# Patient Record
Sex: FEMALE | Race: WHITE | NOT HISPANIC OR LATINO
[De-identification: names, ages, dates, MRNs, and addresses within clinical notes are randomized per-mention and may not be internally consistent; named-entity substitution may affect disease eponyms.]

---

## 2017-01-31 ENCOUNTER — TRANSCRIPTION ENCOUNTER (OUTPATIENT)
Age: 82
End: 2017-01-31

## 2017-08-21 ENCOUNTER — TRANSCRIPTION ENCOUNTER (OUTPATIENT)
Age: 82
End: 2017-08-21

## 2017-08-30 ENCOUNTER — TRANSCRIPTION ENCOUNTER (OUTPATIENT)
Age: 82
End: 2017-08-30

## 2017-11-08 ENCOUNTER — TRANSCRIPTION ENCOUNTER (OUTPATIENT)
Age: 82
End: 2017-11-08

## 2018-03-21 ENCOUNTER — APPOINTMENT (OUTPATIENT)
Dept: OPHTHALMOLOGY | Facility: CLINIC | Age: 83
End: 2018-03-21

## 2018-03-29 ENCOUNTER — APPOINTMENT (OUTPATIENT)
Dept: OPHTHALMOLOGY | Facility: CLINIC | Age: 83
End: 2018-03-29

## 2018-06-01 ENCOUNTER — RESULT REVIEW (OUTPATIENT)
Age: 83
End: 2018-06-01

## 2018-09-05 ENCOUNTER — APPOINTMENT (OUTPATIENT)
Dept: ORTHOPEDIC SURGERY | Facility: CLINIC | Age: 83
End: 2018-09-05
Payer: MEDICARE

## 2018-09-05 VITALS
DIASTOLIC BLOOD PRESSURE: 76 MMHG | SYSTOLIC BLOOD PRESSURE: 136 MMHG | HEIGHT: 60 IN | WEIGHT: 155 LBS | BODY MASS INDEX: 30.43 KG/M2 | HEART RATE: 67 BPM

## 2018-09-05 PROCEDURE — 73502 X-RAY EXAM HIP UNI 2-3 VIEWS: CPT | Mod: LT

## 2018-09-05 PROCEDURE — 99203 OFFICE O/P NEW LOW 30 MIN: CPT

## 2018-09-11 ENCOUNTER — RESULT CHARGE (OUTPATIENT)
Age: 83
End: 2018-09-11

## 2018-09-12 ENCOUNTER — OTHER (OUTPATIENT)
Age: 83
End: 2018-09-12

## 2018-09-26 ENCOUNTER — APPOINTMENT (OUTPATIENT)
Dept: ORTHOPEDIC SURGERY | Facility: CLINIC | Age: 83
End: 2018-09-26

## 2018-09-27 ENCOUNTER — OUTPATIENT (OUTPATIENT)
Dept: OUTPATIENT SERVICES | Facility: HOSPITAL | Age: 83
LOS: 1 days | End: 2018-09-27
Payer: MEDICARE

## 2018-09-27 ENCOUNTER — APPOINTMENT (OUTPATIENT)
Dept: RADIOLOGY | Facility: CLINIC | Age: 83
End: 2018-09-27
Payer: MEDICARE

## 2018-09-27 DIAGNOSIS — Z00.00 ENCOUNTER FOR GENERAL ADULT MEDICAL EXAMINATION WITHOUT ABNORMAL FINDINGS: ICD-10-CM

## 2018-09-27 DIAGNOSIS — Z96.649 PRESENCE OF UNSPECIFIED ARTIFICIAL HIP JOINT: Chronic | ICD-10-CM

## 2018-09-27 DIAGNOSIS — Z98.89 OTHER SPECIFIED POSTPROCEDURAL STATES: Chronic | ICD-10-CM

## 2018-09-27 PROCEDURE — 20610 DRAIN/INJ JOINT/BURSA W/O US: CPT | Mod: LT

## 2018-09-27 PROCEDURE — 20610 DRAIN/INJ JOINT/BURSA W/O US: CPT

## 2018-09-27 PROCEDURE — 77002 NEEDLE LOCALIZATION BY XRAY: CPT | Mod: 26

## 2018-09-27 PROCEDURE — 77002 NEEDLE LOCALIZATION BY XRAY: CPT

## 2018-11-21 ENCOUNTER — APPOINTMENT (OUTPATIENT)
Dept: ORTHOPEDIC SURGERY | Facility: CLINIC | Age: 83
End: 2018-11-21

## 2018-12-05 ENCOUNTER — APPOINTMENT (OUTPATIENT)
Dept: ORTHOPEDIC SURGERY | Facility: CLINIC | Age: 83
End: 2018-12-05
Payer: MEDICARE

## 2018-12-05 PROCEDURE — 99214 OFFICE O/P EST MOD 30 MIN: CPT

## 2019-02-12 ENCOUNTER — TRANSCRIPTION ENCOUNTER (OUTPATIENT)
Age: 84
End: 2019-02-12

## 2019-07-25 ENCOUNTER — RX RENEWAL (OUTPATIENT)
Age: 84
End: 2019-07-25

## 2019-10-25 ENCOUNTER — APPOINTMENT (OUTPATIENT)
Dept: INTERNAL MEDICINE | Facility: CLINIC | Age: 84
End: 2019-10-25
Payer: MEDICARE

## 2019-10-25 ENCOUNTER — NON-APPOINTMENT (OUTPATIENT)
Age: 84
End: 2019-10-25

## 2019-10-25 VITALS
OXYGEN SATURATION: 97 % | HEART RATE: 95 BPM | TEMPERATURE: 97.8 F | WEIGHT: 157 LBS | BODY MASS INDEX: 30.82 KG/M2 | HEIGHT: 60 IN

## 2019-10-25 DIAGNOSIS — Z80.3 FAMILY HISTORY OF MALIGNANT NEOPLASM OF BREAST: ICD-10-CM

## 2019-10-25 DIAGNOSIS — Z87.891 PERSONAL HISTORY OF NICOTINE DEPENDENCE: ICD-10-CM

## 2019-10-25 DIAGNOSIS — S62.101A FRACTURE OF UNSPECIFIED CARPAL BONE, RIGHT WRIST, INITIAL ENCOUNTER FOR CLOSED FRACTURE: ICD-10-CM

## 2019-10-25 PROCEDURE — G0439: CPT

## 2019-10-25 PROCEDURE — 82270 OCCULT BLOOD FECES: CPT

## 2019-10-25 PROCEDURE — 90686 IIV4 VACC NO PRSV 0.5 ML IM: CPT

## 2019-10-25 PROCEDURE — 36415 COLL VENOUS BLD VENIPUNCTURE: CPT

## 2019-10-25 PROCEDURE — G0008: CPT

## 2019-10-25 PROCEDURE — 93000 ELECTROCARDIOGRAM COMPLETE: CPT

## 2019-10-25 NOTE — HISTORY OF PRESENT ILLNESS
[de-identified] : the patient is an 85-year-old female comes in for complete physical examination. Status post breast cancer in 1994 on the eft but does not get followup mammograms She has had a left hip replacement and has known lipidemia. She is treated for long-standing depression withWellbutrin.At the present time she occasionally gets sad and from only this but is not crying and is leaving the house is sleeping well.Over the last year she has gotten one minute of chest pain at rest which is dull and aching but not associated with any other cardiopulmonary symptoms and resolves without sequelae. This occurs once a day She has no chest pain or dyspnea on exertion. She has had a previous stress test that was normal She has refluxand was started on omeprazole by ENT and will take it for 2 weeks. She has no other change in her bowel or bladder and denies melena or blood in her stool. She has pain in the left hip but no weakness numbness loss of balance and coordination. She is up-to-date on Pneumovax as well asshingles vaccineand she will get a flu shot. She is overall feeling well

## 2019-10-25 NOTE — REVIEW OF SYSTEMS
[Chest Pain] : chest pain [Joint Pain] : joint pain [Depression] : depression [FreeTextEntry5] : C. atypical chest pain [Negative] : Heme/Lymph [FreeTextEntry8] : incontinence overflow [FreeTextEntry7] : treated for GERD [FreeTextEntry9] : chronic left hip pain on and off/ight wrist pain secondary to fracture [de-identified] : adiculopathy of the right arm chronicintermittent without weakness [de-identified] : improved on medication

## 2019-10-25 NOTE — ASSESSMENT
[FreeTextEntry1] : the patient's EKG shows no significant changes. We are checking her lipids She refuses to follow up on her breast with mammograms She is up-to-date on all vaccinations She has nonspecific chest pain which we will followand may be GI in origin Overall her health is good and all screening tests are being done

## 2019-10-25 NOTE — HEALTH RISK ASSESSMENT
[1] : 2) Feeling down, depressed, or hopeless for several days (1) [0] : 1) Little interest or pleasure doing things: Not at all (0) [With Patient/Caregiver] : With Patient/Caregiver [Relationship: ___] : Relationship: [unfilled] [I will adhere to the patient's wishes as expressed in the advance directive except as noted below.] : I will adhere to the patient's wishes as expressed in the advance directive except as noted below [AdvancecareDate] : 10/25/19

## 2019-10-25 NOTE — PHYSICAL EXAM
[Normal] : normal gait, coordination grossly intact, no focal deficits [de-identified] : status post cataract [de-identified] : no chest discomfort on palpation [de-identified] : that is post lumpectomy left upper outer quadrant since no masses on the right [FreeTextEntry1] : guaiac negative no masses [de-identified] : no adenopathy [de-identified] : deformity of the right wrist secondary to fracture/status post left hip surgery with occasional limp and pain [de-identified] : no significant lesion

## 2019-10-26 LAB
25(OH)D3 SERPL-MCNC: 43.7 NG/ML
ALBUMIN SERPL ELPH-MCNC: 4.4 G/DL
ALP BLD-CCNC: 55 U/L
ALT SERPL-CCNC: 22 U/L
ANION GAP SERPL CALC-SCNC: 11 MMOL/L
APPEARANCE: CLEAR
AST SERPL-CCNC: 23 U/L
BACTERIA: ABNORMAL
BASOPHILS # BLD AUTO: 0.04 K/UL
BASOPHILS NFR BLD AUTO: 0.6 %
BILIRUB SERPL-MCNC: 0.3 MG/DL
BILIRUBIN URINE: NEGATIVE
BLOOD URINE: NEGATIVE
BUN SERPL-MCNC: 15 MG/DL
CALCIUM SERPL-MCNC: 9.9 MG/DL
CHLORIDE SERPL-SCNC: 104 MMOL/L
CHOLEST SERPL-MCNC: 184 MG/DL
CHOLEST/HDLC SERPL: 3 RATIO
CK SERPL-CCNC: 90 U/L
CO2 SERPL-SCNC: 27 MMOL/L
COLOR: NORMAL
CREAT SERPL-MCNC: 0.78 MG/DL
EOSINOPHIL # BLD AUTO: 0.1 K/UL
EOSINOPHIL NFR BLD AUTO: 1.4 %
GLUCOSE QUALITATIVE U: NEGATIVE
GLUCOSE SERPL-MCNC: 100 MG/DL
HCT VFR BLD CALC: 45.9 %
HDLC SERPL-MCNC: 61 MG/DL
HGB BLD-MCNC: 14.3 G/DL
HYALINE CASTS: 2 /LPF
IMM GRANULOCYTES NFR BLD AUTO: 0.3 %
KETONES URINE: NEGATIVE
LDLC SERPL CALC-MCNC: 99 MG/DL
LEUKOCYTE ESTERASE URINE: NEGATIVE
LYMPHOCYTES # BLD AUTO: 1.44 K/UL
LYMPHOCYTES NFR BLD AUTO: 20.8 %
MAN DIFF?: NORMAL
MCHC RBC-ENTMCNC: 28.2 PG
MCHC RBC-ENTMCNC: 31.2 GM/DL
MCV RBC AUTO: 90.5 FL
MICROSCOPIC-UA: NORMAL
MONOCYTES # BLD AUTO: 0.77 K/UL
MONOCYTES NFR BLD AUTO: 11.1 %
NEUTROPHILS # BLD AUTO: 4.54 K/UL
NEUTROPHILS NFR BLD AUTO: 65.8 %
NITRITE URINE: NEGATIVE
PH URINE: 6
PLATELET # BLD AUTO: 231 K/UL
POTASSIUM SERPL-SCNC: 4.8 MMOL/L
PROT SERPL-MCNC: 7.1 G/DL
PROTEIN URINE: NEGATIVE
RBC # BLD: 5.07 M/UL
RBC # FLD: 14 %
RED BLOOD CELLS URINE: 0 /HPF
SODIUM SERPL-SCNC: 142 MMOL/L
SPECIFIC GRAVITY URINE: 1.01
SQUAMOUS EPITHELIAL CELLS: 5 /HPF
TRIGL SERPL-MCNC: 120 MG/DL
UROBILINOGEN URINE: NORMAL
WBC # FLD AUTO: 6.91 K/UL
WHITE BLOOD CELLS URINE: 3 /HPF

## 2019-11-04 ENCOUNTER — LABORATORY RESULT (OUTPATIENT)
Age: 84
End: 2019-11-04

## 2019-11-05 ENCOUNTER — APPOINTMENT (OUTPATIENT)
Dept: DERMATOLOGY | Facility: CLINIC | Age: 84
End: 2019-11-05
Payer: MEDICARE

## 2019-11-05 VITALS — WEIGHT: 157 LBS | BODY MASS INDEX: 30.82 KG/M2 | HEIGHT: 60 IN

## 2019-11-05 DIAGNOSIS — L40.9 PSORIASIS, UNSPECIFIED: ICD-10-CM

## 2019-11-05 DIAGNOSIS — D48.5 NEOPLASM OF UNCERTAIN BEHAVIOR OF SKIN: ICD-10-CM

## 2019-11-05 DIAGNOSIS — Z78.9 OTHER SPECIFIED HEALTH STATUS: ICD-10-CM

## 2019-11-05 DIAGNOSIS — L21.9 SEBORRHEIC DERMATITIS, UNSPECIFIED: ICD-10-CM

## 2019-11-05 PROCEDURE — 11102 TANGNTL BX SKIN SINGLE LES: CPT

## 2019-11-05 PROCEDURE — 99204 OFFICE O/P NEW MOD 45 MIN: CPT | Mod: 25

## 2019-12-06 ENCOUNTER — APPOINTMENT (OUTPATIENT)
Dept: DERMATOLOGY | Facility: CLINIC | Age: 84
End: 2019-12-06
Payer: MEDICARE

## 2019-12-06 ENCOUNTER — TRANSCRIPTION ENCOUNTER (OUTPATIENT)
Age: 84
End: 2019-12-06

## 2019-12-06 PROCEDURE — 99215 OFFICE O/P EST HI 40 MIN: CPT

## 2019-12-06 RX ORDER — ESTRADIOL 0.1 MG/G
0.1 CREAM VAGINAL
Qty: 42 | Refills: 0 | Status: ACTIVE | COMMUNITY
Start: 2019-07-03

## 2020-01-27 ENCOUNTER — APPOINTMENT (OUTPATIENT)
Dept: DERMATOLOGY | Facility: CLINIC | Age: 85
End: 2020-01-27

## 2020-03-10 ENCOUNTER — APPOINTMENT (OUTPATIENT)
Dept: DERMATOLOGY | Facility: CLINIC | Age: 85
End: 2020-03-10
Payer: MEDICARE

## 2020-03-10 PROCEDURE — 13132 CMPLX RPR F/C/C/M/N/AX/G/H/F: CPT

## 2020-03-10 PROCEDURE — 17311 MOHS 1 STAGE H/N/HF/G: CPT

## 2020-03-11 ENCOUNTER — APPOINTMENT (OUTPATIENT)
Dept: DERMATOLOGY | Facility: CLINIC | Age: 85
End: 2020-03-11
Payer: MEDICARE

## 2020-03-11 PROCEDURE — 99024 POSTOP FOLLOW-UP VISIT: CPT

## 2020-03-17 ENCOUNTER — APPOINTMENT (OUTPATIENT)
Dept: DERMATOLOGY | Facility: CLINIC | Age: 85
End: 2020-03-17
Payer: MEDICARE

## 2020-03-17 PROCEDURE — 99024 POSTOP FOLLOW-UP VISIT: CPT

## 2020-06-30 ENCOUNTER — APPOINTMENT (OUTPATIENT)
Dept: DERMATOLOGY | Facility: CLINIC | Age: 85
End: 2020-06-30
Payer: MEDICARE

## 2020-06-30 VITALS — TEMPERATURE: 96.8 F

## 2020-06-30 DIAGNOSIS — C44.319 BASAL CELL CARCINOMA OF SKIN OF OTHER PARTS OF FACE: ICD-10-CM

## 2020-06-30 PROCEDURE — 99213 OFFICE O/P EST LOW 20 MIN: CPT

## 2020-09-04 ENCOUNTER — TRANSCRIPTION ENCOUNTER (OUTPATIENT)
Age: 85
End: 2020-09-04

## 2020-10-28 ENCOUNTER — NON-APPOINTMENT (OUTPATIENT)
Age: 85
End: 2020-10-28

## 2020-10-28 ENCOUNTER — APPOINTMENT (OUTPATIENT)
Dept: INTERNAL MEDICINE | Facility: CLINIC | Age: 85
End: 2020-10-28
Payer: MEDICARE

## 2020-10-28 VITALS
BODY MASS INDEX: 29.64 KG/M2 | SYSTOLIC BLOOD PRESSURE: 130 MMHG | HEIGHT: 60 IN | WEIGHT: 151 LBS | DIASTOLIC BLOOD PRESSURE: 70 MMHG | OXYGEN SATURATION: 96 % | TEMPERATURE: 98.8 F | HEART RATE: 94 BPM

## 2020-10-28 DIAGNOSIS — Z23 ENCOUNTER FOR IMMUNIZATION: ICD-10-CM

## 2020-10-28 PROCEDURE — G0008: CPT

## 2020-10-28 PROCEDURE — 93000 ELECTROCARDIOGRAM COMPLETE: CPT

## 2020-10-28 PROCEDURE — G0439: CPT

## 2020-10-28 PROCEDURE — 36415 COLL VENOUS BLD VENIPUNCTURE: CPT

## 2020-10-28 PROCEDURE — 90662 IIV NO PRSV INCREASED AG IM: CPT

## 2020-10-28 NOTE — PHYSICAL EXAM
[Normal] : normal gait, coordination grossly intact, no focal deficits [de-identified] : S/P L lumpectomu, 12 0clock R breadt large 3 cm mass ecchymoses,nipple mildly deformed

## 2020-10-28 NOTE — HISTORY OF PRESENT ILLNESS
[de-identified] : Pt is an 86 F s/p L breast Ca =-alana, hyperlipidemia who comes in for complete px. She was seen by her gyne and breast surgeon following onset of itching and dull pain at12 o'clock R beast. W/u w mammogram,MRI found breast cancer on R and there was a ? of a bone lesion wh is being followed up W CAT scan. With these exceptions she has been well ROS otherwise entirely neg. At the present time she has no chest pain.

## 2020-10-29 LAB
25(OH)D3 SERPL-MCNC: 57.9 NG/ML
ALBUMIN SERPL ELPH-MCNC: 4.3 G/DL
ALP BLD-CCNC: 56 U/L
ALT SERPL-CCNC: 22 U/L
ANION GAP SERPL CALC-SCNC: 12 MMOL/L
APPEARANCE: ABNORMAL
AST SERPL-CCNC: 20 U/L
BACTERIA: ABNORMAL
BASOPHILS # BLD AUTO: 0.04 K/UL
BASOPHILS NFR BLD AUTO: 0.6 %
BILIRUB SERPL-MCNC: 0.5 MG/DL
BILIRUBIN URINE: NEGATIVE
BLOOD URINE: NEGATIVE
BUN SERPL-MCNC: 13 MG/DL
CALCIUM SERPL-MCNC: 9.4 MG/DL
CHLORIDE SERPL-SCNC: 104 MMOL/L
CHOLEST SERPL-MCNC: 180 MG/DL
CK SERPL-CCNC: 67 U/L
CO2 SERPL-SCNC: 26 MMOL/L
COLOR: YELLOW
CREAT SERPL-MCNC: 0.8 MG/DL
EOSINOPHIL # BLD AUTO: 0.11 K/UL
EOSINOPHIL NFR BLD AUTO: 1.7 %
GLUCOSE QUALITATIVE U: NEGATIVE
GLUCOSE SERPL-MCNC: 109 MG/DL
HCT VFR BLD CALC: 42.8 %
HDLC SERPL-MCNC: 61 MG/DL
HGB BLD-MCNC: 13.4 G/DL
HYALINE CASTS: 0 /LPF
IMM GRANULOCYTES NFR BLD AUTO: 0.3 %
KETONES URINE: NEGATIVE
LDLC SERPL CALC-MCNC: 96 MG/DL
LEUKOCYTE ESTERASE URINE: ABNORMAL
LYMPHOCYTES # BLD AUTO: 1.17 K/UL
LYMPHOCYTES NFR BLD AUTO: 17.6 %
MAN DIFF?: NORMAL
MCHC RBC-ENTMCNC: 27.9 PG
MCHC RBC-ENTMCNC: 31.3 GM/DL
MCV RBC AUTO: 89.2 FL
MICROSCOPIC-UA: NORMAL
MONOCYTES # BLD AUTO: 0.73 K/UL
MONOCYTES NFR BLD AUTO: 11 %
NEUTROPHILS # BLD AUTO: 4.57 K/UL
NEUTROPHILS NFR BLD AUTO: 68.8 %
NITRITE URINE: NEGATIVE
NONHDLC SERPL-MCNC: 119 MG/DL
PH URINE: 6.5
PLATELET # BLD AUTO: 223 K/UL
POTASSIUM SERPL-SCNC: 4.5 MMOL/L
PROT SERPL-MCNC: 6.7 G/DL
PROTEIN URINE: NORMAL
RBC # BLD: 4.8 M/UL
RBC # FLD: 14 %
RED BLOOD CELLS URINE: 2 /HPF
SODIUM SERPL-SCNC: 141 MMOL/L
SPECIFIC GRAVITY URINE: 1.01
SQUAMOUS EPITHELIAL CELLS: >27 /HPF
TRIGL SERPL-MCNC: 115 MG/DL
UROBILINOGEN URINE: NORMAL
WBC # FLD AUTO: 6.64 K/UL
WHITE BLOOD CELLS URINE: 15 /HPF

## 2021-01-02 ENCOUNTER — TRANSCRIPTION ENCOUNTER (OUTPATIENT)
Age: 86
End: 2021-01-02

## 2021-01-07 ENCOUNTER — FORM ENCOUNTER (OUTPATIENT)
Age: 86
End: 2021-01-07

## 2021-01-08 ENCOUNTER — TRANSCRIPTION ENCOUNTER (OUTPATIENT)
Age: 86
End: 2021-01-08

## 2021-01-10 ENCOUNTER — APPOINTMENT (OUTPATIENT)
Dept: DISASTER EMERGENCY | Facility: HOSPITAL | Age: 86
End: 2021-01-10

## 2021-01-11 ENCOUNTER — EMERGENCY (EMERGENCY)
Facility: HOSPITAL | Age: 86
LOS: 1 days | Discharge: HOME | End: 2021-01-11
Attending: EMERGENCY MEDICINE
Payer: MEDICARE

## 2021-01-11 ENCOUNTER — TRANSCRIPTION ENCOUNTER (OUTPATIENT)
Age: 86
End: 2021-01-11

## 2021-01-11 ENCOUNTER — APPOINTMENT (OUTPATIENT)
Dept: DISASTER EMERGENCY | Facility: CLINIC | Age: 86
End: 2021-01-11

## 2021-01-11 VITALS
OXYGEN SATURATION: 95 % | SYSTOLIC BLOOD PRESSURE: 114 MMHG | HEART RATE: 71 BPM | RESPIRATION RATE: 18 BRPM | DIASTOLIC BLOOD PRESSURE: 54 MMHG | TEMPERATURE: 99 F

## 2021-01-11 VITALS
DIASTOLIC BLOOD PRESSURE: 72 MMHG | HEART RATE: 78 BPM | WEIGHT: 145.06 LBS | RESPIRATION RATE: 18 BRPM | TEMPERATURE: 98 F | HEIGHT: 64 IN | OXYGEN SATURATION: 93 % | SYSTOLIC BLOOD PRESSURE: 122 MMHG

## 2021-01-11 DIAGNOSIS — Z98.89 OTHER SPECIFIED POSTPROCEDURAL STATES: Chronic | ICD-10-CM

## 2021-01-11 DIAGNOSIS — Z96.649 PRESENCE OF UNSPECIFIED ARTIFICIAL HIP JOINT: Chronic | ICD-10-CM

## 2021-01-11 LAB
ALBUMIN SERPL ELPH-MCNC: 3.9 G/DL — SIGNIFICANT CHANGE UP (ref 3.3–5)
ALP SERPL-CCNC: 49 U/L — SIGNIFICANT CHANGE UP (ref 40–120)
ALT FLD-CCNC: 18 U/L — SIGNIFICANT CHANGE UP (ref 10–45)
ANION GAP SERPL CALC-SCNC: 12 MMOL/L — SIGNIFICANT CHANGE UP (ref 5–17)
APTT BLD: 31.5 SEC — SIGNIFICANT CHANGE UP (ref 27.5–35.5)
AST SERPL-CCNC: 25 U/L — SIGNIFICANT CHANGE UP (ref 10–40)
BASOPHILS # BLD AUTO: 0.01 K/UL — SIGNIFICANT CHANGE UP (ref 0–0.2)
BASOPHILS NFR BLD AUTO: 0.3 % — SIGNIFICANT CHANGE UP (ref 0–2)
BILIRUB SERPL-MCNC: 0.2 MG/DL — SIGNIFICANT CHANGE UP (ref 0.2–1.2)
BUN SERPL-MCNC: 8 MG/DL — SIGNIFICANT CHANGE UP (ref 7–23)
CALCIUM SERPL-MCNC: 9 MG/DL — SIGNIFICANT CHANGE UP (ref 8.4–10.5)
CHLORIDE SERPL-SCNC: 102 MMOL/L — SIGNIFICANT CHANGE UP (ref 96–108)
CO2 SERPL-SCNC: 24 MMOL/L — SIGNIFICANT CHANGE UP (ref 22–31)
CREAT SERPL-MCNC: 0.74 MG/DL — SIGNIFICANT CHANGE UP (ref 0.5–1.3)
EOSINOPHIL # BLD AUTO: 0 K/UL — SIGNIFICANT CHANGE UP (ref 0–0.5)
EOSINOPHIL NFR BLD AUTO: 0 % — SIGNIFICANT CHANGE UP (ref 0–6)
GLUCOSE SERPL-MCNC: 108 MG/DL — HIGH (ref 70–99)
HCT VFR BLD CALC: 40.5 % — SIGNIFICANT CHANGE UP (ref 34.5–45)
HGB BLD-MCNC: 12.7 G/DL — SIGNIFICANT CHANGE UP (ref 11.5–15.5)
IMM GRANULOCYTES NFR BLD AUTO: 0.3 % — SIGNIFICANT CHANGE UP (ref 0–1.5)
INR BLD: 1.07 RATIO — SIGNIFICANT CHANGE UP (ref 0.88–1.16)
LYMPHOCYTES # BLD AUTO: 0.63 K/UL — LOW (ref 1–3.3)
LYMPHOCYTES # BLD AUTO: 17.3 % — SIGNIFICANT CHANGE UP (ref 13–44)
MCHC RBC-ENTMCNC: 27.3 PG — SIGNIFICANT CHANGE UP (ref 27–34)
MCHC RBC-ENTMCNC: 31.4 GM/DL — LOW (ref 32–36)
MCV RBC AUTO: 87.1 FL — SIGNIFICANT CHANGE UP (ref 80–100)
MONOCYTES # BLD AUTO: 0.56 K/UL — SIGNIFICANT CHANGE UP (ref 0–0.9)
MONOCYTES NFR BLD AUTO: 15.3 % — HIGH (ref 2–14)
NEUTROPHILS # BLD AUTO: 2.44 K/UL — SIGNIFICANT CHANGE UP (ref 1.8–7.4)
NEUTROPHILS NFR BLD AUTO: 66.8 % — SIGNIFICANT CHANGE UP (ref 43–77)
NRBC # BLD: 0 /100 WBCS — SIGNIFICANT CHANGE UP (ref 0–0)
PLATELET # BLD AUTO: 140 K/UL — LOW (ref 150–400)
POTASSIUM SERPL-MCNC: 3.7 MMOL/L — SIGNIFICANT CHANGE UP (ref 3.5–5.3)
POTASSIUM SERPL-SCNC: 3.7 MMOL/L — SIGNIFICANT CHANGE UP (ref 3.5–5.3)
PROT SERPL-MCNC: 6.7 G/DL — SIGNIFICANT CHANGE UP (ref 6–8.3)
PROTHROM AB SERPL-ACNC: 12.8 SEC — SIGNIFICANT CHANGE UP (ref 10.6–13.6)
RBC # BLD: 4.65 M/UL — SIGNIFICANT CHANGE UP (ref 3.8–5.2)
RBC # FLD: 14 % — SIGNIFICANT CHANGE UP (ref 10.3–14.5)
SODIUM SERPL-SCNC: 138 MMOL/L — SIGNIFICANT CHANGE UP (ref 135–145)
WBC # BLD: 3.65 K/UL — LOW (ref 3.8–10.5)
WBC # FLD AUTO: 3.65 K/UL — LOW (ref 3.8–10.5)

## 2021-01-11 PROCEDURE — 99284 EMERGENCY DEPT VISIT MOD MDM: CPT | Mod: CS

## 2021-01-11 PROCEDURE — 93005 ELECTROCARDIOGRAM TRACING: CPT

## 2021-01-11 PROCEDURE — 71045 X-RAY EXAM CHEST 1 VIEW: CPT | Mod: 26

## 2021-01-11 PROCEDURE — 93010 ELECTROCARDIOGRAM REPORT: CPT

## 2021-01-11 PROCEDURE — 71045 X-RAY EXAM CHEST 1 VIEW: CPT

## 2021-01-11 PROCEDURE — 85025 COMPLETE CBC W/AUTO DIFF WBC: CPT

## 2021-01-11 PROCEDURE — 99284 EMERGENCY DEPT VISIT MOD MDM: CPT | Mod: 25

## 2021-01-11 PROCEDURE — 85730 THROMBOPLASTIN TIME PARTIAL: CPT

## 2021-01-11 PROCEDURE — 80053 COMPREHEN METABOLIC PANEL: CPT

## 2021-01-11 PROCEDURE — 85610 PROTHROMBIN TIME: CPT

## 2021-01-11 NOTE — ED ADULT NURSE REASSESSMENT NOTE - NS ED NURSE REASSESS COMMENT FT1
Patient resting comfortably in bed, VSS. Awaiting non-emergent ambulette for transport home. Chandni SNELL has been in contact with patient's daughter Alysa.

## 2021-01-11 NOTE — ED ADULT NURSE REASSESSMENT NOTE - NS ED NURSE REASSESS COMMENT FT1
Patient resting comfortably in bed, O2 97-98% on room air. Patient O2 decreased with ambulation to 89%. Patient ambulated with ALIS CUTLER Attending MD Ian bajwa.

## 2021-01-11 NOTE — ED PROVIDER NOTE - NS ED ROS FT
Constitutional: see hpi  Eyes: No visual changes, eye pain or redness  HEENT: No throat pain, ear pain, nasal pain. No nose bleeding.  CV: No chest pain or lower extremity edema  Resp: No SOB no cough  GI: No abd pain. No nausea or vomiting. No diarrhea. No constipation.   : No dysuria, hematuria.   MSK: No musculoskeletal pain  Skin: No rash  Neuro: No headache. No numbness or tingling. No weakness.

## 2021-01-11 NOTE — ED PROVIDER NOTE - OBJECTIVE STATEMENT
87 yo female with pmh hld, breast CA (on oral chemo) presenting with concern of hypoxia at home in setting of recent COVID-19 swab 4 days ago. Pt states she has been having low-grade fevers and decreased appetite for approx 1 week, was tested positive for covid-19 4 days ago and has been monitoring oher O2 at home. Today she noticed her O2 was 88-89%, she called her PMD who recommended she come to the ER for evaluation. Pt endorses some mild midsternal pleuritic chest pain only with deep inspiration. oherwise no pain, 85 yo female with pmh hld, breast CA (on oral chemo) presenting with concern of hypoxia at home in setting of recent COVID-19 swab 4 days ago. Pt states she has been having low-grade fevers and decreased appetite for approx 1 week, was tested positive for covid-19 4 days ago and has been monitoring oher O2 at home. Today she noticed her O2 was 88-89%, she called her PMD who recommended she come to the ER for evaluation. Pt endorses some mild midsternal pleuritic chest pain only with deep inspiration. otherwise no pain, no cough, congestion, shortness of breath or difficulty breathing, no abdominal pain, nausea, vomiting or inability to tolerate PO. 85 yo female with pmh hld, breast CA (on oral chemo) presenting with concern of hypoxia at home in setting of recent COVID-19 swab 4 days ago. Pt states she has been having low-grade fevers and decreased appetite for approx 1 week, was tested positive for covid-19 4 days ago and has been monitoring oher O2 at home. Today she noticed her O2 was 88-89%, she called her PMD who recommended she come to the ER for evaluation. Pt endorses some mild midsternal pleuritic chest pain only with deep inspiration. otherwise no pain, no cough, congestion, shortness of breath or difficulty breathing, no abdominal pain, nausea, vomiting or inability to tolerate PO.      Attn - pt seen in [facility] - agree with above - COVID + on 1/7 - pt had one low O2Sat reading at home this morning and called PMD and advised to be checked in ER.  pt reports sscp only with deep inspiration.  no sob, gaona, palp. +fatigue and tire with decreased appetite.  no leg pain or swelling.  currently getting oral chemo for breast CA - Dr. Batista

## 2021-01-11 NOTE — ED PROVIDER NOTE - NSPTACCESSSVCSAPPTDETAILS_ED_ALL_ED_FT
URGENT: [facility]-pt with recent COVID diagnosis, also can receive monoclonal antibodies. URGENT: here for COVID, agreeable to monoclonal abx and needs [facility] f/u.

## 2021-01-11 NOTE — ED PROVIDER NOTE - PHYSICAL EXAMINATION
A&Ox3, NAD. NCAT. PERRL, EOMI. Neck supple, no LAD. Lungs CTAB. +S1S2, RRR, No m/r/g. Abd soft, NT/ND, +BS, no rebound or guarding. Extremities: cap refill <2, pulses in distal extremities 4+, no edema. Skin without rash. CN II-XII intact. Strength 5/5 UE/LE. Sensations intact throughout. Gait steady. breathing comfortably, no retractions, speaking in full sentences no difficulty breathing. O2 at 96% on RA at rest. A&Ox3, NAD. NCAT. PERRL, EOMI. Neck supple, no LAD. Lungs CTAB. +S1S2, RRR, No m/r/g. Abd soft, NT/ND, +BS, no rebound or guarding. Extremities: cap refill <2, pulses in distal extremities 4+, no edema. Skin without rash. CN II-XII intact. Strength 5/5 UE/LE. Sensations intact throughout. Gait steady. breathing comfortably, no retractions, speaking in full sentences no difficulty breathing. O2 at 96% on RA at rest.      Attn - O2Sat 98% at rest on r/a, decreased to 89% with ambulation.  alert, NAD, no pallor or jaundice, PERRL 3 mm, moist mm, skin - warm and dry, Lungs - clear, no w/r/r, good BS bilaterally, Cor - rr, no M, no rub, Abdo - ND, soft, NT, no HSM, no CVAT, no guarding or rebound. Extremities - no edma, no calf tenderness, distal pulses intact and symmetrical, Neuro - intact and non-focal A&Ox3, NAD. NCAT. PERRL, EOMI. Neck supple, no LAD. Lungs CTAB. +S1S2, RRR, No m/r/g. Abd soft, NT/ND, +BS, no rebound or guarding. Extremities: cap refill <2, pulses in distal extremities 4+, no edema. Skin without rash. CN II-XII intact. Strength 5/5 UE/LE. Sensations intact throughout. Gait steady. breathing comfortably, no retractions, speaking in full sentences no difficulty breathing. O2 at 98% on RA at rest.      Attn - O2Sat 98% at rest on r/a, decreased to 89% with ambulation.  alert, NAD, no pallor or jaundice, PERRL 3 mm, moist mm, skin - warm and dry, Lungs - clear, no w/r/r, good BS bilaterally, Cor - rr, no M, no rub, Abdo - ND, soft, NT, no HSM, no CVAT, no guarding or rebound. Extremities - no edma, no calf tenderness, distal pulses intact and symmetrical, Neuro - intact and non-focal

## 2021-01-11 NOTE — ED PROVIDER NOTE - PATIENT PORTAL LINK FT
You can access the FollowMyHealth Patient Portal offered by [facility] by registering at the following website: [email or web address]. By joining [facility]’s FollowMyHealth portal, you will also be able to view your health information using other applications (apps) compatible with our system.

## 2021-01-11 NOTE — ED PROVIDER NOTE - NSFOLLOWUPINSTRUCTIONS_ED_ALL_ED_FT
- stay hydrated.   - take tylenol 975mg and ibuprofen 600mg every 6 hours as needed for pain-take with meals.  - follow up with your pcp in 1-2 days.  - return if symptoms worsen, fever, weakness, numbness/tingling, blurred vision, difficulty ambulating and all other concerns. - stay hydrated.   - take tylenol 975mg and every 6 hours as needed for fevers/chills  - follow up with your pcp in 1-2 days.  - return if symptoms worsen, you are unable to eat or drink, have difficulty ambulating, your oxygen goes below 90 and all other concerns. - stay hydrated, take all  jabari emedications as prescribed  - take tylenol 975mg and every 6 hours as needed for body aches  - follow up with your pcp in 1-2 days.  - return if symptoms worsen, you are unable to eat or drink, have difficulty ambulating, your oxygen goes below 90 and all other concerns---make sure your hands are warm and you place the sensor on a finger that does not have nail polish on it.

## 2021-01-11 NOTE — CHART NOTE - NSCHARTNOTEFT_GEN_A_CORE
EMERGENCY : Social work consulted due to patient in need of transportation home and medically cleared for discharge. Patient covid positive, identifying her primary contact as her daughter in law Alysa Bridges (PH: [phone]). As per PA patient is independent in adl’s and ambulation. LMSW contacted patient’s daughter in law Alysa and introduced self and role to which she verbalized understanding. Alysa states that patient has no way to get home and lives alone, family unable to transport patient home. LMSW explained the option of private paying for an ambulette for transportation home. Patient’s daughter in law expresses interest in this option. LMSW got a quote from [employer] for $116.36 for non-emergent ambulette transport from hospital to patient’s home. Alysa provided with this information. Patient’s daughter in law also expressing that patient was registered for antibody infusion therapy and inquiring if and when patient got an appointment. GILL spoke with ED referrals coordinator Katelyn who was able to confirm that patient has a 10:20 am appointment in the infusion tent on hospital campus for tomorrow, 1/12/2020. LMSW made patient’s daughter in law aware of appointment arrangements and provided her with hotline number for appointments, PH:[phone]. GILL was then notified by Jeremy that he received a call from infusion RN looking to help patient/ family set up transportation for appointment tomorrow. GILL provided this RN with Alysa’s information for further assistance scheduling transportation for tomorrow’s appointment. GILL also provided patient’s daughter-in-law with ambulette companies transporting covid positive patient’s should she need an alternative mode of transportation. Including [facility] [employer] who can accommodate transportation arrangements for patient’s appointment, Alysa aware of availability and states she will call to private pay for trip if infusion RN cannot arrange transportation. Alysa confirms that she is in touch with infusion RN Indy in regards to transportation arrangements.  Alysa then requested LMSW book patient’s trip home through senior care. Alysa confirms all demographic information reflective in chart. Trip arranged for 7:30PM trip #[id number]. GILL then provided information to Alysa for senior care ambulette to private pay for patient’s transportation home. Patient and providers aware of above information and in agreement with dispo plans. Patient and family with no further needs for LMSW at this time. Contact information provided and ongoing social work availability ensured.

## 2021-01-11 NOTE — ED ADULT NURSE NOTE - ED STAT RN HANDOFF DETAILS
Bedside report given to on coming nurse Katerina. Understands pmh, medications given and plan of care for patient. Patient in stable condition, vital signs updated, has no complaints at this time and has been updated on care plan. Explained to patient that it is change of shift and new nurse is taking over, pt verbalized understanding.

## 2021-01-11 NOTE — ED ADULT NURSE REASSESSMENT NOTE - NS ED NURSE REASSESS COMMENT FT1
Patient resting comfortably in bed, VSS. ALIS Tariq arranged for consult with social work for transport home from hospital.

## 2021-01-11 NOTE — ED PROVIDER NOTE - CLINICAL SUMMARY MEDICAL DECISION MAKING FREE TEXT BOX
Attn - pt COVID+ with one episode of decreased pulse ox at home this morning.  CXR to r/o PN.  no suspicion for PE or ACS at this time.  EKG, labs. reassess.  if all reasonable, d/c and enroll pt into [facility] program. pt has own pulse oximeter at home.

## 2021-01-11 NOTE — ED PROVIDER NOTE - PROGRESS NOTE DETAILS
Spoke with Dr. Moise, pts pcp, discussed labs and cxr wnl, pt not hypoxic here in ED, would like to d/c with [facility] follow up which he is agreeable with. -TAVON DyerC spoke with jonathon from social work, will come help with pts d/c, pt state she does not have ride home. Pt agreeable with monoclonal abx treatment, is independent with ambualtion but may need transportation assistance. -TAVON DyerC spoke with Jeremy, clinical coordinator who will speak with pts daughter Alysa [phone] in regards to setting up monoclonal abx. Alysa called earlier with concern as they have been ahving difficulty with setting this up. -Chandni Christopher PA-C spoke with jonathon from social work, will come help with pts d/c, pt state she does not have ride home. Pt agreeable with monoclonal abx treatment, is independent with ambulation but may need transportation assistance. -TAVON DyerC

## 2021-01-11 NOTE — ED ADULT NURSE NOTE - NSIMPLEMENTINTERV_GEN_ALL_ED
Implemented All Fall with Harm Risk Interventions:  [place] to call system. Call bell, personal items and telephone within reach. Instruct patient to call for assistance. Room bathroom lighting operational. Non-slip footwear when patient is off stretcher. Physically safe environment: no spills, clutter or unnecessary equipment. Stretcher in lowest position, wheels locked, appropriate side rails in place. Provide visual cue, wrist band, yellow gown, etc. Monitor gait and stability. Monitor for mental status changes and reorient to person, place, and time. Review medications for side effects contributing to fall risk. Reinforce activity limits and safety measures with patient and family. Provide visual clues: red socks.

## 2021-01-11 NOTE — ED ADULT NURSE NOTE - OBJECTIVE STATEMENT
87 y/o female patient presents ambulatory to the ED with c/o "low oxygen" at home today. Patient states her oxygen was 88-89% on room air today, spoke with PMD advised to come to ED for evaluation. Patient recently diagnosed with COVID 4 days ago (1/7/21). Patient states she has been having low grade fever, decreased PO intake x 1 week. Patient has midsternal chest pain, worsens with deep inspiration. Patient denies cough, nasal congestion, N/V/D; afebrile in ED. PMHX breast cancer on oral chemo, right limb restriction

## 2021-01-12 ENCOUNTER — OUTPATIENT (OUTPATIENT)
Dept: OUTPATIENT SERVICES | Facility: HOSPITAL | Age: 86
LOS: 1 days | End: 2021-01-12
Payer: MEDICARE

## 2021-01-12 ENCOUNTER — APPOINTMENT (OUTPATIENT)
Dept: DISASTER EMERGENCY | Facility: HOSPITAL | Age: 86
End: 2021-01-12

## 2021-01-12 VITALS
HEART RATE: 64 BPM | RESPIRATION RATE: 20 BRPM | OXYGEN SATURATION: 94 % | DIASTOLIC BLOOD PRESSURE: 53 MMHG | TEMPERATURE: 98 F | SYSTOLIC BLOOD PRESSURE: 120 MMHG

## 2021-01-12 VITALS
TEMPERATURE: 98 F | RESPIRATION RATE: 18 BRPM | HEART RATE: 66 BPM | DIASTOLIC BLOOD PRESSURE: 82 MMHG | OXYGEN SATURATION: 94 % | SYSTOLIC BLOOD PRESSURE: 143 MMHG

## 2021-01-12 DIAGNOSIS — U07.1 COVID-19: ICD-10-CM

## 2021-01-12 DIAGNOSIS — Z96.649 PRESENCE OF UNSPECIFIED ARTIFICIAL HIP JOINT: Chronic | ICD-10-CM

## 2021-01-12 DIAGNOSIS — Z98.89 OTHER SPECIFIED POSTPROCEDURAL STATES: Chronic | ICD-10-CM

## 2021-01-12 PROCEDURE — M0243: CPT

## 2021-01-12 NOTE — MONOCLONAL ANTIBODY INFUSION - ASSESSMENT AND PLAN
Patient is a 86y old  Female who presents with a chief complaint of fever, nausea found to have Covid 19 positive. Here for monoclonal infusion.    I have reviewed the Casirivimab/Imdevimab Emergency Use Authorization (EUA) and I have provided the patient or patient's caregiver with the following information:      1. FDA has authorized emergency use Casirivimab/Imdevimab, which is not an FDA-approved biological product.  2. The patient or patient's caregiver has the option to accept or refuse administration of Casirivimab/Imdevimab.   3. The significant known and potential risks and benefits of Casirivimab/Imdevimab and the extent to which such risks and benefits are unknown.  4. Information on available alternative treatments and risks and benefits of those alternatives.  Patient is a 86y old  Female who presents with a chief complaint of fever, nausea found to have Covid 19 positive. Here for monoclonal infusion.    I have reviewed the Casirivimab/Imdevimab Emergency Use Authorization (EUA) and I have provided the patient or patient's caregiver with the following information:      1. FDA has authorized emergency use Casirivimab/Imdevimab, which is not an FDA-approved biological product.  2. The patient or patient's caregiver has the option to accept or refuse administration of Casirivimab/Imdevimab.   3. The significant known and potential risks and benefits of Casirivimab/Imdevimab and the extent to which such risks and benefits are unknown.  4. Information on available alternative treatments and risks and benefits of those alternatives.     - Pt is stable for discharge  - Discharge instructions reviewed with pt, all questions answered.

## 2021-01-12 NOTE — MONOCLONAL ANTIBODY INFUSION - EXAM
Patient is a 86y old  Female who presents with a chief complaint of fever, nausea found to have Covid 19 positive. Here for monoclonal infusion.    MEDICATIONS  (STANDING):  casirivimab/imdevimab in sodium chloride 0.9% (EUA) IVPB 250 milliLiter(s) IV Intermittent once    PAST MEDICAL/SURGICAL/FAMILY/SOCIAL HISTORY:  Past Medical History:  Breast cancer.    Tobacco Usage:  - Tobacco Usage Former smoker    ALLERGIES AND HOME MEDICATIONS:  Allergies:   Allergies:  	penicillin: Drug, Unknown      PHYSICAL EXAM:  Vital Signs Last 24 Hrs  T(C): 36.9 (12 Jan 2021 10:50), Max: 37.6 (11 Jan 2021 17:53)  T(F): 98.4 (12 Jan 2021 10:50), Max: 99.6 (11 Jan 2021 17:53)  HR: 59 (12 Jan 2021 10:50) (59 - 74)  BP: 112/70 (12 Jan 2021 10:50) (104/52 - 143/82)  BP(mean): --  RR: 20 (12 Jan 2021 10:50) (18 - 20)  SpO2: 94% (12 Jan 2021 10:50) (94% - 100%)  CONSTITUTIONAL: NAD, well-developed, well-groomed  ENMT: Moist oral mucosa, no pharyngeal injection or exudates; normal dentition  RESPIRATORY: Normal respiratory effort; lungs are clear to auscultation bilaterally  CARDIOVASCULAR: Regular rate and rhythm, normal S1 and S2, no murmur/rub/gallop; No lower extremity edema; Peripheral pulses are 2+ bilaterally  ABDOMEN: Nontender to palpation, normoactive bowel sounds, no rebound/guarding; No hepatosplenomegaly  PSYCH: A+O to person, place, and time; affect appropriate  NEUROLOGY: CN 2-12 are intact and symmetric; no gross sensory deficits   SKIN: No rashes; no palpable lesions    LABS: Covid detected on 1/7/21                        12.7   3.65  )-----------( 140      ( 11 Jan 2021 11:38 )             40.5     01-11    138  |  102  |  8   ----------------------------<  108<H>  3.7   |  24  |  0.74    Ca    9.0      11 Jan 2021 11:38    TPro  6.7  /  Alb  3.9  /  TBili  0.2  /  DBili  x   /  AST  25  /  ALT  18  /  AlkPhos  49  01-11    PT/INR - ( 11 Jan 2021 11:38 )   PT: 12.8 sec;   INR: 1.07 ratio         PTT - ( 11 Jan 2021 11:38 )  PTT:31.5 sec

## 2021-01-13 ENCOUNTER — TRANSCRIPTION ENCOUNTER (OUTPATIENT)
Age: 86
End: 2021-01-13

## 2021-01-14 ENCOUNTER — APPOINTMENT (OUTPATIENT)
Dept: PULMONOLOGY | Facility: CLINIC | Age: 86
End: 2021-01-14
Payer: MEDICARE

## 2021-01-14 ENCOUNTER — TRANSCRIPTION ENCOUNTER (OUTPATIENT)
Age: 86
End: 2021-01-14

## 2021-01-14 DIAGNOSIS — U07.1 COVID-19: ICD-10-CM

## 2021-01-14 DIAGNOSIS — J12.82 COVID-19: ICD-10-CM

## 2021-01-14 PROCEDURE — 99442: CPT | Mod: CS,95

## 2021-01-14 NOTE — PLAN
[FreeTextEntry1] : 86 year old female with breast cancer, HLD and recent COVID-19  virus. Will have home blood draw, as well as home O2 sent to her house.  Will also order IV remdesivir and have [facility] nursing services as well.  She will start decadron which has been called into her pharmacy. Will follow up tomorrow. \par \par I interviewed the patient and developed a plan of care  Odell Fraser MD\par

## 2021-01-14 NOTE — HISTORY OF PRESENT ILLNESS
[Home] : at home, [unfilled] , at the time of the visit. [Medical Office: ([facility])___] : at the medical office located in  [Verbal consent obtained from patient] : the patient, [unfilled] [FreeTextEntry1] : 86 year old female with breast cancer, HLD and recent COVID-19  virus.  She was tested positive on 1/7 and came back positive on 1/9.  She went to the hospital on Monday due to dyspnea and oxygen saturations were 93% and went as low as 88% on room air.  She was given MAB but not started on remdesivir or decadron.  She is monitoring her oxygen saturations which are 89-91% on room air.  She does not have home oxygen and does report feeling short of breath.

## 2021-01-15 ENCOUNTER — TRANSCRIPTION ENCOUNTER (OUTPATIENT)
Age: 86
End: 2021-01-15

## 2021-01-15 ENCOUNTER — APPOINTMENT (OUTPATIENT)
Dept: PULMONOLOGY | Facility: CLINIC | Age: 86
End: 2021-01-15
Payer: MEDICARE

## 2021-01-15 PROCEDURE — 99214 OFFICE O/P EST MOD 30 MIN: CPT | Mod: CS,95

## 2021-01-15 NOTE — HISTORY OF PRESENT ILLNESS
[Home] : at home, [unfilled] , at the time of the visit. [Medical Office: ([facility])___] : at the medical office located in  [Family Member] : family member [Verbal consent obtained from patient] : the patient, [unfilled] [FreeTextEntry1] : 86 year old female with COVID-19 virus.  Her current oxygen saturations are around 90-91% on room air.  She should be receiving oxygen a little later today.  She does not have much of an appetite and is trying to stay hydrated with water and apple juice.  She is starting to experience a little diarrhea today.  \par She took decadron last night and reports feeling very dizzy after taking the medication.  \par \par I spoke to son,datr-in-law prior to visit and included them in the visit

## 2021-01-15 NOTE — PLAN
[FreeTextEntry1] : 86 year old female with COVID-19 virus.  Pt will be receiving home O2 today, she will continue to take half a tab of Decadron today because of the dizziness (this is most likely due to dehydration than a side effect of the medication), IV hydration and IV remdesivir to be started today at home as well.  Will follow up later this afternoon to make sure that various services have been received and that pt is still stable.  \par Also would like to give full dose of decadron if possible

## 2021-01-17 ENCOUNTER — NON-APPOINTMENT (OUTPATIENT)
Age: 86
End: 2021-01-17

## 2021-01-18 ENCOUNTER — NON-APPOINTMENT (OUTPATIENT)
Age: 86
End: 2021-01-18

## 2021-01-20 ENCOUNTER — APPOINTMENT (OUTPATIENT)
Dept: PULMONOLOGY | Facility: CLINIC | Age: 86
End: 2021-01-20
Payer: MEDICARE

## 2021-01-20 PROBLEM — E78.5 HYPERLIPIDEMIA, UNSPECIFIED: Chronic | Status: ACTIVE | Noted: 2021-01-11

## 2021-01-20 PROBLEM — C50.919 MALIGNANT NEOPLASM OF UNSPECIFIED SITE OF UNSPECIFIED FEMALE BREAST: Chronic | Status: ACTIVE | Noted: 2021-01-11

## 2021-01-20 PROCEDURE — 99212 OFFICE O/P EST SF 10 MIN: CPT | Mod: CS,95

## 2021-01-20 NOTE — PLAN
[FreeTextEntry1] : 86 year old female with COVID-19 virus presents for a follow up.  She is s/p IV Remdesivir and IV hydration and doing much better.  She will continue to keep the oxygen, although, she has not been using it.  She will continue to monitor her oxygen with exertion.  She will also finish  the decadron.  Her d dimer was above 800 and she will start Xarelto 10 mg po daily for 30 days.\par \par \par I interviewed the patient and developed a plan of care  Odell Fraser MD\par

## 2021-01-20 NOTE — HISTORY OF PRESENT ILLNESS
[Home] : at home, [unfilled] , at the time of the visit. [Medical Office: ([facility])___] : at the medical office located in  [Verbal consent obtained from patient] : the patient, [unfilled] [FreeTextEntry1] : 86 year old female with COVID-19 virus presents for a follow up.  She is currently feeling a little better.  She had 5 days of IV remdesivir and some hydration.  Her oxygen saturations are 94-96% on room air at rest and on exertion.  She has not required oxygen over the last few days.  She still has the chills at times but has not checked her temperature.    She is also having difficulty sleeping at night most likely because of the decadron.  She has three more days of the decadron.

## 2021-01-21 ENCOUNTER — NON-APPOINTMENT (OUTPATIENT)
Age: 86
End: 2021-01-21

## 2021-01-25 ENCOUNTER — APPOINTMENT (OUTPATIENT)
Dept: PULMONOLOGY | Facility: CLINIC | Age: 86
End: 2021-01-25
Payer: MEDICARE

## 2021-01-25 PROCEDURE — 99213 OFFICE O/P EST LOW 20 MIN: CPT | Mod: CS,95

## 2021-01-25 NOTE — PLAN
[FreeTextEntry1] : 86 year old female with history of COVID-19 virus. presents for follow up. She will continue to stay hydrated and monitor her oxygen saturation.  We will check back in with her on Thursday of this week to ensure she doesn't require more aggressive IV hydration. \par \par Answered all of her questions\par \par I interviewed the patient and developed a plan of care  Odell Fraser MD\par

## 2021-01-25 NOTE — HISTORY OF PRESENT ILLNESS
[Home] : at home, [unfilled] , at the time of the visit. [Medical Office: ([facility])___] : at the medical office located in  [Verbal consent obtained from patient] : the patient, [unfilled] [FreeTextEntry1] : 86 year old female with history of COVID-19 virus. presents for follow up.  She has been having difficulty eating and drinking adequate amounts but today has poured 4 glasses of water to drink today.  She is also drinking 2 Ensures/day.  She had an egg and toast this AM; she feels like her appetite is slowly coming back.  She continues to feel short of breath but her oxygen saturations is 95% on room air at rest and on exertion. \par She is not currently using her oxygen because her O2 levels have not fallen below 94%.

## 2021-01-28 ENCOUNTER — APPOINTMENT (OUTPATIENT)
Dept: PULMONOLOGY | Facility: CLINIC | Age: 86
End: 2021-01-28
Payer: MEDICARE

## 2021-01-28 PROCEDURE — 99212 OFFICE O/P EST SF 10 MIN: CPT | Mod: CS,95

## 2021-01-28 NOTE — HISTORY OF PRESENT ILLNESS
[Home] : at home, [unfilled] , at the time of the visit. [Medical Office: ([facility])___] : at the medical office located in  [Verbal consent obtained from patient] : the patient, [unfilled] [FreeTextEntry1] : 86 year old female with COVID-19 virus.  She has been feeling much better overall.  Her oxygen saturations have been around 94-97% on room air.  She has never required supplemental oxygen but has it just in case.  Pt has also been making sure to stay hydrated and has been trying to drink 4-5 glasses of water or juice/day.\par She is still struggling with sleep at night.  Just finished the decadron a couple of days ago.  \par \par I was present for the key portions of the history  elicited by the nurse practitioner. I agree with the assessment and plan as written\par

## 2021-01-28 NOTE — PLAN
[FreeTextEntry1] : 86 year old female with COVID-19 virus. Pt has improved significantly over the last couple of days and is feeling a little better. She will continue to check her oxygen saturations and told her to keep the supplemental oxygen should she need it.  We will follow up over the phone next week.

## 2021-02-02 ENCOUNTER — NON-APPOINTMENT (OUTPATIENT)
Age: 86
End: 2021-02-02

## 2021-03-22 ENCOUNTER — EMERGENCY (EMERGENCY)
Facility: HOSPITAL | Age: 86
LOS: 1 days | Discharge: HOME | End: 2021-03-22
Attending: EMERGENCY MEDICINE
Payer: MEDICARE

## 2021-03-22 VITALS
OXYGEN SATURATION: 98 % | WEIGHT: 145.95 LBS | DIASTOLIC BLOOD PRESSURE: 79 MMHG | RESPIRATION RATE: 16 BRPM | TEMPERATURE: 97 F | HEART RATE: 81 BPM | SYSTOLIC BLOOD PRESSURE: 178 MMHG | HEIGHT: 60 IN

## 2021-03-22 VITALS
SYSTOLIC BLOOD PRESSURE: 141 MMHG | RESPIRATION RATE: 17 BRPM | HEART RATE: 85 BPM | DIASTOLIC BLOOD PRESSURE: 67 MMHG | TEMPERATURE: 98 F | OXYGEN SATURATION: 98 %

## 2021-03-22 DIAGNOSIS — Z98.89 OTHER SPECIFIED POSTPROCEDURAL STATES: Chronic | ICD-10-CM

## 2021-03-22 DIAGNOSIS — Z96.649 PRESENCE OF UNSPECIFIED ARTIFICIAL HIP JOINT: Chronic | ICD-10-CM

## 2021-03-22 LAB
ALBUMIN SERPL ELPH-MCNC: 4.1 G/DL — SIGNIFICANT CHANGE UP (ref 3.3–5)
ALP SERPL-CCNC: 51 U/L — SIGNIFICANT CHANGE UP (ref 40–120)
ALT FLD-CCNC: 15 U/L — SIGNIFICANT CHANGE UP (ref 10–45)
ANION GAP SERPL CALC-SCNC: 13 MMOL/L — SIGNIFICANT CHANGE UP (ref 5–17)
APPEARANCE UR: ABNORMAL
APTT BLD: 29.2 SEC — SIGNIFICANT CHANGE UP (ref 27.5–35.5)
AST SERPL-CCNC: 20 U/L — SIGNIFICANT CHANGE UP (ref 10–40)
BACTERIA # UR AUTO: ABNORMAL
BASOPHILS # BLD AUTO: 0 K/UL — SIGNIFICANT CHANGE UP (ref 0–0.2)
BASOPHILS NFR BLD AUTO: 0 % — SIGNIFICANT CHANGE UP (ref 0–2)
BILIRUB SERPL-MCNC: 0.3 MG/DL — SIGNIFICANT CHANGE UP (ref 0.2–1.2)
BILIRUB UR-MCNC: NEGATIVE — SIGNIFICANT CHANGE UP
BUN SERPL-MCNC: 10 MG/DL — SIGNIFICANT CHANGE UP (ref 7–23)
CALCIUM SERPL-MCNC: 9.4 MG/DL — SIGNIFICANT CHANGE UP (ref 8.4–10.5)
CHLORIDE SERPL-SCNC: 103 MMOL/L — SIGNIFICANT CHANGE UP (ref 96–108)
CO2 SERPL-SCNC: 22 MMOL/L — SIGNIFICANT CHANGE UP (ref 22–31)
COLOR SPEC: YELLOW — SIGNIFICANT CHANGE UP
CREAT SERPL-MCNC: 0.6 MG/DL — SIGNIFICANT CHANGE UP (ref 0.5–1.3)
DIFF PNL FLD: NEGATIVE — SIGNIFICANT CHANGE UP
EOSINOPHIL # BLD AUTO: 0 K/UL — SIGNIFICANT CHANGE UP (ref 0–0.5)
EOSINOPHIL NFR BLD AUTO: 0 % — SIGNIFICANT CHANGE UP (ref 0–6)
EPI CELLS # UR: 3 /HPF — SIGNIFICANT CHANGE UP
GAS PNL BLDV: SIGNIFICANT CHANGE UP
GLUCOSE SERPL-MCNC: 119 MG/DL — HIGH (ref 70–99)
GLUCOSE UR QL: NEGATIVE — SIGNIFICANT CHANGE UP
HCT VFR BLD CALC: 36.7 % — SIGNIFICANT CHANGE UP (ref 34.5–45)
HGB BLD-MCNC: 11.9 G/DL — SIGNIFICANT CHANGE UP (ref 11.5–15.5)
HYALINE CASTS # UR AUTO: 4 /LPF — HIGH (ref 0–2)
INR BLD: 0.99 RATIO — SIGNIFICANT CHANGE UP (ref 0.88–1.16)
KETONES UR-MCNC: NEGATIVE — SIGNIFICANT CHANGE UP
LEUKOCYTE ESTERASE UR-ACNC: ABNORMAL
LIDOCAIN IGE QN: 20 U/L — SIGNIFICANT CHANGE UP (ref 7–60)
LYMPHOCYTES # BLD AUTO: 0.68 K/UL — LOW (ref 1–3.3)
LYMPHOCYTES # BLD AUTO: 21 % — SIGNIFICANT CHANGE UP (ref 13–44)
MCHC RBC-ENTMCNC: 29.2 PG — SIGNIFICANT CHANGE UP (ref 27–34)
MCHC RBC-ENTMCNC: 32.4 GM/DL — SIGNIFICANT CHANGE UP (ref 32–36)
MCV RBC AUTO: 90.2 FL — SIGNIFICANT CHANGE UP (ref 80–100)
MONOCYTES # BLD AUTO: 0.17 K/UL — SIGNIFICANT CHANGE UP (ref 0–0.9)
MONOCYTES NFR BLD AUTO: 5.3 % — SIGNIFICANT CHANGE UP (ref 2–14)
NEUTROPHILS # BLD AUTO: 2.35 K/UL — SIGNIFICANT CHANGE UP (ref 1.8–7.4)
NEUTROPHILS NFR BLD AUTO: 71.9 % — SIGNIFICANT CHANGE UP (ref 43–77)
NITRITE UR-MCNC: POSITIVE
PH UR: 7.5 — SIGNIFICANT CHANGE UP (ref 5–8)
PLATELET # BLD AUTO: 140 K/UL — LOW (ref 150–400)
POTASSIUM SERPL-MCNC: 3.9 MMOL/L — SIGNIFICANT CHANGE UP (ref 3.5–5.3)
POTASSIUM SERPL-SCNC: 3.9 MMOL/L — SIGNIFICANT CHANGE UP (ref 3.5–5.3)
PROT SERPL-MCNC: 6.9 G/DL — SIGNIFICANT CHANGE UP (ref 6–8.3)
PROT UR-MCNC: ABNORMAL
PROTHROM AB SERPL-ACNC: 11.9 SEC — SIGNIFICANT CHANGE UP (ref 10.6–13.6)
RBC # BLD: 4.07 M/UL — SIGNIFICANT CHANGE UP (ref 3.8–5.2)
RBC # FLD: 15.7 % — HIGH (ref 10.3–14.5)
RBC CASTS # UR COMP ASSIST: 1 /HPF — SIGNIFICANT CHANGE UP (ref 0–4)
SODIUM SERPL-SCNC: 138 MMOL/L — SIGNIFICANT CHANGE UP (ref 135–145)
SP GR SPEC: 1.02 — SIGNIFICANT CHANGE UP (ref 1.01–1.02)
UROBILINOGEN FLD QL: NEGATIVE — SIGNIFICANT CHANGE UP
WBC # BLD: 3.23 K/UL — LOW (ref 3.8–10.5)
WBC # FLD AUTO: 3.23 K/UL — LOW (ref 3.8–10.5)
WBC UR QL: 14 /HPF — HIGH (ref 0–5)

## 2021-03-22 PROCEDURE — 93010 ELECTROCARDIOGRAM REPORT: CPT

## 2021-03-22 PROCEDURE — 99284 EMERGENCY DEPT VISIT MOD MDM: CPT

## 2021-03-22 PROCEDURE — 74177 CT ABD & PELVIS W/CONTRAST: CPT | Mod: 26,MA

## 2021-03-22 RX ORDER — METOCLOPRAMIDE HCL 10 MG
10 TABLET ORAL ONCE
Refills: 0 | Status: COMPLETED | OUTPATIENT
Start: 2021-03-22 | End: 2021-03-22

## 2021-03-22 RX ORDER — ONDANSETRON 8 MG/1
4 TABLET, FILM COATED ORAL ONCE
Refills: 0 | Status: COMPLETED | OUTPATIENT
Start: 2021-03-22 | End: 2021-03-22

## 2021-03-22 RX ORDER — CEFUROXIME AXETIL 250 MG
1 TABLET ORAL
Qty: 14 | Refills: 0
Start: 2021-03-22 | End: 2021-03-28

## 2021-03-22 RX ORDER — ONDANSETRON 8 MG/1
1 TABLET, FILM COATED ORAL
Qty: 12 | Refills: 0
Start: 2021-03-22 | End: 2021-03-24

## 2021-03-22 RX ORDER — CEFTRIAXONE 500 MG/1
1000 INJECTION, POWDER, FOR SOLUTION INTRAMUSCULAR; INTRAVENOUS ONCE
Refills: 0 | Status: COMPLETED | OUTPATIENT
Start: 2021-03-22 | End: 2021-03-22

## 2021-03-22 RX ORDER — PANTOPRAZOLE SODIUM 20 MG/1
40 TABLET, DELAYED RELEASE ORAL ONCE
Refills: 0 | Status: COMPLETED | OUTPATIENT
Start: 2021-03-22 | End: 2021-03-22

## 2021-03-22 RX ADMIN — CEFTRIAXONE 100 MILLIGRAM(S): 500 INJECTION, POWDER, FOR SOLUTION INTRAMUSCULAR; INTRAVENOUS at 17:30

## 2021-03-22 RX ADMIN — ONDANSETRON 4 MILLIGRAM(S): 8 TABLET, FILM COATED ORAL at 15:48

## 2021-03-22 RX ADMIN — PANTOPRAZOLE SODIUM 40 MILLIGRAM(S): 20 TABLET, DELAYED RELEASE ORAL at 15:47

## 2021-03-22 RX ADMIN — Medication 10 MILLIGRAM(S): at 17:10

## 2021-03-22 RX ADMIN — ONDANSETRON 4 MILLIGRAM(S): 8 TABLET, FILM COATED ORAL at 16:12

## 2021-03-22 NOTE — ED PROVIDER NOTE - ATTENDING CONTRIBUTION TO CARE
RGUJRAL 87yo f hx listed presents with abdominal pain and nausea since this morning. Pain is diffuse and crampy associated with nausea and belching. No chest pain/palp/sob. No f/c.   On exam, Patient is awake,alert,oriented x 3. Patient is well appearing and in no acute distress. Patient's chest is clear to ausculation, +s1s2. Abdomen is soft nd/+suprapubic tenderness +BS. Extremity with no swelling or calf tenderness. No cvat.  Check labs, CT/UA to eval for symptoms. Pain control and re eval.

## 2021-03-22 NOTE — ED CLERICAL - NS ED CLERK NOTE PRE-ARRIVAL INFORMATION; ADDITIONAL PRE-ARRIVAL INFORMATION
CC/Reason For referral:  acute abdominal pain  Preferred Consultant(if applicable):  Who admits for you (if needed):  Do you have documents you would like to fax over?  Would you still like to speak to an ED attending?  please call after patient is seen

## 2021-03-22 NOTE — ED PROVIDER NOTE - PROGRESS NOTE DETAILS
pt with improved nausea and pain, disussed CT scan findings, pt tolerating PO, would like to go home, agreeable with following up closely with pcp and return for worsening/concerning symptoms. Troponin not resulted, called lab, they state result should come back in 30 min. called lab as result has not come up for troponin, they state 30 more minutes and results should be up. Discussed with pt, pt would like to leave, pt denies chest pain, states abd pain and nausea improved, discussed that elevated level would be concerning for ACS/damage to the heart, she understands risks and would like to go home. -Chandni Christopher PA-C

## 2021-03-22 NOTE — ED PROVIDER NOTE - PATIENT PORTAL LINK FT
You can access the FollowMyHealth Patient Portal offered by [facility] by registering at the following website: [email or web address]. By joining [employer]’s FollowMyHealth portal, you will also be able to view your health information using other applications (apps) compatible with our system.

## 2021-03-22 NOTE — ED PROVIDER NOTE - PHYSICAL EXAMINATION
A&Ox3, NAD. NCAT. PERRL, EOMI. Neck supple, no LAD. Lungs CTAB. +S1S2, RRR, No m/r/g. Abd soft, + mild diffuse ttp, nondistended, +BS, no rebound or guarding. Extremities: cap refill <2, pulses in distal extremities 4+, no edema. Skin without rash. CN II-XII intact. Strength 5/5 UE/LE. Sensations intact throughout. Gait steady. A&Ox3, NAD. NCAT. PERRL, EOMI. Neck supple, no LAD. Lungs CTAB. +S1S2, RRR, No m/r/g. Abd soft, + mild diffuse ttp, nondistended, no CVA ttp, +BS, no rebound or guarding. Extremities: cap refill <2, pulses in distal extremities 4+, no edema. Skin without rash. CN II-XII intact. Strength 5/5 UE/LE. Sensations intact throughout. Gait steady.

## 2021-03-22 NOTE — ED ADULT NURSE NOTE - PMH
Anxiety    Breast cancer    Depression    HLD (hyperlipidemia)    Hyperlipidemia    UTI (lower urinary tract infection)

## 2021-03-22 NOTE — ED ADULT NURSE NOTE - DISCHARGE DATE/TIME
normal... Well appearing, well nourished, awake, alert, oriented to person, place, time/situation and in no apparent distress. 22-Mar-2021 20:06

## 2021-03-22 NOTE — ED PROVIDER NOTE - OBJECTIVE STATEMENT
87 yo female with pmh hld, breast CA (on oral chemo) presenting with generalized abd cramping/discomfort, nausea (no emesis) since this morning. Pt was able to tolerate PO this am for breakfast but lost appetite after. Pt denies fevers, chills, diarrhea, constipation, dysuria, hematuria, shortness of breath, difficulty breathing, chest pain.

## 2021-03-22 NOTE — ED CLERICAL - NS ED CLERK NOTE PRE-ARRIVAL INFOMATION; PCP NAME
dr beatty Keystone Flap Text: The defect edges were debeveled with a #15 scalpel blade.  Given the location of the defect, shape of the defect a keystone flap was deemed most appropriate.  Using a sterile surgical marker, an appropriate keystone flap was drawn incorporating the defect, outlining the appropriate donor tissue and placing the expected incisions within the relaxed skin tension lines where possible. The area thus outlined was incised deep to adipose tissue with a #15 scalpel blade.  The skin margins were undermined to an appropriate distance in all directions around the primary defect and laterally outward around the flap utilizing iris scissors.

## 2021-03-22 NOTE — ED PROVIDER NOTE - NSFOLLOWUPINSTRUCTIONS_ED_ALL_ED_FT
- stay hydrated.   -take antibiotics as prescribed  -take zofran as needed every 6 hours for nausea.   - take tylenol 975mg every 6 hours as needed for pain-take with meals.  - follow up with your pcp in 1-2 days.  - return if symptoms worsen, fever, weakness, you are unable to eat/drink and all other concerns.

## 2021-03-22 NOTE — ED ADULT NURSE REASSESSMENT NOTE - NS ED NURSE REASSESS COMMENT FT1
PT AAOx3. Ambulatory. VSS. No c/o cp, sob or palpitations. C/o unchanged abd discomfort. Pt states "I want to go home and I will not wait". Pt verbalizes understanding that troponin result pending. Pt verbalizes understanding on d/c instructions and PA Willsey instructed pt to come back if result abnormal.

## 2021-03-24 ENCOUNTER — NON-APPOINTMENT (OUTPATIENT)
Age: 86
End: 2021-03-24

## 2021-03-24 ENCOUNTER — APPOINTMENT (OUTPATIENT)
Dept: INTERNAL MEDICINE | Facility: CLINIC | Age: 86
End: 2021-03-24
Payer: MEDICARE

## 2021-03-24 ENCOUNTER — INPATIENT (INPATIENT)
Facility: HOSPITAL | Age: 86
LOS: 7 days | Discharge: HOME HEALTH | DRG: 872 | End: 2021-04-01
Attending: INTERNAL MEDICINE | Admitting: HOSPITALIST
Payer: MEDICARE

## 2021-03-24 VITALS
OXYGEN SATURATION: 94 % | BODY MASS INDEX: 28.66 KG/M2 | WEIGHT: 146 LBS | TEMPERATURE: 97.8 F | HEIGHT: 60 IN | HEART RATE: 73 BPM

## 2021-03-24 VITALS
TEMPERATURE: 98 F | OXYGEN SATURATION: 95 % | HEIGHT: 60 IN | WEIGHT: 145.95 LBS | HEART RATE: 101 BPM | SYSTOLIC BLOOD PRESSURE: 107 MMHG | DIASTOLIC BLOOD PRESSURE: 62 MMHG | RESPIRATION RATE: 18 BRPM

## 2021-03-24 DIAGNOSIS — C50.911 MALIGNANT NEOPLASM OF UNSPECIFIED SITE OF RIGHT FEMALE BREAST: ICD-10-CM

## 2021-03-24 DIAGNOSIS — E78.5 HYPERLIPIDEMIA, UNSPECIFIED: ICD-10-CM

## 2021-03-24 DIAGNOSIS — F32.9 MAJOR DEPRESSIVE DISORDER, SINGLE EPISODE, UNSPECIFIED: ICD-10-CM

## 2021-03-24 DIAGNOSIS — K81.9 CHOLECYSTITIS, UNSPECIFIED: ICD-10-CM

## 2021-03-24 DIAGNOSIS — Z29.9 ENCOUNTER FOR PROPHYLACTIC MEASURES, UNSPECIFIED: ICD-10-CM

## 2021-03-24 DIAGNOSIS — Z98.89 OTHER SPECIFIED POSTPROCEDURAL STATES: Chronic | ICD-10-CM

## 2021-03-24 DIAGNOSIS — Z96.649 PRESENCE OF UNSPECIFIED ARTIFICIAL HIP JOINT: Chronic | ICD-10-CM

## 2021-03-24 LAB
-  AMIKACIN: SIGNIFICANT CHANGE UP
-  AMOXICILLIN/CLAVULANIC ACID: SIGNIFICANT CHANGE UP
-  AMPICILLIN/SULBACTAM: SIGNIFICANT CHANGE UP
-  AMPICILLIN: SIGNIFICANT CHANGE UP
-  AZTREONAM: SIGNIFICANT CHANGE UP
-  CEFAZOLIN: SIGNIFICANT CHANGE UP
-  CEFEPIME: SIGNIFICANT CHANGE UP
-  CEFOXITIN: SIGNIFICANT CHANGE UP
-  CEFTRIAXONE: SIGNIFICANT CHANGE UP
-  CIPROFLOXACIN: SIGNIFICANT CHANGE UP
-  ERTAPENEM: SIGNIFICANT CHANGE UP
-  GENTAMICIN: SIGNIFICANT CHANGE UP
-  IMIPENEM: SIGNIFICANT CHANGE UP
-  LEVOFLOXACIN: SIGNIFICANT CHANGE UP
-  MEROPENEM: SIGNIFICANT CHANGE UP
-  NITROFURANTOIN: SIGNIFICANT CHANGE UP
-  PIPERACILLIN/TAZOBACTAM: SIGNIFICANT CHANGE UP
-  TIGECYCLINE: SIGNIFICANT CHANGE UP
-  TOBRAMYCIN: SIGNIFICANT CHANGE UP
-  TRIMETHOPRIM/SULFAMETHOXAZOLE: SIGNIFICANT CHANGE UP
ALBUMIN SERPL ELPH-MCNC: 3.5 G/DL — SIGNIFICANT CHANGE UP (ref 3.3–5)
ALP SERPL-CCNC: 60 U/L — SIGNIFICANT CHANGE UP (ref 40–120)
ALT FLD-CCNC: 22 U/L — SIGNIFICANT CHANGE UP (ref 10–45)
ANION GAP SERPL CALC-SCNC: 15 MMOL/L — SIGNIFICANT CHANGE UP (ref 5–17)
APTT BLD: 31.3 SEC — SIGNIFICANT CHANGE UP (ref 27.5–35.5)
AST SERPL-CCNC: 28 U/L — SIGNIFICANT CHANGE UP (ref 10–40)
BASE EXCESS BLDV CALC-SCNC: 1.8 MMOL/L — SIGNIFICANT CHANGE UP (ref -2–2)
BASOPHILS # BLD AUTO: 0 K/UL — SIGNIFICANT CHANGE UP (ref 0–0.2)
BASOPHILS NFR BLD AUTO: 0 % — SIGNIFICANT CHANGE UP (ref 0–2)
BILIRUB SERPL-MCNC: 0.4 MG/DL — SIGNIFICANT CHANGE UP (ref 0.2–1.2)
BLD GP AB SCN SERPL QL: NEGATIVE — SIGNIFICANT CHANGE UP
BUN SERPL-MCNC: 15 MG/DL — SIGNIFICANT CHANGE UP (ref 7–23)
CA-I SERPL-SCNC: 1.08 MMOL/L — LOW (ref 1.12–1.3)
CALCIUM SERPL-MCNC: 8.8 MG/DL — SIGNIFICANT CHANGE UP (ref 8.4–10.5)
CHLORIDE BLDV-SCNC: 100 MMOL/L — SIGNIFICANT CHANGE UP (ref 96–108)
CHLORIDE SERPL-SCNC: 98 MMOL/L — SIGNIFICANT CHANGE UP (ref 96–108)
CO2 BLDV-SCNC: 28 MMOL/L — SIGNIFICANT CHANGE UP (ref 22–30)
CO2 SERPL-SCNC: 21 MMOL/L — LOW (ref 22–31)
CREAT SERPL-MCNC: 0.89 MG/DL — SIGNIFICANT CHANGE UP (ref 0.5–1.3)
CULTURE RESULTS: SIGNIFICANT CHANGE UP
EOSINOPHIL # BLD AUTO: 0 K/UL — SIGNIFICANT CHANGE UP (ref 0–0.5)
EOSINOPHIL NFR BLD AUTO: 0 % — SIGNIFICANT CHANGE UP (ref 0–6)
GAS PNL BLDV: 131 MMOL/L — LOW (ref 135–145)
GAS PNL BLDV: SIGNIFICANT CHANGE UP
GAS PNL BLDV: SIGNIFICANT CHANGE UP
GLUCOSE BLDV-MCNC: 110 MG/DL — HIGH (ref 70–99)
GLUCOSE SERPL-MCNC: 109 MG/DL — HIGH (ref 70–99)
HCO3 BLDV-SCNC: 27 MMOL/L — SIGNIFICANT CHANGE UP (ref 21–29)
HCT VFR BLD CALC: 36.4 % — SIGNIFICANT CHANGE UP (ref 34.5–45)
HCT VFR BLDA CALC: 38 % — LOW (ref 39–50)
HGB BLD CALC-MCNC: 12.4 G/DL — SIGNIFICANT CHANGE UP (ref 11.5–15.5)
HGB BLD-MCNC: 11.6 G/DL — SIGNIFICANT CHANGE UP (ref 11.5–15.5)
INR BLD: 1.19 RATIO — HIGH (ref 0.88–1.16)
LACTATE BLDV-MCNC: 1.9 MMOL/L — SIGNIFICANT CHANGE UP (ref 0.7–2)
LYMPHOCYTES # BLD AUTO: 0.86 K/UL — LOW (ref 1–3.3)
LYMPHOCYTES # BLD AUTO: 15.5 % — SIGNIFICANT CHANGE UP (ref 13–44)
MAGNESIUM SERPL-MCNC: 1.9 MG/DL — SIGNIFICANT CHANGE UP (ref 1.6–2.6)
MANUAL SMEAR VERIFICATION: SIGNIFICANT CHANGE UP
MCHC RBC-ENTMCNC: 28.7 PG — SIGNIFICANT CHANGE UP (ref 27–34)
MCHC RBC-ENTMCNC: 31.9 GM/DL — LOW (ref 32–36)
MCV RBC AUTO: 90.1 FL — SIGNIFICANT CHANGE UP (ref 80–100)
METHOD TYPE: SIGNIFICANT CHANGE UP
MONOCYTES # BLD AUTO: 0.67 K/UL — SIGNIFICANT CHANGE UP (ref 0–0.9)
MONOCYTES NFR BLD AUTO: 12.1 % — SIGNIFICANT CHANGE UP (ref 2–14)
NEUTROPHILS # BLD AUTO: 4.03 K/UL — SIGNIFICANT CHANGE UP (ref 1.8–7.4)
NEUTROPHILS NFR BLD AUTO: 64.6 % — SIGNIFICANT CHANGE UP (ref 43–77)
NEUTS BAND # BLD: 7.8 % — SIGNIFICANT CHANGE UP (ref 0–8)
NRBC # BLD: 1 /100 — HIGH (ref 0–0)
ORGANISM # SPEC MICROSCOPIC CNT: SIGNIFICANT CHANGE UP
ORGANISM # SPEC MICROSCOPIC CNT: SIGNIFICANT CHANGE UP
PCO2 BLDV: 46 MMHG — HIGH (ref 39–42)
PH BLDV: 7.38 — SIGNIFICANT CHANGE UP (ref 7.35–7.45)
PHOSPHATE SERPL-MCNC: 2.7 MG/DL — SIGNIFICANT CHANGE UP (ref 2.5–4.5)
PLAT MORPH BLD: NORMAL — SIGNIFICANT CHANGE UP
PLATELET # BLD AUTO: 152 K/UL — SIGNIFICANT CHANGE UP (ref 150–400)
PO2 BLDV: 24 MMHG — LOW (ref 25–45)
POTASSIUM BLDV-SCNC: 5.9 MMOL/L — HIGH (ref 3.5–5.3)
POTASSIUM SERPL-MCNC: 3.8 MMOL/L — SIGNIFICANT CHANGE UP (ref 3.5–5.3)
POTASSIUM SERPL-SCNC: 3.8 MMOL/L — SIGNIFICANT CHANGE UP (ref 3.5–5.3)
PROCALCITONIN SERPL-MCNC: 1.53 NG/ML — HIGH (ref 0.02–0.1)
PROT SERPL-MCNC: 6.8 G/DL — SIGNIFICANT CHANGE UP (ref 6–8.3)
PROTHROM AB SERPL-ACNC: 14.2 SEC — HIGH (ref 10.6–13.6)
RBC # BLD: 4.04 M/UL — SIGNIFICANT CHANGE UP (ref 3.8–5.2)
RBC # FLD: 16.4 % — HIGH (ref 10.3–14.5)
RBC BLD AUTO: SIGNIFICANT CHANGE UP
RH IG SCN BLD-IMP: POSITIVE — SIGNIFICANT CHANGE UP
SAO2 % BLDV: 37 % — LOW (ref 67–88)
SARS-COV-2 RNA SPEC QL NAA+PROBE: SIGNIFICANT CHANGE UP
SODIUM SERPL-SCNC: 134 MMOL/L — LOW (ref 135–145)
SPECIMEN SOURCE: SIGNIFICANT CHANGE UP
WBC # BLD: 5.57 K/UL — SIGNIFICANT CHANGE UP (ref 3.8–10.5)
WBC # FLD AUTO: 5.57 K/UL — SIGNIFICANT CHANGE UP (ref 3.8–10.5)

## 2021-03-24 PROCEDURE — 99285 EMERGENCY DEPT VISIT HI MDM: CPT | Mod: CS

## 2021-03-24 PROCEDURE — 93000 ELECTROCARDIOGRAM COMPLETE: CPT | Mod: PD

## 2021-03-24 PROCEDURE — 93010 ELECTROCARDIOGRAM REPORT: CPT

## 2021-03-24 PROCEDURE — 71045 X-RAY EXAM CHEST 1 VIEW: CPT | Mod: 26

## 2021-03-24 PROCEDURE — 99284 EMERGENCY DEPT VISIT MOD MDM: CPT | Mod: GC

## 2021-03-24 PROCEDURE — 99223 1ST HOSP IP/OBS HIGH 75: CPT

## 2021-03-24 PROCEDURE — 76705 ECHO EXAM OF ABDOMEN: CPT | Mod: 26

## 2021-03-24 PROCEDURE — 99214 OFFICE O/P EST MOD 30 MIN: CPT | Mod: 25,PD

## 2021-03-24 RX ORDER — SODIUM CHLORIDE 9 MG/ML
1000 INJECTION INTRAMUSCULAR; INTRAVENOUS; SUBCUTANEOUS ONCE
Refills: 0 | Status: COMPLETED | OUTPATIENT
Start: 2021-03-24 | End: 2021-03-24

## 2021-03-24 RX ORDER — OXYCODONE HYDROCHLORIDE 5 MG/1
5 TABLET ORAL EVERY 6 HOURS
Refills: 0 | Status: DISCONTINUED | OUTPATIENT
Start: 2021-03-24 | End: 2021-03-26

## 2021-03-24 RX ORDER — CEFTRIAXONE 500 MG/1
1000 INJECTION, POWDER, FOR SOLUTION INTRAMUSCULAR; INTRAVENOUS ONCE
Refills: 0 | Status: DISCONTINUED | OUTPATIENT
Start: 2021-03-24 | End: 2021-03-24

## 2021-03-24 RX ORDER — ESTRADIOL 0.5 MG/1
0.5 TABLET ORAL
Refills: 0 | Status: DISCONTINUED | COMMUNITY
End: 2021-03-24

## 2021-03-24 RX ORDER — ONDANSETRON 8 MG/1
4 TABLET, FILM COATED ORAL EVERY 8 HOURS
Refills: 0 | Status: DISCONTINUED | OUTPATIENT
Start: 2021-03-24 | End: 2021-03-24

## 2021-03-24 RX ORDER — METRONIDAZOLE 500 MG
500 TABLET ORAL EVERY 8 HOURS
Refills: 0 | Status: DISCONTINUED | OUTPATIENT
Start: 2021-03-24 | End: 2021-03-29

## 2021-03-24 RX ORDER — MEROPENEM 1 G/30ML
1000 INJECTION INTRAVENOUS ONCE
Refills: 0 | Status: COMPLETED | OUTPATIENT
Start: 2021-03-24 | End: 2021-03-24

## 2021-03-24 RX ORDER — MEDROXYPROGESTERONE ACETATE 10 MG/1
10 TABLET ORAL
Qty: 28 | Refills: 0 | Status: DISCONTINUED | COMMUNITY
Start: 2019-08-07 | End: 2021-03-24

## 2021-03-24 RX ORDER — MORPHINE SULFATE 50 MG/1
2 CAPSULE, EXTENDED RELEASE ORAL EVERY 6 HOURS
Refills: 0 | Status: DISCONTINUED | OUTPATIENT
Start: 2021-03-24 | End: 2021-03-26

## 2021-03-24 RX ORDER — ENOXAPARIN SODIUM 100 MG/ML
40 INJECTION SUBCUTANEOUS DAILY
Refills: 0 | Status: DISCONTINUED | OUTPATIENT
Start: 2021-03-24 | End: 2021-03-25

## 2021-03-24 RX ORDER — CEFEPIME 1 G/1
2000 INJECTION, POWDER, FOR SOLUTION INTRAMUSCULAR; INTRAVENOUS EVERY 8 HOURS
Refills: 0 | Status: DISCONTINUED | OUTPATIENT
Start: 2021-03-24 | End: 2021-03-29

## 2021-03-24 RX ORDER — SODIUM CHLORIDE 9 MG/ML
1000 INJECTION, SOLUTION INTRAVENOUS
Refills: 0 | Status: DISCONTINUED | OUTPATIENT
Start: 2021-03-24 | End: 2021-03-27

## 2021-03-24 RX ORDER — ONDANSETRON 8 MG/1
4 TABLET, FILM COATED ORAL EVERY 8 HOURS
Refills: 0 | Status: DISCONTINUED | OUTPATIENT
Start: 2021-03-24 | End: 2021-04-01

## 2021-03-24 RX ORDER — SODIUM CHLORIDE 9 MG/ML
1000 INJECTION INTRAMUSCULAR; INTRAVENOUS; SUBCUTANEOUS ONCE
Refills: 0 | Status: DISCONTINUED | OUTPATIENT
Start: 2021-03-24 | End: 2021-03-24

## 2021-03-24 RX ORDER — BUPROPION HYDROCHLORIDE 150 MG/1
300 TABLET, EXTENDED RELEASE ORAL DAILY
Refills: 0 | Status: DISCONTINUED | OUTPATIENT
Start: 2021-03-24 | End: 2021-04-01

## 2021-03-24 RX ORDER — ACETAMINOPHEN 500 MG
650 TABLET ORAL EVERY 6 HOURS
Refills: 0 | Status: DISCONTINUED | OUTPATIENT
Start: 2021-03-24 | End: 2021-03-25

## 2021-03-24 RX ORDER — DEXAMETHASONE 6 MG/1
6 TABLET ORAL DAILY
Qty: 10 | Refills: 0 | Status: DISCONTINUED | COMMUNITY
Start: 2021-01-14 | End: 2021-03-24

## 2021-03-24 RX ORDER — RIVAROXABAN 10 MG/1
10 TABLET, FILM COATED ORAL
Qty: 30 | Refills: 1 | Status: DISCONTINUED | COMMUNITY
Start: 2021-01-20 | End: 2021-03-24

## 2021-03-24 RX ORDER — SIMVASTATIN 20 MG/1
20 TABLET, FILM COATED ORAL AT BEDTIME
Refills: 0 | Status: DISCONTINUED | OUTPATIENT
Start: 2021-03-24 | End: 2021-04-01

## 2021-03-24 RX ADMIN — SIMVASTATIN 20 MILLIGRAM(S): 20 TABLET, FILM COATED ORAL at 23:03

## 2021-03-24 RX ADMIN — CEFEPIME 100 MILLIGRAM(S): 1 INJECTION, POWDER, FOR SOLUTION INTRAMUSCULAR; INTRAVENOUS at 19:45

## 2021-03-24 RX ADMIN — Medication 100 MILLIGRAM(S): at 20:20

## 2021-03-24 RX ADMIN — SODIUM CHLORIDE 333.33 MILLILITER(S): 9 INJECTION INTRAMUSCULAR; INTRAVENOUS; SUBCUTANEOUS at 15:25

## 2021-03-24 RX ADMIN — MEROPENEM 100 MILLIGRAM(S): 1 INJECTION INTRAVENOUS at 15:25

## 2021-03-24 NOTE — H&P ADULT - PROBLEM SELECTOR PLAN 2
-on hormonal therapy w/ anastrazole, small molecule inhibitor w/ ibrance  -excellent functional status -on hormonal therapy w/ anastrazole, small molecule inhibitor w/ ibrance  -excellent functional status  She appears to have excellent functional status and notably while Ibrance can be a/w cytopenias, she has no evidence of marrow suppression on cbc and has been on her off week of her cycle

## 2021-03-24 NOTE — PHYSICAL EXAM
[No Acute Distress] : no acute distress [No CVA Tenderness] : no CVA  tenderness [Normal] : no joint swelling and grossly normal strength and tone [de-identified] : Afebrile [de-identified] : Tachycardia without murmur or gallop/pressure sitting is 100/60 [de-identified] : No evidence of phlebitis [de-identified] : The patient has right upper quadrant pain on palpation with positive rebound.  There is no organomegaly bowel sounds are quiet [FreeTextEntry1] : Back is negative [de-identified] : Suprapubic discomfiture neg [de-identified] : In your motion of the hips are negative

## 2021-03-24 NOTE — H&P ADULT - NSHPREVIEWOFSYSTEMS_GEN_ALL_CORE
Review of Systems:   CONSTITUTIONAL: +fever, no weight loss  EYES: No eye pain, visual disturbances, or discharge  ENMT:  No difficulty hearing, tinnitus, vertigo; No sinus or throat pain  RESPIRATORY: No SOB. No cough, wheezing, chills or hemoptysis  CARDIOVASCULAR: No chest pain, palpitations, dizziness, or leg swelling  GASTROINTESTINAL: +RUQ abdominal pain. +nausea, vomiting, No hematemesis; No diarrhea or constipation. No melena or hematochezia.  GENITOURINARY: No dysuria, frequency, hematuria, or incontinence  NEUROLOGICAL: No headaches, memory loss, loss of strength, numbness, or tremors  SKIN: No itching, burning, rashes, or lesions   LYMPH NODES: No enlarged glands  ENDOCRINE: No heat or cold intolerance; No hair loss  MUSCULOSKELETAL: No joint pain or swelling; No muscle, back pain  PSYCHIATRIC: No depression, anxiety, mood swings, or difficulty sleeping  HEME/LYMPH: No easy bruising, or bleeding gums

## 2021-03-24 NOTE — ED ADULT NURSE REASSESSMENT NOTE - NS ED NURSE REASSESS COMMENT FT1
no change in baseline mental status.  no distress noted.  pt resting comfortably in bed.  will continue to monitor.
Pt states she is free from pain or discomfort as long as she is laying in the stretcher. Pt made aware to make RN aware if she has pain, as there are medications ordered for her. Pt is aware of plan of care and in understanding.

## 2021-03-24 NOTE — H&P ADULT - HISTORY OF PRESENT ILLNESS
86F w/ PMHx of HLD, depression, recurrent br ca on inbrance/anastrazole presents w/ RUQ abdominal pain and weakness. Patient was seen in ER on 3/22 for abdominal pain and nausea. She had CT AP which was negative and UA/UCx was positive. She was discharged on cefpodoxime for presumed UTI. Notably never had dysuria. Subsequently developed ongoing nausea, inability to PO and pain localized to RUQ. She was evaluated by PMD this AM and referred to ER due to c.f cholecystitis.     In ER, found to be febrile, tachy and RUQ c/w acute cholecystitis. Received 2L NS. Patient was evaluated by surgery who recommend against cholecystectomy at this time and want to manage conservatively w/ abx, so is now admitted to medicine.

## 2021-03-24 NOTE — ED PROVIDER NOTE - ATTENDING CONTRIBUTION TO CARE
Attending Statement (CHAYA Ball MD):    HPI: 87yo F with h/o breast cancer (on oral chemotherapy), HLD, and recently diagnosed with UTI (on cefuroxime day 2) presenting with RUQ pain for 2 days; states having pain constantly, no appetite, + nausea, no vomiting. Worse after eating yesterday but has not eaten today. Denies fever/chills, diarrhea, back/flank pain, chest pain or shortness of breath.  Saw PCP today, who noted her to be hypotensive and tender in the right upper quadrant, and sent her to ED for evaluation.    Review of Systems:  -General: no fever or chills  -ENT: no congestion, no difficulty swallowing  -Pulmonary: no cough, no shortness of breath  -Cardiac: no chest pain, no palpitations  -Gastrointestinal: +abdominal pain, +nausea, no vomiting, and no diarrhea.  -Genitourinary: no blood or pain with urination  -Musculoskeletal: no back or neck pain  -Skin: no rashes  -Endocrine: No h/o diabetes or thyroid disease  -Neurologic: No focal weakness or numbness    All else negative unless otherwise specified elsewhere in this note.    PSH/PMH as noted above    On Physical Exam:  General: well appearing, in NAD, speaking clearly in full sentences and without difficulty; cooperative with exam  HEENT: PERRL, MMM  Neck: no neck tenderness, no nuchal rigidity  Cardiac: normal s1, s2; RRR; no MGR  Lungs: CTABL  Abdomen: soft, nondistended; + RUQ TTP without rebound; + Mcguire's  : no bladder tenderness or distension  Skin: intact, no rash  Extremities: no peripheral edema, no gross deformities  Neuro: no gross neurologic deficits    POCUS: c/w cholecystitis    MDM: 87yo F with h/o breast cancer (on oral chemotherapy), HLD, and recently diagnosed with UTI (on cefuroxime day 2) presenting with RUQ pain for 2 days; not hypotensive in ED but found to be febrile, and POCUS shows findings c/w acute cholecystitis; starting meropenem (given allergy profile) and sending labs: cbc (to evaluate for leukocytosis or anemia), CMP (to evaluate for electrolyte abnormalities or renal/liver dysfunction), lipase (to evaluate for pancreatitis), pt/inr, type; surgery consulted, to see patient, and patient to require admission.

## 2021-03-24 NOTE — CONSULT NOTE ADULT - SUBJECTIVE AND OBJECTIVE BOX
[facility] General Surgery Consultation     Patient is a 86y old  Female who presents with a chief complaint of abdominal pain    HPI:  Ms. Bridges is an 86 year old woman with PMH of HTN/HLD and breast cancer in 1994 s/p lumpectomy and radiation who was recently diagnosed with recurrence in September and is taking chemotherapy    PAST MEDICAL & SURGICAL HISTORY:  HLD (hyperlipidemia)  Breast cancer  Depression  UTI (lower urinary tract infection)  Hyperlipidemia  Anxiety  History of hip replacement  H/O lumpectomy    FAMILY HISTORY:  None     SOCIAL HISTORY:  Smoked ages ago, quit in 1977  Drinks alcohol ~2x/week    Allergies  penicillin (Unknown)      Vital Signs Last 24 Hrs  T(C): 38.2 (24 Mar 2021 15:05), Max: 38.2 (24 Mar 2021 15:05)  T(F): 100.7 (24 Mar 2021 15:05), Max: 100.7 (24 Mar 2021 15:05)  HR: 96 (24 Mar 2021 16:17) (96 - 101)  BP: 107/61 (24 Mar 2021 16:17) (107/61 - 108/63)  BP(mean): 75 (24 Mar 2021 16:17) (75 - 75)  RR: 17 (24 Mar 2021 16:17) (17 - 18)  SpO2: 95% (24 Mar 2021 16:17) (95% - 95%)  Daily Height in cm: 152.4 (24 Mar 2021 12:40)    Daily     Examination  General: NAD, well-nourished  HEENT: Atraumatic, EOMI  Resp: Breathing comfortably on RA  CV: Normal sinus rhythm  Abd: soft, tender in the RUQ, + Mcguire's sign. no rebound no guarding  Ext: ROMIx4, motor strength intact x 4                          11.6   5.57  )-----------( 152      ( 24 Mar 2021 15:45 )             36.4     03-24    134<L>  |  98  |  15  ----------------------------<  109<H>  3.8   |  21<L>  |  0.89    Ca    8.8      24 Mar 2021 15:45  Phos  2.7     03-24  Mg     1.9     03-24    TPro  6.8  /  Alb  3.5  /  TBili  0.4  /  DBili  x   /  AST  28  /  ALT  22  /  AlkPhos  60  03-24    PT/INR - ( 24 Mar 2021 15:45 )   PT: 14.2 sec;   INR: 1.19 ratio         PTT - ( 24 Mar 2021 15:45 )  PTT:31.3 sec      Radiographic Findings:   < from: US Abdomen Limited (ED) (03.24.21 @ 15:04) >    INTERPRETATION:Grayscale imaging of the right upper quadrant was performed.  The gallbladder was visualized and scanned in longitudinal and transverse planes.  The anterior gallbladder wall measured  up to 0.65.cm.  The common bile duct measured 0.59.cm.  Gallstones are present.  Sludge is present.  Pericholecystic fluid is present.  Gallbladder wall edema is present.  Sonographic Mcguire's sign is present.    IMPRESSION:Findings are consistent with acute cholecystitis.      < end of copied text >      Assessment:                  [facility] General Surgery Consultation     Patient is a 86y old  Female who presents with a chief complaint of abdominal pain    HPI:  Ms. Bridges is an 86 year old woman with PMH of HTN/HLD and breast cancer in 1994 s/p lumpectomy and radiation who was recently diagnosed with recurrence in September and is taking chemotherapy and presents with acute onset abdominal pain. She was in the ED 2 days ago with right lower abdominal pain and back pain and was diagnosed with pyelonephritis and UTI and was prescribed abx. She took the first dose yesterday AM. yesterday PM she noted acute onset abdominal pain that felt distinctly different    PAST MEDICAL & SURGICAL HISTORY:  HLD (hyperlipidemia)  Breast cancer  Depression  UTI (lower urinary tract infection)  Hyperlipidemia  Anxiety  History of hip replacement  H/O lumpectomy    FAMILY HISTORY:  None     SOCIAL HISTORY:  Smoked ages ago, quit in 1977  Drinks alcohol ~2x/week    Allergies  penicillin (Unknown)      Vital Signs Last 24 Hrs  T(C): 38.2 (24 Mar 2021 15:05), Max: 38.2 (24 Mar 2021 15:05)  T(F): 100.7 (24 Mar 2021 15:05), Max: 100.7 (24 Mar 2021 15:05)  HR: 96 (24 Mar 2021 16:17) (96 - 101)  BP: 107/61 (24 Mar 2021 16:17) (107/61 - 108/63)  BP(mean): 75 (24 Mar 2021 16:17) (75 - 75)  RR: 17 (24 Mar 2021 16:17) (17 - 18)  SpO2: 95% (24 Mar 2021 16:17) (95% - 95%)  Daily Height in cm: 152.4 (24 Mar 2021 12:40)    Daily     Examination  General: NAD, well-nourished  HEENT: Atraumatic, EOMI  Resp: Breathing comfortably on RA  CV: Normal sinus rhythm  Abd: soft, tender in the RUQ, + Mcguire's sign. no rebound no guarding  Ext: ROMIx4, motor strength intact x 4                          11.6   5.57  )-----------( 152      ( 24 Mar 2021 15:45 )             36.4     03-24    134<L>  |  98  |  15  ----------------------------<  109<H>  3.8   |  21<L>  |  0.89    Ca    8.8      24 Mar 2021 15:45  Phos  2.7     03-24  Mg     1.9     03-24    TPro  6.8  /  Alb  3.5  /  TBili  0.4  /  DBili  x   /  AST  28  /  ALT  22  /  AlkPhos  60  03-24    PT/INR - ( 24 Mar 2021 15:45 )   PT: 14.2 sec;   INR: 1.19 ratio         PTT - ( 24 Mar 2021 15:45 )  PTT:31.3 sec      Radiographic Findings:   < from: US Abdomen Limited (ED) (03.24.21 @ 15:04) >    INTERPRETATION:Grayscale imaging of the right upper quadrant was performed.  The gallbladder was visualized and scanned in longitudinal and transverse planes.  The anterior gallbladder wall measured  up to 0.65.cm.  The common bile duct measured 0.59.cm.  Gallstones are present.  Sludge is present.  Pericholecystic fluid is present.  Gallbladder wall edema is present.  Sonographic Mcguire's sign is present.    IMPRESSION:Findings are consistent with acute cholecystitis.      < end of copied text >      Assessment:                  [facility] General Surgery Consultation     Patient is a 86y old  Female who presents with a chief complaint of abdominal pain    HPI:  Ms. Bridges is an 86 year old woman with PMH of HTN/HLD and breast cancer in 1994 s/p lumpectomy and radiation who was recently diagnosed with recurrence in September and is taking chemotherapy and presents with acute onset abdominal pain. She was in the ED 2 days ago with right lower abdominal pain and back pain and was diagnosed with pyelonephritis and UTI and was prescribed abx. She took the first dose yesterday AM. yesterday PM she noted acute onset abdominal pain that felt distinctly different from the pyelo in that it was RUQ pain, more severe, and sharper. She denied fever or chills, chest pain, SOB, denied ever having hematuria or dysuria, or diarrhea or constipation. She has been on anastrazole for a "few months" and ibranz for 3w for her breast cancer. She smoked many years ago but quit in 1977, and rinks occasionally 1-2x per month. In the ED the patient was febrile to 100.7 with WBC of 5. US confirmed acute cholecystitis and patient remained uncomfortable but not in excruciating pain.    PAST MEDICAL & SURGICAL HISTORY:  HLD (hyperlipidemia)  Breast cancer  Depression  UTI (lower urinary tract infection)  Hyperlipidemia  Anxiety  History of hip replacement  H/O lumpectomy    FAMILY HISTORY:  None     SOCIAL HISTORY:  Smoked ages ago, quit in 1977  Drinks alcohol ~2x/week    Allergies  penicillin (Unknown)      Vital Signs Last 24 Hrs  T(C): 38.2 (24 Mar 2021 15:05), Max: 38.2 (24 Mar 2021 15:05)  T(F): 100.7 (24 Mar 2021 15:05), Max: 100.7 (24 Mar 2021 15:05)  HR: 96 (24 Mar 2021 16:17) (96 - 101)  BP: 107/61 (24 Mar 2021 16:17) (107/61 - 108/63)  BP(mean): 75 (24 Mar 2021 16:17) (75 - 75)  RR: 17 (24 Mar 2021 16:17) (17 - 18)  SpO2: 95% (24 Mar 2021 16:17) (95% - 95%)  Daily Height in cm: 152.4 (24 Mar 2021 12:40)    Daily     Examination  General: NAD, well-nourished  HEENT: Atraumatic, EOMI  Resp: Breathing comfortably on RA  CV: Normal sinus rhythm  Abd: soft, tender in the RUQ, + Mcguire's sign. no rebound no guarding  Ext: ROMIx4, motor strength intact x 4                          11.6   5.57  )-----------( 152      ( 24 Mar 2021 15:45 )             36.4     03-24    134<L>  |  98  |  15  ----------------------------<  109<H>  3.8   |  21<L>  |  0.89    Ca    8.8      24 Mar 2021 15:45  Phos  2.7     03-24  Mg     1.9     03-24    TPro  6.8  /  Alb  3.5  /  TBili  0.4  /  DBili  x   /  AST  28  /  ALT  22  /  AlkPhos  60  03-24    PT/INR - ( 24 Mar 2021 15:45 )   PT: 14.2 sec;   INR: 1.19 ratio         PTT - ( 24 Mar 2021 15:45 )  PTT:31.3 sec      Radiographic Findings:   < from: US Abdomen Limited (ED) (03.24.21 @ 15:04) >    INTERPRETATION:Grayscale imaging of the right upper quadrant was performed.  The gallbladder was visualized and scanned in longitudinal and transverse planes.  The anterior gallbladder wall measured  up to 0.65.cm.  The common bile duct measured 0.59.cm.  Gallstones are present.  Sludge is present.  Pericholecystic fluid is present.  Gallbladder wall edema is present.  Sonographic Mcguire's sign is present.    IMPRESSION:Findings are consistent with acute cholecystitis.      < end of copied text >      Assessment:   Ms. Bridges is an 86 year old woman with PMH of HTN/HLD and breast cancer in 1994 s/p lumpectomy and radiation who was recently diagnosed with recurrence in September and is taking chemotherapy and presents with acute onset abdominal pain, found to have acute cholecystitis clinically and on US, but not currently a candidate for surgical intervention due to being on chemotherapy.    Plan:  - patient not currently operative candidate  - recommend abx and clinical evaluation  - if patient improves clinically from abx, may follow up outpatient  - if clinical course worsens or persists depsite abx, consider IR consult for percutaneous cholecystostomy tube    Discussed with Dr. Sol  Red Team Surgery  [phone]               [facility] General Surgery Consultation     Patient is a 86y old  Female who presents with a chief complaint of abdominal pain    HPI:  Ms. Bridges is an 86 year old woman with PMH of HTN/HLD and breast cancer in 1994 s/p lumpectomy and radiation who was recently diagnosed with recurrence in September and is taking chemotherapy and presents with acute onset abdominal pain. She was in the ED 2 days ago with right lower abdominal pain and back pain and was diagnosed with pyelonephritis and UTI and was prescribed abx. She took the first dose yesterday AM. yesterday PM she noted acute onset abdominal pain that felt distinctly different from the pyelo in that it was RUQ pain, more severe, and sharper. She denied fever or chills, chest pain, SOB, denied ever having hematuria or dysuria, or diarrhea or constipation. She has been on anastrazole for a "few months" and ibranz for 3w for her breast cancer. She smoked many years ago but quit in 1977, and rinks occasionally 1-2x per month. In the ED the patient was febrile to 100.7 with WBC of 5. US confirmed acute cholecystitis and patient remained uncomfortable but not in excruciating pain.    PAST MEDICAL & SURGICAL HISTORY:  HLD (hyperlipidemia)  Breast cancer  Depression  UTI (lower urinary tract infection)  Hyperlipidemia  Anxiety  History of hip replacement  H/O lumpectomy    FAMILY HISTORY:  None     SOCIAL HISTORY:  Smoked ages ago, quit in 1977  Drinks alcohol ~2x/week    Allergies  penicillin (Unknown)      Vital Signs Last 24 Hrs  T(C): 38.2 (24 Mar 2021 15:05), Max: 38.2 (24 Mar 2021 15:05)  T(F): 100.7 (24 Mar 2021 15:05), Max: 100.7 (24 Mar 2021 15:05)  HR: 96 (24 Mar 2021 16:17) (96 - 101)  BP: 107/61 (24 Mar 2021 16:17) (107/61 - 108/63)  BP(mean): 75 (24 Mar 2021 16:17) (75 - 75)  RR: 17 (24 Mar 2021 16:17) (17 - 18)  SpO2: 95% (24 Mar 2021 16:17) (95% - 95%)  Daily Height in cm: 152.4 (24 Mar 2021 12:40)    Daily     Examination  General: NAD, well-nourished  HEENT: Atraumatic, EOMI  Resp: Breathing comfortably on RA  CV: Normal sinus rhythm  Abd: soft, tender in the RUQ, + Mcguire's sign. no rebound no guarding  Ext: ROMIx4, motor strength intact x 4                          11.6   5.57  )-----------( 152      ( 24 Mar 2021 15:45 )             36.4     03-24    134<L>  |  98  |  15  ----------------------------<  109<H>  3.8   |  21<L>  |  0.89    Ca    8.8      24 Mar 2021 15:45  Phos  2.7     03-24  Mg     1.9     03-24    TPro  6.8  /  Alb  3.5  /  TBili  0.4  /  DBili  x   /  AST  28  /  ALT  22  /  AlkPhos  60  03-24    PT/INR - ( 24 Mar 2021 15:45 )   PT: 14.2 sec;   INR: 1.19 ratio         PTT - ( 24 Mar 2021 15:45 )  PTT:31.3 sec      Radiographic Findings:   < from: US Abdomen Limited (ED) (03.24.21 @ 15:04) >    INTERPRETATION:Grayscale imaging of the right upper quadrant was performed.  The gallbladder was visualized and scanned in longitudinal and transverse planes.  The anterior gallbladder wall measured  up to 0.65.cm.  The common bile duct measured 0.59.cm.  Gallstones are present.  Sludge is present.  Pericholecystic fluid is present.  Gallbladder wall edema is present.  Sonographic Mcguire's sign is present.    IMPRESSION:Findings are consistent with acute cholecystitis.      < end of copied text >      Assessment:   Ms. Bridges is an 86 year old woman with PMH of HTN/HLD and breast cancer in 1994 s/p lumpectomy and radiation who was recently diagnosed with recurrence in September and is taking chemotherapy and presents with acute onset abdominal pain, found to have acute cholecystitis clinically and on US, but not currently a candidate for surgical intervention due to being on chemotherapy.    Plan:  - patient not currently operative candidate  - recommend abx and clinical evaluation  - if patient improves clinically from abx, may follow up outpatient  - if clinical course worsens or persists depsite abx, consider IR consult for percutaneous cholecystostomy tube    Discussed with Dr. Sol  Red Team Surgery  [phone]           [facility] General Surgery Consultation     Patient is a 86y old  Female who presents with a chief complaint of abdominal pain    HPI:  Ms. Bridges is an 86 year old woman with PMH of HTN/HLD and breast cancer in 1994 s/p lumpectomy and radiation who was recently diagnosed with recurrence in September and is taking chemotherapy and presents with acute onset abdominal pain. She was in the ED 2 days ago with right lower abdominal pain and back pain and was diagnosed with pyelonephritis and UTI and was prescribed abx. She took the first dose yesterday AM. yesterday PM she noted acute onset abdominal pain that felt distinctly different from the pyelo in that it was RUQ pain, more severe, and sharper. She denied fever or chills, chest pain, SOB, denied ever having hematuria or dysuria, or diarrhea or constipation. She has been on anastrazole for a "few months" and ibranz for 3w for her breast cancer. She smoked many years ago but quit in 1977, and rinks occasionally 1-2x per month. In the ED the patient was febrile to 100.7 with WBC of 5. US confirmed acute cholecystitis and patient remained uncomfortable but not in excruciating pain.    PAST MEDICAL & SURGICAL HISTORY:  HLD (hyperlipidemia)  Breast cancer  Depression  UTI (lower urinary tract infection)  Hyperlipidemia  Anxiety  History of hip replacement  H/O lumpectomy    Meds: Simvastatin 20mg daily  Wellbutrin 300mg daily  eTrace 2x per month  Anastrazole 1mg daily  Ibrance 100mg    FAMILY HISTORY:  None     SOCIAL HISTORY:  Smoked ages ago, quit in 1977  Drinks alcohol ~2x/week    Allergies  penicillin (Unknown)      Vital Signs Last 24 Hrs  T(C): 38.2 (24 Mar 2021 15:05), Max: 38.2 (24 Mar 2021 15:05)  T(F): 100.7 (24 Mar 2021 15:05), Max: 100.7 (24 Mar 2021 15:05)  HR: 96 (24 Mar 2021 16:17) (96 - 101)  BP: 107/61 (24 Mar 2021 16:17) (107/61 - 108/63)  BP(mean): 75 (24 Mar 2021 16:17) (75 - 75)  RR: 17 (24 Mar 2021 16:17) (17 - 18)  SpO2: 95% (24 Mar 2021 16:17) (95% - 95%)  Daily Height in cm: 152.4 (24 Mar 2021 12:40)    Daily     Examination  General: NAD, well-nourished  HEENT: Atraumatic, EOMI  Resp: Breathing comfortably on RA  CV: Normal sinus rhythm  Abd: soft, tender in the RUQ, + Mcguire's sign. no rebound no guarding  Ext: ROMIx4, motor strength intact x 4                          11.6   5.57  )-----------( 152      ( 24 Mar 2021 15:45 )             36.4     03-24    134<L>  |  98  |  15  ----------------------------<  109<H>  3.8   |  21<L>  |  0.89    Ca    8.8      24 Mar 2021 15:45  Phos  2.7     03-24  Mg     1.9     03-24    TPro  6.8  /  Alb  3.5  /  TBili  0.4  /  DBili  x   /  AST  28  /  ALT  22  /  AlkPhos  60  03-24    PT/INR - ( 24 Mar 2021 15:45 )   PT: 14.2 sec;   INR: 1.19 ratio         PTT - ( 24 Mar 2021 15:45 )  PTT:31.3 sec      Radiographic Findings:   < from: US Abdomen Limited (ED) (03.24.21 @ 15:04) >    INTERPRETATION:Grayscale imaging of the right upper quadrant was performed.  The gallbladder was visualized and scanned in longitudinal and transverse planes.  The anterior gallbladder wall measured  up to 0.65.cm.  The common bile duct measured 0.59.cm.  Gallstones are present.  Sludge is present.  Pericholecystic fluid is present.  Gallbladder wall edema is present.  Sonographic Mcguire's sign is present.    IMPRESSION:Findings are consistent with acute cholecystitis.      < end of copied text >      Assessment:   Ms. Bridges is an 86 year old woman with PMH of HTN/HLD and breast cancer in 1994 s/p lumpectomy and radiation who was recently diagnosed with recurrence in September and is taking chemotherapy and presents with acute onset abdominal pain, found to have acute cholecystitis clinically and on US, but not currently a candidate for surgical intervention due to being on chemotherapy.    Plan:  - patient not currently operative candidate  - recommend abx and clinical evaluation  - if patient improves clinically from abx, may follow up outpatient  - if clinical course worsens or persists depsite abx, consider IR consult for percutaneous cholecystostomy tube    Discussed with Dr. Sol  Red Team Surgery  [phone]           [facility] General Surgery Consultation     Patient is a 86y old  Female who presents with a chief complaint of abdominal pain    HPI:  Ms. Bridges is an 86 year old woman with PMH of HTN/HLD and breast cancer in 1994 s/p lumpectomy and radiation who was recently diagnosed with recurrence in September and is taking chemotherapy and presents with acute onset abdominal pain. She was in the ED 2 days ago with right lower abdominal pain and back pain and was diagnosed with pyelonephritis and UTI and was prescribed abx. She took the first dose yesterday AM. yesterday PM she noted acute onset abdominal pain that felt distinctly different from the pyelo in that it was RUQ pain, more severe, and sharper. She denied fever or chills, chest pain, SOB, denied ever having hematuria or dysuria, or diarrhea or constipation. She has been on anastrazole for a "few months" and ibranz for 3w for her breast cancer. She smoked many years ago but quit in 1977, and rinks occasionally 1-2x per month. In the ED the patient was febrile to 100.7 with WBC of 5. US confirmed acute cholecystitis and patient remained uncomfortable but not in excruciating pain.    PAST MEDICAL & SURGICAL HISTORY:  HLD (hyperlipidemia)  Breast cancer  Depression  UTI (lower urinary tract infection)  Hyperlipidemia  Anxiety  History of hip replacement  H/O lumpectomy    Meds: Simvastatin 20mg daily  Wellbutrin 300mg daily  eTrace 2x per month  Anastrazole 1mg daily  Ibrance 100mg    FAMILY HISTORY:  None     SOCIAL HISTORY:  Smoked ages ago, quit in 1977  Drinks alcohol ~2x/week    Allergies  penicillin (Unknown)      Vital Signs Last 24 Hrs  T(C): 38.2 (24 Mar 2021 15:05), Max: 38.2 (24 Mar 2021 15:05)  T(F): 100.7 (24 Mar 2021 15:05), Max: 100.7 (24 Mar 2021 15:05)  HR: 96 (24 Mar 2021 16:17) (96 - 101)  BP: 107/61 (24 Mar 2021 16:17) (107/61 - 108/63)  BP(mean): 75 (24 Mar 2021 16:17) (75 - 75)  RR: 17 (24 Mar 2021 16:17) (17 - 18)  SpO2: 95% (24 Mar 2021 16:17) (95% - 95%)  Daily Height in cm: 152.4 (24 Mar 2021 12:40)    Daily     Examination  General: NAD, well-nourished  HEENT: Atraumatic, EOMI  Resp: Breathing comfortably on RA  CV: Normal sinus rhythm  Abd: soft, tender in the RUQ, + Mcguire's sign. no rebound no guarding  Ext: ROMIx4, motor strength intact x 4                          11.6   5.57  )-----------( 152      ( 24 Mar 2021 15:45 )             36.4     03-24    134<L>  |  98  |  15  ----------------------------<  109<H>  3.8   |  21<L>  |  0.89    Ca    8.8      24 Mar 2021 15:45  Phos  2.7     03-24  Mg     1.9     03-24    TPro  6.8  /  Alb  3.5  /  TBili  0.4  /  DBili  x   /  AST  28  /  ALT  22  /  AlkPhos  60  03-24    PT/INR - ( 24 Mar 2021 15:45 )   PT: 14.2 sec;   INR: 1.19 ratio         PTT - ( 24 Mar 2021 15:45 )  PTT:31.3 sec      Radiographic Findings:   < from: US Abdomen Limited (ED) (03.24.21 @ 15:04) >    INTERPRETATION:Grayscale imaging of the right upper quadrant was performed.  The gallbladder was visualized and scanned in longitudinal and transverse planes.  The anterior gallbladder wall measured  up to 0.65.cm.  The common bile duct measured 0.59.cm.  Gallstones are present.  Sludge is present.  Pericholecystic fluid is present.  Gallbladder wall edema is present.  Sonographic Mcguire's sign is present.    IMPRESSION:Findings are consistent with acute cholecystitis.      < end of copied text >      Assessment:   Ms. Bridges is an 86 year old woman with PMH of HTN/HLD and breast cancer in 1994 s/p lumpectomy and radiation who was recently diagnosed with recurrence in September and is taking chemotherapy and presents with acute onset abdominal pain, found to have acute cholecystitis clinically and on US, but not currently a candidate for surgical intervention due to being on chemotherapy.    Plan:  - patient not currently operative candidate  - recommend abx and clinical evaluation  - if patient improves clinically from abx, may follow up outpatient for possible elective cholecystecotmy after careful planning with oncologists  - if clinical course worsens or persists depsite abx, consider IR consult for percutaneous cholecystostomy tube    Discussed with Dr. Sol  Red Team Surgery  [phone]

## 2021-03-24 NOTE — ED CLERICAL - NS ED CLERK NOTE PRE-ARRIVAL INFORMATION; ADDITIONAL PRE-ARRIVAL INFORMATION
CC/Reason For referral:  hx breast ca. presented to ed 2 days ago and was found to have uti given meds and sent home.  now has ruq pain, very weak, b/p 100/60 and tachy.  patient was covid + jan 19, 2020  Preferred Consultant(if applicable):  Who admits for you (if needed):  Do you have documents you would like to fax over?  Would you still like to speak to an ED attending?  please call after patient is seen

## 2021-03-24 NOTE — CONSULT NOTE ADULT - ATTENDING COMMENTS
pt chart and imaging reviewed  agree with note above  pt not operative candidate at this time  cont iv abx  serial labs and abd exam  if fails to improve on abx alone consider IR dameon renee   will follow with you

## 2021-03-24 NOTE — REVIEW OF SYSTEMS
[Shortness Of Breath] : shortness of breath [Abdominal Pain] : abdominal pain [Nausea] : nausea [Negative] : Musculoskeletal [[id number]] : Anorexia [de-identified] : Overall weakness

## 2021-03-24 NOTE — ED ADULT NURSE NOTE - OBJECTIVE STATEMENT
87 y/o female with pmhx of ca c/o RUQ pain that radiates to her back. 87 y/o female with pmhx HTN, HLD, breast CA (on IBRANCE and Anastrozole) c/o  RUQ pain, nausea, and lack of appetite x1-2 days. Patient has been hydrating but not really eating. per pt, pain gets worse after eating and is described as crampy. per pt, she  had COVID 1/2021. Patient denied CP, SOB, diarrhea, urinary, dysuria, hematuria, rash. pt is awake, alert and responsive to all stimuli.  no sob or respiratory distress noted.  skin is warm, dry and intact.  no pallor or cyanosis noted at this time.  pt sent by pmd for further assessment of hypotension and tachycardia.  safety precautions in place.  will continue to monitor.

## 2021-03-24 NOTE — H&P ADULT - ASSESSMENT
86F w/ PMHx of HLD, depression, recurrent br ca on inbrance/anastrazole presents w/ RUQ abdominal pain and weakness being managed conservatively.

## 2021-03-24 NOTE — ASSESSMENT
[FreeTextEntry1] : Patient was evaluated on Monday night and was found to have a urinary tract infection.  She initially felt better on cephalexin but developed right upper quadrant pain and on exam there is rebound and tenderness and tachycardia and a borderline low blood pressure and generalized weakness.  EKG is tachycardia of 95 and no significant other changes.  I am concerned about the change in the nature of the pain and it may be modified by being on antibiotic.  Is in the area of the gallbladder and should be reevaluated

## 2021-03-24 NOTE — HISTORY OF PRESENT ILLNESS
[FreeTextEntry8] : The patient comes in following evaluation Monday night at the emergency room [place].  At that time she was having lower abdominal pain and a work-up showed air in the bladder on a CAT scan with no other abnormalities and a urine which showed nitrates positive and white cells and she was placed on cefuroxime.  She felt well on Tuesday then noted the onset of right-sided pain which was constant associated with anorexia and possibly nausea but no fever change in bowel blood in his stool or melena.  She denied urinary symptoms.  She feels generally weak but there are no focalizing neurologic symptoms.  She does feel short of breath on and off.  The patient had Covid pneumonia January 19 and was treated at home with  Xarelto fluprednisolone as well as IV fluids and she improved but noted that she would get short of breath on and off.  Up until the present time she was doing reasonably well until the abdominal pain started

## 2021-03-24 NOTE — H&P ADULT - PROBLEM SELECTOR PLAN 1
-s/p meropenem in ER  -c/w cefepime/flagyl  -NPO  -s/p 2L NS  -start LR@80cc/hr maintenance  -per surgery, defer perc víctor or cholecystectomy for now and monitor on abx  -surgical follow up appreciated  -pain control  -zofran prn -s/p meropenem in ER  -c/w cefepime/flagyl  -NPO  -s/p 2L NS  -start LR@80cc/hr maintenance  -per surgery, defer perc víctor or cholecystectomy for now and monitor on abx  -surgical follow up appreciated  -if decompensating will reach out to surgery for cholecystecomy vs ir perc víctor.   -pain control  -zofran prn

## 2021-03-24 NOTE — H&P ADULT - NSICDXPASTMEDICALHX_GEN_ALL_CORE_FT
PAST MEDICAL HISTORY:  Anxiety     Breast cancer     Depression     HLD (hyperlipidemia)     Hyperlipidemia     UTI (lower urinary tract infection)

## 2021-03-24 NOTE — H&P ADULT - NSHPLABSRESULTS_GEN_ALL_CORE
LABS:                         11.6   5.57  )-----------( 152      ( 24 Mar 2021 15:45 )             36.4     03-24    134<L>  |  98  |  15  ----------------------------<  109<H>  3.8   |  21<L>  |  0.89    Ca    8.8      24 Mar 2021 15:45  Phos  2.7     03-24  Mg     1.9     03-24    TPro  6.8  /  Alb  3.5  /  TBili  0.4  /  DBili  x   /  AST  28  /  ALT  22  /  AlkPhos  60  03-24    PT/INR - ( 24 Mar 2021 15:45 )   PT: 14.2 sec;   INR: 1.19 ratio         PTT - ( 24 Mar 2021 15:45 )  PTT:31.3 sec          Culture - Urine (collected 22 Mar 2021 18:11)  Source: .Urine Clean Catch (Midstream)  Final Report (24 Mar 2021 14:43):    >100,000 CFU/ml Escherichia coli  Organism: Escherichia coli (24 Mar 2021 14:43)  Organism: Escherichia coli (24 Mar 2021 14:43)          Records reviewed from prior hospitalization.  Labs reviewed remarkable for no wbc, LFTs wnl  EKG personally reviewed - sinus tach  CXR personally reviewed -clear lungs    < from: US Abdomen Limited (ED) (03.24.21 @ 15:04) >    INTERPRETATION:Grayscale imaging of the right upper quadrant was performed.  The gallbladder was visualized and scanned in longitudinal and transverse planes.  The anterior gallbladder wall measured  up to 0.65.cm.  The common bile duct measured 0.59.cm.  Gallstones are present.  Sludge is present.  Pericholecystic fluid is present.  Gallbladder wall edema is present.  Sonographic Mcguire's sign is present.    IMPRESSION:Findings are consistent with acute cholecystitis.    < end of copied text >    < from: Xray Chest 1 View AP/PA (03.24.21 @ 14:45) >    Impression:    The heart is slightly enlarged. Mild elevation right hemidiaphragm. Otherwise the lungs appear to be clear. No pleural effusion. No pneumothorax. No acute bony pathology could be identified.      < end of copied text >    < from: CT Abdomen and Pelvis w/ IV Cont (03.22.21 @ 16:58) >    IMPRESSION:    4.6 x 3.7 x 5.6 cm probable uterine fibroid showing signs of degeneration. This is poorly evaluated secondary to streak artifact from a left hip prosthesis. Recommend pelvic ultrasound for further characterization.    Tiny droplet of nondependent air in the bladder. Correlate clinically for recent instrumentation and/or cystitis.    < end of copied text >

## 2021-03-24 NOTE — ED POST DISCHARGE NOTE - DETAILS
Appropriate care for UTI. Patient returned and is currently admitted to hospital. - Yolie Echavarria PA-C

## 2021-03-25 LAB
ANION GAP SERPL CALC-SCNC: 11 MMOL/L — SIGNIFICANT CHANGE UP (ref 5–17)
APPEARANCE UR: CLEAR — SIGNIFICANT CHANGE UP
BACTERIA # UR AUTO: NEGATIVE — SIGNIFICANT CHANGE UP
BILIRUB UR-MCNC: NEGATIVE — SIGNIFICANT CHANGE UP
BUN SERPL-MCNC: 10 MG/DL — SIGNIFICANT CHANGE UP (ref 7–23)
CALCIUM SERPL-MCNC: 8.3 MG/DL — LOW (ref 8.4–10.5)
CHLORIDE SERPL-SCNC: 102 MMOL/L — SIGNIFICANT CHANGE UP (ref 96–108)
CO2 SERPL-SCNC: 23 MMOL/L — SIGNIFICANT CHANGE UP (ref 22–31)
COLOR SPEC: YELLOW — SIGNIFICANT CHANGE UP
COVID-19 SPIKE DOMAIN AB INTERP: POSITIVE
COVID-19 SPIKE DOMAIN ANTIBODY RESULT: >250 U/ML — HIGH
CREAT SERPL-MCNC: 0.76 MG/DL — SIGNIFICANT CHANGE UP (ref 0.5–1.3)
DIFF PNL FLD: NEGATIVE — SIGNIFICANT CHANGE UP
EPI CELLS # UR: 2 /HPF — SIGNIFICANT CHANGE UP
GLUCOSE SERPL-MCNC: 82 MG/DL — SIGNIFICANT CHANGE UP (ref 70–99)
GLUCOSE UR QL: NEGATIVE — SIGNIFICANT CHANGE UP
GRAM STN FLD: SIGNIFICANT CHANGE UP
HCT VFR BLD CALC: 29.7 % — LOW (ref 34.5–45)
HGB BLD-MCNC: 9.5 G/DL — LOW (ref 11.5–15.5)
HYALINE CASTS # UR AUTO: 2 /LPF — SIGNIFICANT CHANGE UP (ref 0–2)
KETONES UR-MCNC: SIGNIFICANT CHANGE UP
LEUKOCYTE ESTERASE UR-ACNC: NEGATIVE — SIGNIFICANT CHANGE UP
MAGNESIUM SERPL-MCNC: 1.9 MG/DL — SIGNIFICANT CHANGE UP (ref 1.6–2.6)
MCHC RBC-ENTMCNC: 28.8 PG — SIGNIFICANT CHANGE UP (ref 27–34)
MCHC RBC-ENTMCNC: 32 GM/DL — SIGNIFICANT CHANGE UP (ref 32–36)
MCV RBC AUTO: 90 FL — SIGNIFICANT CHANGE UP (ref 80–100)
NITRITE UR-MCNC: NEGATIVE — SIGNIFICANT CHANGE UP
NRBC # BLD: 0 /100 WBCS — SIGNIFICANT CHANGE UP (ref 0–0)
PH UR: 6 — SIGNIFICANT CHANGE UP (ref 5–8)
PHOSPHATE SERPL-MCNC: 2.2 MG/DL — LOW (ref 2.5–4.5)
PLATELET # BLD AUTO: 133 K/UL — LOW (ref 150–400)
POTASSIUM SERPL-MCNC: 3.3 MMOL/L — LOW (ref 3.5–5.3)
POTASSIUM SERPL-SCNC: 3.3 MMOL/L — LOW (ref 3.5–5.3)
PROT UR-MCNC: ABNORMAL
RBC # BLD: 3.3 M/UL — LOW (ref 3.8–5.2)
RBC # FLD: 16.4 % — HIGH (ref 10.3–14.5)
RBC CASTS # UR COMP ASSIST: 1 /HPF — SIGNIFICANT CHANGE UP (ref 0–4)
SARS-COV-2 IGG+IGM SERPL QL IA: >250 U/ML — HIGH
SARS-COV-2 IGG+IGM SERPL QL IA: POSITIVE
SODIUM SERPL-SCNC: 136 MMOL/L — SIGNIFICANT CHANGE UP (ref 135–145)
SP GR SPEC: 1.02 — SIGNIFICANT CHANGE UP (ref 1.01–1.02)
SPECIMEN SOURCE: SIGNIFICANT CHANGE UP
UROBILINOGEN FLD QL: NEGATIVE — SIGNIFICANT CHANGE UP
WBC # BLD: 3.4 K/UL — LOW (ref 3.8–10.5)
WBC # FLD AUTO: 3.4 K/UL — LOW (ref 3.8–10.5)
WBC UR QL: 3 /HPF — SIGNIFICANT CHANGE UP (ref 0–5)

## 2021-03-25 PROCEDURE — 71045 X-RAY EXAM CHEST 1 VIEW: CPT | Mod: 26

## 2021-03-25 PROCEDURE — 99233 SBSQ HOSP IP/OBS HIGH 50: CPT | Mod: GC

## 2021-03-25 PROCEDURE — 47490 INCISION OF GALLBLADDER: CPT

## 2021-03-25 PROCEDURE — 99231 SBSQ HOSP IP/OBS SF/LOW 25: CPT | Mod: GC

## 2021-03-25 RX ORDER — ACETAMINOPHEN 500 MG
650 TABLET ORAL EVERY 6 HOURS
Refills: 0 | Status: DISCONTINUED | OUTPATIENT
Start: 2021-03-25 | End: 2021-04-01

## 2021-03-25 RX ORDER — ACETAMINOPHEN 500 MG
1000 TABLET ORAL ONCE
Refills: 0 | Status: DISCONTINUED | OUTPATIENT
Start: 2021-03-25 | End: 2021-03-26

## 2021-03-25 RX ORDER — POTASSIUM CHLORIDE 20 MEQ
40 PACKET (EA) ORAL ONCE
Refills: 0 | Status: DISCONTINUED | OUTPATIENT
Start: 2021-03-25 | End: 2021-03-25

## 2021-03-25 RX ORDER — ONDANSETRON 8 MG/1
4 TABLET, FILM COATED ORAL ONCE
Refills: 0 | Status: DISCONTINUED | OUTPATIENT
Start: 2021-03-25 | End: 2021-04-01

## 2021-03-25 RX ORDER — POTASSIUM CHLORIDE 20 MEQ
20 PACKET (EA) ORAL ONCE
Refills: 0 | Status: COMPLETED | OUTPATIENT
Start: 2021-03-25 | End: 2021-03-25

## 2021-03-25 RX ORDER — POTASSIUM PHOSPHATE, MONOBASIC POTASSIUM PHOSPHATE, DIBASIC 236; 224 MG/ML; MG/ML
15 INJECTION, SOLUTION INTRAVENOUS ONCE
Refills: 0 | Status: COMPLETED | OUTPATIENT
Start: 2021-03-25 | End: 2021-03-25

## 2021-03-25 RX ADMIN — CEFEPIME 100 MILLIGRAM(S): 1 INJECTION, POWDER, FOR SOLUTION INTRAMUSCULAR; INTRAVENOUS at 21:49

## 2021-03-25 RX ADMIN — CEFEPIME 100 MILLIGRAM(S): 1 INJECTION, POWDER, FOR SOLUTION INTRAMUSCULAR; INTRAVENOUS at 14:00

## 2021-03-25 RX ADMIN — OXYCODONE HYDROCHLORIDE 5 MILLIGRAM(S): 5 TABLET ORAL at 12:00

## 2021-03-25 RX ADMIN — Medication 20 MILLIEQUIVALENT(S): at 09:12

## 2021-03-25 RX ADMIN — CEFEPIME 100 MILLIGRAM(S): 1 INJECTION, POWDER, FOR SOLUTION INTRAMUSCULAR; INTRAVENOUS at 06:28

## 2021-03-25 RX ADMIN — POTASSIUM PHOSPHATE, MONOBASIC POTASSIUM PHOSPHATE, DIBASIC 62.5 MILLIMOLE(S): 236; 224 INJECTION, SOLUTION INTRAVENOUS at 09:12

## 2021-03-25 RX ADMIN — Medication 100 MILLIGRAM(S): at 06:28

## 2021-03-25 RX ADMIN — OXYCODONE HYDROCHLORIDE 5 MILLIGRAM(S): 5 TABLET ORAL at 00:07

## 2021-03-25 RX ADMIN — OXYCODONE HYDROCHLORIDE 5 MILLIGRAM(S): 5 TABLET ORAL at 12:30

## 2021-03-25 RX ADMIN — Medication 100 MILLIGRAM(S): at 21:49

## 2021-03-25 RX ADMIN — SODIUM CHLORIDE 80 MILLILITER(S): 9 INJECTION, SOLUTION INTRAVENOUS at 17:00

## 2021-03-25 RX ADMIN — Medication 100 MILLIGRAM(S): at 14:28

## 2021-03-25 RX ADMIN — OXYCODONE HYDROCHLORIDE 5 MILLIGRAM(S): 5 TABLET ORAL at 18:59

## 2021-03-25 RX ADMIN — SODIUM CHLORIDE 80 MILLILITER(S): 9 INJECTION, SOLUTION INTRAVENOUS at 06:29

## 2021-03-25 RX ADMIN — BUPROPION HYDROCHLORIDE 300 MILLIGRAM(S): 150 TABLET, EXTENDED RELEASE ORAL at 16:19

## 2021-03-25 RX ADMIN — SIMVASTATIN 20 MILLIGRAM(S): 20 TABLET, FILM COATED ORAL at 21:49

## 2021-03-25 RX ADMIN — OXYCODONE HYDROCHLORIDE 5 MILLIGRAM(S): 5 TABLET ORAL at 00:37

## 2021-03-25 RX ADMIN — OXYCODONE HYDROCHLORIDE 5 MILLIGRAM(S): 5 TABLET ORAL at 18:23

## 2021-03-25 NOTE — PROCEDURE NOTE - PLAN
-Bedrest x 4 hours  -Monitor vitals as per orders  -Record daily tube output, may flush with 5 cc of sterile saline daily to prevent clogging  -Continue IVAbx  -Pain control as per primary team  -Followup results of bile specimen sent for culture

## 2021-03-25 NOTE — PROGRESS NOTE ADULT - PROBLEM SELECTOR PLAN 5
-lovenox (on hold in case of procedure today 3/25) -lovenox (on hold in case of procedure today 3/25), will discuss resumption of AC -lovenox (on hold in case of procedure today 3/25), will discuss resumption of AC    3/25: Updated son and daughter in law on phone.

## 2021-03-25 NOTE — PHYSICAL THERAPY INITIAL EVALUATION ADULT - PERTINENT HX OF CURRENT PROBLEM, REHAB EVAL
Pt is a 86 F w/ PMHx of HLD, depression, recurrent br ca on inbrance/anastrazole presents w/ RUQ abdominal pain and weakness being managed conservatively, however w/ worsening exam and Vitals, now s/p IR percutaneous cholecystostomy.

## 2021-03-25 NOTE — PROGRESS NOTE ADULT - SUBJECTIVE AND OBJECTIVE BOX
Authored by Elina Martin MD, PGY1  PATIENT:  LAUREN JENKINS  [id number]    CHIEF COMPLAINT:  Patient is a 86y old  Female who presents with a chief complaint of acute cholecystitis (25 Mar 2021 08:04)      INTERVAL HISTORY OVERNIGHT EVENTS: Patient admitted overnight for cholecystitis, started on IVF and abx. Exam worsening this AM, increasingly hypotensive, urgent IR consult placed.         MEDICATIONS:  MEDICATIONS  (STANDING):  buPROPion XL . 300 milliGRAM(s) Oral daily  cefepime   IVPB 2000 milliGRAM(s) IV Intermittent every 8 hours  enoxaparin Injectable 40 milliGRAM(s) SubCutaneous daily  lactated ringers. 1000 milliLiter(s) (80 mL/Hr) IV Continuous <Continuous>  metroNIDAZOLE  IVPB 500 milliGRAM(s) IV Intermittent every 8 hours  potassium chloride    Tablet ER 20 milliEquivalent(s) Oral once  potassium phosphate IVPB 15 milliMole(s) IV Intermittent once  simvastatin 20 milliGRAM(s) Oral at bedtime    MEDICATIONS  (PRN):  acetaminophen   Tablet .. 650 milliGRAM(s) Oral every 6 hours PRN Temp greater or equal to 38C (100.4F), Mild Pain (1 - 3)  morphine  - Injectable 2 milliGRAM(s) IV Push every 6 hours PRN Severe Pain (7 - 10)  ondansetron Injectable 4 milliGRAM(s) IV Push every 8 hours PRN Nausea and/or Vomiting  oxyCODONE    IR 5 milliGRAM(s) Oral every 6 hours PRN Moderate Pain (4 - 6)      ALLERGIES:  Allergies    penicillin (Unknown)    Intolerances        OBJECTIVE:  ICU Vital Signs Last 24 Hrs  T(C): 36.7 (25 Mar 2021 06:25), Max: 38.2 (24 Mar 2021 15:05)  T(F): 98 (25 Mar 2021 06:25), Max: 100.7 (24 Mar 2021 15:05)  HR: 86 (25 Mar 2021 06:25) (86 - 101)  BP: 94/56 (25 Mar 2021 06:25) (94/56 - 108/63)  BP(mean): 75 (24 Mar 2021 16:17) (75 - 75)  ABP: --  ABP(mean): --  RR: 17 (25 Mar 2021 06:25) (17 - 18)  SpO2: 95% (25 Mar 2021 06:25) (85% - 97%)          I&O's Summary    24 Mar 2021 07:01  -  25 Mar 2021 07:00  --------------------------------------------------------  IN: 790 mL / OUT: 700 mL / NET: 90 mL      Daily Height in cm: 152.4 (24 Mar 2021 21:29)    Daily     PHYSICAL EXAMINATION:  CONSTITUTIONAL: NAD, well-developed, well-groomed  EYES: PERRLA; conjunctiva and sclera clear  ENMT: Moist oral mucosa, no pharyngeal injection or exudates; normal dentition  NECK: Supple, no palpable masses; no thyromegaly  RESPIRATORY: Normal respiratory effort; lungs are clear to auscultation bilaterally  CARDIOVASCULAR: Regular rate and rhythm, normal S1 and S2, no murmur/rub/gallop; No lower extremity edema; Peripheral pulses are 2+ bilaterally  ABDOMEN:+RUQ tenderness, normoactive bowel sounds, no rebound/guarding; No hepatosplenomegaly  MUSCULOSKELETAL: no clubbing or cyanosis of digits; no joint swelling or tenderness to palpation  PSYCH: A+O to person, place, and time; affect appropriate  NEUROLOGY: CN 2-12 are intact and symmetric; no gross sensory deficits   SKIN: No rashes; no palpable lesions    LABS:                          9.5    3.40  )-----------( 133      ( 25 Mar 2021 06:40 )             29.7     03-    136  |  102  |  10  ----------------------------<  82  3.3<L>   |  23  |  0.76    Ca    8.3<L>      25 Mar 2021 06:39  Phos  2.2     -  Mg     1.9         TPro  6.8  /  Alb  3.5  /  TBili  0.4  /  DBili  x   /  AST  28  /  ALT  22  /  AlkPhos  60  03-    LIVER FUNCTIONS - ( 24 Mar 2021 15:45 )  Alb: 3.5 g/dL / Pro: 6.8 g/dL / ALK PHOS: 60 U/L / ALT: 22 U/L / AST: 28 U/L / GGT: x           PT/INR - ( 24 Mar 2021 15:45 )   PT: 14.2 sec;   INR: 1.19 ratio         PTT - ( 24 Mar 2021 15:45 )  PTT:31.3 sec        Urinalysis Basic - ( 25 Mar 2021 01:27 )    Color: Yellow / Appearance: Clear / S.017 / pH: x  Gluc: x / Ketone: Trace  / Bili: Negative / Urobili: Negative   Blood: x / Protein: 30 mg/dL / Nitrite: Negative   Leuk Esterase: Negative / RBC: 1 /hpf / WBC 3 /HPF   Sq Epi: x / Non Sq Epi: 2 /hpf / Bacteria: Negative        TELEMETRY:     EKG:     IMAGING:       Authored by Elina Martin MD, PGY1  PATIENT:  LAUREN JENKINS  [id number]    CHIEF COMPLAINT:  Patient is a 86y old  Female who presents with a chief complaint of acute cholecystitis (25 Mar 2021 08:04)      INTERVAL HISTORY OVERNIGHT EVENTS: Patient admitted overnight for cholecystitis, started on IVF and abx. Exam worsening this AM, increasingly hypotensive, urgent IR consult placed. This AM, patient complaining of abdominal pain when abdomen is palpated, denies fevers, chills, endorses hunger, generalized weakness.         MEDICATIONS:  MEDICATIONS  (STANDING):  buPROPion XL . 300 milliGRAM(s) Oral daily  cefepime   IVPB 2000 milliGRAM(s) IV Intermittent every 8 hours  enoxaparin Injectable 40 milliGRAM(s) SubCutaneous daily  lactated ringers. 1000 milliLiter(s) (80 mL/Hr) IV Continuous <Continuous>  metroNIDAZOLE  IVPB 500 milliGRAM(s) IV Intermittent every 8 hours  potassium chloride    Tablet ER 20 milliEquivalent(s) Oral once  potassium phosphate IVPB 15 milliMole(s) IV Intermittent once  simvastatin 20 milliGRAM(s) Oral at bedtime    MEDICATIONS  (PRN):  acetaminophen   Tablet .. 650 milliGRAM(s) Oral every 6 hours PRN Temp greater or equal to 38C (100.4F), Mild Pain (1 - 3)  morphine  - Injectable 2 milliGRAM(s) IV Push every 6 hours PRN Severe Pain (7 - 10)  ondansetron Injectable 4 milliGRAM(s) IV Push every 8 hours PRN Nausea and/or Vomiting  oxyCODONE    IR 5 milliGRAM(s) Oral every 6 hours PRN Moderate Pain (4 - 6)      ALLERGIES:  Allergies    penicillin (Unknown)    Intolerances        OBJECTIVE:  ICU Vital Signs Last 24 Hrs  T(C): 36.7 (25 Mar 2021 06:25), Max: 38.2 (24 Mar 2021 15:05)  T(F): 98 (25 Mar 2021 06:25), Max: 100.7 (24 Mar 2021 15:05)  HR: 86 (25 Mar 2021 06:25) (86 - 101)  BP: 94/56 (25 Mar 2021 06:25) (94/56 - 108/63)  BP(mean): 75 (24 Mar 2021 16:17) (75 - 75)  ABP: --  ABP(mean): --  RR: 17 (25 Mar 2021 06:25) (17 - 18)  SpO2: 95% (25 Mar 2021 06:25) (85% - 97%)          I&O's Summary    24 Mar 2021 07:01  -  25 Mar 2021 07:00  --------------------------------------------------------  IN: 790 mL / OUT: 700 mL / NET: 90 mL      Daily Height in cm: 152.4 (24 Mar 2021 21:29)    Daily     PHYSICAL EXAMINATION:  CONSTITUTIONAL: NAD, well-developed, well-groomed  EYES: PERRLA; conjunctiva and sclera clear  ENMT: Moist oral mucosa, no pharyngeal injection or exudates; normal dentition  NECK: Supple, no palpable masses; no thyromegaly  RESPIRATORY: Normal respiratory effort; lungs are clear to auscultation bilaterally  CARDIOVASCULAR: Regular rate and rhythm, normal S1 and S2, no murmur/rub/gallop; No lower extremity edema; Peripheral pulses are 2+ bilaterally  ABDOMEN:+RUQ tenderness, normoactive bowel sounds, no rebound/guarding; No hepatosplenomegaly  MUSCULOSKELETAL: no clubbing or cyanosis of digits; no joint swelling or tenderness to palpation  PSYCH: A+O to person, place, and time; affect appropriate  NEUROLOGY: CN 2-12 are intact and symmetric; no gross sensory deficits   SKIN: No rashes; no palpable lesions    LABS:                          9.5    3.40  )-----------( 133      ( 25 Mar 2021 06:40 )             29.7     03-    136  |  102  |  10  ----------------------------<  82  3.3<L>   |  23  |  0.76    Ca    8.3<L>      25 Mar 2021 06:39  Phos  2.2     -  Mg     1.9         TPro  6.8  /  Alb  3.5  /  TBili  0.4  /  DBili  x   /  AST  28  /  ALT  22  /  AlkPhos  60      LIVER FUNCTIONS - ( 24 Mar 2021 15:45 )  Alb: 3.5 g/dL / Pro: 6.8 g/dL / ALK PHOS: 60 U/L / ALT: 22 U/L / AST: 28 U/L / GGT: x           PT/INR - ( 24 Mar 2021 15:45 )   PT: 14.2 sec;   INR: 1.19 ratio         PTT - ( 24 Mar 2021 15:45 )  PTT:31.3 sec        Urinalysis Basic - ( 25 Mar 2021 01:27 )    Color: Yellow / Appearance: Clear / S.017 / pH: x  Gluc: x / Ketone: Trace  / Bili: Negative / Urobili: Negative   Blood: x / Protein: 30 mg/dL / Nitrite: Negative   Leuk Esterase: Negative / RBC: 1 /hpf / WBC 3 /HPF   Sq Epi: x / Non Sq Epi: 2 /hpf / Bacteria: Negative        TELEMETRY:     EKG:     IMAGING:

## 2021-03-25 NOTE — PROGRESS NOTE ADULT - ASSESSMENT
Assessment:   Ms. Bridges is an 86 year old woman with PMH of HTN/HLD and breast cancer in 1994 s/p lumpectomy and radiation who was recently diagnosed with recurrence in September and is taking chemotherapy and presents with acute onset abdominal pain, found to have acute cholecystitis clinically and on US, but not currently a candidate for surgical intervention due to being on chemotherapy.    Plan:  - NPO, IVF  - Continue Abx  - Recommend urgent IR consult for percutaneous cholecystostomy as patient has worsening abdominal pain and tenderness, new O2 requirements, and borderline hypotension    Felicia Conteh, PGY2  Red Team Surgery [id number]

## 2021-03-25 NOTE — PROCEDURE NOTE - PROCEDURE FINDINGS AND DETAILS
Acute calculus cholecystitis, status post percutaneous cholecystostomy with placement of a 8.5 pigtail drainage catheter.  Manual aspiration of 50 cc of brown-pink think fluid, specimen sent for culture.

## 2021-03-25 NOTE — PRE PROCEDURE NOTE - PRE PROCEDURE EVALUATION
HPI:  86F w/ PMHx of HLD, depression, recurrent br ca on inbrance/anastrazole presents w/ RUQ abdominal pain and weakness. Patient was seen in ER on 3/22 for abdominal pain and nausea. She had CT AP which was negative and UA/UCx was positive. She was discharged on cefpodoxime for presumed UTI. Notably never had dysuria. Subsequently developed ongoing nausea, inability to PO and pain localized to RUQ. She was evaluated by PMD this AM and referred to ER due to c.f cholecystitis.     In ER, found to be febrile, tachy and RUQ c/w acute cholecystitis. Received 2L NS. Patient was evaluated by surgery who recommend against cholecystectomy at this time and want to manage conservatively w/ abx, so is now admitted to medicine.    IR consulted for percutaneous cholecystostomy tube placement.     NPO status: Since midnight  Anticoagulation: None   Antibiotics: IV Maxipime    Allergies: penicillin (Unknown)      PAST MEDICAL & SURGICAL HISTORY:  HLD (hyperlipidemia)    Breast cancer    Depression    UTI (lower urinary tract infection)    Hyperlipidemia    Anxiety    History of hip replacement    H/O lumpectomy          Pertinent labs:                      9.5    3.40  )-----------( 133      ( 25 Mar 2021 06:40 )             29.7   03-25    136  |  102  |  10  ----------------------------<  82  3.3<L>   |  23  |  0.76    Ca    8.3<L>      25 Mar 2021 06:39  Phos  2.2     03-25  Mg     1.9     03-25    TPro  6.8  /  Alb  3.5  /  TBili  0.4  /  DBili  x   /  AST  28  /  ALT  22  /  AlkPhos  60  03-24  PT/INR - ( 24 Mar 2021 15:45 )   PT: 14.2 sec;   INR: 1.19 ratio         PTT - ( 24 Mar 2021 15:45 )  PTT:31.3 sec    Consent: Procedure/risks/ Benefits explained. Informed consent obtained. Pt verbalizes understanding. patient agreed to suspend DNR for the duration of the procedure.

## 2021-03-25 NOTE — PHYSICAL THERAPY INITIAL EVALUATION ADULT - ADDITIONAL COMMENTS
As per pt, pt lives in an apartment alone and no steps to enter w/ UHR. PTA pt was independent w/ all  mobility and straight cane and ADLs.

## 2021-03-25 NOTE — CONSULT NOTE ADULT - SUBJECTIVE AND OBJECTIVE BOX
Interventional Radiology    Evaluate for Procedure:     HPI: 86y Female with breast cancer on chemo, presented with acute cholecystitis. Initially planned for conservative management, but now with worsening abd pain, borderline hypotension, and new O2 requirement.    Allergies: penicillin (Unknown)    Medications (Abx/Cardiac/Anticoagulation/Blood Products)  cefepime   IVPB: 100 mL/Hr IV Intermittent (03-25 @ 06:28)  meropenem  IVPB: 100 mL/Hr IV Intermittent (03-24 @ 15:25)  metroNIDAZOLE  IVPB: 100 mL/Hr IV Intermittent (03-25 @ 06:28)    Data:  152.4  69.037  T(C): 36.7  HR: 86  BP: 94/56  RR: 17  SpO2: 95%    -WBC 3.40 / HgB 9.5 / Hct 29.7 / Plt 133  -Na 136 / Cl 102 / BUN 10 / Glucose 82  -K 3.3 / CO2 23 / Cr 0.76  -ALT -- / Alk Phos -- / T.Bili --  -INR 1.19 / PTT 31.3      Radiology:   US 3/24  IMPRESSION: Findings are consistent with acute cholecystitis.    Assessment/Plan:   86y Female with breast cancer on chemo, presented with acute cholecystitis. Initially planned for conservative management, but now with worsening abd pain, borderline hypotension, and new O2 requirement. IR consulted for percutaneous cholecystostomy as patient is not a surgical candidate per surgery.  -- IR will plan to perform percutaneous cholecystostomy on 3/25/21.  -- Keep patient NPO.  -- hold all anticoagulation.  -- please maintain COVID PCR within 72 hours of planned procedure.  -- please place IR procedure request order under Dr. Barker.

## 2021-03-25 NOTE — PROGRESS NOTE ADULT - SUBJECTIVE AND OBJECTIVE BOX
GENERAL SURGERY PROGRESS NOTE    S: Patient seen and examined on AM rounds. Worsening abdominal pain, now requiring 2L NC via O2. Denies nausea, vomiting, fevers. Endorses chills.    O: Vital Signs  T(C): 36.7 (03-25 @ 06:25), Max: 38.2 (03-24 @ 15:05)  HR: 86 (03-25 @ 06:25) (86 - 101)  BP: 94/56 (03-25 @ 06:25) (94/56 - 108/63)  RR: 17 (03-25 @ 06:25) (17 - 18)  SpO2: 95% (03-25 @ 06:25) (85% - 97%)  03-24-21 @ 07:01  -  03-25-21 @ 07:00  --------------------------------------------------------  IN:  Total IN: 0 mL    OUT:    Voided (mL): 700 mL  Total OUT: 700 mL    Total NET: -700 mL        General: alert and oriented, NAD  Resp: airway patent, respirations unlabored  CVS: regular rate and rhythm  Abdomen: soft, nondistended, significant RUQ tenderness w/rebound and guarding  Extremities: no edema  Skin: warm, dry, appropriate color                          9.5    3.40  )-----------( 133      ( 25 Mar 2021 06:40 )             29.7   03-25    136  |  102  |  10  ----------------------------<  82  3.3<L>   |  23  |  0.76    Ca    8.3<L>      25 Mar 2021 06:39  Phos  2.2     03-25  Mg     1.9     03-25    TPro  6.8  /  Alb  3.5  /  TBili  0.4  /  DBili  x   /  AST  28  /  ALT  22  /  AlkPhos  60  03-24

## 2021-03-25 NOTE — PRE-OP CHECKLIST - BP NONINVASIVE SYSTOLIC (MM HG)
Per provider, 500ml of fluids to be admin'd prior to discharge       Andre Cease, RN  03/11/21 [phone]

## 2021-03-25 NOTE — PROGRESS NOTE ADULT - PROBLEM SELECTOR PLAN 1
-s/p meropenem in ER (3/24)  -c/w cefepime/flagyl (3/24-  -NPO  -s/p 2L NS  -start LR@80cc/hr maintenance  -per surgery, initial recs were to defer perc víctor or cholecystectomy for now and monitor on abx, this AM worsening abdominal exam, unstable VS, urgent consult for IR percutaneous cholecystostomy placed   -surgical follow up appreciated  -pain control  -zofran prn -s/p meropenem in ER (3/24)  -c/w cefepime/flagyl (3/24-  -NPO for now  -s/p 2L NS  -start LR@80cc/hr maintenance  -per surgery, initial recs were to defer perc víctor or cholecystectomy for now and monitor on abx, this AM worsening abdominal exam, unstable VS, urgent consult for IR percutaneous cholecystostomy placed, IR procedure 3/25  -surgical follow up appreciated  -pain control  -zofran prn

## 2021-03-25 NOTE — PROGRESS NOTE ADULT - ASSESSMENT
86F w/ PMHx of HLD, depression, recurrent br ca on inbrance/anastrazole presents w/ RUQ abdominal pain and weakness being managed conservatively, however w/ worsenign exam and VSS, needs percutaneous cholecystostomy.

## 2021-03-25 NOTE — PRE-ANESTHESIA EVALUATION ADULT - NSANTHOSAYNRD_GEN_A_CORE
No. WILVER screening performed.  STOP BANG Legend: 0-2 = LOW Risk; 3-4 = INTERMEDIATE Risk; 5-8 = HIGH Risk

## 2021-03-25 NOTE — PROGRESS NOTE ADULT - PROBLEM SELECTOR PLAN 2
-on hormonal therapy w/ anastrazole, small molecule inhibitor w/ ibrance  -excellent functional status  She appears to have excellent functional status and notably while Ibrance can be a/w cytopenias, she has no evidence of marrow suppression on cbc and has been on her off week of her cycle -on hormonal therapy w/ anastrazole, small molecule inhibitor w/ ibrance  -excellent functional status  She appears to have excellent functional status and notably while Ibrance can be a/w cytopenias, she has no evidence of marrow suppression on cbc and has been on her off week of her cycle  -oncologist Dr. Juvencio Fletcher notified of admission, will see patient today or tomorrow AM

## 2021-03-25 NOTE — PROGRESS NOTE ADULT - ATTENDING COMMENTS
pt seen and examined  agree with above  poor operative candidate  cont npo, ivf, iv abx  f/u IR for perc víctor

## 2021-03-25 NOTE — PROGRESS NOTE ADULT - ATTENDING COMMENTS
Cholecystitis- cont cefepime/flagyl- IR placement of percutaneous cholecystostomy tube today  Malignant neoplasm of right female breast- on anastrazole and Ibrance- will inform oncologist Dr. Juvencio Batista of admission  Hyperlipidemia- cont statin.   Depression- cont Wellbutrin  PT eval

## 2021-03-26 LAB
ALBUMIN SERPL ELPH-MCNC: 3.1 G/DL — LOW (ref 3.3–5)
ALP SERPL-CCNC: 61 U/L — SIGNIFICANT CHANGE UP (ref 40–120)
ALT FLD-CCNC: 25 U/L — SIGNIFICANT CHANGE UP (ref 10–45)
ANION GAP SERPL CALC-SCNC: 11 MMOL/L — SIGNIFICANT CHANGE UP (ref 5–17)
ANISOCYTOSIS BLD QL: SLIGHT — SIGNIFICANT CHANGE UP
AST SERPL-CCNC: 30 U/L — SIGNIFICANT CHANGE UP (ref 10–40)
BASOPHILS # BLD AUTO: 0.01 K/UL — SIGNIFICANT CHANGE UP (ref 0–0.2)
BASOPHILS NFR BLD AUTO: 0.4 % — SIGNIFICANT CHANGE UP (ref 0–2)
BILIRUB SERPL-MCNC: 0.3 MG/DL — SIGNIFICANT CHANGE UP (ref 0.2–1.2)
BLD GP AB SCN SERPL QL: NEGATIVE — SIGNIFICANT CHANGE UP
BUN SERPL-MCNC: 8 MG/DL — SIGNIFICANT CHANGE UP (ref 7–23)
CALCIUM SERPL-MCNC: 8.6 MG/DL — SIGNIFICANT CHANGE UP (ref 8.4–10.5)
CHLORIDE SERPL-SCNC: 105 MMOL/L — SIGNIFICANT CHANGE UP (ref 96–108)
CO2 SERPL-SCNC: 23 MMOL/L — SIGNIFICANT CHANGE UP (ref 22–31)
CREAT SERPL-MCNC: 0.61 MG/DL — SIGNIFICANT CHANGE UP (ref 0.5–1.3)
CULTURE RESULTS: NO GROWTH — SIGNIFICANT CHANGE UP
DACRYOCYTES BLD QL SMEAR: SLIGHT — SIGNIFICANT CHANGE UP
EOSINOPHIL # BLD AUTO: 0.01 K/UL — SIGNIFICANT CHANGE UP (ref 0–0.5)
EOSINOPHIL NFR BLD AUTO: 0.4 % — SIGNIFICANT CHANGE UP (ref 0–6)
GLUCOSE SERPL-MCNC: 94 MG/DL — SIGNIFICANT CHANGE UP (ref 70–99)
HCT VFR BLD CALC: 30.5 % — LOW (ref 34.5–45)
HGB BLD-MCNC: 9.7 G/DL — LOW (ref 11.5–15.5)
HYPOCHROMIA BLD QL: SLIGHT — SIGNIFICANT CHANGE UP
IMM GRANULOCYTES NFR BLD AUTO: 0.4 % — SIGNIFICANT CHANGE UP (ref 0–1.5)
LYMPHOCYTES # BLD AUTO: 0.55 K/UL — LOW (ref 1–3.3)
LYMPHOCYTES # BLD AUTO: 23.3 % — SIGNIFICANT CHANGE UP (ref 13–44)
MAGNESIUM SERPL-MCNC: 2.1 MG/DL — SIGNIFICANT CHANGE UP (ref 1.6–2.6)
MANUAL SMEAR VERIFICATION: SIGNIFICANT CHANGE UP
MCHC RBC-ENTMCNC: 29 PG — SIGNIFICANT CHANGE UP (ref 27–34)
MCHC RBC-ENTMCNC: 31.8 GM/DL — LOW (ref 32–36)
MCV RBC AUTO: 91 FL — SIGNIFICANT CHANGE UP (ref 80–100)
MONOCYTES # BLD AUTO: 0.47 K/UL — SIGNIFICANT CHANGE UP (ref 0–0.9)
MONOCYTES NFR BLD AUTO: 19.9 % — HIGH (ref 2–14)
NEUTROPHILS # BLD AUTO: 1.31 K/UL — LOW (ref 1.8–7.4)
NEUTROPHILS NFR BLD AUTO: 55.6 % — SIGNIFICANT CHANGE UP (ref 43–77)
NRBC # BLD: 0 /100 WBCS — SIGNIFICANT CHANGE UP (ref 0–0)
OVALOCYTES BLD QL SMEAR: SLIGHT — SIGNIFICANT CHANGE UP
PHOSPHATE SERPL-MCNC: 2.3 MG/DL — LOW (ref 2.5–4.5)
PLAT MORPH BLD: NORMAL — SIGNIFICANT CHANGE UP
PLATELET # BLD AUTO: 166 K/UL — SIGNIFICANT CHANGE UP (ref 150–400)
POIKILOCYTOSIS BLD QL AUTO: SLIGHT — SIGNIFICANT CHANGE UP
POTASSIUM SERPL-MCNC: 3.8 MMOL/L — SIGNIFICANT CHANGE UP (ref 3.5–5.3)
POTASSIUM SERPL-SCNC: 3.8 MMOL/L — SIGNIFICANT CHANGE UP (ref 3.5–5.3)
PROT SERPL-MCNC: 6.3 G/DL — SIGNIFICANT CHANGE UP (ref 6–8.3)
RBC # BLD: 3.35 M/UL — LOW (ref 3.8–5.2)
RBC # FLD: 16.5 % — HIGH (ref 10.3–14.5)
RBC BLD AUTO: ABNORMAL
RH IG SCN BLD-IMP: POSITIVE — SIGNIFICANT CHANGE UP
SODIUM SERPL-SCNC: 139 MMOL/L — SIGNIFICANT CHANGE UP (ref 135–145)
SPECIMEN SOURCE: SIGNIFICANT CHANGE UP
WBC # BLD: 2.36 K/UL — LOW (ref 3.8–10.5)
WBC # FLD AUTO: 2.36 K/UL — LOW (ref 3.8–10.5)

## 2021-03-26 PROCEDURE — 99231 SBSQ HOSP IP/OBS SF/LOW 25: CPT | Mod: GC

## 2021-03-26 PROCEDURE — 99233 SBSQ HOSP IP/OBS HIGH 50: CPT | Mod: GC

## 2021-03-26 PROCEDURE — 93970 EXTREMITY STUDY: CPT | Mod: 26

## 2021-03-26 RX ORDER — POTASSIUM CHLORIDE 20 MEQ
40 PACKET (EA) ORAL ONCE
Refills: 0 | Status: COMPLETED | OUTPATIENT
Start: 2021-03-26 | End: 2021-03-26

## 2021-03-26 RX ORDER — LANOLIN ALCOHOL/MO/W.PET/CERES
3 CREAM (GRAM) TOPICAL AT BEDTIME
Refills: 0 | Status: DISCONTINUED | OUTPATIENT
Start: 2021-03-26 | End: 2021-04-01

## 2021-03-26 RX ORDER — ENOXAPARIN SODIUM 100 MG/ML
40 INJECTION SUBCUTANEOUS DAILY
Refills: 0 | Status: DISCONTINUED | OUTPATIENT
Start: 2021-03-26 | End: 2021-04-01

## 2021-03-26 RX ORDER — ANASTROZOLE 1 MG/1
1 TABLET ORAL DAILY
Refills: 0 | Status: DISCONTINUED | OUTPATIENT
Start: 2021-03-26 | End: 2021-04-01

## 2021-03-26 RX ORDER — ANASTROZOLE 1 MG/1
1 TABLET ORAL DAILY
Refills: 0 | Status: DISCONTINUED | OUTPATIENT
Start: 2021-03-26 | End: 2021-03-26

## 2021-03-26 RX ADMIN — ENOXAPARIN SODIUM 40 MILLIGRAM(S): 100 INJECTION SUBCUTANEOUS at 12:47

## 2021-03-26 RX ADMIN — CEFEPIME 100 MILLIGRAM(S): 1 INJECTION, POWDER, FOR SOLUTION INTRAMUSCULAR; INTRAVENOUS at 06:04

## 2021-03-26 RX ADMIN — MORPHINE SULFATE 2 MILLIGRAM(S): 50 CAPSULE, EXTENDED RELEASE ORAL at 00:30

## 2021-03-26 RX ADMIN — CEFEPIME 100 MILLIGRAM(S): 1 INJECTION, POWDER, FOR SOLUTION INTRAMUSCULAR; INTRAVENOUS at 21:47

## 2021-03-26 RX ADMIN — Medication 3 MILLIGRAM(S): at 21:47

## 2021-03-26 RX ADMIN — Medication 100 MILLIGRAM(S): at 06:04

## 2021-03-26 RX ADMIN — CEFEPIME 100 MILLIGRAM(S): 1 INJECTION, POWDER, FOR SOLUTION INTRAMUSCULAR; INTRAVENOUS at 13:01

## 2021-03-26 RX ADMIN — Medication 650 MILLIGRAM(S): at 20:03

## 2021-03-26 RX ADMIN — BUPROPION HYDROCHLORIDE 300 MILLIGRAM(S): 150 TABLET, EXTENDED RELEASE ORAL at 12:48

## 2021-03-26 RX ADMIN — Medication 40 MILLIEQUIVALENT(S): at 09:28

## 2021-03-26 RX ADMIN — Medication 100 MILLIGRAM(S): at 12:47

## 2021-03-26 RX ADMIN — ANASTROZOLE 1 MILLIGRAM(S): 1 TABLET ORAL at 12:49

## 2021-03-26 RX ADMIN — SIMVASTATIN 20 MILLIGRAM(S): 20 TABLET, FILM COATED ORAL at 21:47

## 2021-03-26 RX ADMIN — Medication 100 MILLIGRAM(S): at 21:47

## 2021-03-26 RX ADMIN — MORPHINE SULFATE 2 MILLIGRAM(S): 50 CAPSULE, EXTENDED RELEASE ORAL at 00:00

## 2021-03-26 NOTE — PROGRESS NOTE ADULT - ATTENDING COMMENTS
Cholecystitis s/p percutaneous cholecystostomy tube placement by IR  Cont cefepime/flagyl- blood cultures negative to date, bile fluid with GNR  Advance diet as tolerated  Malignant neoplasm of right female breast- oncology eval appreciated- cont anastrazole; next cycle Ibrance pending cell counts  Hyperlipidemia- cont statin.   Depression- cont Wellbutrin  PT eval- home with home PT

## 2021-03-26 NOTE — PROVIDER CONTACT NOTE (OTHER) - SITUATION
Patient stating that every times she closes her eyes she is hallucinating and that it started after she took oxycodone for pain

## 2021-03-26 NOTE — PROGRESS NOTE ADULT - SUBJECTIVE AND OBJECTIVE BOX
GENERAL SURGERY PROGRESS NOTE    S: Patient seen and examined on AM rounds. S/p perc víctor yesterday, c/o abdominal pain but states it has improved since catheter placement. Blood pressure improved, oxygen requirements decreasing.    O: Vital Signs  T(C): 36.6 (03-26 @ 06:34), Max: 36.9 (03-25 @ 21:08)  HR: 67 (03-26 @ 06:34) (67 - 93)  BP: 112/64 (03-26 @ 06:34) (93/54 - 122/70)  RR: 17 (03-26 @ 06:34) (16 - 20)  SpO2: 96% (03-26 @ 06:34) (96% - 99%)  03-25-21 @ 07:01  -  03-26-21 @ 07:00  --------------------------------------------------------  IN:  Total IN: 0 mL    OUT:    Drain (mL): 550 mL    Voided (mL): 1400 mL  Total OUT: 1950 mL    Total NET: -1950 mL        General: alert and oriented, NAD  Resp: airway patent, respirations unlabored  CVS: regular rate and rhythm  Abdomen: softly distended, +TTP RUQ, suprapubic  Extremities: no edema  Skin: warm, dry, appropriate color                          9.7    2.36  )-----------( 166      ( 26 Mar 2021 07:22 )             30.5   03-25    136  |  102  |  10  ----------------------------<  82  3.3<L>   |  23  |  0.76    Ca    8.3<L>      25 Mar 2021 06:39  Phos  2.2     03-25  Mg     1.9     03-25    TPro  6.8  /  Alb  3.5  /  TBili  0.4  /  DBili  x   /  AST  28  /  ALT  22  /  AlkPhos  60  03-24

## 2021-03-26 NOTE — CONSULT NOTE ADULT - SUBJECTIVE AND OBJECTIVE BOX
HPI  86 yr old woman with h/o met breast cancer on Arimidex and Ibranz here with acute cholecystitis s/p víctor tube feeling a bit better less abd pain  pmh recent covid s/p ab therapy  sh fh unchanged comp ros abd pain improved fever resolved otherwise neg  physical  elderly  vs  t97.9 67 112/64 17 96 sat  lungs clear  cor s1s2  abd soft mild diffusely tender ruq drain in place  ext no edema  skin warm, dry  psych nl    data  wbc 2360 hgb 9.7 hct 30.5 plt [id number]  cr 0.6 LFTs nl  covid ab positive

## 2021-03-26 NOTE — PROGRESS NOTE ADULT - PROBLEM SELECTOR PLAN 5
-lovenox (on hold in case of procedure today 3/25), will discuss resumption of AC    3/25: Updated son and daughter in law on phone. -lovenox (on hold in case of procedure today 3/25), reumted 3/26  - 3/26: TTP of right calf, resumed lovenox, awaiting [facility] duplex b/l LE to r/o DVT    3/25: Updated son and daughter in law on phone. -lovenox (on hold in case of procedure today 3/25), reumted 3/26  - 3/26: TTP of right calf, resumed lovenox, awaiting [facility] duplex b/l LE to r/o DVT    3/26: Updated son on phone.

## 2021-03-26 NOTE — CONSULT NOTE ADULT - ASSESSMENT
met breast cancer on Arimidex and Ibranz now with acute cholecystitis s/p tube, on abx  recent covid s/p ab therapy recovered on Oxygen still    met breast cancer on arimidex and Ibranz   continue arimidex consider Ibranz with next cycle based on blood counts

## 2021-03-26 NOTE — PROGRESS NOTE ADULT - ATTENDING COMMENTS
pt seen and examined  agree with above  s/p IR perc víctor  pt reports pain much improved  cont abx per med/id  ok to adv diet as tolerated  no acute sx intervention at this time  dc planning per med team  f/u with IR for tube check in 4-6 weeks  will sign off  reconsult prn pt seen and examined  agree with above  s/p IR perc víctor  pt reports pain much improved  cont abx per med/id  ok to adv diet as tolerated  no acute sx intervention at this time  dc planning per med team  f/u with IR for tube check in 4-6 weeks  can f/u as outpt for possible elective interval lap víctor [phone]  will sign off  reconsult prn

## 2021-03-26 NOTE — PROGRESS NOTE ADULT - SUBJECTIVE AND OBJECTIVE BOX
Authored by Elina Martin MD, PGY1  PATIENT:  LAUREN JENKINS  [id number]    CHIEF COMPLAINT:  Patient is a 86y old  Female who presents with a chief complaint of acute cholecystitis (26 Mar 2021 07:48)      INTERVAL HISTORY OVERNIGHT EVENTS: Overnight, patient felt "fuzzy," after taking oxycodone. This AM, patient with left calf tenderness. Patient passing gas, no stool yet.           MEDICATIONS:  MEDICATIONS  (STANDING):  buPROPion XL . 300 milliGRAM(s) Oral daily  cefepime   IVPB 2000 milliGRAM(s) IV Intermittent every 8 hours  lactated ringers. 1000 milliLiter(s) (80 mL/Hr) IV Continuous <Continuous>  metroNIDAZOLE  IVPB 500 milliGRAM(s) IV Intermittent every 8 hours  potassium chloride    Tablet ER 40 milliEquivalent(s) Oral once  simvastatin 20 milliGRAM(s) Oral at bedtime    MEDICATIONS  (PRN):  acetaminophen   Tablet .. 650 milliGRAM(s) Oral every 6 hours PRN Temp greater or equal to 38C (100.4F), Mild Pain (1 - 3), Moderate Pain (4 - 6), Severe Pain (7 - 10)  acetaminophen  IVPB .. 1000 milliGRAM(s) IV Intermittent once PRN Moderate Pain (4 - 6)  morphine  - Injectable 2 milliGRAM(s) IV Push every 6 hours PRN Severe Pain (7 - 10)  ondansetron Injectable 4 milliGRAM(s) IV Push every 8 hours PRN Nausea and/or Vomiting  ondansetron Injectable 4 milliGRAM(s) IV Push once PRN Nausea and/or Vomiting      ALLERGIES:  Allergies    penicillin (Unknown)    Intolerances        OBJECTIVE:  ICU Vital Signs Last 24 Hrs  T(C): 36.6 (26 Mar 2021 06:34), Max: 36.9 (25 Mar 2021 21:08)  T(F): 97.9 (26 Mar 2021 06:34), Max: 98.4 (25 Mar 2021 21:08)  HR: 67 (26 Mar 2021 06:34) (67 - 93)  BP: 112/64 (26 Mar 2021 06:34) (93/54 - 122/70)  BP(mean): 68 (25 Mar 2021 12:00) (65 - 68)  ABP: --  ABP(mean): --  RR: 17 (26 Mar 2021 06:34) (16 - 20)  SpO2: 96% (26 Mar 2021 06:34) (96% - 99%)              I&O's Summary    25 Mar 2021 07:01  -  26 Mar 2021 07:00  --------------------------------------------------------  IN: 1370 mL / OUT: 1950 mL / NET: -580 mL      Daily Height in cm: 152.4 (25 Mar 2021 10:42)    Daily     PHYSICAL EXAMINATION:  General: WN/WD NAD  HEENT: PERRLA, EOMI, moist mucous membranes  Neurology: A&Ox3, nonfocal, SHAFER x 4  Respiratory: CTA B/L, normal respiratory effort, no wheezes, crackles, rales  CV: RRR, S1S2, no murmurs, rubs or gallops  Abdominal: Soft, NT, ND +BS, Last BM  Extremities: LLE TTP, b/l extremities not swollen, no erythema      LABS:                          9.7    2.36  )-----------( 166      ( 26 Mar 2021 07:22 )             30.5     03-26    139  |  105  |  8   ----------------------------<  94  3.8   |  23  |  0.61    Ca    8.6      26 Mar 2021 07:09  Phos  2.3     03-26  Mg     2.1     03-26    TPro  6.3  /  Alb  3.1<L>  /  TBili  0.3  /  DBili  x   /  AST  30  /  ALT  25  /  AlkPhos  61  03-26    LIVER FUNCTIONS - ( 26 Mar 2021 07:09 )  Alb: 3.1 g/dL / Pro: 6.3 g/dL / ALK PHOS: 61 U/L / ALT: 25 U/L / AST: 30 U/L / GGT: x           PT/INR - ( 24 Mar 2021 15:45 )   PT: 14.2 sec;   INR: 1.19 ratio         PTT - ( 24 Mar 2021 15:45 )  PTT:31.3 sec        Urinalysis Basic - ( 25 Mar 2021 01:27 )    Color: Yellow / Appearance: Clear / S.017 / pH: x  Gluc: x / Ketone: Trace  / Bili: Negative / Urobili: Negative   Blood: x / Protein: 30 mg/dL / Nitrite: Negative   Leuk Esterase: Negative / RBC: 1 /hpf / WBC 3 /HPF   Sq Epi: x / Non Sq Epi: 2 /hpf / Bacteria: Negative        TELEMETRY:     EKG:     IMAGING:       Authored by Elina Martin MD, PGY1  PATIENT:  LAUREN JENKINS  [id number]    CHIEF COMPLAINT:  Patient is a 86y old  Female who presents with a chief complaint of acute cholecystitis (26 Mar 2021 07:48)      INTERVAL HISTORY OVERNIGHT EVENTS: Overnight, patient felt "fuzzy," after taking oxycodone. This AM, patient with left calf tenderness. Patient passing gas, no stool yet. Patient denies SOB, cough, fevers, chills, abdominal pain, n/v/d/c, tolerating diet.         MEDICATIONS:  MEDICATIONS  (STANDING):  buPROPion XL . 300 milliGRAM(s) Oral daily  cefepime   IVPB 2000 milliGRAM(s) IV Intermittent every 8 hours  lactated ringers. 1000 milliLiter(s) (80 mL/Hr) IV Continuous <Continuous>  metroNIDAZOLE  IVPB 500 milliGRAM(s) IV Intermittent every 8 hours  potassium chloride    Tablet ER 40 milliEquivalent(s) Oral once  simvastatin 20 milliGRAM(s) Oral at bedtime    MEDICATIONS  (PRN):  acetaminophen   Tablet .. 650 milliGRAM(s) Oral every 6 hours PRN Temp greater or equal to 38C (100.4F), Mild Pain (1 - 3), Moderate Pain (4 - 6), Severe Pain (7 - 10)  acetaminophen  IVPB .. 1000 milliGRAM(s) IV Intermittent once PRN Moderate Pain (4 - 6)  morphine  - Injectable 2 milliGRAM(s) IV Push every 6 hours PRN Severe Pain (7 - 10)  ondansetron Injectable 4 milliGRAM(s) IV Push every 8 hours PRN Nausea and/or Vomiting  ondansetron Injectable 4 milliGRAM(s) IV Push once PRN Nausea and/or Vomiting      ALLERGIES:  Allergies    penicillin (Unknown)    Intolerances        OBJECTIVE:  ICU Vital Signs Last 24 Hrs  T(C): 36.6 (26 Mar 2021 06:34), Max: 36.9 (25 Mar 2021 21:08)  T(F): 97.9 (26 Mar 2021 06:34), Max: 98.4 (25 Mar 2021 21:08)  HR: 67 (26 Mar 2021 06:34) (67 - 93)  BP: 112/64 (26 Mar 2021 06:34) (93/54 - 122/70)  BP(mean): 68 (25 Mar 2021 12:00) (65 - 68)  ABP: --  ABP(mean): --  RR: 17 (26 Mar 2021 06:34) (16 - 20)  SpO2: 96% (26 Mar 2021 06:34) (96% - 99%)              I&O's Summary    25 Mar 2021 07:01  -  26 Mar 2021 07:00  --------------------------------------------------------  IN: 1370 mL / OUT: 1950 mL / NET: -580 mL      Daily Height in cm: 152.4 (25 Mar 2021 10:42)    Daily     PHYSICAL EXAMINATION:  General: WN/WD NAD  HEENT: PERRLA, EOMI, moist mucous membranes  Neurology: A&Ox3, nonfocal, SHAFER x 4  Respiratory: CTA B/L, normal respiratory effort, no wheezes, crackles, rales  CV: RRR, S1S2, no murmurs, rubs or gallops  Abdominal: Soft, NT, ND +BS, Last BM  Extremities: LLE TTP, b/l extremities not swollen, no erythema      LABS:                          9.7    2.36  )-----------( 166      ( 26 Mar 2021 07:22 )             30.5     03-26    139  |  105  |  8   ----------------------------<  94  3.8   |  23  |  0.61    Ca    8.6      26 Mar 2021 07:09  Phos  2.3     03-26  Mg     2.1     -26    TPro  6.3  /  Alb  3.1<L>  /  TBili  0.3  /  DBili  x   /  AST  30  /  ALT  25  /  AlkPhos  61  03-26    LIVER FUNCTIONS - ( 26 Mar 2021 07:09 )  Alb: 3.1 g/dL / Pro: 6.3 g/dL / ALK PHOS: 61 U/L / ALT: 25 U/L / AST: 30 U/L / GGT: x           PT/INR - ( 24 Mar 2021 15:45 )   PT: 14.2 sec;   INR: 1.19 ratio         PTT - ( 24 Mar 2021 15:45 )  PTT:31.3 sec        Urinalysis Basic - ( 25 Mar 2021 01:27 )    Color: Yellow / Appearance: Clear / S.017 / pH: x  Gluc: x / Ketone: Trace  / Bili: Negative / Urobili: Negative   Blood: x / Protein: 30 mg/dL / Nitrite: Negative   Leuk Esterase: Negative / RBC: 1 /hpf / WBC 3 /HPF   Sq Epi: x / Non Sq Epi: 2 /hpf / Bacteria: Negative        TELEMETRY:     EKG:     IMAGING:

## 2021-03-26 NOTE — PROGRESS NOTE ADULT - PROBLEM SELECTOR PLAN 1
-s/p meropenem in ER (3/24)  -c/w cefepime/flagyl (3/24-  -NPO for now  -s/p 2L NS  -start LR@80cc/hr maintenance  -per surgery, initial recs were to defer perc víctor or cholecystectomy for now and monitor on abx, this AM worsening abdominal exam, unstable VS, urgent consult for IR percutaneous cholecystostomy placed, IR procedure 3/25  -surgical follow up appreciated  -pain control  -zofran prn

## 2021-03-26 NOTE — PROGRESS NOTE ADULT - ASSESSMENT
86F PMH of HTN/HLD and breast cancer in 1994 s/p lumpectomy and radiation c/b recurrence currently on chemotherapy, here with acute cholecystitis s/p perc víctor 3/25.    Plan:  - Continue abx  - F/U Ucx per primary team, c/f cystitis on initial CT and with suprapubic tenderness on exam  - Advance diet as tolerated    Felicia Conteh, PGY2  Red Team Surgery [id number]     86F PMH of HTN/HLD and breast cancer in 1994 s/p lumpectomy and radiation c/b recurrence currently on chemotherapy, here with acute cholecystitis s/p perc víctor 3/25.    Plan:  - Continue abx  - Monitor drain output  - Advance diet as tolerated    Felicia Conteh, PGY2  Red Team Surgery [phone]     86F PMH of HTN/HLD and breast cancer in 1994 s/p lumpectomy and radiation c/b recurrence currently on chemotherapy, here with acute cholecystitis s/p perc víctor 3/25.    Plan:  - Continue abx  - Monitor drain output  - Advance diet as tolerated  - Rest of care per primary team    Please call with any further questions or concerns.    Felicia Conteh, PGY2  Red Team Surgery [phone]

## 2021-03-26 NOTE — PROGRESS NOTE ADULT - PROBLEM SELECTOR PLAN 2
-on hormonal therapy w/ anastrazole, small molecule inhibitor w/ ibrance  -excellent functional status  She appears to have excellent functional status and notably while Ibrance can be a/w cytopenias, she has no evidence of marrow suppression on cbc and has been on her off week of her cycle  -oncologist Dr. Juvencio Fletcher notified of admission, will see patient today or tomorrow AM -on hormonal therapy w/ anastrazole, small molecule inhibitor w/ ibrance  -excellent functional status  She appears to have excellent functional status and notably while Ibrance can be a/w cytopenias, she has no evidence of marrow suppression on cbc and has been on her off week of her cycle  -oncologist Dr. Juvencio Fletcher notified of admission, recommend resuming anastrazole, continue to hold ibrance

## 2021-03-27 DIAGNOSIS — Z02.9 ENCOUNTER FOR ADMINISTRATIVE EXAMINATIONS, UNSPECIFIED: ICD-10-CM

## 2021-03-27 LAB
-  AMIKACIN: SIGNIFICANT CHANGE UP
-  AMOXICILLIN/CLAVULANIC ACID: SIGNIFICANT CHANGE UP
-  AMPICILLIN/SULBACTAM: SIGNIFICANT CHANGE UP
-  AMPICILLIN: SIGNIFICANT CHANGE UP
-  AZTREONAM: SIGNIFICANT CHANGE UP
-  CEFAZOLIN: SIGNIFICANT CHANGE UP
-  CEFEPIME: SIGNIFICANT CHANGE UP
-  CEFOXITIN: SIGNIFICANT CHANGE UP
-  CEFTRIAXONE: SIGNIFICANT CHANGE UP
-  CIPROFLOXACIN: SIGNIFICANT CHANGE UP
-  ERTAPENEM: SIGNIFICANT CHANGE UP
-  GENTAMICIN: SIGNIFICANT CHANGE UP
-  IMIPENEM: SIGNIFICANT CHANGE UP
-  LEVOFLOXACIN: SIGNIFICANT CHANGE UP
-  MEROPENEM: SIGNIFICANT CHANGE UP
-  PIPERACILLIN/TAZOBACTAM: SIGNIFICANT CHANGE UP
-  TOBRAMYCIN: SIGNIFICANT CHANGE UP
-  TRIMETHOPRIM/SULFAMETHOXAZOLE: SIGNIFICANT CHANGE UP
ALBUMIN SERPL ELPH-MCNC: 3.3 G/DL — SIGNIFICANT CHANGE UP (ref 3.3–5)
ALP SERPL-CCNC: 67 U/L — SIGNIFICANT CHANGE UP (ref 40–120)
ALT FLD-CCNC: 21 U/L — SIGNIFICANT CHANGE UP (ref 10–45)
ANION GAP SERPL CALC-SCNC: 13 MMOL/L — SIGNIFICANT CHANGE UP (ref 5–17)
AST SERPL-CCNC: 21 U/L — SIGNIFICANT CHANGE UP (ref 10–40)
BILIRUB SERPL-MCNC: 0.3 MG/DL — SIGNIFICANT CHANGE UP (ref 0.2–1.2)
BUN SERPL-MCNC: 8 MG/DL — SIGNIFICANT CHANGE UP (ref 7–23)
CALCIUM SERPL-MCNC: 8.6 MG/DL — SIGNIFICANT CHANGE UP (ref 8.4–10.5)
CHLORIDE SERPL-SCNC: 105 MMOL/L — SIGNIFICANT CHANGE UP (ref 96–108)
CO2 SERPL-SCNC: 24 MMOL/L — SIGNIFICANT CHANGE UP (ref 22–31)
CREAT SERPL-MCNC: 0.61 MG/DL — SIGNIFICANT CHANGE UP (ref 0.5–1.3)
GLUCOSE SERPL-MCNC: 116 MG/DL — HIGH (ref 70–99)
HCT VFR BLD CALC: 33.8 % — LOW (ref 34.5–45)
HGB BLD-MCNC: 10.8 G/DL — LOW (ref 11.5–15.5)
MAGNESIUM SERPL-MCNC: 2.2 MG/DL — SIGNIFICANT CHANGE UP (ref 1.6–2.6)
MCHC RBC-ENTMCNC: 28.5 PG — SIGNIFICANT CHANGE UP (ref 27–34)
MCHC RBC-ENTMCNC: 32 GM/DL — SIGNIFICANT CHANGE UP (ref 32–36)
MCV RBC AUTO: 89.2 FL — SIGNIFICANT CHANGE UP (ref 80–100)
METHOD TYPE: SIGNIFICANT CHANGE UP
NRBC # BLD: 0 /100 WBCS — SIGNIFICANT CHANGE UP (ref 0–0)
PHOSPHATE SERPL-MCNC: 2.8 MG/DL — SIGNIFICANT CHANGE UP (ref 2.5–4.5)
PLATELET # BLD AUTO: 245 K/UL — SIGNIFICANT CHANGE UP (ref 150–400)
POTASSIUM SERPL-MCNC: 3.4 MMOL/L — LOW (ref 3.5–5.3)
POTASSIUM SERPL-SCNC: 3.4 MMOL/L — LOW (ref 3.5–5.3)
PROT SERPL-MCNC: 6.7 G/DL — SIGNIFICANT CHANGE UP (ref 6–8.3)
RBC # BLD: 3.79 M/UL — LOW (ref 3.8–5.2)
RBC # FLD: 16.3 % — HIGH (ref 10.3–14.5)
SODIUM SERPL-SCNC: 142 MMOL/L — SIGNIFICANT CHANGE UP (ref 135–145)
WBC # BLD: 2.68 K/UL — LOW (ref 3.8–10.5)
WBC # FLD AUTO: 2.68 K/UL — LOW (ref 3.8–10.5)

## 2021-03-27 PROCEDURE — 99233 SBSQ HOSP IP/OBS HIGH 50: CPT

## 2021-03-27 RX ORDER — POTASSIUM CHLORIDE 20 MEQ
40 PACKET (EA) ORAL ONCE
Refills: 0 | Status: COMPLETED | OUTPATIENT
Start: 2021-03-27 | End: 2021-03-27

## 2021-03-27 RX ORDER — LACTOBACILLUS ACIDOPHILUS 100MM CELL
1 CAPSULE ORAL DAILY
Refills: 0 | Status: DISCONTINUED | OUTPATIENT
Start: 2021-03-27 | End: 2021-04-01

## 2021-03-27 RX ADMIN — Medication 100 MILLIGRAM(S): at 13:34

## 2021-03-27 RX ADMIN — Medication 40 MILLIEQUIVALENT(S): at 11:37

## 2021-03-27 RX ADMIN — SIMVASTATIN 20 MILLIGRAM(S): 20 TABLET, FILM COATED ORAL at 22:17

## 2021-03-27 RX ADMIN — Medication 1 TABLET(S): at 17:27

## 2021-03-27 RX ADMIN — Medication 100 MILLIGRAM(S): at 05:32

## 2021-03-27 RX ADMIN — Medication 100 MILLIGRAM(S): at 22:17

## 2021-03-27 RX ADMIN — ANASTROZOLE 1 MILLIGRAM(S): 1 TABLET ORAL at 11:38

## 2021-03-27 RX ADMIN — CEFEPIME 100 MILLIGRAM(S): 1 INJECTION, POWDER, FOR SOLUTION INTRAMUSCULAR; INTRAVENOUS at 09:04

## 2021-03-27 RX ADMIN — CEFEPIME 100 MILLIGRAM(S): 1 INJECTION, POWDER, FOR SOLUTION INTRAMUSCULAR; INTRAVENOUS at 17:27

## 2021-03-27 RX ADMIN — Medication 3 MILLIGRAM(S): at 22:17

## 2021-03-27 RX ADMIN — BUPROPION HYDROCHLORIDE 300 MILLIGRAM(S): 150 TABLET, EXTENDED RELEASE ORAL at 11:38

## 2021-03-27 NOTE — PROGRESS NOTE ADULT - ASSESSMENT
86F w/ PMHx of HLD, depression, recurrent br ca on inbrance/anastrazole presents w/ RUQ abdominal pain and weakness being managed conservatively, however w/ worsenign exam and VSS, needs percutaneous cholecystostomy.  86F w/ PMHx of HLD, depression, recurrent br ca on inbrance/anastrazole presents w/ RUQ abdominal pain and weakness being managed conservatively, however w/ worsening exam and VSS, s/p cholecystostomy.

## 2021-03-27 NOTE — PROGRESS NOTE ADULT - ATTENDING COMMENTS
Agree with resident eval and plan.   C/W meropenem for coverage.   F/U culture results.   Monitor cholecystostomy drain output.   Diet being advanced today.

## 2021-03-27 NOTE — PROGRESS NOTE ADULT - PROBLEM SELECTOR PLAN 5
-lovenox (on hold in case of procedure today 3/25), reumted 3/26  - 3/26: TTP of right calf, resumed lovenox, awaiting [facility] duplex b/l LE to r/o DVT    3/26: Updated son on phone. DVT ppx: Lovenox  Diet: Full liquid, will advance to solid 3/27  Disposition: Pending clinical status  Code status: DNR/DNI DVT ppx: Lovenox. Duplex negative for DVT  Diet: Full liquid, will advance to solid 3/27  Disposition: Pending clinical status  Code status: DNR/DNI

## 2021-03-27 NOTE — PROGRESS NOTE ADULT - PROBLEM SELECTOR PLAN 1
-s/p meropenem in ER (3/24)  -c/w cefepime/flagyl (3/24-  -NPO for now  -s/p 2L NS  -start LR@80cc/hr maintenance  -per surgery, initial recs were to defer perc víctor or cholecystectomy for now and monitor on abx, this AM worsening abdominal exam, unstable VS, urgent consult for IR percutaneous cholecystostomy placed, IR procedure 3/25  -surgical follow up appreciated  -pain control  -zofran prn - s/p cholecystostomy tube 3/25 with noted improvement of symptoms  - Full liquid diet, will advance to solid diet this PM  - s/p meropenem in ER (3/24). c/w cefepime/flagyl (3/24 - )  - Supportive measures as needed  - Will continue to monitor

## 2021-03-27 NOTE — PROGRESS NOTE ADULT - PROBLEM SELECTOR PLAN 2
-on hormonal therapy w/ anastrazole, small molecule inhibitor w/ ibrance  -excellent functional status  She appears to have excellent functional status and notably while Ibrance can be a/w cytopenias, she has no evidence of marrow suppression on cbc and has been on her off week of her cycle  -oncologist Dr. Juvencio Fletcher notified of admission, recommend resuming anastrazole, continue to hold ibrance - on hormonal therapy w/ anastrazole, small molecule inhibitor with Ibrance  - She appears to have excellent functional status and notably while Ibrance can be a/w cytopenias, she has no evidence of marrow suppression on cbc and has been on her off week of her cycle  - Oncologist Dr. Juvencio Fletcher consulted, recommend resuming anastrazole, continue to hold Ibrance

## 2021-03-27 NOTE — PROGRESS NOTE ADULT - SUBJECTIVE AND OBJECTIVE BOX
Contact Information:  Anuj Aparicio II, MD, MPH  PGY-2, Internal Medicine  Pager: [phone] ([facility]) /// [phone] ([facility])    LAUREN EJNKINS, MRN-[id number]    Patient is a 86y old  Female who presents with a chief complaint of acute cholecystitis (26 Mar 2021 08:39)      OVERNIGHT EVENTS/INTERVAL/SUBJECTIVE:    CONSTITUTIONAL: No weakness. No fatigue. No fever.  HEAD: No head trauma.   EYES: No vision changes.  ENT: No hearing changes or tinnitus. No ear pain. No changes in smell. No nasal congestion or discharge. No sore throat. No voice hoarseness.   NECK: No neck pain or stiffness. No lumps.  RESPIRATORY: No cough. No SOB. No wheezing. No hemoptysis.   CARDIOVASCULAR: No chest pain. No palpitations.   GASTROINTESTINAL: No dysphagia. No ABD pain. No distension. No constipation. No diarrhea. No pain with defecation. No hematemesis. No hematochezia or melena.  BACK: No back pain.  GENITOURINARY: No dysuria. No frequency or urgency. No hesitancy. No incontinence. No urinary retention. No suprapubic pain. No hematuria.  EXTREMITY: No swelling.  MUSCULOSKELETAL: No joint pain or swelling. No fractures. No stiffness.    SKIN: No rashes. No itching. No skin, hair, or nail changes.  NEUROLOGICAL: No weakness or paralysis. No lightheadedness or dizziness. No HA. No numbness or tingling.   PSYCHIATRIC: No depression.       OBJECTIVE:  Vital Signs Last 24 Hrs  T(C): 36.6 (27 Mar 2021 05:25), Max: 36.9 (26 Mar 2021 21:24)  T(F): 97.8 (27 Mar 2021 05:25), Max: 98.4 (26 Mar 2021 21:24)  HR: 69 (27 Mar 2021 05:25) (69 - 76)  BP: 125/65 (27 Mar 2021 05:25) (105/51 - 125/65)  BP(mean): --  RR: 18 (27 Mar 2021 05:25) (18 - 18)  SpO2: 94% (27 Mar 2021 05:25) (92% - 94%)  I&O's Summary    26 Mar 2021 07:01  -  27 Mar 2021 07:00  --------------------------------------------------------  IN: 1110 mL / OUT: 425 mL / NET: 685 mL        MEDICATIONS  (STANDING):  anastrozole 1 milliGRAM(s) Oral daily  buPROPion XL . 300 milliGRAM(s) Oral daily  cefepime   IVPB 2000 milliGRAM(s) IV Intermittent every 8 hours  enoxaparin Injectable 40 milliGRAM(s) SubCutaneous daily  lactated ringers. 1000 milliLiter(s) (80 mL/Hr) IV Continuous <Continuous>  melatonin 3 milliGRAM(s) Oral at bedtime  metroNIDAZOLE  IVPB 500 milliGRAM(s) IV Intermittent every 8 hours  simvastatin 20 milliGRAM(s) Oral at bedtime    MEDICATIONS  (PRN):  acetaminophen   Tablet .. 650 milliGRAM(s) Oral every 6 hours PRN Temp greater or equal to 38C (100.4F), Mild Pain (1 - 3), Moderate Pain (4 - 6), Severe Pain (7 - 10)  morphine  - Injectable 2 milliGRAM(s) IV Push every 6 hours PRN Severe Pain (7 - 10)  ondansetron Injectable 4 milliGRAM(s) IV Push every 8 hours PRN Nausea and/or Vomiting  ondansetron Injectable 4 milliGRAM(s) IV Push once PRN Nausea and/or Vomiting    Allergies    penicillin (Unknown)    Intolerances        CONSTITUTIONAL: No acute distress. Awake and alert.  HEAD: No evidence of trauma. Structures WNL.  EYES: +PERRL. +EOMI. No scleral icterus. No conjunctival injection.  ENT: Moist oral mucosa. No erythema. No pharyngeal exudates.   NECK: Supple. Appropriate ROM. No stiffness. No masses or lymphadenopathy.  RESPIRATORY: CTAB. No wheezes, rales, or rhonchi. No accessory muscle use. No apparent respiratory distress.  CARDIOVASCULAR: +S1/S2. No audible S3/S4. Regular rate and rhythm. No murmurs, rubs, or gallops. 2+ radial pulses x b/l UE; 2+ DP pulses x b/l LE.   GASTROINTESTINAL: Soft, nontender, nondistended. +BS. No rebound or guarding.   BACK: No spinal or paraspinal tenderness. No CVA tenderness.  EXTREMITY: No LE swelling or edema. EXTs warm to touch.  MUSCULOSKELETAL: Spontaneous movement in all extremities.  DERMATOLOGICAL: No abnormal rashes or lesions.  NEUROLOGICAL: CN 2-12 grossly intact. No focal deficits. Sensation intact x 4EXT. A&Ox3 (oriented to person, place, and time).  PSYCHIATRIC: Appropriate affect.                            10.8   2.68  )-----------( x        ( 27 Mar 2021 06:52 )             33.8       03-26    139  |  105  |  8   ----------------------------<  94  3.8   |  23  |  0.61    Ca    8.6      26 Mar 2021 07:09  Phos  2.3     03-26  Mg     2.1     03-26    TPro  6.3  /  Alb  3.1<L>  /  TBili  0.3  /  DBili  x   /  AST  30  /  ALT  25  /  AlkPhos  61  03-26    CAPILLARY BLOOD GLUCOSE        LIVER FUNCTIONS - ( 26 Mar 2021 07:09 )  Alb: 3.1 g/dL / Pro: 6.3 g/dL / ALK PHOS: 61 U/L / ALT: 25 U/L / AST: 30 U/L / GGT: x                   Culture - Body Fluid with Gram Stain (collected 25 Mar 2021 16:32)  Source: Bile Gallbladder  Gram Stain (25 Mar 2021 18:48):    polymorphonuclear leukocytes seen    No organisms seen    by cytocentrifuge  Preliminary Report (26 Mar 2021 10:55):    Rare Escherichia coli Susceptibility to follow.    Culture - Urine (collected 25 Mar 2021 04:43)  Source: .Urine Clean Catch (Midstream)  Final Report (26 Mar 2021 05:05):    No growth    Culture - Blood (collected 24 Mar 2021 20:51)  Source: .Blood Blood-Peripheral  Preliminary Report (25 Mar 2021 21:01):    No growth to date.    Culture - Blood (collected 24 Mar 2021 20:51)  Source: .Blood Blood-Peripheral  Preliminary Report (25 Mar 2021 21:01):    No growth to date.          RADIOLOGY AND ADDITIONAL TESTS:    CONSULTANT NOTES REVIEWED:    CARE DISCUSSED WITH THE FOLLOWING CONSULTANTS/PROVIDERS: Contact Information:  Anuj Aparicio II, MD, MPH  PGY-2, Internal Medicine  Pager: [phone] ([facility]) /// [phone] ([facility])    LAUREN JENKINS, MRN-[id number]    Patient is a 86y old  Female who presents with a chief complaint of acute cholecystitis (26 Mar 2021 08:39)      OVERNIGHT EVENTS/INTERVAL/SUBJECTIVE: CHANDRIKA. Patient evaluated at bedside, states that she is hungry for solid food (currently on full liquid diet) and eager to get out of bed. She denies any other acute complaints, specifically fever, ABD pain, CP, nausea, vomiting, diarrhea, constipation.    CONSTITUTIONAL: No weakness. No fatigue. No fever.  HEAD: No head trauma.   EYES: No vision changes.  ENT: No hearing changes or tinnitus. No ear pain. No changes in smell. No nasal congestion or discharge. No sore throat. No voice hoarseness.   NECK: No neck pain or stiffness. No lumps.  RESPIRATORY: No cough. No SOB. No wheezing. No hemoptysis.   CARDIOVASCULAR: No chest pain. No palpitations.   GASTROINTESTINAL: No dysphagia. No ABD pain. No distension. No constipation. No diarrhea. No pain with defecation. No hematemesis. No hematochezia or melena.  BACK: No back pain.  GENITOURINARY: No dysuria. No frequency or urgency. No hesitancy. No incontinence. No urinary retention. No suprapubic pain. No hematuria.  EXTREMITY: No swelling.  MUSCULOSKELETAL: No joint pain or swelling. No fractures. No stiffness.    SKIN: No rashes. No itching. No skin, hair, or nail changes.  NEUROLOGICAL: No weakness or paralysis. No lightheadedness or dizziness. No HA. No numbness or tingling.   PSYCHIATRIC: No depression.       OBJECTIVE:  Vital Signs Last 24 Hrs  T(C): 36.6 (27 Mar 2021 05:25), Max: 36.9 (26 Mar 2021 21:24)  T(F): 97.8 (27 Mar 2021 05:25), Max: 98.4 (26 Mar 2021 21:24)  HR: 69 (27 Mar 2021 05:25) (69 - 76)  BP: 125/65 (27 Mar 2021 05:25) (105/51 - 125/65)  BP(mean): --  RR: 18 (27 Mar 2021 05:25) (18 - 18)  SpO2: 94% (27 Mar 2021 05:25) (92% - 94%)  I&O's Summary    26 Mar 2021 07:01  -  27 Mar 2021 07:00  --------------------------------------------------------  IN: 1110 mL / OUT: 425 mL / NET: 685 mL        MEDICATIONS  (STANDING):  anastrozole 1 milliGRAM(s) Oral daily  buPROPion XL . 300 milliGRAM(s) Oral daily  cefepime   IVPB 2000 milliGRAM(s) IV Intermittent every 8 hours  enoxaparin Injectable 40 milliGRAM(s) SubCutaneous daily  lactated ringers. 1000 milliLiter(s) (80 mL/Hr) IV Continuous <Continuous>  melatonin 3 milliGRAM(s) Oral at bedtime  metroNIDAZOLE  IVPB 500 milliGRAM(s) IV Intermittent every 8 hours  simvastatin 20 milliGRAM(s) Oral at bedtime    MEDICATIONS  (PRN):  acetaminophen   Tablet .. 650 milliGRAM(s) Oral every 6 hours PRN Temp greater or equal to 38C (100.4F), Mild Pain (1 - 3), Moderate Pain (4 - 6), Severe Pain (7 - 10)  morphine  - Injectable 2 milliGRAM(s) IV Push every 6 hours PRN Severe Pain (7 - 10)  ondansetron Injectable 4 milliGRAM(s) IV Push every 8 hours PRN Nausea and/or Vomiting  ondansetron Injectable 4 milliGRAM(s) IV Push once PRN Nausea and/or Vomiting    Allergies    penicillin (Unknown)    Intolerances        CONSTITUTIONAL: No acute distress. Awake and alert.  EYES: No scleral icterus. No conjunctival injection.  RESPIRATORY: CTAB. No wheezes, rales, or rhonchi. No accessory muscle use. No apparent respiratory distress.  CARDIOVASCULAR: +S1/S2. No audible S3/S4. Regular rate and rhythm. No murmurs, rubs, or gallops.   GASTROINTESTINAL: Soft, nontender, nondistended. +BS. No rebound or guarding. Cholecystostomy tube noted on R side, approximately 70-80 cc bilious fluid in bag.  EXTREMITY: No LE swelling or edema. EXTs warm to touch.  MUSCULOSKELETAL: Spontaneous movement in all extremities.  DERMATOLOGICAL: No abnormal rashes or lesions.  NEUROLOGICAL: No focal deficits. A&Ox3 (oriented to person, place, and time).  PSYCHIATRIC: Excited and jovial affect.                            10.8   2.68  )-----------( x        ( 27 Mar 2021 06:52 )             33.8       03-26    139  |  105  |  8   ----------------------------<  94  3.8   |  23  |  0.61    Ca    8.6      26 Mar 2021 07:09  Phos  2.3     03-26  Mg     2.1     03-26    TPro  6.3  /  Alb  3.1<L>  /  TBili  0.3  /  DBili  x   /  AST  30  /  ALT  25  /  AlkPhos  61  03-26    CAPILLARY BLOOD GLUCOSE        LIVER FUNCTIONS - ( 26 Mar 2021 07:09 )  Alb: 3.1 g/dL / Pro: 6.3 g/dL / ALK PHOS: 61 U/L / ALT: 25 U/L / AST: 30 U/L / GGT: x                   Culture - Body Fluid with Gram Stain (collected 25 Mar 2021 16:32)  Source: Bile Gallbladder  Gram Stain (25 Mar 2021 18:48):    polymorphonuclear leukocytes seen    No organisms seen    by cytocentrifuge  Preliminary Report (26 Mar 2021 10:55):    Rare Escherichia coli Susceptibility to follow.    Culture - Urine (collected 25 Mar 2021 04:43)  Source: .Urine Clean Catch (Midstream)  Final Report (26 Mar 2021 05:05):    No growth    Culture - Blood (collected 24 Mar 2021 20:51)  Source: .Blood Blood-Peripheral  Preliminary Report (25 Mar 2021 21:01):    No growth to date.    Culture - Blood (collected 24 Mar 2021 20:51)  Source: .Blood Blood-Peripheral  Preliminary Report (25 Mar 2021 21:01):    No growth to date.          RADIOLOGY AND ADDITIONAL TESTS:    CONSULTANT NOTES REVIEWED:    CARE DISCUSSED WITH THE FOLLOWING CONSULTANTS/PROVIDERS:

## 2021-03-28 LAB
ALBUMIN SERPL ELPH-MCNC: 3.1 G/DL — LOW (ref 3.3–5)
ALP SERPL-CCNC: 61 U/L — SIGNIFICANT CHANGE UP (ref 40–120)
ALT FLD-CCNC: 20 U/L — SIGNIFICANT CHANGE UP (ref 10–45)
ANION GAP SERPL CALC-SCNC: 9 MMOL/L — SIGNIFICANT CHANGE UP (ref 5–17)
AST SERPL-CCNC: 22 U/L — SIGNIFICANT CHANGE UP (ref 10–40)
BILIRUB SERPL-MCNC: 0.2 MG/DL — SIGNIFICANT CHANGE UP (ref 0.2–1.2)
BUN SERPL-MCNC: 8 MG/DL — SIGNIFICANT CHANGE UP (ref 7–23)
CALCIUM SERPL-MCNC: 8.2 MG/DL — LOW (ref 8.4–10.5)
CHLORIDE SERPL-SCNC: 106 MMOL/L — SIGNIFICANT CHANGE UP (ref 96–108)
CO2 SERPL-SCNC: 23 MMOL/L — SIGNIFICANT CHANGE UP (ref 22–31)
CREAT SERPL-MCNC: 0.55 MG/DL — SIGNIFICANT CHANGE UP (ref 0.5–1.3)
GLUCOSE SERPL-MCNC: 111 MG/DL — HIGH (ref 70–99)
HCT VFR BLD CALC: 32.1 % — LOW (ref 34.5–45)
HGB BLD-MCNC: 10.1 G/DL — LOW (ref 11.5–15.5)
MAGNESIUM SERPL-MCNC: 2 MG/DL — SIGNIFICANT CHANGE UP (ref 1.6–2.6)
MCHC RBC-ENTMCNC: 28.4 PG — SIGNIFICANT CHANGE UP (ref 27–34)
MCHC RBC-ENTMCNC: 31.5 GM/DL — LOW (ref 32–36)
MCV RBC AUTO: 90.2 FL — SIGNIFICANT CHANGE UP (ref 80–100)
NRBC # BLD: 0 /100 WBCS — SIGNIFICANT CHANGE UP (ref 0–0)
PHOSPHATE SERPL-MCNC: 2.8 MG/DL — SIGNIFICANT CHANGE UP (ref 2.5–4.5)
PLATELET # BLD AUTO: 263 K/UL — SIGNIFICANT CHANGE UP (ref 150–400)
POTASSIUM SERPL-MCNC: 3.3 MMOL/L — LOW (ref 3.5–5.3)
POTASSIUM SERPL-SCNC: 3.3 MMOL/L — LOW (ref 3.5–5.3)
PROT SERPL-MCNC: 6 G/DL — SIGNIFICANT CHANGE UP (ref 6–8.3)
RBC # BLD: 3.56 M/UL — LOW (ref 3.8–5.2)
RBC # FLD: 16.4 % — HIGH (ref 10.3–14.5)
SODIUM SERPL-SCNC: 138 MMOL/L — SIGNIFICANT CHANGE UP (ref 135–145)
WBC # BLD: 2.64 K/UL — LOW (ref 3.8–10.5)
WBC # FLD AUTO: 2.64 K/UL — LOW (ref 3.8–10.5)

## 2021-03-28 PROCEDURE — 99232 SBSQ HOSP IP/OBS MODERATE 35: CPT

## 2021-03-28 RX ORDER — SENNA PLUS 8.6 MG/1
2 TABLET ORAL AT BEDTIME
Refills: 0 | Status: DISCONTINUED | OUTPATIENT
Start: 2021-03-28 | End: 2021-04-01

## 2021-03-28 RX ORDER — TRAZODONE HCL 50 MG
25 TABLET ORAL ONCE
Refills: 0 | Status: COMPLETED | OUTPATIENT
Start: 2021-03-28 | End: 2021-03-28

## 2021-03-28 RX ORDER — POLYETHYLENE GLYCOL 3350 17 G/17G
17 POWDER, FOR SOLUTION ORAL EVERY 12 HOURS
Refills: 0 | Status: DISCONTINUED | OUTPATIENT
Start: 2021-03-28 | End: 2021-04-01

## 2021-03-28 RX ORDER — POTASSIUM CHLORIDE 20 MEQ
40 PACKET (EA) ORAL ONCE
Refills: 0 | Status: COMPLETED | OUTPATIENT
Start: 2021-03-28 | End: 2021-03-28

## 2021-03-28 RX ORDER — LANOLIN ALCOHOL/MO/W.PET/CERES
3 CREAM (GRAM) TOPICAL ONCE
Refills: 0 | Status: DISCONTINUED | OUTPATIENT
Start: 2021-03-28 | End: 2021-03-28

## 2021-03-28 RX ADMIN — CEFEPIME 100 MILLIGRAM(S): 1 INJECTION, POWDER, FOR SOLUTION INTRAMUSCULAR; INTRAVENOUS at 09:14

## 2021-03-28 RX ADMIN — Medication 650 MILLIGRAM(S): at 05:32

## 2021-03-28 RX ADMIN — Medication 100 MILLIGRAM(S): at 21:17

## 2021-03-28 RX ADMIN — Medication 1 TABLET(S): at 11:54

## 2021-03-28 RX ADMIN — CEFEPIME 100 MILLIGRAM(S): 1 INJECTION, POWDER, FOR SOLUTION INTRAMUSCULAR; INTRAVENOUS at 17:07

## 2021-03-28 RX ADMIN — Medication 40 MILLIEQUIVALENT(S): at 09:14

## 2021-03-28 RX ADMIN — SIMVASTATIN 20 MILLIGRAM(S): 20 TABLET, FILM COATED ORAL at 21:17

## 2021-03-28 RX ADMIN — ENOXAPARIN SODIUM 40 MILLIGRAM(S): 100 INJECTION SUBCUTANEOUS at 11:53

## 2021-03-28 RX ADMIN — Medication 100 MILLIGRAM(S): at 13:34

## 2021-03-28 RX ADMIN — Medication 25 MILLIGRAM(S): at 00:58

## 2021-03-28 RX ADMIN — Medication 100 MILLIGRAM(S): at 05:23

## 2021-03-28 RX ADMIN — Medication 3 MILLIGRAM(S): at 21:17

## 2021-03-28 RX ADMIN — POLYETHYLENE GLYCOL 3350 17 GRAM(S): 17 POWDER, FOR SOLUTION ORAL at 00:58

## 2021-03-28 RX ADMIN — BUPROPION HYDROCHLORIDE 300 MILLIGRAM(S): 150 TABLET, EXTENDED RELEASE ORAL at 11:53

## 2021-03-28 RX ADMIN — CEFEPIME 100 MILLIGRAM(S): 1 INJECTION, POWDER, FOR SOLUTION INTRAMUSCULAR; INTRAVENOUS at 00:58

## 2021-03-28 RX ADMIN — ANASTROZOLE 1 MILLIGRAM(S): 1 TABLET ORAL at 11:53

## 2021-03-28 RX ADMIN — POLYETHYLENE GLYCOL 3350 17 GRAM(S): 17 POWDER, FOR SOLUTION ORAL at 17:07

## 2021-03-28 RX ADMIN — Medication 650 MILLIGRAM(S): at 06:03

## 2021-03-28 RX ADMIN — SENNA PLUS 2 TABLET(S): 8.6 TABLET ORAL at 21:17

## 2021-03-28 NOTE — PROGRESS NOTE ADULT - SUBJECTIVE AND OBJECTIVE BOX
Authored by Elina Martin MD, PGY1  PATIENT:  LAUREN JENKINS  [id number]    CHIEF COMPLAINT:  Patient is a 86y old  Female who presents with a chief complaint of acute cholecystitis (27 Mar 2021 07:13)      INTERVAL HISTORY OVERNIGHT EVENTS: HENRRY overnight.         MEDICATIONS:  MEDICATIONS  (STANDING):  anastrozole 1 milliGRAM(s) Oral daily  buPROPion XL . 300 milliGRAM(s) Oral daily  cefepime   IVPB 2000 milliGRAM(s) IV Intermittent every 8 hours  enoxaparin Injectable 40 milliGRAM(s) SubCutaneous daily  lactobacillus acidophilus 1 Tablet(s) Oral daily  melatonin 3 milliGRAM(s) Oral at bedtime  metroNIDAZOLE  IVPB 500 milliGRAM(s) IV Intermittent every 8 hours  polyethylene glycol 3350 17 Gram(s) Oral every 12 hours  potassium chloride    Tablet ER 40 milliEquivalent(s) Oral once  senna 2 Tablet(s) Oral at bedtime  simvastatin 20 milliGRAM(s) Oral at bedtime    MEDICATIONS  (PRN):  acetaminophen   Tablet .. 650 milliGRAM(s) Oral every 6 hours PRN Temp greater or equal to 38C (100.4F), Mild Pain (1 - 3), Moderate Pain (4 - 6), Severe Pain (7 - 10)  morphine  - Injectable 2 milliGRAM(s) IV Push every 6 hours PRN Severe Pain (7 - 10)  ondansetron Injectable 4 milliGRAM(s) IV Push every 8 hours PRN Nausea and/or Vomiting  ondansetron Injectable 4 milliGRAM(s) IV Push once PRN Nausea and/or Vomiting      ALLERGIES:  Allergies    penicillin (Unknown)    Intolerances        OBJECTIVE:  ICU Vital Signs Last 24 Hrs  T(C): 36.8 (28 Mar 2021 05:12), Max: 36.8 (28 Mar 2021 05:12)  T(F): 98.3 (28 Mar 2021 05:12), Max: 98.3 (28 Mar 2021 05:12)  HR: 70 (28 Mar 2021 05:12) (70 - 75)  BP: 132/69 (28 Mar 2021 05:12) (125/73 - 132/69)  BP(mean): --  ABP: --  ABP(mean): --  RR: 18 (28 Mar 2021 05:12) (18 - 18)  SpO2: 94% (28 Mar 2021 05:12) (94% - 97%)          I&O's Summary    27 Mar 2021 07:01  -  28 Mar 2021 07:00  --------------------------------------------------------  IN: 630 mL / OUT: 500 mL / NET: 130 mL        PHYSICAL EXAMINATION:  CONSTITUTIONAL: No acute distress. Awake and alert.  EYES: No scleral icterus. No conjunctival injection.  RESPIRATORY: CTAB. No wheezes, rales, or rhonchi. No accessory muscle use. No apparent respiratory distress.  CARDIOVASCULAR: +S1/S2. No audible S3/S4. Regular rate and rhythm. No murmurs, rubs, or gallops.   GASTROINTESTINAL: Soft, nontender, nondistended. +BS. No rebound or guarding. Cholecystostomy tube noted on R side, approximately 70-80 cc bilious fluid in bag.  EXTREMITY: No LE swelling or edema. EXTs warm to touch.  MUSCULOSKELETAL: Spontaneous movement in all extremities.  DERMATOLOGICAL: No abnormal rashes or lesions.  NEUROLOGICAL: No focal deficits. A&Ox3 (oriented to person, place, and time).  PSYCHIATRIC: Excited and jovial affect.    LABS:                          10.1   2.64  )-----------( 263      ( 28 Mar 2021 06:52 )             32.1     03-28    138  |  106  |  8   ----------------------------<  111<H>  3.3<L>   |  23  |  0.55    Ca    8.2<L>      28 Mar 2021 06:50  Phos  2.8     03-28  Mg     2.0     03-28    TPro  6.0  /  Alb  3.1<L>  /  TBili  0.2  /  DBili  x   /  AST  22  /  ALT  20  /  AlkPhos  61  03-28    LIVER FUNCTIONS - ( 28 Mar 2021 06:50 )  Alb: 3.1 g/dL / Pro: 6.0 g/dL / ALK PHOS: 61 U/L / ALT: 20 U/L / AST: 22 U/L / GGT: x                       TELEMETRY:     EKG:     IMAGING:       Authored by Elina Martin MD, PGY1  PATIENT:  LAUREN JENKINS  [id number]    CHIEF COMPLAINT:  Patient is a 86y old  Female who presents with a chief complaint of acute cholecystitis (27 Mar 2021 07:13)      INTERVAL HISTORY OVERNIGHT EVENTS: HENRRY overnight. This AM, patient endorses increased energy, pleased to have resumed regular diet. Denies fevers, chills, abdominal pain, n/v/d/c. Has passed gas, has not had bowel movement since procedure.         MEDICATIONS:  MEDICATIONS  (STANDING):  anastrozole 1 milliGRAM(s) Oral daily  buPROPion XL . 300 milliGRAM(s) Oral daily  cefepime   IVPB 2000 milliGRAM(s) IV Intermittent every 8 hours  enoxaparin Injectable 40 milliGRAM(s) SubCutaneous daily  lactobacillus acidophilus 1 Tablet(s) Oral daily  melatonin 3 milliGRAM(s) Oral at bedtime  metroNIDAZOLE  IVPB 500 milliGRAM(s) IV Intermittent every 8 hours  polyethylene glycol 3350 17 Gram(s) Oral every 12 hours  potassium chloride    Tablet ER 40 milliEquivalent(s) Oral once  senna 2 Tablet(s) Oral at bedtime  simvastatin 20 milliGRAM(s) Oral at bedtime    MEDICATIONS  (PRN):  acetaminophen   Tablet .. 650 milliGRAM(s) Oral every 6 hours PRN Temp greater or equal to 38C (100.4F), Mild Pain (1 - 3), Moderate Pain (4 - 6), Severe Pain (7 - 10)  morphine  - Injectable 2 milliGRAM(s) IV Push every 6 hours PRN Severe Pain (7 - 10)  ondansetron Injectable 4 milliGRAM(s) IV Push every 8 hours PRN Nausea and/or Vomiting  ondansetron Injectable 4 milliGRAM(s) IV Push once PRN Nausea and/or Vomiting      ALLERGIES:  Allergies    penicillin (Unknown)    Intolerances        OBJECTIVE:  ICU Vital Signs Last 24 Hrs  T(C): 36.8 (28 Mar 2021 05:12), Max: 36.8 (28 Mar 2021 05:12)  T(F): 98.3 (28 Mar 2021 05:12), Max: 98.3 (28 Mar 2021 05:12)  HR: 70 (28 Mar 2021 05:12) (70 - 75)  BP: 132/69 (28 Mar 2021 05:12) (125/73 - 132/69)  BP(mean): --  ABP: --  ABP(mean): --  RR: 18 (28 Mar 2021 05:12) (18 - 18)  SpO2: 94% (28 Mar 2021 05:12) (94% - 97%)          I&O's Summary    27 Mar 2021 07:01  -  28 Mar 2021 07:00  --------------------------------------------------------  IN: 630 mL / OUT: 500 mL / NET: 130 mL        PHYSICAL EXAMINATION:  CONSTITUTIONAL: No acute distress. Awake and alert.  EYES: No scleral icterus. No conjunctival injection.  RESPIRATORY: CTAB. No wheezes, rales, or rhonchi. No accessory muscle use. No apparent respiratory distress.  CARDIOVASCULAR: +S1/S2. No audible S3/S4. Regular rate and rhythm. No murmurs, rubs, or gallops.   GASTROINTESTINAL: Soft, nontender, nondistended. +BS. No rebound or guarding. Cholecystostomy tube noted on R side, approximately 100 cc brown fluid in bag.  EXTREMITY: No LE swelling or edema. EXTs warm to touch.  MUSCULOSKELETAL: Spontaneous movement in all extremities.  DERMATOLOGICAL: No abnormal rashes or lesions.  NEUROLOGICAL: No focal deficits. A&Ox3 (oriented to person, place, and time).  PSYCHIATRIC: Excited and jovial affect.    LABS:                          10.1   2.64  )-----------( 263      ( 28 Mar 2021 06:52 )             32.1     03-28    138  |  106  |  8   ----------------------------<  111<H>  3.3<L>   |  23  |  0.55    Ca    8.2<L>      28 Mar 2021 06:50  Phos  2.8     03-28  Mg     2.0     03-28    TPro  6.0  /  Alb  3.1<L>  /  TBili  0.2  /  DBili  x   /  AST  22  /  ALT  20  /  AlkPhos  61  03-28    LIVER FUNCTIONS - ( 28 Mar 2021 06:50 )  Alb: 3.1 g/dL / Pro: 6.0 g/dL / ALK PHOS: 61 U/L / ALT: 20 U/L / AST: 22 U/L / GGT: x                       TELEMETRY:     EKG:     IMAGING:

## 2021-03-28 NOTE — PROGRESS NOTE ADULT - PROBLEM SELECTOR PLAN 1
- s/p cholecystostomy tube 3/25 with noted improvement of symptoms  - Full liquid diet, will advance to solid diet this PM  - s/p meropenem in ER (3/24). c/w cefepime/flagyl (3/24 - )  - Supportive measures as needed  - Will continue to monitor - s/p cholecystostomy tube 3/25 with noted improvement of symptoms  - Now advanced to solid diet (3/27)  - s/p meropenem in ER (3/24). c/w cefepime/flagyl (3/24 - )  - Supportive measures as needed  - Will continue to monitor

## 2021-03-28 NOTE — PROGRESS NOTE ADULT - PROBLEM SELECTOR PLAN 5
DVT ppx: Lovenox. Duplex negative for DVT  Diet: Full liquid, will advance to solid 3/27  Disposition: Pending clinical status  Code status: DNR/DNI DVT ppx: Lovenox. Duplex negative for DVT  Diet: Solid   Disposition: Pending clinical status  Code status: DNR/DNI

## 2021-03-28 NOTE — PROGRESS NOTE ADULT - ASSESSMENT
86F w/ PMHx of HLD, depression, recurrent br ca on inbrance/anastrazole presents w/ RUQ abdominal pain and weakness being managed conservatively, however w/ worsening exam and VSS, s/p cholecystostomy.

## 2021-03-28 NOTE — PROGRESS NOTE ADULT - PROBLEM SELECTOR PLAN 2
- on hormonal therapy w/ anastrazole, small molecule inhibitor with Ibrance  - She appears to have excellent functional status and notably while Ibrance can be a/w cytopenias, she has no evidence of marrow suppression on cbc and has been on her off week of her cycle  - Oncologist Dr. Juvencio Fletcher consulted, recommend resuming anastrazole, continue to hold Ibrance

## 2021-03-28 NOTE — PROGRESS NOTE ADULT - ATTENDING COMMENTS
Remains afebrile, doing well with perc víctor tube.   Continue with cefepime/flagyl.  Tolerating regular diet.

## 2021-03-29 ENCOUNTER — TRANSCRIPTION ENCOUNTER (OUTPATIENT)
Age: 86
End: 2021-03-29

## 2021-03-29 LAB
ALBUMIN SERPL ELPH-MCNC: 3.3 G/DL — SIGNIFICANT CHANGE UP (ref 3.3–5)
ALP SERPL-CCNC: 78 U/L — SIGNIFICANT CHANGE UP (ref 40–120)
ALT FLD-CCNC: 49 U/L — HIGH (ref 10–45)
ANION GAP SERPL CALC-SCNC: 9 MMOL/L — SIGNIFICANT CHANGE UP (ref 5–17)
ANISOCYTOSIS BLD QL: SLIGHT — SIGNIFICANT CHANGE UP
AST SERPL-CCNC: 83 U/L — HIGH (ref 10–40)
BASOPHILS # BLD AUTO: 0 K/UL — SIGNIFICANT CHANGE UP (ref 0–0.2)
BASOPHILS NFR BLD AUTO: 0 % — SIGNIFICANT CHANGE UP (ref 0–2)
BILIRUB SERPL-MCNC: 0.2 MG/DL — SIGNIFICANT CHANGE UP (ref 0.2–1.2)
BLD GP AB SCN SERPL QL: NEGATIVE — SIGNIFICANT CHANGE UP
BUN SERPL-MCNC: 10 MG/DL — SIGNIFICANT CHANGE UP (ref 7–23)
CALCIUM SERPL-MCNC: 8.6 MG/DL — SIGNIFICANT CHANGE UP (ref 8.4–10.5)
CHLORIDE SERPL-SCNC: 106 MMOL/L — SIGNIFICANT CHANGE UP (ref 96–108)
CO2 SERPL-SCNC: 23 MMOL/L — SIGNIFICANT CHANGE UP (ref 22–31)
CREAT SERPL-MCNC: 0.63 MG/DL — SIGNIFICANT CHANGE UP (ref 0.5–1.3)
CULTURE RESULTS: SIGNIFICANT CHANGE UP
CULTURE RESULTS: SIGNIFICANT CHANGE UP
ELLIPTOCYTES BLD QL SMEAR: SLIGHT — SIGNIFICANT CHANGE UP
EOSINOPHIL # BLD AUTO: 0 K/UL — SIGNIFICANT CHANGE UP (ref 0–0.5)
EOSINOPHIL NFR BLD AUTO: 0 % — SIGNIFICANT CHANGE UP (ref 0–6)
GIANT PLATELETS BLD QL SMEAR: PRESENT — SIGNIFICANT CHANGE UP
GLUCOSE SERPL-MCNC: 97 MG/DL — SIGNIFICANT CHANGE UP (ref 70–99)
HCT VFR BLD CALC: 34.7 % — SIGNIFICANT CHANGE UP (ref 34.5–45)
HGB BLD-MCNC: 11 G/DL — LOW (ref 11.5–15.5)
LYMPHOCYTES # BLD AUTO: 0.76 K/UL — LOW (ref 1–3.3)
LYMPHOCYTES # BLD AUTO: 23 % — SIGNIFICANT CHANGE UP (ref 13–44)
MACROCYTES BLD QL: SLIGHT — SIGNIFICANT CHANGE UP
MAGNESIUM SERPL-MCNC: 2.2 MG/DL — SIGNIFICANT CHANGE UP (ref 1.6–2.6)
MANUAL SMEAR VERIFICATION: SIGNIFICANT CHANGE UP
MCHC RBC-ENTMCNC: 28.2 PG — SIGNIFICANT CHANGE UP (ref 27–34)
MCHC RBC-ENTMCNC: 31.7 GM/DL — LOW (ref 32–36)
MCV RBC AUTO: 89 FL — SIGNIFICANT CHANGE UP (ref 80–100)
MONOCYTES # BLD AUTO: 0.61 K/UL — SIGNIFICANT CHANGE UP (ref 0–0.9)
MONOCYTES NFR BLD AUTO: 18.6 % — HIGH (ref 2–14)
NEUTROPHILS # BLD AUTO: 1.93 K/UL — SIGNIFICANT CHANGE UP (ref 1.8–7.4)
NEUTROPHILS NFR BLD AUTO: 57.5 % — SIGNIFICANT CHANGE UP (ref 43–77)
NEUTS BAND # BLD: 0.9 % — SIGNIFICANT CHANGE UP (ref 0–8)
OVALOCYTES BLD QL SMEAR: SLIGHT — SIGNIFICANT CHANGE UP
PHOSPHATE SERPL-MCNC: 3.3 MG/DL — SIGNIFICANT CHANGE UP (ref 2.5–4.5)
PLAT MORPH BLD: NORMAL — SIGNIFICANT CHANGE UP
PLATELET # BLD AUTO: 346 K/UL — SIGNIFICANT CHANGE UP (ref 150–400)
POIKILOCYTOSIS BLD QL AUTO: SLIGHT — SIGNIFICANT CHANGE UP
POLYCHROMASIA BLD QL SMEAR: SLIGHT — SIGNIFICANT CHANGE UP
POTASSIUM SERPL-MCNC: 4.1 MMOL/L — SIGNIFICANT CHANGE UP (ref 3.5–5.3)
POTASSIUM SERPL-SCNC: 4.1 MMOL/L — SIGNIFICANT CHANGE UP (ref 3.5–5.3)
PROT SERPL-MCNC: 6.3 G/DL — SIGNIFICANT CHANGE UP (ref 6–8.3)
RBC # BLD: 3.9 M/UL — SIGNIFICANT CHANGE UP (ref 3.8–5.2)
RBC # FLD: 16.6 % — HIGH (ref 10.3–14.5)
RBC BLD AUTO: ABNORMAL
RH IG SCN BLD-IMP: POSITIVE — SIGNIFICANT CHANGE UP
SODIUM SERPL-SCNC: 138 MMOL/L — SIGNIFICANT CHANGE UP (ref 135–145)
SPECIMEN SOURCE: SIGNIFICANT CHANGE UP
SPECIMEN SOURCE: SIGNIFICANT CHANGE UP
TARGETS BLD QL SMEAR: SLIGHT — SIGNIFICANT CHANGE UP
WBC # BLD: 3.3 K/UL — LOW (ref 3.8–10.5)
WBC # FLD AUTO: 3.3 K/UL — LOW (ref 3.8–10.5)

## 2021-03-29 PROCEDURE — 99232 SBSQ HOSP IP/OBS MODERATE 35: CPT

## 2021-03-29 PROCEDURE — 99233 SBSQ HOSP IP/OBS HIGH 50: CPT | Mod: GC

## 2021-03-29 RX ORDER — CEFPODOXIME PROXETIL 100 MG
200 TABLET ORAL EVERY 12 HOURS
Refills: 0 | Status: DISCONTINUED | OUTPATIENT
Start: 2021-03-29 | End: 2021-04-01

## 2021-03-29 RX ORDER — METRONIDAZOLE 500 MG
500 TABLET ORAL EVERY 8 HOURS
Refills: 0 | Status: DISCONTINUED | OUTPATIENT
Start: 2021-03-29 | End: 2021-04-01

## 2021-03-29 RX ADMIN — Medication 500 MILLIGRAM(S): at 21:19

## 2021-03-29 RX ADMIN — ENOXAPARIN SODIUM 40 MILLIGRAM(S): 100 INJECTION SUBCUTANEOUS at 11:52

## 2021-03-29 RX ADMIN — SIMVASTATIN 20 MILLIGRAM(S): 20 TABLET, FILM COATED ORAL at 21:15

## 2021-03-29 RX ADMIN — CEFEPIME 100 MILLIGRAM(S): 1 INJECTION, POWDER, FOR SOLUTION INTRAMUSCULAR; INTRAVENOUS at 00:30

## 2021-03-29 RX ADMIN — Medication 1 TABLET(S): at 11:52

## 2021-03-29 RX ADMIN — Medication 100 MILLIGRAM(S): at 05:34

## 2021-03-29 RX ADMIN — Medication 200 MILLIGRAM(S): at 17:59

## 2021-03-29 RX ADMIN — POLYETHYLENE GLYCOL 3350 17 GRAM(S): 17 POWDER, FOR SOLUTION ORAL at 05:34

## 2021-03-29 RX ADMIN — Medication 500 MILLIGRAM(S): at 14:29

## 2021-03-29 RX ADMIN — Medication 3 MILLIGRAM(S): at 22:11

## 2021-03-29 RX ADMIN — ANASTROZOLE 1 MILLIGRAM(S): 1 TABLET ORAL at 11:52

## 2021-03-29 RX ADMIN — CEFEPIME 100 MILLIGRAM(S): 1 INJECTION, POWDER, FOR SOLUTION INTRAMUSCULAR; INTRAVENOUS at 09:07

## 2021-03-29 RX ADMIN — BUPROPION HYDROCHLORIDE 300 MILLIGRAM(S): 150 TABLET, EXTENDED RELEASE ORAL at 11:52

## 2021-03-29 NOTE — DISCHARGE NOTE PROVIDER - NSDCFUADDAPPT_GEN_ALL_CORE_FT
Call IR booking office at [phone] for an appointment 4-6 weeks after discharge for drain removal/tube evaluation.

## 2021-03-29 NOTE — DISCHARGE NOTE PROVIDER - PROVIDER TOKENS
PROVIDER:[TOKEN:[[id number]],FOLLOWUP:[1 month]],PROVIDER:[TOKEN:[[id number]],FOLLOWUP:[Routine]] PROVIDER:[TOKEN:[[id number]],FOLLOWUP:[Routine]],PROVIDER:[TOKEN:[[id number]],FOLLOWUP:[1 month]],PROVIDER:[TOKEN:[[id number]],FOLLOWUP:[2 weeks]]

## 2021-03-29 NOTE — DISCHARGE NOTE PROVIDER - CARE PROVIDER_API CALL
James Rdz)  Diagnostic Radiology  [address] [place] [address]  Phone: [phone]  Fax: [phone]  Follow Up Time: 1 month    Kraig Veliz  COLON/RECTAL SURGERY  [address], [address]  [place] [address]  Phone: [phone]  Fax: [phone]  Follow Up Time: Routine   Kraig Veliz  COLON/RECTAL SURGERY  [address] [facility] [address]  [place] [address]  Phone: [phone]  Fax: [phone]  Follow Up Time: Routine    Georgie Taylor)  [facility]  [address] [place] [address]  Phone: [phone]  Fax: [phone]  Follow Up Time: 1 month    Arturo Sol)  Surgery  [address] [facility] [address]  [place] [address]  Phone: [phone]  Fax: [phone]  Follow Up Time: 2 weeks

## 2021-03-29 NOTE — PROGRESS NOTE ADULT - PROBLEM SELECTOR PLAN 5
DVT ppx: Lovenox. Duplex negative for DVT  Diet: Solid   Disposition: Pending clinical status  Code status: DNR/DNI

## 2021-03-29 NOTE — PROGRESS NOTE ADULT - SUBJECTIVE AND OBJECTIVE BOX
Interventional Radiology Follow-Up Note    This is a 86y Female s/p Percutaneous cholecystostomy on 3/25 in Interventional Radiology with Dr. Barker.     S:     Medication:  cefepime   IVPB: (03-29)  enoxaparin Injectable: (03-28)  metroNIDAZOLE  IVPB: (03-29)    Vitals:  T(F): 97.7, Max: 98.1 (20:49)  HR: 66  BP: 103/67  RR: 18  SpO2: 94%    Physical Exam:  General:   Abdomen:   Drain Device: Drain intact attached to ____. Dressing clean, dry, intact.    LABS:  WBC 3.30 / Hgb 11.0 / Hct 34.7 / Plt 346  Na -- / K -- / CO2 -- / Cl -- / BUN -- / Cr -- / Glucose --  ALT -- / AST -- / Alk Phos -- / Tbili --  Ptt -- / Pt -- / INR --    Culture - Body Fluid with Gram Stain (03.25.21 @ 16:32)    -  Tobramycin: I 8    -  Trimethoprim/Sulfamethoxazole: R >2/38    -  Meropenem: S <=1    -  Piperacillin/Tazobactam: S <=8    -  Ertapenem: S <=0.5    -  Gentamicin: R >8    -  Imipenem: S <=1    -  Levofloxacin: R >4    Gram Stain: polymorphonuclear leukocytes seen. No organisms seen by cytocentrifuge    -  Amikacin: S <=16    -  Amoxicillin/Clavulanic Acid: S <=8/4    -  Ampicillin: R >16 These ampicillin results predict results for amoxicillin    -  Ampicillin/Sulbactam: R >16/8 Enterobacter, Citrobacter, and Serratia may develop resistance during prolonged therapy (3-4 days)    -  Aztreonam: S <=4    -  Cefazolin: I 4 Enterobacter, Citrobacter, and Serratia may develop resistance during prolonged therapy (3-4 days)    -  Cefepime: S <=2    -  Cefoxitin: S <=8    -  Ceftriaxone: S <=1 Enterobacter, Citrobacter, and Serratia may develop resistance during prolonged therapy    -  Ciprofloxacin: R >2    Specimen Source: Bile Gallbladder    Culture Results: Rare Escherichia coli    Organism Identification: Escherichia coli    Organism: Escherichia coli    Method Type: ALEXI    24hr Drain output: 200cc    Assessment/Plan:  86y Female admitted with Cholecystitis s/p percutaneous cholecystostomy tube placement on 3/25 with Dr. Barker.     -ABX therapy per primary/ ID team  -trend vs/labs  -flush drain with 5cc NS daily forward only; DO NOT aspirate  -change dressing q3 days or when dressing is saturated  -regarding outpatient follow up with IR, if the patient is d/c home with drainage catheter they can make an appointment with IR by calling the IR booking office at [phone]; recommend IR follow in 4-6wks/months for tube evaluation.  -they will benefit from VNS service to help with drainage catheter care; they should continue same drainage catheter care as an outpatient.  -continue global management per primary team   -Please call IR at extension [phone] with any questions, concerns, or issues regarding above.      Anette Luu PA-C  Spectra link #[phone]     Interventional Radiology Follow-Up Note    This is a 86y Female s/p Percutaneous cholecystostomy on 3/25 in Interventional Radiology with Dr. Barker.     S: Patient seen and examined at bedside. Offers no complaints. States she is feeling much better since IR procedure. Denies abdominal pain, f/c/n/v.     Medication:  cefepime   IVPB: (03-29)  enoxaparin Injectable: (03-28)  metroNIDAZOLE  IVPB: (03-29)    Vitals:  T(F): 97.7, Max: 98.1 (20:49)  HR: 66  BP: 103/67  RR: 18  SpO2: 94%    Physical Exam:  General: NAD, A&Ox3  Abdomen: ND, soft, mild ttp over drain site.   Drain Device: Drain intact attached to bag with clear green bile. Dressing clean, dry, intact.    LABS:  WBC 3.30 / Hgb 11.0 / Hct 34.7 / Plt 346  Na -- / K -- / CO2 -- / Cl -- / BUN -- / Cr -- / Glucose --  ALT -- / AST -- / Alk Phos -- / Tbili --  Ptt -- / Pt -- / INR --    Culture - Body Fluid with Gram Stain (03.25.21 @ 16:32)    -  Tobramycin: I 8    -  Trimethoprim/Sulfamethoxazole: R >2/38    -  Meropenem: S <=1    -  Piperacillin/Tazobactam: S <=8    -  Ertapenem: S <=0.5    -  Gentamicin: R >8    -  Imipenem: S <=1    -  Levofloxacin: R >4    Gram Stain: polymorphonuclear leukocytes seen. No organisms seen by cytocentrifuge    -  Amikacin: S <=16    -  Amoxicillin/Clavulanic Acid: S <=8/4    -  Ampicillin: R >16 These ampicillin results predict results for amoxicillin    -  Ampicillin/Sulbactam: R >16/8 Enterobacter, Citrobacter, and Serratia may develop resistance during prolonged therapy (3-4 days)    -  Aztreonam: S <=4    -  Cefazolin: I 4 Enterobacter, Citrobacter, and Serratia may develop resistance during prolonged therapy (3-4 days)    -  Cefepime: S <=2    -  Cefoxitin: S <=8    -  Ceftriaxone: S <=1 Enterobacter, Citrobacter, and Serratia may develop resistance during prolonged therapy    -  Ciprofloxacin: R >2    Specimen Source: Bile Gallbladder    Culture Results: Rare Escherichia coli    Organism Identification: Escherichia coli    Organism: Escherichia coli    Method Type: ALEXI    24hr Drain output: 200cc    Assessment/Plan:  86y Female admitted with Cholecystitis s/p percutaneous cholecystostomy tube placement on 3/25 with Dr. Barker.     -ABX therapy per primary/ ID team  -trend vs/labs  -flush drain with 5cc NS daily forward only; DO NOT aspirate  -change dressing q3 days or when dressing is saturated  -regarding outpatient follow up with IR, if the patient is d/c home with drainage catheter they can make an appointment with IR by calling the IR booking office at [phone]; recommend IR follow in 4-6wks/months for tube evaluation.  -they will benefit from VNS service to help with drainage catheter care; they should continue same drainage catheter care as an outpatient.  -Drain care and follow up info inputted into d/c paperwork   -Discussed follow up and drain care at length with both the patient and her daughter in law Alysa.   -continue global management per primary team   -Please call IR at extension [phone] with any questions, concerns, or issues regarding above.      Anette Luu PA-C  Spectra link #[phone]

## 2021-03-29 NOTE — PROGRESS NOTE ADULT - PROBLEM SELECTOR PLAN 1
- s/p cholecystostomy tube 3/25 with noted improvement of symptoms  - Now advanced to solid diet (3/27)  - s/p meropenem in ER (3/24). c/w cefepime/flagyl (3/24 - )  - Supportive measures as needed  - Will continue to monitor - s/p cholecystostomy tube 3/25 with noted improvement of symptoms  - Now advanced to solid diet (3/27)  - s/p meropenem in ER (3/24). c/w cefepime/flagyl (3/24 - ), transition to PO cefpodoxime and flagyl (3/29-)  -d/c 3/29 with VNS, f/u IR 4-6 weeks for drain removal/monitoring, elective víctor outpatient per surgery   - Supportive measures as needed  - Will continue to monitor

## 2021-03-29 NOTE — PROGRESS NOTE ADULT - SUBJECTIVE AND OBJECTIVE BOX
Authored by Elina Martin MD, PGY1  PATIENT:  LAUREN JENKINS  [id number]    CHIEF COMPLAINT:  Patient is a 86y old  Female who presents with a chief complaint of acute cholecystitis (28 Mar 2021 08:13)      INTERVAL HISTORY OVERNIGHT EVENTS: HENRRY overnight.         MEDICATIONS:  MEDICATIONS  (STANDING):  anastrozole 1 milliGRAM(s) Oral daily  buPROPion XL . 300 milliGRAM(s) Oral daily  cefepime   IVPB 2000 milliGRAM(s) IV Intermittent every 8 hours  enoxaparin Injectable 40 milliGRAM(s) SubCutaneous daily  lactobacillus acidophilus 1 Tablet(s) Oral daily  melatonin 3 milliGRAM(s) Oral at bedtime  metroNIDAZOLE  IVPB 500 milliGRAM(s) IV Intermittent every 8 hours  polyethylene glycol 3350 17 Gram(s) Oral every 12 hours  senna 2 Tablet(s) Oral at bedtime  simvastatin 20 milliGRAM(s) Oral at bedtime    MEDICATIONS  (PRN):  acetaminophen   Tablet .. 650 milliGRAM(s) Oral every 6 hours PRN Temp greater or equal to 38C (100.4F), Mild Pain (1 - 3), Moderate Pain (4 - 6), Severe Pain (7 - 10)  morphine  - Injectable 2 milliGRAM(s) IV Push every 6 hours PRN Severe Pain (7 - 10)  ondansetron Injectable 4 milliGRAM(s) IV Push every 8 hours PRN Nausea and/or Vomiting  ondansetron Injectable 4 milliGRAM(s) IV Push once PRN Nausea and/or Vomiting      ALLERGIES:  Allergies    penicillin (Unknown)    Intolerances        OBJECTIVE:  ICU Vital Signs Last 24 Hrs  T(C): 36.5 (29 Mar 2021 05:54), Max: 36.7 (28 Mar 2021 20:49)  T(F): 97.7 (29 Mar 2021 05:54), Max: 98.1 (28 Mar 2021 20:49)  HR: 66 (29 Mar 2021 05:54) (66 - 83)  BP: 103/67 (29 Mar 2021 05:54) (103/67 - 115/65)  BP(mean): --  ABP: --  ABP(mean): --  RR: 18 (29 Mar 2021 05:54) (18 - 19)  SpO2: 94% (29 Mar 2021 05:54) (94% - 96%)          I&O's Summary    28 Mar 2021 07:01  -  29 Mar 2021 07:00  --------------------------------------------------------  IN: 480 mL / OUT: 100 mL / NET: 380 mL    29 Mar 2021 07:01  -  29 Mar 2021 07:39  --------------------------------------------------------  IN: 0 mL / OUT: 100 mL / NET: -100 mL      PHYSICAL EXAMINATION:  CONSTITUTIONAL: No acute distress. Awake and alert.  EYES: No scleral icterus. No conjunctival injection.  RESPIRATORY: CTAB. No wheezes, rales, or rhonchi. No accessory muscle use. No apparent respiratory distress.  CARDIOVASCULAR: +S1/S2. No audible S3/S4. Regular rate and rhythm. No murmurs, rubs, or gallops.   GASTROINTESTINAL: Soft, nontender, nondistended. +BS. No rebound or guarding. Cholecystostomy tube noted on R side, approximately 100 cc brown fluid in bag.  EXTREMITY: No LE swelling or edema. EXTs warm to touch.  MUSCULOSKELETAL: Spontaneous movement in all extremities.  DERMATOLOGICAL: No abnormal rashes or lesions.  NEUROLOGICAL: No focal deficits. A&Ox3 (oriented to person, place, and time).  LABS:                          11.0   3.30  )-----------( 346      ( 29 Mar 2021 06:50 )             34.7     03-29    138  |  106  |  10  ----------------------------<  97  4.1   |  23  |  0.63    Ca    8.6      29 Mar 2021 06:47  Phos  3.3     03-29  Mg     2.2     03-29    TPro  6.3  /  Alb  3.3  /  TBili  0.2  /  DBili  x   /  AST  83<H>  /  ALT  49<H>  /  AlkPhos  78  03-29    LIVER FUNCTIONS - ( 29 Mar 2021 06:47 )  Alb: 3.3 g/dL / Pro: 6.3 g/dL / ALK PHOS: 78 U/L / ALT: 49 U/L / AST: 83 U/L / GGT: x                       TELEMETRY:     EKG:     IMAGING:       Authored by Elina Martin MD, PGY1  PATIENT:  LAUREN JENKINS  [id number]    CHIEF COMPLAINT:  Patient is a 86y old  Female who presents with a chief complaint of acute cholecystitis (28 Mar 2021 08:13)      INTERVAL HISTORY OVERNIGHT EVENTS: HENRRY overnight. This AM, patient with no acute complaints, denies abdominal pain, n/v/d, endorses constipation, passing gas.         MEDICATIONS:  MEDICATIONS  (STANDING):  anastrozole 1 milliGRAM(s) Oral daily  buPROPion XL . 300 milliGRAM(s) Oral daily  cefepime   IVPB 2000 milliGRAM(s) IV Intermittent every 8 hours  enoxaparin Injectable 40 milliGRAM(s) SubCutaneous daily  lactobacillus acidophilus 1 Tablet(s) Oral daily  melatonin 3 milliGRAM(s) Oral at bedtime  metroNIDAZOLE  IVPB 500 milliGRAM(s) IV Intermittent every 8 hours  polyethylene glycol 3350 17 Gram(s) Oral every 12 hours  senna 2 Tablet(s) Oral at bedtime  simvastatin 20 milliGRAM(s) Oral at bedtime    MEDICATIONS  (PRN):  acetaminophen   Tablet .. 650 milliGRAM(s) Oral every 6 hours PRN Temp greater or equal to 38C (100.4F), Mild Pain (1 - 3), Moderate Pain (4 - 6), Severe Pain (7 - 10)  morphine  - Injectable 2 milliGRAM(s) IV Push every 6 hours PRN Severe Pain (7 - 10)  ondansetron Injectable 4 milliGRAM(s) IV Push every 8 hours PRN Nausea and/or Vomiting  ondansetron Injectable 4 milliGRAM(s) IV Push once PRN Nausea and/or Vomiting      ALLERGIES:  Allergies    penicillin (Unknown)    Intolerances        OBJECTIVE:  ICU Vital Signs Last 24 Hrs  T(C): 36.5 (29 Mar 2021 05:54), Max: 36.7 (28 Mar 2021 20:49)  T(F): 97.7 (29 Mar 2021 05:54), Max: 98.1 (28 Mar 2021 20:49)  HR: 66 (29 Mar 2021 05:54) (66 - 83)  BP: 103/67 (29 Mar 2021 05:54) (103/67 - 115/65)  BP(mean): --  ABP: --  ABP(mean): --  RR: 18 (29 Mar 2021 05:54) (18 - 19)  SpO2: 94% (29 Mar 2021 05:54) (94% - 96%)          I&O's Summary    28 Mar 2021 07:01  -  29 Mar 2021 07:00  --------------------------------------------------------  IN: 480 mL / OUT: 100 mL / NET: 380 mL    29 Mar 2021 07:01  -  29 Mar 2021 07:39  --------------------------------------------------------  IN: 0 mL / OUT: 100 mL / NET: -100 mL      PHYSICAL EXAMINATION:  CONSTITUTIONAL: No acute distress. Awake and alert.  EYES: No scleral icterus. No conjunctival injection.  RESPIRATORY: CTAB. No wheezes, rales, or rhonchi. No accessory muscle use. No apparent respiratory distress.  CARDIOVASCULAR: +S1/S2. No audible S3/S4. Regular rate and rhythm. No murmurs, rubs, or gallops.   GASTROINTESTINAL: Soft, nontender, nondistended. +BS. No rebound or guarding. Cholecystostomy tube noted on R side, approximately 100 cc brown fluid in bag.  EXTREMITY: No LE swelling or edema. EXTs warm to touch.  MUSCULOSKELETAL: Spontaneous movement in all extremities.  DERMATOLOGICAL: No abnormal rashes or lesions.  NEUROLOGICAL: No focal deficits. A&Ox3 (oriented to person, place, and time).    LABS:                          11.0   3.30  )-----------( 346      ( 29 Mar 2021 06:50 )             34.7     03-29    138  |  106  |  10  ----------------------------<  97  4.1   |  23  |  0.63    Ca    8.6      29 Mar 2021 06:47  Phos  3.3     03-29  Mg     2.2     03-29    TPro  6.3  /  Alb  3.3  /  TBili  0.2  /  DBili  x   /  AST  83<H>  /  ALT  49<H>  /  AlkPhos  78  03-29    LIVER FUNCTIONS - ( 29 Mar 2021 06:47 )  Alb: 3.3 g/dL / Pro: 6.3 g/dL / ALK PHOS: 78 U/L / ALT: 49 U/L / AST: 83 U/L / GGT: x                       TELEMETRY:     EKG:     IMAGING:       Authored by Elina Martin MD, PGY1  PATIENT:  LAUREN JENKINS  [id number]    CHIEF COMPLAINT:  Patient is a 86y old  Female who presents with a chief complaint of acute cholecystitis (28 Mar 2021 08:13)      INTERVAL HISTORY OVERNIGHT EVENTS: HENRRY overnight. This AM, patient with no acute complaints, denies abdominal pain, n/v/d, had one BM this AM, passing gas.         MEDICATIONS:  MEDICATIONS  (STANDING):  anastrozole 1 milliGRAM(s) Oral daily  buPROPion XL . 300 milliGRAM(s) Oral daily  cefepime   IVPB 2000 milliGRAM(s) IV Intermittent every 8 hours  enoxaparin Injectable 40 milliGRAM(s) SubCutaneous daily  lactobacillus acidophilus 1 Tablet(s) Oral daily  melatonin 3 milliGRAM(s) Oral at bedtime  metroNIDAZOLE  IVPB 500 milliGRAM(s) IV Intermittent every 8 hours  polyethylene glycol 3350 17 Gram(s) Oral every 12 hours  senna 2 Tablet(s) Oral at bedtime  simvastatin 20 milliGRAM(s) Oral at bedtime    MEDICATIONS  (PRN):  acetaminophen   Tablet .. 650 milliGRAM(s) Oral every 6 hours PRN Temp greater or equal to 38C (100.4F), Mild Pain (1 - 3), Moderate Pain (4 - 6), Severe Pain (7 - 10)  morphine  - Injectable 2 milliGRAM(s) IV Push every 6 hours PRN Severe Pain (7 - 10)  ondansetron Injectable 4 milliGRAM(s) IV Push every 8 hours PRN Nausea and/or Vomiting  ondansetron Injectable 4 milliGRAM(s) IV Push once PRN Nausea and/or Vomiting      ALLERGIES:  Allergies    penicillin (Unknown)    Intolerances        OBJECTIVE:  ICU Vital Signs Last 24 Hrs  T(C): 36.5 (29 Mar 2021 05:54), Max: 36.7 (28 Mar 2021 20:49)  T(F): 97.7 (29 Mar 2021 05:54), Max: 98.1 (28 Mar 2021 20:49)  HR: 66 (29 Mar 2021 05:54) (66 - 83)  BP: 103/67 (29 Mar 2021 05:54) (103/67 - 115/65)  BP(mean): --  ABP: --  ABP(mean): --  RR: 18 (29 Mar 2021 05:54) (18 - 19)  SpO2: 94% (29 Mar 2021 05:54) (94% - 96%)          I&O's Summary    28 Mar 2021 07:01  -  29 Mar 2021 07:00  --------------------------------------------------------  IN: 480 mL / OUT: 100 mL / NET: 380 mL    29 Mar 2021 07:01  -  29 Mar 2021 07:39  --------------------------------------------------------  IN: 0 mL / OUT: 100 mL / NET: -100 mL      PHYSICAL EXAMINATION:  CONSTITUTIONAL: No acute distress. Awake and alert.  EYES: No scleral icterus. No conjunctival injection.  RESPIRATORY: CTAB. No wheezes, rales, or rhonchi. No accessory muscle use. No apparent respiratory distress.  CARDIOVASCULAR: +S1/S2. No audible S3/S4. Regular rate and rhythm. No murmurs, rubs, or gallops.   GASTROINTESTINAL: Soft, nontender, nondistended. +BS. No rebound or guarding. Cholecystostomy tube noted on R side, approximately 100 cc brown fluid in bag.  EXTREMITY: No LE swelling or edema. EXTs warm to touch.  MUSCULOSKELETAL: Spontaneous movement in all extremities.  DERMATOLOGICAL: No abnormal rashes or lesions.  NEUROLOGICAL: No focal deficits. A&Ox3 (oriented to person, place, and time).    LABS:                          11.0   3.30  )-----------( 346      ( 29 Mar 2021 06:50 )             34.7     03-29    138  |  106  |  10  ----------------------------<  97  4.1   |  23  |  0.63    Ca    8.6      29 Mar 2021 06:47  Phos  3.3     03-29  Mg     2.2     03-29    TPro  6.3  /  Alb  3.3  /  TBili  0.2  /  DBili  x   /  AST  83<H>  /  ALT  49<H>  /  AlkPhos  78  03-29    LIVER FUNCTIONS - ( 29 Mar 2021 06:47 )  Alb: 3.3 g/dL / Pro: 6.3 g/dL / ALK PHOS: 78 U/L / ALT: 49 U/L / AST: 83 U/L / GGT: x                       TELEMETRY:     EKG:     IMAGING:

## 2021-03-29 NOTE — PROGRESS NOTE ADULT - ATTENDING COMMENTS
Cholecystitis s/p percutaneous cholecystostomy tube placement by IR  Transition to PO antibiotics cefpodoxime and flagyl  Cont regular diet   Malignant neoplasm of right female breast- oncology eval appreciated- cont anastrazole; next cycle Ibrance pending cell counts  Hyperlipidemia- cont statin.   Depression- cont Wellbutrin  D/C planning home with home PT likely 3/30 Cholecystitis with sepsis s/p percutaneous cholecystostomy tube placement by IR  Transition to PO antibiotics cefpodoxime and flagyl  Cont regular diet   Malignant neoplasm of right female breast- oncology eval appreciated- cont anastrazole; next cycle Ibrance pending cell counts  Hyperlipidemia- cont statin.   Other unspecified depression- cont Wellbutrin  D/C planning home with home PT likely 3/30

## 2021-03-29 NOTE — DISCHARGE NOTE PROVIDER - NSDCMRMEDTOKEN_GEN_ALL_CORE_FT
Give it a few more days  If still swelling next week, then have her see Blaire Young or Deepak Talavera and stop the Lotrel to get rid of the amlodipine in it and just be on lotensin anastrozole 1 mg oral tablet: 1 tab(s) orally once a day  Ibrance 100 mg oral tablet: 1 tab(s) orally once a day 3wks on 1 off  simvastatin 20 mg oral tablet: 1 tab(s) orally once a day (at bedtime)  Wellbutrin  mg/24 hours oral tablet, extended release: 1 tab(s) orally every 24 hours   anastrozole 1 mg oral tablet: 1 tab(s) orally once a day  Ibrance 100 mg oral tablet: 1 tab(s) orally once a day 3wks on 1 off  mirtazapine 7.5 mg oral tablet: 1 tab(s) orally once a day (at bedtime)  Rolling Walker: Rolling walker for debility ICD R54  simvastatin 20 mg oral tablet: 1 tab(s) orally once a day (at bedtime)  Wellbutrin  mg/24 hours oral tablet, extended release: 1 tab(s) orally every 24 hours   anastrozole 1 mg oral tablet: 1 tab(s) orally once a day  cefpodoxime 200 mg oral tablet: 1 tab(s) orally every 12 hours  Ibrance 100 mg oral tablet: 1 tab(s) orally once a day 3wks on 1 off  metroNIDAZOLE 500 mg oral tablet: 1 tab(s) orally every 8 hours  mirtazapine 7.5 mg oral tablet: 1 tab(s) orally once a day (at bedtime)  Rolling Walker: Rolling walker for debility ICD R54  simvastatin 20 mg oral tablet: 1 tab(s) orally once a day (at bedtime)  Wellbutrin  mg/24 hours oral tablet, extended release: 1 tab(s) orally every 24 hours

## 2021-03-29 NOTE — PROGRESS NOTE ADULT - PROBLEM SELECTOR PLAN 6
Transition of Care Status:  1. Name of PCP:  2. PCP contacted on admission: [  ] Y     [  ] N  3. PCP contacted at discharge: [  ] Y     [  ] N  4. Post-discharge appointment date and location:  5. Summary of handoff given to PCP: Transition of Care Status:  1. Name of PCP:  2. PCP contacted on admission: [  ] Y     [  ] N  3. PCP contacted at discharge: [  ] Y     [  ] N  4. Post-discharge appointment date and location:  5. Summary of handoff given to PCP:    3/29: Updated patient's daughter in law per patient's request, understands plan for d/c tomorrow, transition to PO abx, and f/u with IR in 4-6 weeks for drain monitoring, will arrange visiting nurse services with CM.

## 2021-03-29 NOTE — DISCHARGE NOTE PROVIDER - NSDCCPTREATMENT_GEN_ALL_CORE_FT
PRINCIPAL PROCEDURE  Procedure: Percutaneous cholecystostomy  Findings and Treatment: You had a drain placeed in your gallbladder by Dr. Barker on 3/25/21in Intervetnional Radiology (IR).   Monitor site for any sign or symptoms of infection (painful red skin, green/ yellow foul smelling discharge from the insertion site, fever, chills, leakage around drain site).   Flush drain with 5mL NS daily. If you meet resistance upon flushing, STOP and contact IR.   Empty drainage bag daily and record output. If there is a sudden decrease in output please contact IR to schedule  an appointment.  Drain care:  -Disconnect tubing (tube attached to bag)  from the catheter (catheter going into skin)  -Clean catheter with alcohol swab  -Twist on the flush syringe to the catheter (be sure to push the air out of syringe)  -Flush catheter with 5mL NS (DO NOT pull back on syringe). If you meet resistance upon flushing, STOP and contact IR.   -Disconnect syringe from catheter and reconnect drainage bag.  Dressing care:  -Wash hands thoroughly with water and soap for at least 20 seconds.   -take off old dressing and clean around drain site with gauze soaked with warm water  -After cleaning the site, dry and replace dressing with a new gauze and tegaderm.   -Place one piece of gauze underneath the drain and another piece of gauze on top of drain.   -Apply tegaderm (clear dressing) on top.  -Change dressing every 3 days or when soiled

## 2021-03-29 NOTE — DISCHARGE NOTE PROVIDER - NSDCCPCAREPLAN_GEN_ALL_CORE_FT
PRINCIPAL DISCHARGE DIAGNOSIS  Diagnosis: Cholecystitis  Assessment and Plan of Treatment: You presented with an infection of the gallbladder. Surgery did not recommend surgical intervention as your were considered high risk for complications. You were initially managed conservatively with antibiotics, however worsening of infection prompted need to place a drain (percutaneous cholecystostomy drain) in the gallbladder to remove the source of infection. You continued on antibiotics, completing a one week course. Your signs and symptoms of infection resolved. The drain will remain in place for 4-6 weeks, please see instructions in discharge document for follow up with interventional radiology to manage the drain. You will also have visiting nurse services to help manage the drain. You may see a surgeon to re-evaluate if your are a candidate for surgery for definitive removal of the gallbladder.      SECONDARY DISCHARGE DIAGNOSES  Diagnosis: Malignant neoplasm of right female breast, unspecified estrogen receptor status, unspecified site of breast  Assessment and Plan of Treatment: You are currently undergoing treatment for breast cancer. Your oncologist recommend we resume anastrazole, your inbrance is on hold in setting of infection. Please follow up with your oncologist to manage your regimen.     PRINCIPAL DISCHARGE DIAGNOSIS  Diagnosis: Cholecystitis  Assessment and Plan of Treatment: You presented with an infection of the gallbladder. Surgery did not recommend surgical intervention as your were considered high risk for complications. You were initially managed conservatively with antibiotics, however worsening of infection prompted need to place a drain (percutaneous cholecystostomy drain) in the gallbladder to remove the source of infection. You continued on antibiotics, completing, and will complete a 10 day course (3 days left of antibiotics to be completed outpatient). Your signs and symptoms of infection resolved. The drain will remain in place for 4-6 weeks, please see instructions in discharge document for follow up with interventional radiology to manage the drain. You will also have visiting nurse services to help manage the drain. You may see a surgeon to re-evaluate if your are a candidate for surgery for definitive removal of the gallbladder.      SECONDARY DISCHARGE DIAGNOSES  Diagnosis: Malignant neoplasm of right female breast, unspecified estrogen receptor status, unspecified site of breast  Assessment and Plan of Treatment: You are currently undergoing treatment for breast cancer. Your oncologist recommend we resume anastrazole, your inbrance is on hold in setting of infection. Please follow up with your oncologist to manage your regimen.

## 2021-03-29 NOTE — DISCHARGE NOTE PROVIDER - HOSPITAL COURSE
86 year old woman, history of HLD, depression, recurrent breast cancer on inbrance/anastrazole presents w/ RUQ abdominal pain and weakness. Patient was seen in ER on 3/22 for abdominal pain and nausea. She had CT AP which was negative and UA/UCx was positive. She was discharged on cefpodoxime for presumed UTI. Notably never had dysuria. Subsequently developed ongoing nausea, inability to PO and pain localized to RUQ. She was evaluated by PMD and referred to ER due to c.f cholecystitis.     In ER, found to be febrile, tachycardic, exam and RUQ ultrasound c/w acute cholecystitis (patient also noted to have gallstones, nonobstructive) Received 2L NS.     Patient was evaluated by surgery who recommend against cholecystectomy upon initial evaluation (concern as patient undergoing chemo, deemed high risk for surgical intervention) wanted to manage conservatively w/ antibiotics (patient started on IV flagyl and cefepime 3/24/21). Subsequent abdominal exam +Mcguire's sign, patient becoming more hypotensive, surgery recommended percutaneous cholecystostomy tube with interventional radiology. Patient underwent IR procedure, tube initially drained brownish fluid, then continued to drain bilious fluid, culture positive for rare E. coli. Patient transitioned to PO antibiotics (cefpodoxime and flagyl), and completed an 8 day course (3/24-3/31). Patient with uptrending AST/ALT later in hospital course, hepatocellular pattern, low suspicion for stone traveling and obstructing bile duct, however MRCP obtained:     Patient with insomnia, Wellbutrin known to be activating; psychiatry recommended adding Remeron to regimen, patient experienced improved sleep.     Patient will have visiting nurse services set up to help with drain maintenance (flush drain with 5cc NS daily forward only; DO NOT aspirate, change dressing q3 days or when dressing is saturated). Patient will follow up outpatient with surgery to determine surgical candidacy when stable. Pt should follow up with IR for cholecystostomy tube check in 4-6 weeks after placement, if planning for surgery.  Otherwise follow up every 3 months for routine exchange.     Patient evaluated by PT, patient d/c with home PT.    86 year old woman, history of HLD, depression, recurrent breast cancer on inbrance/anastrazole presents w/ RUQ abdominal pain and weakness. Patient was seen in ER on 3/22 for abdominal pain and nausea. She had CT AP which was negative and UA/UCx was positive. She was discharged on cefpodoxime for presumed UTI. Notably never had dysuria. Subsequently developed ongoing nausea, inability to PO and pain localized to RUQ. She was evaluated by PMD and referred to ER due to c.f cholecystitis.     In ER, found to be febrile, tachycardic, exam and RUQ ultrasound c/w acute cholecystitis (patient also noted to have gallstones, nonobstructive) Received 2L NS.     Patient was evaluated by surgery who recommend against cholecystectomy upon initial evaluation (concern as patient undergoing chemo, deemed high risk for surgical intervention) wanted to manage conservatively w/ antibiotics (patient started on IV flagyl and cefepime 3/24/21). Subsequent abdominal exam +Mcguire's sign, patient becoming more hypotensive, surgery recommended percutaneous cholecystostomy tube with interventional radiology. Patient underwent IR procedure, tube initially drained brownish fluid, then continued to drain bilious fluid, culture positive for rare E. coli. Patient transitioned to PO antibiotics (cefpodoxime and flagyl), and completed an 10 day course (3/24-4/2). Patient with uptrending AST/ALT later in hospital course, hepatocellular pattern, low suspicion for stone traveling and obstructing bile duct, MRCP confirmed no bile duct obstruction.     Patient with insomnia, Wellbutrin known to be activating; psychiatry recommended adding Remeron to regimen, patient experienced improved sleep.     Patient will have visiting nurse services set up to help with drain maintenance (flush drain with 5cc NS daily forward only; DO NOT aspirate, change dressing q3 days or when dressing is saturated). Patient will follow up outpatient with surgery to determine surgical candidacy when stable. Pt should follow up with IR for cholecystostomy tube check in 4-6 weeks after placement, if planning for surgery.  Otherwise follow up every 3 months for routine exchange.     Patient evaluated by PT, patient d/c with home PT.

## 2021-03-30 LAB
ALBUMIN SERPL ELPH-MCNC: 3.6 G/DL — SIGNIFICANT CHANGE UP (ref 3.3–5)
ALP SERPL-CCNC: 82 U/L — SIGNIFICANT CHANGE UP (ref 40–120)
ALT FLD-CCNC: 73 U/L — HIGH (ref 10–45)
ANION GAP SERPL CALC-SCNC: 10 MMOL/L — SIGNIFICANT CHANGE UP (ref 5–17)
AST SERPL-CCNC: 91 U/L — HIGH (ref 10–40)
BASOPHILS # BLD AUTO: 0.06 K/UL — SIGNIFICANT CHANGE UP (ref 0–0.2)
BASOPHILS NFR BLD AUTO: 1.8 % — SIGNIFICANT CHANGE UP (ref 0–2)
BILIRUB SERPL-MCNC: 0.2 MG/DL — SIGNIFICANT CHANGE UP (ref 0.2–1.2)
BUN SERPL-MCNC: 9 MG/DL — SIGNIFICANT CHANGE UP (ref 7–23)
CALCIUM SERPL-MCNC: 9.1 MG/DL — SIGNIFICANT CHANGE UP (ref 8.4–10.5)
CHLORIDE SERPL-SCNC: 104 MMOL/L — SIGNIFICANT CHANGE UP (ref 96–108)
CO2 SERPL-SCNC: 26 MMOL/L — SIGNIFICANT CHANGE UP (ref 22–31)
CREAT SERPL-MCNC: 0.67 MG/DL — SIGNIFICANT CHANGE UP (ref 0.5–1.3)
CULTURE RESULTS: SIGNIFICANT CHANGE UP
EOSINOPHIL # BLD AUTO: 0 K/UL — SIGNIFICANT CHANGE UP (ref 0–0.5)
EOSINOPHIL NFR BLD AUTO: 0 % — SIGNIFICANT CHANGE UP (ref 0–6)
GLUCOSE SERPL-MCNC: 97 MG/DL — SIGNIFICANT CHANGE UP (ref 70–99)
HCT VFR BLD CALC: 36.7 % — SIGNIFICANT CHANGE UP (ref 34.5–45)
HGB BLD-MCNC: 11.5 G/DL — SIGNIFICANT CHANGE UP (ref 11.5–15.5)
LYMPHOCYTES # BLD AUTO: 0.74 K/UL — LOW (ref 1–3.3)
LYMPHOCYTES # BLD AUTO: 22.3 % — SIGNIFICANT CHANGE UP (ref 13–44)
MAGNESIUM SERPL-MCNC: 2.2 MG/DL — SIGNIFICANT CHANGE UP (ref 1.6–2.6)
MANUAL SMEAR VERIFICATION: SIGNIFICANT CHANGE UP
MCHC RBC-ENTMCNC: 28.5 PG — SIGNIFICANT CHANGE UP (ref 27–34)
MCHC RBC-ENTMCNC: 31.3 GM/DL — LOW (ref 32–36)
MCV RBC AUTO: 90.8 FL — SIGNIFICANT CHANGE UP (ref 80–100)
MONOCYTES # BLD AUTO: 0.62 K/UL — SIGNIFICANT CHANGE UP (ref 0–0.9)
MONOCYTES NFR BLD AUTO: 18.7 % — HIGH (ref 2–14)
NEUTROPHILS # BLD AUTO: 1.85 K/UL — SIGNIFICANT CHANGE UP (ref 1.8–7.4)
NEUTROPHILS NFR BLD AUTO: 54.5 % — SIGNIFICANT CHANGE UP (ref 43–77)
NEUTS BAND # BLD: 0.9 % — SIGNIFICANT CHANGE UP (ref 0–8)
ORGANISM # SPEC MICROSCOPIC CNT: SIGNIFICANT CHANGE UP
ORGANISM # SPEC MICROSCOPIC CNT: SIGNIFICANT CHANGE UP
PHOSPHATE SERPL-MCNC: 3.3 MG/DL — SIGNIFICANT CHANGE UP (ref 2.5–4.5)
PLAT MORPH BLD: NORMAL — SIGNIFICANT CHANGE UP
PLATELET # BLD AUTO: 451 K/UL — HIGH (ref 150–400)
POTASSIUM SERPL-MCNC: 4.1 MMOL/L — SIGNIFICANT CHANGE UP (ref 3.5–5.3)
POTASSIUM SERPL-SCNC: 4.1 MMOL/L — SIGNIFICANT CHANGE UP (ref 3.5–5.3)
PROT SERPL-MCNC: 6.6 G/DL — SIGNIFICANT CHANGE UP (ref 6–8.3)
RBC # BLD: 4.04 M/UL — SIGNIFICANT CHANGE UP (ref 3.8–5.2)
RBC # FLD: 16.9 % — HIGH (ref 10.3–14.5)
RBC BLD AUTO: SIGNIFICANT CHANGE UP
SODIUM SERPL-SCNC: 140 MMOL/L — SIGNIFICANT CHANGE UP (ref 135–145)
SPECIMEN SOURCE: SIGNIFICANT CHANGE UP
VARIANT LYMPHS # BLD: 1.8 % — SIGNIFICANT CHANGE UP (ref 0–6)
WBC # BLD: 3.34 K/UL — LOW (ref 3.8–10.5)
WBC # FLD AUTO: 3.34 K/UL — LOW (ref 3.8–10.5)

## 2021-03-30 PROCEDURE — 99233 SBSQ HOSP IP/OBS HIGH 50: CPT | Mod: GC

## 2021-03-30 PROCEDURE — 99231 SBSQ HOSP IP/OBS SF/LOW 25: CPT

## 2021-03-30 PROCEDURE — 90792 PSYCH DIAG EVAL W/MED SRVCS: CPT

## 2021-03-30 RX ORDER — MIRTAZAPINE 45 MG/1
7.5 TABLET, ORALLY DISINTEGRATING ORAL AT BEDTIME
Refills: 0 | Status: DISCONTINUED | OUTPATIENT
Start: 2021-03-30 | End: 2021-04-01

## 2021-03-30 RX ORDER — HYDROCORTISONE 1 %
1 OINTMENT (GRAM) TOPICAL ONCE
Refills: 0 | Status: COMPLETED | OUTPATIENT
Start: 2021-03-30 | End: 2021-03-30

## 2021-03-30 RX ORDER — DIPHENHYDRAMINE HCL 50 MG
25 CAPSULE ORAL ONCE
Refills: 0 | Status: DISCONTINUED | OUTPATIENT
Start: 2021-03-30 | End: 2021-03-31

## 2021-03-30 RX ADMIN — Medication 1 TABLET(S): at 12:34

## 2021-03-30 RX ADMIN — ENOXAPARIN SODIUM 40 MILLIGRAM(S): 100 INJECTION SUBCUTANEOUS at 12:34

## 2021-03-30 RX ADMIN — BUPROPION HYDROCHLORIDE 300 MILLIGRAM(S): 150 TABLET, EXTENDED RELEASE ORAL at 12:34

## 2021-03-30 RX ADMIN — Medication 500 MILLIGRAM(S): at 22:01

## 2021-03-30 RX ADMIN — Medication 200 MILLIGRAM(S): at 06:15

## 2021-03-30 RX ADMIN — Medication 500 MILLIGRAM(S): at 06:15

## 2021-03-30 RX ADMIN — Medication 200 MILLIGRAM(S): at 18:08

## 2021-03-30 RX ADMIN — MIRTAZAPINE 7.5 MILLIGRAM(S): 45 TABLET, ORALLY DISINTEGRATING ORAL at 22:00

## 2021-03-30 RX ADMIN — SENNA PLUS 2 TABLET(S): 8.6 TABLET ORAL at 21:57

## 2021-03-30 RX ADMIN — Medication 1 APPLICATION(S): at 18:08

## 2021-03-30 RX ADMIN — Medication 500 MILLIGRAM(S): at 15:04

## 2021-03-30 RX ADMIN — SIMVASTATIN 20 MILLIGRAM(S): 20 TABLET, FILM COATED ORAL at 21:55

## 2021-03-30 RX ADMIN — ANASTROZOLE 1 MILLIGRAM(S): 1 TABLET ORAL at 12:34

## 2021-03-30 RX ADMIN — Medication 3 MILLIGRAM(S): at 21:58

## 2021-03-30 NOTE — BEHAVIORAL HEALTH ASSESSMENT NOTE - NSBHCHARTREVIEWLAB_PSY_A_CORE FT
11.5   3.34  )-----------( 451      ( 30 Mar 2021 08:34 )             36.7     03-30    140  |  104  |  9   ----------------------------<  97  4.1   |  26  |  0.67    Ca    9.1      30 Mar 2021 08:34  Phos  3.3     03-30  Mg     2.2     03-30    TPro  6.6  /  Alb  3.6  /  TBili  0.2  /  DBili  x   /  AST  91<H>  /  ALT  73<H>  /  AlkPhos  82  03-30

## 2021-03-30 NOTE — BEHAVIORAL HEALTH ASSESSMENT NOTE - HPI (INCLUDE ILLNESS QUALITY, SEVERITY, DURATION, TIMING, CONTEXT, MODIFYING FACTORS, ASSOCIATED SIGNS AND SYMPTOMS)
87y/o  female with 4 adult children, retired book keeper, lives alone, with PPHx of depression, in outpt psychiatric treatment with psychopharmacologist Dr. Saleem Combs and is on Wellbutrin, no h/o SA/SIB, no h/o inpt psychiatric hospitalizations, no substance abuse, w/ PMHx of HLD, recurrent br ca on inbrance/anastrazole presents w/ RUQ abdominal pain and weakness being managed conservatively, however w/ worsening exam and VSS, s/p cholecystostomy.  Psychiatry consulted to assess for management of insomnia.    Patient see and evaluated, awake and alert oriented x 3, able to recall recent events in the hospital.  Reports has h/o depression for which she  takes Wellbutrin for prescribed by psychopharmacologist, Dr. Rodríguez.  She reports has been on this for at least 6 years.  Reports depression has been stable, denies worsening mood symptoms.  She reports while in the hospital that she has had impaired sleep, states is able to fall sleep for 2 hours with melatonin and then is awake for the rest of the night.  She reports on occasion has difficulty sleeping at home for which she takes Chamomile tea.  She reports she is hopeful that her condition will improve, states that she was supposed to take to be discharged from the hospital today, however "they found something else wrong" and has to have further testing.  She denies SI/HI, AVH, or thoughts of paranoia, she denies any recent drug use or alcohol use.  She reports prior to coming to the hospital was enjoying life, getting together a few times a week with her sister, going out on walks.

## 2021-03-30 NOTE — PROGRESS NOTE ADULT - SUBJECTIVE AND OBJECTIVE BOX
Authored by Elina Martin MD, PGY1  PATIENT:  LAUREN JENKINS  [id number]    CHIEF COMPLAINT:  Patient is a 86y old  Female who presents with a chief complaint of acute cholecystitis (29 Mar 2021 11:16)      INTERVAL HISTORY OVERNIGHT EVENTS: HENRRY overnight.       MEDICATIONS:  MEDICATIONS  (STANDING):  anastrozole 1 milliGRAM(s) Oral daily  buPROPion XL . 300 milliGRAM(s) Oral daily  cefpodoxime 200 milliGRAM(s) Oral every 12 hours  enoxaparin Injectable 40 milliGRAM(s) SubCutaneous daily  lactobacillus acidophilus 1 Tablet(s) Oral daily  melatonin 3 milliGRAM(s) Oral at bedtime  metroNIDAZOLE    Tablet 500 milliGRAM(s) Oral every 8 hours  polyethylene glycol 3350 17 Gram(s) Oral every 12 hours  senna 2 Tablet(s) Oral at bedtime  simvastatin 20 milliGRAM(s) Oral at bedtime    MEDICATIONS  (PRN):  acetaminophen   Tablet .. 650 milliGRAM(s) Oral every 6 hours PRN Temp greater or equal to 38C (100.4F), Mild Pain (1 - 3), Moderate Pain (4 - 6), Severe Pain (7 - 10)  morphine  - Injectable 2 milliGRAM(s) IV Push every 6 hours PRN Severe Pain (7 - 10)  ondansetron Injectable 4 milliGRAM(s) IV Push every 8 hours PRN Nausea and/or Vomiting  ondansetron Injectable 4 milliGRAM(s) IV Push once PRN Nausea and/or Vomiting      ALLERGIES:  Allergies    penicillin (Unknown)    Intolerances        OBJECTIVE:  ICU Vital Signs Last 24 Hrs  T(C): 36.6 (30 Mar 2021 05:08), Max: 36.7 (29 Mar 2021 16:59)  T(F): 97.9 (30 Mar 2021 05:08), Max: 98 (29 Mar 2021 16:59)  HR: 64 (30 Mar 2021 05:08) (64 - 83)  BP: 112/66 (30 Mar 2021 05:08) (109/50 - 113/51)  BP(mean): --  ABP: --  ABP(mean): --  RR: 18 (30 Mar 2021 05:08) (18 - 18)  SpO2: 93% (30 Mar 2021 05:08) (93% - 96%)          I&O's Summary    29 Mar 2021 07:01  -  30 Mar 2021 07:00  --------------------------------------------------------  IN: 360 mL / OUT: 125 mL / NET: 235 mL      Daily     Daily     PHYSICAL EXAMINATION:  CONSTITUTIONAL: No acute distress. Awake and alert.  EYES: No scleral icterus. No conjunctival injection.  RESPIRATORY: CTAB. No wheezes, rales, or rhonchi. No accessory muscle use. No apparent respiratory distress.  CARDIOVASCULAR: +S1/S2. No audible S3/S4. Regular rate and rhythm. No murmurs, rubs, or gallops.   GASTROINTESTINAL: Soft, nontender, nondistended. +BS. No rebound or guarding. Cholecystostomy tube noted on R side, approximately 100 cc brown fluid in bag.  EXTREMITY: No LE swelling or edema. EXTs warm to touch.  MUSCULOSKELETAL: Spontaneous movement in all extremities.  DERMATOLOGICAL: No abnormal rashes or lesions.  NEUROLOGICAL: No focal deficits. A&Ox3 (oriented to person, place, and time).    LABS:                          11.0   3.30  )-----------( 346      ( 29 Mar 2021 06:50 )             34.7     03-29    138  |  106  |  10  ----------------------------<  97  4.1   |  23  |  0.63    Ca    8.6      29 Mar 2021 06:47  Phos  3.3     03-29  Mg     2.2     03-29    TPro  6.3  /  Alb  3.3  /  TBili  0.2  /  DBili  x   /  AST  83<H>  /  ALT  49<H>  /  AlkPhos  78  03-29    LIVER FUNCTIONS - ( 29 Mar 2021 06:47 )  Alb: 3.3 g/dL / Pro: 6.3 g/dL / ALK PHOS: 78 U/L / ALT: 49 U/L / AST: 83 U/L / GGT: x                       TELEMETRY:     EKG:     IMAGING:       Authored by Elina Martin MD, PGY1  PATIENT:  LAUREN JENKINS  [id number]    CHIEF COMPLAINT:  Patient is a 86y old  Female who presents with a chief complaint of acute cholecystitis (29 Mar 2021 11:16)      INTERVAL HISTORY OVERNIGHT EVENTS: HENRRY overnight. This AM, patient reports continued BMs, denies fevers, chills, n/v/d/c, abdominal pain.       MEDICATIONS:  MEDICATIONS  (STANDING):  anastrozole 1 milliGRAM(s) Oral daily  buPROPion XL . 300 milliGRAM(s) Oral daily  cefpodoxime 200 milliGRAM(s) Oral every 12 hours  enoxaparin Injectable 40 milliGRAM(s) SubCutaneous daily  lactobacillus acidophilus 1 Tablet(s) Oral daily  melatonin 3 milliGRAM(s) Oral at bedtime  metroNIDAZOLE    Tablet 500 milliGRAM(s) Oral every 8 hours  polyethylene glycol 3350 17 Gram(s) Oral every 12 hours  senna 2 Tablet(s) Oral at bedtime  simvastatin 20 milliGRAM(s) Oral at bedtime    MEDICATIONS  (PRN):  acetaminophen   Tablet .. 650 milliGRAM(s) Oral every 6 hours PRN Temp greater or equal to 38C (100.4F), Mild Pain (1 - 3), Moderate Pain (4 - 6), Severe Pain (7 - 10)  morphine  - Injectable 2 milliGRAM(s) IV Push every 6 hours PRN Severe Pain (7 - 10)  ondansetron Injectable 4 milliGRAM(s) IV Push every 8 hours PRN Nausea and/or Vomiting  ondansetron Injectable 4 milliGRAM(s) IV Push once PRN Nausea and/or Vomiting      ALLERGIES:  Allergies    penicillin (Unknown)    Intolerances        OBJECTIVE:  ICU Vital Signs Last 24 Hrs  T(C): 36.6 (30 Mar 2021 05:08), Max: 36.7 (29 Mar 2021 16:59)  T(F): 97.9 (30 Mar 2021 05:08), Max: 98 (29 Mar 2021 16:59)  HR: 64 (30 Mar 2021 05:08) (64 - 83)  BP: 112/66 (30 Mar 2021 05:08) (109/50 - 113/51)  BP(mean): --  ABP: --  ABP(mean): --  RR: 18 (30 Mar 2021 05:08) (18 - 18)  SpO2: 93% (30 Mar 2021 05:08) (93% - 96%)          I&O's Summary    29 Mar 2021 07:01  -  30 Mar 2021 07:00  --------------------------------------------------------  IN: 360 mL / OUT: 125 mL / NET: 235 mL      Daily     Daily     PHYSICAL EXAMINATION:  CONSTITUTIONAL: No acute distress. Awake and alert.  EYES: No scleral icterus. No conjunctival injection.  RESPIRATORY: CTAB. No wheezes, rales, or rhonchi. No accessory muscle use. No apparent respiratory distress.  CARDIOVASCULAR: +S1/S2. No audible S3/S4. Regular rate and rhythm. No murmurs, rubs, or gallops.   GASTROINTESTINAL: Soft, nontender, nondistended. +BS. No rebound or guarding. Cholecystostomy tube noted on R side, approximately 100 cc brown fluid in bag.  EXTREMITY: No LE swelling or edema. EXTs warm to touch.  MUSCULOSKELETAL: Spontaneous movement in all extremities.  DERMATOLOGICAL: No abnormal rashes or lesions.  NEUROLOGICAL: No focal deficits. A&Ox3 (oriented to person, place, and time).    LABS:                          11.0   3.30  )-----------( 346      ( 29 Mar 2021 06:50 )             34.7     03-29    138  |  106  |  10  ----------------------------<  97  4.1   |  23  |  0.63    Ca    8.6      29 Mar 2021 06:47  Phos  3.3     03-29  Mg     2.2     03-29    TPro  6.3  /  Alb  3.3  /  TBili  0.2  /  DBili  x   /  AST  83<H>  /  ALT  49<H>  /  AlkPhos  78  03-29    LIVER FUNCTIONS - ( 29 Mar 2021 06:47 )  Alb: 3.3 g/dL / Pro: 6.3 g/dL / ALK PHOS: 78 U/L / ALT: 49 U/L / AST: 83 U/L / GGT: x                       TELEMETRY:     EKG:     IMAGING:

## 2021-03-30 NOTE — BEHAVIORAL HEALTH ASSESSMENT NOTE - NSBHCHARTREVIEWINVESTIGATE_PSY_A_CORE FT
< from: 12 Lead ECG (03.24.21 @ 15:26) >      Ventricular Rate 91 BPM    Atrial Rate 91 BPM    P-R Interval 136 ms    QRS Duration 82 ms    Q-T Interval 370 ms    QTC Calculation(Bazett) 455 ms    P Axis 29 degrees    R Axis -21 degrees    T Axis 46 degrees    Diagnosis Line NORMAL SINUS RHYTHM WITH SINUS ARRHYTHMIA  BASELINE ARTIFACT  LATE TRANSITION  ABNORMAL ECG  WHEN COMPARED WITH ECG OF 22-MAR-2021 15:36,  RATE FASTER  Confirmed by MD Rufino, Dipak ([id number]) on 3/26/2021 11:49:23 AM    < end of copied text >

## 2021-03-30 NOTE — PROGRESS NOTE ADULT - PROBLEM SELECTOR PLAN 1
- s/p cholecystostomy tube 3/25 with noted improvement of symptoms  - Now advanced to solid diet (3/27)  - s/p meropenem in ER (3/24). c/w cefepime/flagyl (3/24 - ), transition to PO cefpodoxime and flagyl (3/29-)  -d/c 3/29 with VNS, f/u IR 4-6 weeks for drain removal/monitoring, elective víctor outpatient per surgery   - Supportive measures as needed  - Will continue to monitor - s/p cholecystostomy tube 3/25 with noted improvement of symptoms  - Now advanced to solid diet (3/27)  - s/p meropenem in ER (3/24). c/w cefepime/flagyl (3/24 - ), transition to PO cefpodoxime and flagyl (3/29-)  -d/c 3/29 with VNS, f/u IR 4-6 weeks for drain removal/monitoring, elective víctor outpatient per surgery   - uptrending AST/ALT, will obtain MRCP to r/o bile duct obstruction, GI consulted however state there is nothing to add at this time, advised to reconsult pending MRCP result   - Supportive measures as needed  - Will continue to monitor

## 2021-03-30 NOTE — DIETITIAN INITIAL EVALUATION ADULT. - OTHER INFO
Pt reports improving appetite and PO intake in-house. Reports receiving Ensure supplements, declined need for supplement and wants to discontinue. Denies nausea/vomiting/diarrhea/constipation. Last BM today (3/30) per pt. Denies difficulties chewing or swallowing. Confirmed NKFA. Pt declined remainder of interview/education at this time, states "I'm fine I don't even know why I'm still here." Made aware RD remains available.     Weight history per chart review: 151 pounds (10/2020), 145 pounds (1/11/2021). Dosing weight 152.2 pounds (3/24) suggests wt gain. Will continue to monitor and trend weights.

## 2021-03-30 NOTE — BEHAVIORAL HEALTH ASSESSMENT NOTE - RISK ASSESSMENT
Low Acute Suicide Risk Risk factors: insomnia, acute/chronic medical issues, underlying mood d/o    Protective factors: no current SIIP/HIIP, no h/o SA/SIB, no h/o psych admissions, no active substance abuse, no psychosis, with adult children/grandchildren, domiciled, social supports, positive therapeutic relationship, engaged in treatment, compliant with treatment, help-seeking behaviors    Overall, pt is a low risk of harm to self/others and does not require psychiatric admission for safety and stabilization.

## 2021-03-30 NOTE — BEHAVIORAL HEALTH ASSESSMENT NOTE - NSBHCHARTREVIEWVS_PSY_A_CORE FT
Vital Signs Last 24 Hrs  T(C): 36.9 (30 Mar 2021 13:23), Max: 36.9 (30 Mar 2021 13:23)  T(F): 98.5 (30 Mar 2021 13:23), Max: 98.5 (30 Mar 2021 13:23)  HR: 67 (30 Mar 2021 13:23) (64 - 83)  BP: 113/56 (30 Mar 2021 13:23) (111/46 - 113/56)  BP(mean): --  RR: 18 (30 Mar 2021 13:23) (18 - 18)  SpO2: 98% (30 Mar 2021 13:23) (93% - 98%)

## 2021-03-30 NOTE — DIETITIAN INITIAL EVALUATION ADULT. - PROBLEM SELECTOR PLAN 2
-on hormonal therapy w/ anastrazole, small molecule inhibitor w/ ibrance  -excellent functional status  She appears to have excellent functional status and notably while Ibrance can be a/w cytopenias, she has no evidence of marrow suppression on cbc and has been on her off week of her cycle

## 2021-03-30 NOTE — PROGRESS NOTE ADULT - PROBLEM SELECTOR PLAN 6
Transition of Care Status:  1. Name of PCP:  2. PCP contacted on admission: [  ] Y     [  ] N  3. PCP contacted at discharge: [  ] Y     [  ] N  4. Post-discharge appointment date and location:  5. Summary of handoff given to PCP:    3/29: Updated patient's daughter in law per patient's request, understands plan for d/c tomorrow, transition to PO abx, and f/u with IR in 4-6 weeks for drain monitoring, will arrange visiting nurse services with CM. Transition of Care Status:  1. Name of PCP:  2. PCP contacted on admission: [  ] Y     [  ] N  3. PCP contacted at discharge: [  ] Y     [  ] N  4. Post-discharge appointment date and location:  5. Summary of handoff given to PCP:    3/30: Updated patient's daughter in law on the phone, aware of MRCP today, will not be discharged today.

## 2021-03-30 NOTE — DIETITIAN INITIAL EVALUATION ADULT. - REASON INDICATOR FOR ASSESSMENT
Pt seen for length of stay assessment. Source: EMR, pt. Pt is an 87 yo female with PMH of HLD, depression, and recurrent breast cancer on inbrance/anastrazole who presented with RUQ abdominal pain and weakness, admitted 3/24.

## 2021-03-30 NOTE — PROGRESS NOTE ADULT - ATTENDING COMMENTS
Cholecystitis with sepsis s/p percutaneous cholecystostomy tube placement by IR  Cont cefpodoxime and flagyl PO  Now with transaminitis- will order MRCP and consult GI- while patient is on medications that can cause transaminitis concern for extrahepatic obstructive causes given cholelithiasis and cholecystitis   Malignant neoplasm of right female breast- oncology eval appreciated- cont anastrazole; next cycle Ibrance pending cell counts  Hyperlipidemia- cont for now, may need to hold if transaminitis worsens  Other unspecified depression- cont Wellbutrin  Plan for home with home PT at discharge

## 2021-03-30 NOTE — DIETITIAN INITIAL EVALUATION ADULT. - ORAL INTAKE PTA/DIET HISTORY
Pt reports decreased appetite and PO intake ~1/2021 in setting of COVID, now improved. Reports during that time consuming Ensure at home. No vitamin/mineral supplementation reported.

## 2021-03-30 NOTE — PROGRESS NOTE ADULT - SUBJECTIVE AND OBJECTIVE BOX
Interventional Radiology Follow-Up Note    This is a 86y Female s/p perc víctor on 3/25  in Interventional Radiology with Dr. My ware.     S: Patient seen and examined @ bedside. Denies abdominal pain,n/v.    Medication:   cefepime   IVPB: (03-29)  cefpodoxime: (03-30)  enoxaparin Injectable: (03-29)  metroNIDAZOLE    Tablet: (03-30)  metroNIDAZOLE  IVPB: (03-29)    Vitals:   T(F): 97.9, Max: 98 (16:59)  HR: 64  BP: 112/66  RR: 18  SpO2: 93%    Physical Exam:  General: Nontoxic, in NAD,  Abdomen: soft, NTND, no peritoneal signs.  Drain Device: Drain intact attached to gravity drain. Drain with green bilious output. Dressing clean, dry, intact.   24hr Drain output: 100cc/24hrs.     LABS:  WBC 3.34 / Hgb 11.5 / Hct 36.7 / Plt 451  Na 140 / K 4.1 / CO2 26 / Cl 104 / BUN 9 / Cr 0.67 / Glucose 97  ALT 73 / AST 91 / Alk Phos 82 / Tbili 0.2  Ptt -- / Pt -- / INR --      Assessment/Plan:  86y Female admitted with Cholecystitis s/p percutaneous cholecystostomy tube placement on 3/25 with Dr. Barker.     - ABX per primary team.  -trend vs/labs  -flush drain with 5cc NS daily forward only; DO NOT aspirate  -change dressing q3 days or when dressing is saturated  - The pt should follow up with surgery to determine surgical candidacy when stable. Pt should follow up with IR for cholecystostomy tube check in 4-6 weeks after placement, if planning for surgery.  Otherwise follow up every 3 months for routine exchange.      Please call IR at extension [phone] with any questions, concerns, or issues regarding above.

## 2021-03-30 NOTE — BEHAVIORAL HEALTH ASSESSMENT NOTE - CASE SUMMARY
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 Saleem Combs and is on Wellbutrin, no h/o SA/SIB, no h/o inpt psychiatric hospitalizations, no substance abuse, w/ PMHx of HLD, recurrent breast ca on inbrance/anastrazole presents w/ RUQ abdominal pain and weakness being managed conservatively, however w/ worsening exam and VSS, s/p cholecystostomy.  Psychiatry consulted to assess for management of insomnia.  Patient seen and evaluated, awake and alert, oriented x 3, reports has been having poor sleep in the hospital, states intermittently has poor sleep at home but says this is much worse here in the hospital.  She denies SI/HI, AVH, or thoughts of paranoia, she denies worsening mood symptoms, states depression has been stable recently.  She is hopeful that her condition will improve, and states she is looking forward to going back home.  At this time, agree with starting Remeron 7.5mg po qHS for mood/insomnia. May continue Wellbutrin in the AM since this can be activating.

## 2021-03-30 NOTE — BEHAVIORAL HEALTH ASSESSMENT NOTE - SUMMARY
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 Saleem Combs and is on Wellbutrin, no h/o SA/SIB, no h/o inpt psychiatric hospitalizations, no substance abuse, w/ PMHx of HLD, recurrent br ca on inbrance/anastrazole presents w/ RUQ abdominal pain and weakness being managed conservatively, however w/ worsening exam and VSS, s/p cholecystostomy.  Psychiatry consulted to assess for management of insomnia.  Patient seen and evaluated, awake and alert, oriented x 3, reports has been having poor sleep in the hospital, states intermittently has poor sleep at home but is able to drink chamomile team and is able to fall asleep.  She denies SI/HI, AVH, or thoughts of paranoia, she denies worsening mood symptoms, states depression has been stable recently.  She reports she is hopeful that her condition will improve, state is looking forward to going back home.  At this time, would consider starting Remeron 7.5mg po qHS for mood/insomnia.  Can c/w current dose of Wellbutrin as there are no contraindications to use Wellbutrin + Remeron. 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 Saleem Combs and is on Wellbutrin, no h/o SA/SIB, no h/o inpt psychiatric hospitalizations, no substance abuse, w/ PMHx of HLD, recurrent breast ca on inbrance/anastrazole presents w/ RUQ abdominal pain and weakness being managed conservatively, however w/ worsening exam and VSS, s/p cholecystostomy.  Psychiatry consulted to assess for management of insomnia.  Patient seen and evaluated, awake and alert, oriented x 3, reports has been having poor sleep in the hospital, states intermittently has poor sleep at home but is able to drink chamomile team and is able to fall asleep.  She denies SI/HI, AVH, or thoughts of paranoia, she denies worsening mood symptoms, states depression has been stable recently.  She reports she is hopeful that her condition will improve, state is looking forward to going back home.  At this time, would consider starting Remeron 7.5mg po qHS for mood/insomnia.  Can c/w current dose of Wellbutrin as there are no contraindications to use Wellbutrin + Remeron.

## 2021-03-30 NOTE — DIETITIAN INITIAL EVALUATION ADULT. - PROBLEM SELECTOR PLAN 1
-s/p meropenem in ER  -c/w cefepime/flagyl  -NPO  -s/p 2L NS  -start LR@80cc/hr maintenance  -per surgery, defer perc víctor or cholecystectomy for now and monitor on abx  -surgical follow up appreciated  -if decompensating will reach out to surgery for cholecystecomy vs ir perc víctor.   -pain control  -zofran prn

## 2021-03-31 LAB
ALBUMIN SERPL ELPH-MCNC: 3.4 G/DL — SIGNIFICANT CHANGE UP (ref 3.3–5)
ALP SERPL-CCNC: 75 U/L — SIGNIFICANT CHANGE UP (ref 40–120)
ALT FLD-CCNC: 61 U/L — HIGH (ref 10–45)
ANION GAP SERPL CALC-SCNC: 12 MMOL/L — SIGNIFICANT CHANGE UP (ref 5–17)
AST SERPL-CCNC: 60 U/L — HIGH (ref 10–40)
BILIRUB SERPL-MCNC: 0.2 MG/DL — SIGNIFICANT CHANGE UP (ref 0.2–1.2)
BUN SERPL-MCNC: 13 MG/DL — SIGNIFICANT CHANGE UP (ref 7–23)
CALCIUM SERPL-MCNC: 9.1 MG/DL — SIGNIFICANT CHANGE UP (ref 8.4–10.5)
CHLORIDE SERPL-SCNC: 104 MMOL/L — SIGNIFICANT CHANGE UP (ref 96–108)
CO2 SERPL-SCNC: 24 MMOL/L — SIGNIFICANT CHANGE UP (ref 22–31)
CREAT SERPL-MCNC: 0.74 MG/DL — SIGNIFICANT CHANGE UP (ref 0.5–1.3)
GLUCOSE SERPL-MCNC: 85 MG/DL — SIGNIFICANT CHANGE UP (ref 70–99)
HCT VFR BLD CALC: 35.2 % — SIGNIFICANT CHANGE UP (ref 34.5–45)
HGB BLD-MCNC: 11.1 G/DL — LOW (ref 11.5–15.5)
MAGNESIUM SERPL-MCNC: 2.2 MG/DL — SIGNIFICANT CHANGE UP (ref 1.6–2.6)
MCHC RBC-ENTMCNC: 28.7 PG — SIGNIFICANT CHANGE UP (ref 27–34)
MCHC RBC-ENTMCNC: 31.5 GM/DL — LOW (ref 32–36)
MCV RBC AUTO: 91 FL — SIGNIFICANT CHANGE UP (ref 80–100)
NRBC # BLD: 0 /100 WBCS — SIGNIFICANT CHANGE UP (ref 0–0)
PHOSPHATE SERPL-MCNC: 3.5 MG/DL — SIGNIFICANT CHANGE UP (ref 2.5–4.5)
PLATELET # BLD AUTO: 489 K/UL — HIGH (ref 150–400)
POTASSIUM SERPL-MCNC: 4.1 MMOL/L — SIGNIFICANT CHANGE UP (ref 3.5–5.3)
POTASSIUM SERPL-SCNC: 4.1 MMOL/L — SIGNIFICANT CHANGE UP (ref 3.5–5.3)
PROT SERPL-MCNC: 6.4 G/DL — SIGNIFICANT CHANGE UP (ref 6–8.3)
RBC # BLD: 3.87 M/UL — SIGNIFICANT CHANGE UP (ref 3.8–5.2)
RBC # FLD: 16.9 % — HIGH (ref 10.3–14.5)
SARS-COV-2 RNA SPEC QL NAA+PROBE: SIGNIFICANT CHANGE UP
SODIUM SERPL-SCNC: 140 MMOL/L — SIGNIFICANT CHANGE UP (ref 135–145)
WBC # BLD: 3.76 K/UL — LOW (ref 3.8–10.5)
WBC # FLD AUTO: 3.76 K/UL — LOW (ref 3.8–10.5)

## 2021-03-31 PROCEDURE — 99233 SBSQ HOSP IP/OBS HIGH 50: CPT | Mod: GC

## 2021-03-31 PROCEDURE — 74183 MRI ABD W/O CNTR FLWD CNTR: CPT | Mod: 26

## 2021-03-31 PROCEDURE — 99232 SBSQ HOSP IP/OBS MODERATE 35: CPT

## 2021-03-31 PROCEDURE — 99231 SBSQ HOSP IP/OBS SF/LOW 25: CPT

## 2021-03-31 RX ORDER — MIRTAZAPINE 45 MG/1
1 TABLET, ORALLY DISINTEGRATING ORAL
Qty: 7 | Refills: 0
Start: 2021-03-31 | End: 2021-04-06

## 2021-03-31 RX ADMIN — ANASTROZOLE 1 MILLIGRAM(S): 1 TABLET ORAL at 12:04

## 2021-03-31 RX ADMIN — Medication 200 MILLIGRAM(S): at 06:20

## 2021-03-31 RX ADMIN — BUPROPION HYDROCHLORIDE 300 MILLIGRAM(S): 150 TABLET, EXTENDED RELEASE ORAL at 12:04

## 2021-03-31 RX ADMIN — Medication 1 TABLET(S): at 12:05

## 2021-03-31 RX ADMIN — ENOXAPARIN SODIUM 40 MILLIGRAM(S): 100 INJECTION SUBCUTANEOUS at 12:05

## 2021-03-31 RX ADMIN — Medication 3 MILLIGRAM(S): at 21:42

## 2021-03-31 RX ADMIN — Medication 500 MILLIGRAM(S): at 06:20

## 2021-03-31 RX ADMIN — Medication 200 MILLIGRAM(S): at 17:51

## 2021-03-31 RX ADMIN — MIRTAZAPINE 7.5 MILLIGRAM(S): 45 TABLET, ORALLY DISINTEGRATING ORAL at 21:42

## 2021-03-31 RX ADMIN — Medication 500 MILLIGRAM(S): at 14:38

## 2021-03-31 RX ADMIN — Medication 500 MILLIGRAM(S): at 21:42

## 2021-03-31 RX ADMIN — SIMVASTATIN 20 MILLIGRAM(S): 20 TABLET, FILM COATED ORAL at 21:42

## 2021-03-31 NOTE — PROGRESS NOTE BEHAVIORAL HEALTH - NSBHFUPINTERVALCCFT_PSY_A_CORE
" I slept so much better last night. I think the Remeron worked." " I slept so much better last night. "

## 2021-03-31 NOTE — PROGRESS NOTE ADULT - SUBJECTIVE AND OBJECTIVE BOX
Authored by Elina Martin MD, PGY1  PATIENT:  ALUREN JENKINS  [id number]    CHIEF COMPLAINT:  Patient is a 86y old  Female who presents with a chief complaint of acute cholecystitis (31 Mar 2021 10:46)      INTERVAL HISTORY OVERNIGHT EVENTS: HENRRY overnight. Patient endorses regular BMs, denies pain, n/v/d/c, denies fevers or chills.         MEDICATIONS:  MEDICATIONS  (STANDING):  anastrozole 1 milliGRAM(s) Oral daily  buPROPion XL . 300 milliGRAM(s) Oral daily  cefpodoxime 200 milliGRAM(s) Oral every 12 hours  enoxaparin Injectable 40 milliGRAM(s) SubCutaneous daily  lactobacillus acidophilus 1 Tablet(s) Oral daily  melatonin 3 milliGRAM(s) Oral at bedtime  metroNIDAZOLE    Tablet 500 milliGRAM(s) Oral every 8 hours  mirtazapine 7.5 milliGRAM(s) Oral at bedtime  polyethylene glycol 3350 17 Gram(s) Oral every 12 hours  senna 2 Tablet(s) Oral at bedtime  simvastatin 20 milliGRAM(s) Oral at bedtime    MEDICATIONS  (PRN):  acetaminophen   Tablet .. 650 milliGRAM(s) Oral every 6 hours PRN Temp greater or equal to 38C (100.4F), Mild Pain (1 - 3), Moderate Pain (4 - 6), Severe Pain (7 - 10)  diphenhydrAMINE 25 milliGRAM(s) Oral once PRN Insomnia  morphine  - Injectable 2 milliGRAM(s) IV Push every 6 hours PRN Severe Pain (7 - 10)  ondansetron Injectable 4 milliGRAM(s) IV Push every 8 hours PRN Nausea and/or Vomiting  ondansetron Injectable 4 milliGRAM(s) IV Push once PRN Nausea and/or Vomiting      ALLERGIES:  Allergies    penicillin (Unknown)    Intolerances        OBJECTIVE:  ICU Vital Signs Last 24 Hrs  T(C): 36.7 (31 Mar 2021 04:34), Max: 36.9 (30 Mar 2021 13:23)  T(F): 98.1 (31 Mar 2021 04:34), Max: 98.5 (30 Mar 2021 13:23)  HR: 64 (31 Mar 2021 04:34) (64 - 80)  BP: 144/60 (31 Mar 2021 04:34) (107/63 - 144/60)  BP(mean): --  ABP: --  ABP(mean): --  RR: 18 (31 Mar 2021 04:34) (18 - 20)  SpO2: 99% (31 Mar 2021 04:34) (97% - 99%)          I&O's Summary    30 Mar 2021 07:01  -  31 Mar 2021 07:00  --------------------------------------------------------  IN: 240 mL / OUT: 225 mL / NET: 15 mL      Daily     Daily Weight in k.9 (31 Mar 2021 09:59)    PHYSICAL EXAMINATION:  CONSTITUTIONAL: No acute distress. Awake and alert.  EYES: No scleral icterus. No conjunctival injection.  RESPIRATORY: CTAB. No wheezes, rales, or rhonchi. No accessory muscle use. No apparent respiratory distress.  CARDIOVASCULAR: +S1/S2. No audible S3/S4. Regular rate and rhythm. No murmurs, rubs, or gallops.   GASTROINTESTINAL: Soft, nontender, nondistended. +BS. No rebound or guarding. Cholecystostomy tube noted on R side, approximately 100 cc brown fluid in bag.  EXTREMITY: No LE swelling or edema. EXTs warm to touch.  MUSCULOSKELETAL: Spontaneous movement in all extremities.  DERMATOLOGICAL: No abnormal rashes or lesions.  NEUROLOGICAL: No focal deficits. A&Ox3 (oriented to person, place, and time).    LABS:                          11.1   3.76  )-----------( 489      ( 31 Mar 2021 07:08 )             35.2         140  |  104  |  13  ----------------------------<  85  4.1   |  24  |  0.74    Ca    9.1      31 Mar 2021 07:07  Phos  3.5       Mg     2.2         TPro  6.4  /  Alb  3.4  /  TBili  0.2  /  DBili  x   /  AST  60<H>  /  ALT  61<H>  /  AlkPhos  75      LIVER FUNCTIONS - ( 31 Mar 2021 07:07 )  Alb: 3.4 g/dL / Pro: 6.4 g/dL / ALK PHOS: 75 U/L / ALT: 61 U/L / AST: 60 U/L / GGT: x                       TELEMETRY:     EKG:     IMAGING:       Authored by Elina Martin MD, PGY1  PATIENT:  LAUREN JENKINS  [id number]    CHIEF COMPLAINT:  Patient is a 86y old  Female who presents with a chief complaint of acute cholecystitis (31 Mar 2021 10:46)      INTERVAL HISTORY OVERNIGHT EVENTS: HENRRY overnight. Patient endorses regular BMs, denies abdominal pain, n/v/d/c, denies fevers or chills.         MEDICATIONS:  MEDICATIONS  (STANDING):  anastrozole 1 milliGRAM(s) Oral daily  buPROPion XL . 300 milliGRAM(s) Oral daily  cefpodoxime 200 milliGRAM(s) Oral every 12 hours  enoxaparin Injectable 40 milliGRAM(s) SubCutaneous daily  lactobacillus acidophilus 1 Tablet(s) Oral daily  melatonin 3 milliGRAM(s) Oral at bedtime  metroNIDAZOLE    Tablet 500 milliGRAM(s) Oral every 8 hours  mirtazapine 7.5 milliGRAM(s) Oral at bedtime  polyethylene glycol 3350 17 Gram(s) Oral every 12 hours  senna 2 Tablet(s) Oral at bedtime  simvastatin 20 milliGRAM(s) Oral at bedtime    MEDICATIONS  (PRN):  acetaminophen   Tablet .. 650 milliGRAM(s) Oral every 6 hours PRN Temp greater or equal to 38C (100.4F), Mild Pain (1 - 3), Moderate Pain (4 - 6), Severe Pain (7 - 10)  diphenhydrAMINE 25 milliGRAM(s) Oral once PRN Insomnia  morphine  - Injectable 2 milliGRAM(s) IV Push every 6 hours PRN Severe Pain (7 - 10)  ondansetron Injectable 4 milliGRAM(s) IV Push every 8 hours PRN Nausea and/or Vomiting  ondansetron Injectable 4 milliGRAM(s) IV Push once PRN Nausea and/or Vomiting      ALLERGIES:  Allergies    penicillin (Unknown)    Intolerances        OBJECTIVE:  ICU Vital Signs Last 24 Hrs  T(C): 36.7 (31 Mar 2021 04:34), Max: 36.9 (30 Mar 2021 13:23)  T(F): 98.1 (31 Mar 2021 04:34), Max: 98.5 (30 Mar 2021 13:23)  HR: 64 (31 Mar 2021 04:34) (64 - 80)  BP: 144/60 (31 Mar 2021 04:34) (107/63 - 144/60)  BP(mean): --  ABP: --  ABP(mean): --  RR: 18 (31 Mar 2021 04:34) (18 - 20)  SpO2: 99% (31 Mar 2021 04:34) (97% - 99%)          I&O's Summary    30 Mar 2021 07:01  -  31 Mar 2021 07:00  --------------------------------------------------------  IN: 240 mL / OUT: 225 mL / NET: 15 mL      Daily     Daily Weight in k.9 (31 Mar 2021 09:59)    PHYSICAL EXAMINATION:  CONSTITUTIONAL: No acute distress. Awake and alert.  EYES: No scleral icterus. No conjunctival injection.  RESPIRATORY: CTAB. No wheezes, rales, or rhonchi. No accessory muscle use. No apparent respiratory distress.  CARDIOVASCULAR: +S1/S2. No audible S3/S4. Regular rate and rhythm. No murmurs, rubs, or gallops.   GASTROINTESTINAL: Soft, nontender, nondistended. +BS. No rebound or guarding. Cholecystostomy tube noted on R side, approximately 100 cc brown fluid in bag.  EXTREMITY: No LE swelling or edema. EXTs warm to touch.  MUSCULOSKELETAL: Spontaneous movement in all extremities.  DERMATOLOGICAL: No abnormal rashes or lesions.  NEUROLOGICAL: No focal deficits. A&Ox3 (oriented to person, place, and time).    LABS:                          11.1   3.76  )-----------( 489      ( 31 Mar 2021 07:08 )             35.2         140  |  104  |  13  ----------------------------<  85  4.1   |  24  |  0.74    Ca    9.1      31 Mar 2021 07:07  Phos  3.5       Mg     2.2         TPro  6.4  /  Alb  3.4  /  TBili  0.2  /  DBili  x   /  AST  60<H>  /  ALT  61<H>  /  AlkPhos  75      LIVER FUNCTIONS - ( 31 Mar 2021 07:07 )  Alb: 3.4 g/dL / Pro: 6.4 g/dL / ALK PHOS: 75 U/L / ALT: 61 U/L / AST: 60 U/L / GGT: x                       TELEMETRY:     EKG:     IMAGING:

## 2021-03-31 NOTE — PROGRESS NOTE ADULT - PROBLEM SELECTOR PLAN 1
- s/p cholecystostomy tube 3/25 with noted improvement of symptoms  - Now advanced to solid diet (3/27)  - s/p meropenem in ER (3/24). c/w cefepime/flagyl (3/24 - ), transition to PO cefpodoxime and flagyl (3/29-)  -d/c 3/29 with VNS, f/u IR 4-6 weeks for drain removal/monitoring, elective víctor outpatient per surgery   - uptrending AST/ALT, will obtain MRCP to r/o bile duct obstruction, GI consulted however state there is nothing to add at this time, advised to reconsult pending MRCP result   - Supportive measures as needed  - Will continue to monitor

## 2021-03-31 NOTE — PROGRESS NOTE ADULT - PROBLEM SELECTOR PLAN 6
Transition of Care Status:  1. Name of PCP:  2. PCP contacted on admission: [  ] Y     [  ] N  3. PCP contacted at discharge: [  ] Y     [  ] N  4. Post-discharge appointment date and location:  5. Summary of handoff given to PCP:    3/30: Updated patient's daughter in law on the phone, aware of MRCP today, will not be discharged today. Transition of Care Status:  1. Name of PCP:  2. PCP contacted on admission: [  ] Y     [  ] N  3. PCP contacted at discharge: [  ] Y     [  ] N  4. Post-discharge appointment date and location:  5. Summary of handoff given to PCP:    3/31: Updated patient's sister at bedside.

## 2021-03-31 NOTE — PROGRESS NOTE BEHAVIORAL HEALTH - NSBHFUPINTERVALHXFT_PSY_A_CORE
PT is happy that she slept last night since she had been distressed about poor sleep for days.  She is hoping to be discharged home soon but says that she still needs an MRI and may not be able to go until tomorrow. She has no SI/and is hopeful about the future.

## 2021-03-31 NOTE — PROGRESS NOTE ADULT - ATTENDING COMMENTS
Cholecystitis with sepsis s/p percutaneous cholecystostomy tube placement by IR  Cont cefpodoxime and flagyl PO  Mild transaminitis- slightly improved today- await MRCP   Malignant neoplasm of right female breast- oncology eval appreciated- cont anastrazole; next cycle Ibrance pending cell counts  Hyperlipidemia- cont statin for now, may need to hold if transaminitis worsens  Other unspecified depression- cont Wellbutrin  Plan for home with home PT at discharge- today/tomorrow pending MRCP results

## 2021-03-31 NOTE — PROGRESS NOTE BEHAVIORAL HEALTH - CASE SUMMARY
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 Saleem Combs and is on Wellbutrin, no h/o SA/SIB, no h/o inpt psychiatric hospitalizations, no substance abuse, w/ PMHx of HLD, recurrent breast ca on inbrance/anastrazole presents w/ RUQ abdominal pain and weakness being managed conservatively, however w/ worsening exam and VSS, s/p cholecystostomy.  Psychiatry consulted to assess for management of insomnia.  Patient seen and evaluated, awake and alert, oriented x 3, reports has been having poor sleep in the hospital, states intermittently has poor sleep at home but says this is much worse here in the hospital.  She denies SI/HI, AVH, or thoughts of paranoia, she denies worsening mood symptoms, states depression has been stable recently.  She is hopeful that her condition will improve, and states she is looking forward to going back home.  At this time, agree with starting Remeron 7.5mg po qHS for mood/insomnia. May continue Wellbutrin in the AM since this can be activating.

## 2021-03-31 NOTE — PROGRESS NOTE BEHAVIORAL HEALTH - SUMMARY
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 Saleem Combs and is on Wellbutrin, no h/o SA/SIB, no h/o inpt psychiatric hospitalizations, no substance abuse, w/ PMHx of HLD, recurrent breast ca on inbrance/anastrazole presents w/ RUQ abdominal pain and weakness being managed conservatively, however w/ worsening exam and VSS, s/p cholecystostomy.  Psychiatry consulted to assess for management of insomnia.  Patient seen and evaluated, awake and alert, oriented x 3, reports has been having poor sleep in the hospital, states intermittently has poor sleep at home but is able to drink chamomile team and is able to fall asleep.  She denies SI/HI, AVH, or thoughts of paranoia, she denies worsening mood symptoms, states depression has been stable recently.  She reports she is hopeful that her condition will improve, state is looking forward to going back home.  At this time, would consider starting Remeron 7.5mg po qHS for mood/insomnia.  Can c/w current dose of Wellbutrin as there are no contraindications to use Wellbutrin + Remeron.

## 2021-03-31 NOTE — PROGRESS NOTE ADULT - SUBJECTIVE AND OBJECTIVE BOX
Interventional Radiology Follow-Up Note    This is a 86y Female s/p perc víctor on 3/25 in Interventional Radiology.     S: Patient seen and examined @ bedside. No complaints offered.     Medication:     cefpodoxime: (03-31)  enoxaparin Injectable: (03-30)  metroNIDAZOLE    Tablet: (03-31)    Vitals:   T(F): 98.1, Max: 98.5 (13:23)  HR: 64  BP: 144/60  RR: 18  SpO2: 99%    Physical Exam:  General: Nontoxic, in NAD,  Abdomen: soft, NTND, no peritoneal signs.  Drain Device: Drain intact attached to gravity drain bag. Draining bilious output. Dressing clean, dry, intact.   24hr Drain output: 225cc.    LABS:  WBC 3.76 / Hgb 11.1 / Hct 35.2 / Plt 489  Na -- / K -- / CO2 -- / Cl -- / BUN -- / Cr -- / Glucose --  ALT -- / AST -- / Alk Phos -- / Tbili --  Ptt -- / Pt -- / INR --      Assessment/Plan:  86y Female admitted with Cholecystitis s/p cholecystostomy drain.    -continue global management per primary team  -monitor h/h; transfuse as needed  -trend vs/labs  -flush drain with 5cc NS daily forward only; DO NOT aspirate  -change dressing q3 days or when dressing is saturated  -  The pt should follow up with surgery to determine surgical candidacy when stable. Pt should follow up with IR for cholecystostomy tube check in 4-6 weeks after placement, if planning for surgery.  Otherwise follow up every 3 months for routine exchange.      Please call IR at extension [phone] with any questions, concerns, or issues regarding above.       Interventional Radiology Follow-Up Note    This is a 86y Female s/p perc víctor on 3/25 in Interventional Radiology.     S: Patient seen and examined @ bedside. No complaints offered.     Medication:     cefpodoxime: (03-31)  enoxaparin Injectable: (03-30)  metroNIDAZOLE    Tablet: (03-31)    Vitals:   T(F): 98.1, Max: 98.5 (13:23)  HR: 64  BP: 144/60  RR: 18  SpO2: 99%    Physical Exam:  General: Nontoxic, in NAD,  Abdomen: soft, NTND, no peritoneal signs.  Drain Device: Drain intact attached to gravity drain bag. Draining bilious output. Dressing clean, dry, intact.   24hr Drain output: 225cc.    LABS:  WBC 3.76 / Hgb 11.1 / Hct 35.2 / Plt 489  Na -- / K -- / CO2 -- / Cl -- / BUN -- / Cr -- / Glucose --  ALT -- / AST -- / Alk Phos -- / Tbili --  Ptt -- / Pt -- / INR --      Assessment/Plan:  86y Female admitted with Cholecystitis s/p cholecystostomy drain.    - up trending LFTS, MRCP ordered.   -continue global management per primary team  -monitor h/h; transfuse as needed  -trend vs/labs  -flush drain with 5cc NS daily forward only; DO NOT aspirate  -change dressing q3 days or when dressing is saturated  -  The pt should follow up with surgery to determine surgical candidacy when stable. Pt should follow up with IR for cholecystostomy tube check in 4-6 weeks after placement, if planning for surgery.  Otherwise follow up every 3 months for routine exchange.      Please call IR at extension [phone] with any questions, concerns, or issues regarding above.

## 2021-03-31 NOTE — PROGRESS NOTE BEHAVIORAL HEALTH - NSBHCHARTREVIEWVS_PSY_A_CORE FT
Vital Signs Last 24 Hrs  T(C): 36.4 (31 Mar 2021 13:26), Max: 36.8 (30 Mar 2021 20:59)  T(F): 97.5 (31 Mar 2021 13:26), Max: 98.2 (30 Mar 2021 20:59)  HR: 64 (31 Mar 2021 13:26) (64 - 80)  BP: 112/58 (31 Mar 2021 13:26) (107/63 - 144/60)  BP(mean): --  RR: 18 (31 Mar 2021 13:26) (18 - 20)  SpO2: 95% (31 Mar 2021 13:26) (95% - 99%)

## 2021-04-01 ENCOUNTER — TRANSCRIPTION ENCOUNTER (OUTPATIENT)
Age: 86
End: 2021-04-01

## 2021-04-01 VITALS
TEMPERATURE: 98 F | RESPIRATION RATE: 18 BRPM | HEART RATE: 68 BPM | OXYGEN SATURATION: 95 % | SYSTOLIC BLOOD PRESSURE: 103 MMHG | DIASTOLIC BLOOD PRESSURE: 58 MMHG

## 2021-04-01 LAB
APTT BLD: 33.6 SEC — SIGNIFICANT CHANGE UP (ref 27.5–35.5)
INR BLD: 1.02 RATIO — SIGNIFICANT CHANGE UP (ref 0.88–1.16)
PROTHROM AB SERPL-ACNC: 12.2 SEC — SIGNIFICANT CHANGE UP (ref 10.6–13.6)

## 2021-04-01 PROCEDURE — 86850 RBC ANTIBODY SCREEN: CPT

## 2021-04-01 PROCEDURE — 82330 ASSAY OF CALCIUM: CPT

## 2021-04-01 PROCEDURE — 87075 CULTR BACTERIA EXCEPT BLOOD: CPT

## 2021-04-01 PROCEDURE — 97161 PT EVAL LOW COMPLEX 20 MIN: CPT

## 2021-04-01 PROCEDURE — 99239 HOSP IP/OBS DSCHRG MGMT >30: CPT

## 2021-04-01 PROCEDURE — 84295 ASSAY OF SERUM SODIUM: CPT

## 2021-04-01 PROCEDURE — U0003: CPT

## 2021-04-01 PROCEDURE — 83605 ASSAY OF LACTIC ACID: CPT

## 2021-04-01 PROCEDURE — 86769 SARS-COV-2 COVID-19 ANTIBODY: CPT

## 2021-04-01 PROCEDURE — 71045 X-RAY EXAM CHEST 1 VIEW: CPT

## 2021-04-01 PROCEDURE — 80048 BASIC METABOLIC PNL TOTAL CA: CPT

## 2021-04-01 PROCEDURE — 84100 ASSAY OF PHOSPHORUS: CPT

## 2021-04-01 PROCEDURE — 76705 ECHO EXAM OF ABDOMEN: CPT

## 2021-04-01 PROCEDURE — 87086 URINE CULTURE/COLONY COUNT: CPT

## 2021-04-01 PROCEDURE — 97116 GAIT TRAINING THERAPY: CPT

## 2021-04-01 PROCEDURE — 83735 ASSAY OF MAGNESIUM: CPT

## 2021-04-01 PROCEDURE — 84145 PROCALCITONIN (PCT): CPT

## 2021-04-01 PROCEDURE — 85018 HEMOGLOBIN: CPT

## 2021-04-01 PROCEDURE — 87205 SMEAR GRAM STAIN: CPT

## 2021-04-01 PROCEDURE — 86900 BLOOD TYPING SEROLOGIC ABO: CPT

## 2021-04-01 PROCEDURE — C1729: CPT

## 2021-04-01 PROCEDURE — 84484 ASSAY OF TROPONIN QUANT: CPT

## 2021-04-01 PROCEDURE — 93005 ELECTROCARDIOGRAM TRACING: CPT

## 2021-04-01 PROCEDURE — 84132 ASSAY OF SERUM POTASSIUM: CPT

## 2021-04-01 PROCEDURE — 85610 PROTHROMBIN TIME: CPT

## 2021-04-01 PROCEDURE — 85014 HEMATOCRIT: CPT

## 2021-04-01 PROCEDURE — A9585: CPT

## 2021-04-01 PROCEDURE — 82435 ASSAY OF BLOOD CHLORIDE: CPT

## 2021-04-01 PROCEDURE — 87077 CULTURE AEROBIC IDENTIFY: CPT

## 2021-04-01 PROCEDURE — 99284 EMERGENCY DEPT VISIT MOD MDM: CPT | Mod: 25

## 2021-04-01 PROCEDURE — 85027 COMPLETE CBC AUTOMATED: CPT

## 2021-04-01 PROCEDURE — 47490 INCISION OF GALLBLADDER: CPT

## 2021-04-01 PROCEDURE — 85730 THROMBOPLASTIN TIME PARTIAL: CPT

## 2021-04-01 PROCEDURE — 83690 ASSAY OF LIPASE: CPT

## 2021-04-01 PROCEDURE — 85025 COMPLETE CBC W/AUTO DIFF WBC: CPT

## 2021-04-01 PROCEDURE — 96375 TX/PRO/DX INJ NEW DRUG ADDON: CPT

## 2021-04-01 PROCEDURE — C1769: CPT

## 2021-04-01 PROCEDURE — 87040 BLOOD CULTURE FOR BACTERIA: CPT

## 2021-04-01 PROCEDURE — 99231 SBSQ HOSP IP/OBS SF/LOW 25: CPT

## 2021-04-01 PROCEDURE — 74177 CT ABD & PELVIS W/CONTRAST: CPT

## 2021-04-01 PROCEDURE — 80053 COMPREHEN METABOLIC PANEL: CPT

## 2021-04-01 PROCEDURE — 96374 THER/PROPH/DIAG INJ IV PUSH: CPT | Mod: XU

## 2021-04-01 PROCEDURE — U0005: CPT

## 2021-04-01 PROCEDURE — 93970 EXTREMITY STUDY: CPT

## 2021-04-01 PROCEDURE — 87070 CULTURE OTHR SPECIMN AEROBIC: CPT

## 2021-04-01 PROCEDURE — 74183 MRI ABD W/O CNTR FLWD CNTR: CPT

## 2021-04-01 PROCEDURE — 86901 BLOOD TYPING SEROLOGIC RH(D): CPT

## 2021-04-01 PROCEDURE — 87186 SC STD MICRODIL/AGAR DIL: CPT

## 2021-04-01 PROCEDURE — 81001 URINALYSIS AUTO W/SCOPE: CPT

## 2021-04-01 PROCEDURE — 97530 THERAPEUTIC ACTIVITIES: CPT

## 2021-04-01 PROCEDURE — 82803 BLOOD GASES ANY COMBINATION: CPT

## 2021-04-01 PROCEDURE — 82947 ASSAY GLUCOSE BLOOD QUANT: CPT

## 2021-04-01 RX ORDER — CEFPODOXIME PROXETIL 100 MG
1 TABLET ORAL
Qty: 6 | Refills: 0
Start: 2021-04-01 | End: 2021-04-03

## 2021-04-01 RX ORDER — METRONIDAZOLE 500 MG
1 TABLET ORAL
Qty: 9 | Refills: 0
Start: 2021-04-01 | End: 2021-04-03

## 2021-04-01 RX ADMIN — Medication 200 MILLIGRAM(S): at 05:54

## 2021-04-01 RX ADMIN — Medication 1 TABLET(S): at 13:02

## 2021-04-01 RX ADMIN — Medication 500 MILLIGRAM(S): at 05:54

## 2021-04-01 RX ADMIN — POLYETHYLENE GLYCOL 3350 17 GRAM(S): 17 POWDER, FOR SOLUTION ORAL at 13:02

## 2021-04-01 RX ADMIN — BUPROPION HYDROCHLORIDE 300 MILLIGRAM(S): 150 TABLET, EXTENDED RELEASE ORAL at 13:02

## 2021-04-01 RX ADMIN — Medication 500 MILLIGRAM(S): at 13:02

## 2021-04-01 RX ADMIN — ANASTROZOLE 1 MILLIGRAM(S): 1 TABLET ORAL at 13:02

## 2021-04-01 NOTE — PROGRESS NOTE ADULT - PROBLEM SELECTOR PROBLEM 2
Malignant neoplasm of right female breast, unspecified estrogen receptor status, unspecified site of breast

## 2021-04-01 NOTE — DISCHARGE NOTE NURSING/CASE MANAGEMENT/SOCIAL WORK - PATIENT PORTAL LINK FT
You can access the FollowMyHealth Patient Portal offered by [facility] by registering at the following website: [email or web address]. By joining [email or web address] FollowMyHealth portal, you will also be able to view your health information using other applications (apps) compatible with our system.

## 2021-04-01 NOTE — PROGRESS NOTE ADULT - REASON FOR ADMISSION
acute cholecystitis

## 2021-04-01 NOTE — DISCHARGE NOTE NURSING/CASE MANAGEMENT/SOCIAL WORK - NSDCFUADDAPPT_GEN_ALL_CORE_FT
Call IR booking office at [phone] for an appointment 4-6 weeks after discharge for drain removal/tube evaluation.

## 2021-04-01 NOTE — PROGRESS NOTE ADULT - PROBLEM SELECTOR PLAN 6
Transition of Care Status:  1. Name of PCP:  2. PCP contacted on admission: [  ] Y     [  ] N  3. PCP contacted at discharge: [  ] Y     [  ] N  4. Post-discharge appointment date and location:  5. Summary of handoff given to PCP:    3/31: Updated patient's sister at bedside.

## 2021-04-01 NOTE — PROGRESS NOTE ADULT - PROVIDER SPECIALTY LIST ADULT
Intervent Radiology
Surgery
Surgery
Intervent Radiology
Internal Medicine

## 2021-04-01 NOTE — PROGRESS NOTE ADULT - PROBLEM SELECTOR PROBLEM 1
Cholecystitis

## 2021-04-01 NOTE — PROGRESS NOTE ADULT - SUBJECTIVE AND OBJECTIVE BOX
Authored by Elina Martin MD, PGY1  PATIENT:  LAUREN JENKINS  [id number]    CHIEF COMPLAINT:  Patient is a 86y old  Female who presents with a chief complaint of acute cholecystitis (31 Mar 2021 10:46)      INTERVAL HISTORY OVERNIGHT EVENTS: HENRRY overnight. Patient underwent MRCP. This AM:        MEDICATIONS:  MEDICATIONS  (STANDING):  anastrozole 1 milliGRAM(s) Oral daily  buPROPion XL . 300 milliGRAM(s) Oral daily  cefpodoxime 200 milliGRAM(s) Oral every 12 hours  enoxaparin Injectable 40 milliGRAM(s) SubCutaneous daily  lactobacillus acidophilus 1 Tablet(s) Oral daily  melatonin 3 milliGRAM(s) Oral at bedtime  metroNIDAZOLE    Tablet 500 milliGRAM(s) Oral every 8 hours  mirtazapine 7.5 milliGRAM(s) Oral at bedtime  polyethylene glycol 3350 17 Gram(s) Oral every 12 hours  senna 2 Tablet(s) Oral at bedtime  simvastatin 20 milliGRAM(s) Oral at bedtime    MEDICATIONS  (PRN):  acetaminophen   Tablet .. 650 milliGRAM(s) Oral every 6 hours PRN Temp greater or equal to 38C (100.4F), Mild Pain (1 - 3), Moderate Pain (4 - 6), Severe Pain (7 - 10)  ondansetron Injectable 4 milliGRAM(s) IV Push every 8 hours PRN Nausea and/or Vomiting  ondansetron Injectable 4 milliGRAM(s) IV Push once PRN Nausea and/or Vomiting      ALLERGIES:  Allergies    penicillin (Unknown)    Intolerances        OBJECTIVE:  ICU Vital Signs Last 24 Hrs  T(C): 36.8 (2021 05:35), Max: 36.8 (2021 05:35)  T(F): 98.2 (2021 05:35), Max: 98.2 (2021 05:35)  HR: 76 (2021 05:35) (64 - 78)  BP: 114/68 (2021 05:35) (112/58 - 116/70)  BP(mean): --  ABP: --  ABP(mean): --  RR: 18 (2021 05:35) (18 - 18)  SpO2: 96% (2021 05:35) (94% - 96%)              I&O's Summary    31 Mar 2021 07:01  -  2021 07:00  --------------------------------------------------------  IN: 240 mL / OUT: 150 mL / NET: 90 mL      Daily     Daily Weight in k.9 (31 Mar 2021 09:59)    PHYSICAL EXAMINATION:  CONSTITUTIONAL: No acute distress. Awake and alert.  EYES: No scleral icterus. No conjunctival injection.  RESPIRATORY: CTAB. No wheezes, rales, or rhonchi. No accessory muscle use. No apparent respiratory distress.  CARDIOVASCULAR: +S1/S2. No audible S3/S4. Regular rate and rhythm. No murmurs, rubs, or gallops.   GASTROINTESTINAL: Soft, nontender, nondistended. +BS. No rebound or guarding. Cholecystostomy tube noted on R side, approximately 100 cc brown fluid in bag.  EXTREMITY: No LE swelling or edema. EXTs warm to touch.  MUSCULOSKELETAL: Spontaneous movement in all extremities.  DERMATOLOGICAL: No abnormal rashes or lesions.  NEUROLOGICAL: No focal deficits. A&Ox3 (oriented to person, place, and time).    LABS:                          11.1   3.76  )-----------( 489      ( 31 Mar 2021 07:08 )             35.2         140  |  104  |  13  ----------------------------<  85  4.1   |  24  |  0.74    Ca    9.1      31 Mar 2021 07:07  Phos  3.5       Mg     2.2         TPro  6.4  /  Alb  3.4  /  TBili  0.2  /  DBili  x   /  AST  60<H>  /  ALT  61<H>  /  AlkPhos  75      LIVER FUNCTIONS - ( 31 Mar 2021 07:07 )  Alb: 3.4 g/dL / Pro: 6.4 g/dL / ALK PHOS: 75 U/L / ALT: 61 U/L / AST: 60 U/L / GGT: x                       TELEMETRY:     EKG:     IMAGING:       Authored by Elina Martin MD, PGY1  PATIENT:  LAUREN JENKINS  [id number]    CHIEF COMPLAINT:  Patient is a 86y old  Female who presents with a chief complaint of acute cholecystitis (31 Mar 2021 10:46)      INTERVAL HISTORY OVERNIGHT EVENTS: HENRRY overnight. Patient underwent MRCP. This AM, patient has no acute complaints, eager to go home. Denies abdominal pain, n/v/d/c.         MEDICATIONS:  MEDICATIONS  (STANDING):  anastrozole 1 milliGRAM(s) Oral daily  buPROPion XL . 300 milliGRAM(s) Oral daily  cefpodoxime 200 milliGRAM(s) Oral every 12 hours  enoxaparin Injectable 40 milliGRAM(s) SubCutaneous daily  lactobacillus acidophilus 1 Tablet(s) Oral daily  melatonin 3 milliGRAM(s) Oral at bedtime  metroNIDAZOLE    Tablet 500 milliGRAM(s) Oral every 8 hours  mirtazapine 7.5 milliGRAM(s) Oral at bedtime  polyethylene glycol 3350 17 Gram(s) Oral every 12 hours  senna 2 Tablet(s) Oral at bedtime  simvastatin 20 milliGRAM(s) Oral at bedtime    MEDICATIONS  (PRN):  acetaminophen   Tablet .. 650 milliGRAM(s) Oral every 6 hours PRN Temp greater or equal to 38C (100.4F), Mild Pain (1 - 3), Moderate Pain (4 - 6), Severe Pain (7 - 10)  ondansetron Injectable 4 milliGRAM(s) IV Push every 8 hours PRN Nausea and/or Vomiting  ondansetron Injectable 4 milliGRAM(s) IV Push once PRN Nausea and/or Vomiting      ALLERGIES:  Allergies    penicillin (Unknown)    Intolerances        OBJECTIVE:  ICU Vital Signs Last 24 Hrs  T(C): 36.8 (2021 05:35), Max: 36.8 (2021 05:35)  T(F): 98.2 (2021 05:35), Max: 98.2 (2021 05:35)  HR: 76 (2021 05:35) (64 - 78)  BP: 114/68 (2021 05:35) (112/58 - 116/70)  BP(mean): --  ABP: --  ABP(mean): --  RR: 18 (2021 05:35) (18 - 18)  SpO2: 96% (2021 05:35) (94% - 96%)              I&O's Summary    31 Mar 2021 07:01  -  2021 07:00  --------------------------------------------------------  IN: 240 mL / OUT: 150 mL / NET: 90 mL      Daily     Daily Weight in k.9 (31 Mar 2021 09:59)    PHYSICAL EXAMINATION:  CONSTITUTIONAL: No acute distress. Awake and alert.  EYES: No scleral icterus. No conjunctival injection.  RESPIRATORY: CTAB. No wheezes, rales, or rhonchi. No accessory muscle use. No apparent respiratory distress.  CARDIOVASCULAR: +S1/S2. No audible S3/S4. Regular rate and rhythm. No murmurs, rubs, or gallops.   GASTROINTESTINAL: Soft, nontender, nondistended. +BS. No rebound or guarding. Cholecystostomy tube noted on R side, approximately 100 cc brown fluid in bag.  EXTREMITY: No LE swelling or edema. EXTs warm to touch.  MUSCULOSKELETAL: Spontaneous movement in all extremities.  DERMATOLOGICAL: No abnormal rashes or lesions.  NEUROLOGICAL: No focal deficits. A&Ox3 (oriented to person, place, and time).    LABS:                          11.1   3.76  )-----------( 489      ( 31 Mar 2021 07:08 )             35.2     -    140  |  104  |  13  ----------------------------<  85  4.1   |  24  |  0.74    Ca    9.1      31 Mar 2021 07:07  Phos  3.5     03-  Mg     2.2     -    TPro  6.4  /  Alb  3.4  /  TBili  0.2  /  DBili  x   /  AST  60<H>  /  ALT  61<H>  /  AlkPhos  75  03-    LIVER FUNCTIONS - ( 31 Mar 2021 07:07 )  Alb: 3.4 g/dL / Pro: 6.4 g/dL / ALK PHOS: 75 U/L / ALT: 61 U/L / AST: 60 U/L / GGT: x                       TELEMETRY:     EKG:     IMAGING:

## 2021-04-01 NOTE — PROGRESS NOTE ADULT - ATTENDING COMMENTS
Cholecystitis with sepsis s/p percutaneous cholecystostomy tube placement by IR  Cont cefpodoxime and flagyl PO to complete 10 days  Transaminitis improving, MRCP without ductal obstruction or dilatation   Malignant neoplasm of right female breast- cont anastrazole; next cycle Ibrance pending cell counts  Hyperlipidemia- cont statin   Other unspecified depression- cont Wellbutrin  D/C home with home PT and visiting nurse services today- 50 minutes spent discharging the patient

## 2021-04-01 NOTE — PROGRESS NOTE ADULT - ASSESSMENT
86F w/ PMHx of HLD, depression, recurrent br ca on inbrance/anastrazole presents w/ RUQ abdominal pain and weakness being managed conservatively, however w/ worsening exam and VSS, s/p percutaneous cholecystostomy drain.

## 2021-04-01 NOTE — PROGRESS NOTE ADULT - PROBLEM SELECTOR PROBLEM 4
Depression, unspecified depression type

## 2021-04-01 NOTE — PROGRESS NOTE ADULT - SUBJECTIVE AND OBJECTIVE BOX
Interventional Radiology Follow-Up Note    This is a 86y Female s/p perc víctor on 3/25 in Interventional Radiology.   Denies abdominal pain/n/v.     Medication:     cefpodoxime: (04-01)  enoxaparin Injectable: (03-31)  metroNIDAZOLE    Tablet: (04-01)    Vitals:   T(F): 98.2, Max: 98.2 (05:35)  HR: 76  BP: 114/68  RR: 18  SpO2: 96%    Physical Exam:  General: Nontoxic, in NAD,  Abdomen: soft, NTND, no peritoneal signs.  Drain Device: Drain intact attached to gravity drain bag. Dressing clean, dry, intact.   24hr Drain output: 150cc.    LABS:  WBC -- / Hgb -- / Hct -- / Plt --  Na -- / K -- / CO2 -- / Cl -- / BUN -- / Cr -- / Glucose --  ALT -- / AST -- / Alk Phos -- / Tbili --  Ptt 33.6 / Pt 12.2 / INR 1.02      Assessment/Plan:  86y Female admitted with Cholecystitis s/p cholecystostomy drain.     - AST/ ALT improving.  - MRCP result pending.   -continue global management per primary team  -monitor h/h; transfuse as needed  -trend vs/labs  -flush drain with 5cc NS daily forward only; DO NOT aspirate  -change dressing q3 days or when dressing is saturated  - The pt should follow up with surgery to determine surgical candidacy when stable. Pt should follow up with IR for cholecystostomy tube check in 4-6 weeks after placement, if planning for surgery.  Otherwise follow up every 3 months for routine exchange.      Please call IR at extension [phone] with any questions, concerns, or issues regarding above.

## 2021-04-02 ENCOUNTER — NON-APPOINTMENT (OUTPATIENT)
Age: 86
End: 2021-04-02

## 2021-04-02 DIAGNOSIS — M16.12 UNILATERAL PRIMARY OSTEOARTHRITIS, LEFT HIP: ICD-10-CM

## 2021-04-02 RX ORDER — MUPIROCIN 20 MG/G
2 OINTMENT TOPICAL
Qty: 1 | Refills: 2 | Status: COMPLETED | COMMUNITY
Start: 2020-03-17 | End: 2021-04-02

## 2021-04-02 RX ORDER — BUPROPION HYDROCHLORIDE 300 MG/1
300 TABLET, EXTENDED RELEASE ORAL DAILY
Qty: 90 | Refills: 3 | Status: ACTIVE | COMMUNITY

## 2021-04-02 RX ORDER — CEPHALEXIN 250 MG/1
250 CAPSULE ORAL 3 TIMES DAILY
Qty: 21 | Refills: 0 | Status: COMPLETED | COMMUNITY
Start: 2020-03-17 | End: 2021-04-02

## 2021-04-05 PROBLEM — M16.12 ARTHRITIS OF LEFT HIP: Status: ACTIVE | Noted: 2018-12-05

## 2021-04-19 ENCOUNTER — APPOINTMENT (OUTPATIENT)
Dept: SURGERY | Facility: CLINIC | Age: 86
End: 2021-04-19
Payer: MEDICARE

## 2021-04-19 VITALS
HEIGHT: 60 IN | TEMPERATURE: 96.4 F | SYSTOLIC BLOOD PRESSURE: 125 MMHG | WEIGHT: 146 LBS | DIASTOLIC BLOOD PRESSURE: 73 MMHG | BODY MASS INDEX: 28.66 KG/M2 | HEART RATE: 82 BPM

## 2021-04-19 PROCEDURE — 99204 OFFICE O/P NEW MOD 45 MIN: CPT

## 2021-04-19 NOTE — ASSESSMENT
[FreeTextEntry1] : 87 yo female with a cholecystostomy tube s/p acute cholecystitis. \par \par will schedule out patient lap víctor in early May\par -oncology clearance to discuss ibrance  management.\par

## 2021-04-19 NOTE — PHYSICAL EXAM
[Respiratory Effort] : normal respiratory effort [Oriented to Person] : oriented to person [Alert] : alert [Oriented to Place] : oriented to place [Oriented to Time] : oriented to time [Calm] : calm [JVD] : no jugular venous distention  [Abdominal Masses] : No abdominal masses [Abdomen Tenderness] : ~T ~M No abdominal tenderness [de-identified] : VIRY MENJIVAR NAD [de-identified] : EOMI [de-identified] : soft NT ND + perc tube with bile drainage

## 2021-04-19 NOTE — HISTORY OF PRESENT ILLNESS
[de-identified] : 87 yo female with metastatic breast CA on Ibrance  and anastrozole was admitted to [facility] on 3/24 and discharged on 4/1  with acute cholecystitis s/p IR placement of cholecystostomy tube. She presents today after being referred by her PMD for consult to schedule elective cholecystectomy.

## 2021-04-19 NOTE — REASON FOR VISIT
[Post Hospitalization] : a post hospitalization visit [Family Member] : family member [FreeTextEntry1] : s/p cholecystostomy tube

## 2021-04-27 ENCOUNTER — OUTPATIENT (OUTPATIENT)
Dept: OUTPATIENT SERVICES | Facility: HOSPITAL | Age: 86
LOS: 1 days | End: 2021-04-27

## 2021-04-27 ENCOUNTER — OUTPATIENT (OUTPATIENT)
Dept: OUTPATIENT SERVICES | Facility: HOSPITAL | Age: 86
LOS: 1 days | End: 2021-04-27
Payer: MEDICARE

## 2021-04-27 VITALS
HEART RATE: 95 BPM | SYSTOLIC BLOOD PRESSURE: 130 MMHG | HEIGHT: 59 IN | RESPIRATION RATE: 16 BRPM | DIASTOLIC BLOOD PRESSURE: 88 MMHG | WEIGHT: 143.96 LBS | TEMPERATURE: 98 F | OXYGEN SATURATION: 98 %

## 2021-04-27 DIAGNOSIS — K80.20 CALCULUS OF GALLBLADDER WITHOUT CHOLECYSTITIS WITHOUT OBSTRUCTION: ICD-10-CM

## 2021-04-27 DIAGNOSIS — Z96.649 PRESENCE OF UNSPECIFIED ARTIFICIAL HIP JOINT: Chronic | ICD-10-CM

## 2021-04-27 DIAGNOSIS — Z98.89 OTHER SPECIFIED POSTPROCEDURAL STATES: Chronic | ICD-10-CM

## 2021-04-27 DIAGNOSIS — Z11.52 ENCOUNTER FOR SCREENING FOR COVID-19: ICD-10-CM

## 2021-04-27 DIAGNOSIS — Z98.890 OTHER SPECIFIED POSTPROCEDURAL STATES: Chronic | ICD-10-CM

## 2021-04-27 LAB
ALBUMIN SERPL ELPH-MCNC: 4.3 G/DL — SIGNIFICANT CHANGE UP (ref 3.3–5)
ALP SERPL-CCNC: 54 U/L — SIGNIFICANT CHANGE UP (ref 40–120)
ALT FLD-CCNC: 18 U/L — SIGNIFICANT CHANGE UP (ref 4–33)
ANION GAP SERPL CALC-SCNC: 11 MMOL/L — SIGNIFICANT CHANGE UP (ref 7–14)
AST SERPL-CCNC: 17 U/L — SIGNIFICANT CHANGE UP (ref 4–32)
BILIRUB SERPL-MCNC: 0.2 MG/DL — SIGNIFICANT CHANGE UP (ref 0.2–1.2)
BUN SERPL-MCNC: 13 MG/DL — SIGNIFICANT CHANGE UP (ref 7–23)
CALCIUM SERPL-MCNC: 9.6 MG/DL — SIGNIFICANT CHANGE UP (ref 8.4–10.5)
CHLORIDE SERPL-SCNC: 104 MMOL/L — SIGNIFICANT CHANGE UP (ref 98–107)
CO2 SERPL-SCNC: 26 MMOL/L — SIGNIFICANT CHANGE UP (ref 22–31)
CREAT SERPL-MCNC: 0.77 MG/DL — SIGNIFICANT CHANGE UP (ref 0.5–1.3)
GLUCOSE SERPL-MCNC: 106 MG/DL — HIGH (ref 70–99)
HCT VFR BLD CALC: 38.2 % — SIGNIFICANT CHANGE UP (ref 34.5–45)
HGB BLD-MCNC: 12.1 G/DL — SIGNIFICANT CHANGE UP (ref 11.5–15.5)
MCHC RBC-ENTMCNC: 28.5 PG — SIGNIFICANT CHANGE UP (ref 27–34)
MCHC RBC-ENTMCNC: 31.7 GM/DL — LOW (ref 32–36)
MCV RBC AUTO: 89.9 FL — SIGNIFICANT CHANGE UP (ref 80–100)
NRBC # BLD: 0 /100 WBCS — SIGNIFICANT CHANGE UP
NRBC # FLD: 0 K/UL — SIGNIFICANT CHANGE UP
PLATELET # BLD AUTO: 262 K/UL — SIGNIFICANT CHANGE UP (ref 150–400)
POTASSIUM SERPL-MCNC: 3.9 MMOL/L — SIGNIFICANT CHANGE UP (ref 3.5–5.3)
POTASSIUM SERPL-SCNC: 3.9 MMOL/L — SIGNIFICANT CHANGE UP (ref 3.5–5.3)
PROT SERPL-MCNC: 7.2 G/DL — SIGNIFICANT CHANGE UP (ref 6–8.3)
RBC # BLD: 4.25 M/UL — SIGNIFICANT CHANGE UP (ref 3.8–5.2)
RBC # FLD: 15.8 % — HIGH (ref 10.3–14.5)
SARS-COV-2 RNA SPEC QL NAA+PROBE: SIGNIFICANT CHANGE UP
SODIUM SERPL-SCNC: 141 MMOL/L — SIGNIFICANT CHANGE UP (ref 135–145)
WBC # BLD: 4.96 K/UL — SIGNIFICANT CHANGE UP (ref 3.8–10.5)
WBC # FLD AUTO: 4.96 K/UL — SIGNIFICANT CHANGE UP (ref 3.8–10.5)

## 2021-04-27 PROCEDURE — U0005: CPT

## 2021-04-27 PROCEDURE — U0003: CPT

## 2021-04-27 PROCEDURE — C9803: CPT

## 2021-04-27 RX ORDER — PALBOCICLIB 100 MG/1
1 CAPSULE ORAL
Qty: 0 | Refills: 0 | DISCHARGE

## 2021-04-27 NOTE — H&P PST ADULT - NSICDXPROBLEM_GEN_ALL_CORE_FT
PROBLEM DIAGNOSES  Problem: Cholelithiasis  Assessment and Plan: Pt. is scheduled for a laparoscopic cholecystectomy 5/10/21.  Pt. verbalized understanding of instructions and that Chlorhexidine is for external use.  Pt. instructed to call Surgeon's office to have COVID test scheduled.  Pt. instructed to obtain medical clearance.

## 2021-04-27 NOTE — H&P PST ADULT - NSICDXFAMILYHX_GEN_ALL_CORE_FT
FAMILY HISTORY:  FH: breast cancer, mother    Mother  Still living? No  FH: lung cancer, Age at diagnosis: Age Unknown

## 2021-04-27 NOTE — H&P PST ADULT - NSICDXPASTMEDICALHX_GEN_ALL_CORE_FT
PAST MEDICAL HISTORY:  2019 novel coronavirus disease (COVID-19) 1/7/2021    Anxiety     Breast cancer     Cholecystitis     Depression     Gallbladder stone without cholecystitis or obstruction     History of chemotherapy and radiation-1994    HLD (hyperlipidemia)     Hyperlipidemia     Recurrent breast cancer opposite side, left-3/2020    S/P chemotherapy, time since less than 4 weeks right side of nose-2020    UTI (lower urinary tract infection)      PAST MEDICAL HISTORY:  2019 novel coronavirus disease (COVID-19) 1/7/2021    Anxiety     Breast cancer     Cholecystitis     Depression     Gallbladder stone without cholecystitis or obstruction     History of chemotherapy and radiation-1994    HLD (hyperlipidemia)     Hyperlipidemia     Recurrent breast cancer opposite side, left-3/2020    S/P chemotherapy, time since less than 4 weeks     UTI (lower urinary tract infection)      PAST MEDICAL HISTORY:  2019 novel coronavirus disease (COVID-19) 1/7/2021    Anxiety     Breast cancer     Cholecystitis     Cholelithiasis     Depression     Gallbladder stone without cholecystitis or obstruction     History of chemotherapy and radiation-1994    HLD (hyperlipidemia)     Hyperlipidemia     Recurrent breast cancer opposite side, left-3/2020    S/P chemotherapy, time since less than 4 weeks     UTI (lower urinary tract infection)

## 2021-04-27 NOTE — H&P PST ADULT - NSICDXPASTSURGICALHX_GEN_ALL_CORE_FT
PAST SURGICAL HISTORY:  H/O lumpectomy left-11/1994    History of hip replacement left-1994    S/P Mohs surgery for basal cell carcinoma      PAST SURGICAL HISTORY:  H/O lumpectomy left-11/1994    History of hip replacement left-1994    S/P Mohs surgery for basal cell carcinoma right side of nose-2020

## 2021-04-29 ENCOUNTER — OUTPATIENT (OUTPATIENT)
Dept: OUTPATIENT SERVICES | Facility: HOSPITAL | Age: 86
LOS: 1 days | End: 2021-04-29
Payer: MEDICARE

## 2021-04-29 ENCOUNTER — RESULT REVIEW (OUTPATIENT)
Age: 86
End: 2021-04-29

## 2021-04-29 VITALS
RESPIRATION RATE: 18 BRPM | SYSTOLIC BLOOD PRESSURE: 132 MMHG | HEART RATE: 79 BPM | DIASTOLIC BLOOD PRESSURE: 46 MMHG | TEMPERATURE: 98 F | HEIGHT: 60 IN | WEIGHT: 145.06 LBS | OXYGEN SATURATION: 98 %

## 2021-04-29 DIAGNOSIS — Z98.890 OTHER SPECIFIED POSTPROCEDURAL STATES: Chronic | ICD-10-CM

## 2021-04-29 DIAGNOSIS — Z96.649 PRESENCE OF UNSPECIFIED ARTIFICIAL HIP JOINT: Chronic | ICD-10-CM

## 2021-04-29 DIAGNOSIS — Z98.89 OTHER SPECIFIED POSTPROCEDURAL STATES: Chronic | ICD-10-CM

## 2021-04-29 DIAGNOSIS — K81.0 ACUTE CHOLECYSTITIS: ICD-10-CM

## 2021-04-29 PROCEDURE — 47531 INJECTION FOR CHOLANGIOGRAM: CPT

## 2021-04-29 NOTE — PRE PROCEDURE NOTE - PRE PROCEDURE EVALUATION
Interventional Radiology    HPI: 86y Female with hx of breast CA, cholecystitis s/p cholecystostomy drain on 3/25/2021 planning for lap víctor on 5/10 here for cholecystostomy drain evaluation.    Allergies: penicillin (Hives)    Medications (Abx/Cardiac/Anticoagulation/Blood Products)      Data:  152.4  65.8  T(C): 36.6  HR: 79  BP: 132/46  RR: 18  SpO2: 98%    Acute cholecystitis    FH: breast cancer    FH: lung cancer (Mother)    Anxiety    Hyperlipidemia    UTI (lower urinary tract infection)    Depression    Breast cancer    HLD (hyperlipidemia)    Gallbladder stone without cholecystitis or obstruction    Cholecystitis    2019 novel coronavirus disease (COVID-19)    Recurrent breast cancer    History of chemotherapy    S/P chemotherapy, time since less than 4 weeks    Cholelithiasis    H/O lumpectomy    History of hip replacement    S/P Mohs surgery for basal cell carcinoma    SysAdmin_VstLnk        Exam  General: No acute distress  Chest: Non labored breathing    -WBC 4.96 / HgB 12.1 / Hct 38.2 / Plt 262  -Na 141 / Cl 104 / BUN 13 / Glucose 106  -K 3.9 / CO2 26 / Cr 0.77  -ALT 18 / Alk Phos 54 / T.Bili 0.2    Imaging:     Plan: 86y Female presents for Cholecystostomy drain evaluation.  -Risks/Benefits/alternatives explained with the patient and/or healthcare proxy and witnessed informed consent obtained.    Interventional Radiology    HPI: 86y Female with hx of breast CA, cholecystitis s/p cholecystostomy drain on 3/25/2021 planning for lap víctor on 5/10 here for cholecystostomy drain evaluation. Pt denies abdominal pain, n,v, fever.    Allergies: penicillin (Hives)    Medications (Abx/Cardiac/Anticoagulation/Blood Products)      Data:  152.4  65.8  T(C): 36.6  HR: 79  BP: 132/46  RR: 18  SpO2: 98%    Acute cholecystitis    FH: breast cancer    FH: lung cancer (Mother)    Anxiety    Hyperlipidemia    UTI (lower urinary tract infection)    Depression    Breast cancer    HLD (hyperlipidemia)    Gallbladder stone without cholecystitis or obstruction    Cholecystitis    2019 novel coronavirus disease (COVID-19)    Recurrent breast cancer    History of chemotherapy    S/P chemotherapy, time since less than 4 weeks    Cholelithiasis    H/O lumpectomy    History of hip replacement    S/P Mohs surgery for basal cell carcinoma    SysAdmin_VstLnk        Exam  General: No acute distress  Chest: Non labored breathing    -WBC 4.96 / HgB 12.1 / Hct 38.2 / Plt 262  -Na 141 / Cl 104 / BUN 13 / Glucose 106  -K 3.9 / CO2 26 / Cr 0.77  -ALT 18 / Alk Phos 54 / T.Bili 0.2    Imaging:     Plan: 86y Female presents for Cholecystostomy drain evaluation.  -Risks/Benefits/alternatives explained with the patient and/or healthcare proxy and witnessed informed consent obtained.

## 2021-04-29 NOTE — PROCEDURE NOTE - GENERAL PROCEDURE DETAILS
DRain evaluation demonstrated patent cystic, CBD. Contrast fills the small bowel. Drain reattached to drain bag.

## 2021-04-29 NOTE — ASU PATIENT PROFILE, ADULT - PMH
2019 novel coronavirus disease (COVID-19)  1/7/2021  Anxiety    Breast cancer    Cholecystitis    Cholelithiasis    Depression    Gallbladder stone without cholecystitis or obstruction    History of chemotherapy  and radiation-1994  HLD (hyperlipidemia)    Hyperlipidemia    Recurrent breast cancer  opposite side, left-3/2020  S/P chemotherapy, time since less than 4 weeks    UTI (lower urinary tract infection)

## 2021-04-29 NOTE — ASU PATIENT PROFILE, ADULT - PSH
H/O lumpectomy  left-11/1994  History of hip replacement  left-1994  S/P Mohs surgery for basal cell carcinoma  right side of nose-2020

## 2021-04-29 NOTE — ASU DISCHARGE PLAN (ADULT/PEDIATRIC) - ASU DC SPECIAL INSTRUCTIONSFT
Cholecystostomy Drain evaluation.    Discharge Instructions  - You have had cholecystostomy drain evaluation.  - Keep the area clean and dry.  - Do not soak in a tub or pool with the drain, however you may shower with the drain and dressing covered in plastic wrap.  - Do not put traction on the drain and be careful that the drain does not get accidentally dislodged or kinked.  - Record output daily from the drain. Empty the bag as needed.  - You may resume your normal diet.  - You may resume your normal medications.  - It is normal to experience some pain over the site for the next few days. You may take apply ice to the area (20 minutes on, 20 minutes off) and take Tylenol for that pain. Do not take more frequently than every 6 hours and do not exceed more than 3000mg of Tylenol in a 24 hour period.    Notify your primary physician and/or Interventional Radiology IMMEDIATELY if you experience any of the following       - Fever of 101F or 38C       - Chills or Rigors/ Shakes       - Swelling and/or Redness in the area of the puncture site       - Worsening Pain       - Blood soaked bandages or worsening bleeding       - Lightheadedness and/or dizziness upon standing       - Chest Pain/ Tightness       - Shortness of Breath       - Difficulty walking    If you have a problem that you believe requires IMMEDIATE attention, please go to your NEAREST Emergency Room. If you believe your problem can safely wait until you speak to a physician, please call Interventional Radiology for any concerns.    Please feel free to contact us at [phone] if any problems arise. After 6PM, Monday through Friday, on weekends and on holidays, please call [phone] and ask for the radiology resident on call to be paged.

## 2021-04-30 PROBLEM — C50.919 MALIGNANT NEOPLASM OF UNSPECIFIED SITE OF UNSPECIFIED FEMALE BREAST: Chronic | Status: ACTIVE | Noted: 2021-04-27

## 2021-04-30 PROBLEM — K81.9 CHOLECYSTITIS, UNSPECIFIED: Chronic | Status: ACTIVE | Noted: 2021-04-27

## 2021-04-30 PROBLEM — K80.20 CALCULUS OF GALLBLADDER WITHOUT CHOLECYSTITIS WITHOUT OBSTRUCTION: Chronic | Status: ACTIVE | Noted: 2021-04-27

## 2021-04-30 PROBLEM — Z92.21 PERSONAL HISTORY OF ANTINEOPLASTIC CHEMOTHERAPY: Chronic | Status: ACTIVE | Noted: 2021-04-27

## 2021-04-30 PROBLEM — U07.1 COVID-19: Chronic | Status: ACTIVE | Noted: 2021-04-27

## 2021-05-05 DIAGNOSIS — Z01.818 ENCOUNTER FOR OTHER PREPROCEDURAL EXAMINATION: ICD-10-CM

## 2021-05-06 ENCOUNTER — APPOINTMENT (OUTPATIENT)
Dept: INTERNAL MEDICINE | Facility: CLINIC | Age: 86
End: 2021-05-06
Payer: MEDICARE

## 2021-05-06 VITALS
SYSTOLIC BLOOD PRESSURE: 135 MMHG | WEIGHT: 144 LBS | OXYGEN SATURATION: 97 % | BODY MASS INDEX: 28.27 KG/M2 | HEIGHT: 60 IN | DIASTOLIC BLOOD PRESSURE: 70 MMHG | TEMPERATURE: 97.9 F | HEART RATE: 101 BPM

## 2021-05-06 DIAGNOSIS — K81.9 CHOLECYSTITIS, UNSPECIFIED: ICD-10-CM

## 2021-05-06 DIAGNOSIS — Z46.59 ENCOUNTER FOR FITTING AND ADJUSTMENT OF OTHER GASTROINTESTINAL APPLIANCE AND DEVICE: ICD-10-CM

## 2021-05-06 PROCEDURE — 99215 OFFICE O/P EST HI 40 MIN: CPT | Mod: 25

## 2021-05-06 PROCEDURE — 82272 OCCULT BLD FECES 1-3 TESTS: CPT

## 2021-05-06 RX ORDER — PALBOCICLIB 100 MG/1
100 TABLET, FILM COATED ORAL
Refills: 0 | Status: DISCONTINUED | COMMUNITY
Start: 2021-03-24 | End: 2021-05-06

## 2021-05-07 ENCOUNTER — APPOINTMENT (OUTPATIENT)
Dept: DISASTER EMERGENCY | Facility: CLINIC | Age: 86
End: 2021-05-07

## 2021-05-07 VITALS
HEIGHT: 59 IN | DIASTOLIC BLOOD PRESSURE: 50 MMHG | TEMPERATURE: 98 F | WEIGHT: 143.96 LBS | OXYGEN SATURATION: 97 % | SYSTOLIC BLOOD PRESSURE: 116 MMHG | HEART RATE: 68 BPM | RESPIRATION RATE: 16 BRPM

## 2021-05-08 LAB — SARS-COV-2 N GENE NPH QL NAA+PROBE: NOT DETECTED

## 2021-05-08 NOTE — HISTORY OF PRESENT ILLNESS
[Chronic Anticoagulation] : no chronic anticoagulation [Chronic Kidney Disease] : no chronic kidney disease [Diabetes] : no diabetes [FreeTextEntry1] : Cholecystectomy [FreeTextEntry4] : Patient is an 86-year-old female who is admitted to [facility] with an acute abdomen secondary to cholecystitis and underwent a cholecystotomy.  She did quite well following the procedure and now comes in for preop for cholecystectomy.  A form is filled out for the surgeon.  At the present time she is without chest pain palpitations swelling fainting or dyspnea.  She has no GI symptoms no  symptoms and no focalizing neurologic symptoms.  She is on a statin, will Bruton Remeron for sleep and Xanax. [FreeTextEntry7] : Normal EKG

## 2021-05-08 NOTE — PHYSICAL EXAM
[de-identified] : Breast is without masses/right breast with the active cancer is she has a scar at about 11:00 with a fullness above it mild retraction of the nipple and a small nodule at approximately 5:00 [de-identified] : Cystotomy tube but otherwise negative [FreeTextEntry1] : Without masses guaiac negative [de-identified] : Patient has a chronic left hip problem with her replacement

## 2021-05-08 NOTE — ASSESSMENT
[FreeTextEntry4] : Patient had preop testing work-up bloods and EKG were within normal limits.  She has been advised to hold Wellbutrin and Xanax on the day of surgery.  She can take her other medicines the night prior to surgery.  She is cleared for the proposed surgery.  She is not to take aspirin n saids otc's or vit for 1 week prior to the surgery

## 2021-05-09 ENCOUNTER — TRANSCRIPTION ENCOUNTER (OUTPATIENT)
Age: 86
End: 2021-05-09

## 2021-05-10 ENCOUNTER — OUTPATIENT (OUTPATIENT)
Dept: OUTPATIENT SERVICES | Facility: HOSPITAL | Age: 86
LOS: 1 days | Discharge: HOME | End: 2021-05-10
Payer: MEDICARE

## 2021-05-10 ENCOUNTER — RESULT REVIEW (OUTPATIENT)
Age: 86
End: 2021-05-10

## 2021-05-10 ENCOUNTER — APPOINTMENT (OUTPATIENT)
Dept: SURGERY | Facility: AMBULATORY SURGERY CENTER | Age: 86
End: 2021-05-10

## 2021-05-10 VITALS
OXYGEN SATURATION: 98 % | DIASTOLIC BLOOD PRESSURE: 70 MMHG | TEMPERATURE: 98 F | SYSTOLIC BLOOD PRESSURE: 118 MMHG | HEART RATE: 64 BPM

## 2021-05-10 DIAGNOSIS — Z98.890 OTHER SPECIFIED POSTPROCEDURAL STATES: Chronic | ICD-10-CM

## 2021-05-10 DIAGNOSIS — Z96.649 PRESENCE OF UNSPECIFIED ARTIFICIAL HIP JOINT: Chronic | ICD-10-CM

## 2021-05-10 DIAGNOSIS — Z98.89 OTHER SPECIFIED POSTPROCEDURAL STATES: Chronic | ICD-10-CM

## 2021-05-10 DIAGNOSIS — K80.20 CALCULUS OF GALLBLADDER WITHOUT CHOLECYSTITIS WITHOUT OBSTRUCTION: ICD-10-CM

## 2021-05-10 PROCEDURE — 47562 LAPAROSCOPIC CHOLECYSTECTOMY: CPT | Mod: AS

## 2021-05-10 PROCEDURE — 47562 LAPAROSCOPIC CHOLECYSTECTOMY: CPT

## 2021-05-10 PROCEDURE — 88304 TISSUE EXAM BY PATHOLOGIST: CPT | Mod: 26

## 2021-05-10 RX ORDER — SODIUM CHLORIDE 9 MG/ML
1000 INJECTION, SOLUTION INTRAVENOUS
Refills: 0 | Status: DISCONTINUED | OUTPATIENT
Start: 2021-05-10 | End: 2021-05-24

## 2021-05-10 NOTE — ASU DISCHARGE PLAN (ADULT/PEDIATRIC) - CARE PROVIDER_API CALL
Miah Billings)  ColonRectal Surgery; Surgery  [address] [place] [address]  Phone: [phone]  Fax: [phone]  Follow Up Time: 1 week

## 2021-05-10 NOTE — ASU PREOP CHECKLIST - HAND OFF
The staff and providers of Pain Management would like to THANK YOU!  Thank you for continuing to utilize the [facility] and [facility] as your healthcare provider.     Our Goal is to always provide you with the best of care and we continue to look for better ways to improve the service we provide you.     You may receive a survey in the mail with questions specific to your encounter with our clinic. Should you receive a survey, please take a few minutes to rate your experience with your visit. Our providers and staff value your opinions and insights.    Thank you again for your time and interest!     Jaci Peng PA-C    ________________________________________________________________    DICLOFENAC (VOLTAREN) GEL:  Apply 2-4 gm (about 2-3 inches) topically to the affected area up to 4 times daily as needed.  USE GUIDELINES:  4 gm to lower extremities, 2 gm to upper extremities  MAX 8 g/day to any single joint of upper extremity, 16 g/day to any single joint of lower extremity, 32 g/day total over all affected joints    When OKAY, try Diclofenac 50 mg twice daily as needed. STOP the naproxen.   DO NOT take with any other anti-inflammmatories (anything that says \"NSAID\", motrin, ibuprofen, aleve, naproxen, etc.)    
yes

## 2021-05-10 NOTE — ASU DISCHARGE PLAN (ADULT/PEDIATRIC) - ACTIVITY LEVEL
no strenuous activity x 1 week/No heavy lifting no strenuous activity x 1 week/No excercise/No heavy lifting

## 2021-05-17 ENCOUNTER — RX RENEWAL (OUTPATIENT)
Age: 86
End: 2021-05-17

## 2021-05-17 ENCOUNTER — APPOINTMENT (OUTPATIENT)
Dept: SURGERY | Facility: CLINIC | Age: 86
End: 2021-05-17
Payer: MEDICARE

## 2021-05-17 VITALS
RESPIRATION RATE: 14 BRPM | DIASTOLIC BLOOD PRESSURE: 70 MMHG | SYSTOLIC BLOOD PRESSURE: 123 MMHG | BODY MASS INDEX: 22.5 KG/M2 | TEMPERATURE: 97.4 F | HEART RATE: 89 BPM | WEIGHT: 140 LBS | HEIGHT: 66 IN

## 2021-05-17 DIAGNOSIS — Z09 ENCOUNTER FOR FOLLOW-UP EXAMINATION AFTER COMPLETED TREATMENT FOR CONDITIONS OTHER THAN MALIGNANT NEOPLASM: ICD-10-CM

## 2021-05-17 LAB — SURGICAL PATHOLOGY STUDY: SIGNIFICANT CHANGE UP

## 2021-05-17 PROCEDURE — 99024 POSTOP FOLLOW-UP VISIT: CPT

## 2021-05-17 NOTE — ASSESSMENT
[FreeTextEntry1] : Patient is doing well, states minimal periumbilical discomfort. As well as some numbness of her thumb and index finger on right hand. ? IV insertion.\par \par \par incisions are c/d/i and healing well. \par \par \par f/u prn

## 2021-07-22 ENCOUNTER — APPOINTMENT (OUTPATIENT)
Dept: UROGYNECOLOGY | Facility: CLINIC | Age: 86
End: 2021-07-22
Payer: MEDICARE

## 2021-07-22 VITALS
BODY MASS INDEX: 27.29 KG/M2 | WEIGHT: 139 LBS | DIASTOLIC BLOOD PRESSURE: 70 MMHG | SYSTOLIC BLOOD PRESSURE: 128 MMHG | TEMPERATURE: 98.7 F | HEIGHT: 60 IN

## 2021-07-22 DIAGNOSIS — R39.13 SPLITTING OF URINARY STREAM: ICD-10-CM

## 2021-07-22 DIAGNOSIS — N95.2 POSTMENOPAUSAL ATROPHIC VAGINITIS: ICD-10-CM

## 2021-07-22 DIAGNOSIS — R39.11 HESITANCY OF MICTURITION: ICD-10-CM

## 2021-07-22 LAB
BILIRUB UR QL STRIP: NORMAL
CLARITY UR: CLEAR
COLLECTION METHOD: NORMAL
GLUCOSE UR-MCNC: NORMAL
HCG UR QL: 0.2 EU/DL
HGB UR QL STRIP.AUTO: NORMAL
KETONES UR-MCNC: NORMAL
LEUKOCYTE ESTERASE UR QL STRIP: NORMAL
NITRITE UR QL STRIP: NORMAL
PH UR STRIP: 6
PROT UR STRIP-MCNC: NORMAL
SP GR UR STRIP: 1

## 2021-07-22 PROCEDURE — 51725 SIMPLE CYSTOMETROGRAM: CPT

## 2021-07-22 PROCEDURE — 99205 OFFICE O/P NEW HI 60 MIN: CPT | Mod: 25

## 2021-08-16 ENCOUNTER — NON-APPOINTMENT (OUTPATIENT)
Age: 86
End: 2021-08-16

## 2021-08-18 ENCOUNTER — APPOINTMENT (OUTPATIENT)
Dept: ORTHOPEDIC SURGERY | Facility: CLINIC | Age: 86
End: 2021-08-18
Payer: MEDICARE

## 2021-08-18 DIAGNOSIS — M16.12 UNILATERAL PRIMARY OSTEOARTHRITIS, LEFT HIP: ICD-10-CM

## 2021-08-18 PROCEDURE — 73502 X-RAY EXAM HIP UNI 2-3 VIEWS: CPT | Mod: LT

## 2021-08-18 PROCEDURE — 99214 OFFICE O/P EST MOD 30 MIN: CPT

## 2021-08-18 NOTE — HISTORY OF PRESENT ILLNESS
[de-identified] : 87-year-old female.  Patient underwent left hip hemiarthroplasty 1994 for fracture.  Uneventful recovery.  Has experienced sporadic symptoms and left thigh.  Recent onset of intermittent pain lateral left hip radiating into proximal anterolateral left thigh.  Symptoms provoked by walking.  Has medical history of carcinoma the breast undergoing medical treatment.  Ambulating independently

## 2021-08-18 NOTE — DISCUSSION/SUMMARY
[de-identified] : Impression painful left hip bipolar arthroplasty.  Differential diagnosis includes arthritis of acetabulum, rule out polyethylene wear with secondary osteolysis, rule out occult tumor\par Plan MRI left hip

## 2021-08-18 NOTE — PHYSICAL EXAM
[de-identified] : Constitutional:Well nourished , well developed and in no acute distress\par Psychiatric: Alert and oriented to time place and person.Appropriate affect \par Skin:Head, neck, arms and lower extremities:no lesions or discoloration\par HEENT: Normocephalic, EOM intact, Nasal septum midline,\par Respiratory: Unlabored respirations,no audible wheezing ,no tachypnea, no cyanosis\par Cardiovascular: no leg swelling  no ankle edema no JVD, pulse regular\par Vascular: no calf or thigh tenderness, \par Peripheral pulses; intact\par Lymphatics:No groin adenopathy,no lymphedema lower  or upper extremities\par Left hip moderate to marked focal tenderness greater trochanter leg lengths equal incision healed passive range of motion flexion 100 internal 15 external 40 abduction 40 adduction 30 [de-identified] : X-ray AP pelvis left hip reveals left hip cemented bipolar hemiarthroplasty with narrowing of joint space and lucencies cement bone interface proximal two thirds of femur

## 2021-08-19 ENCOUNTER — APPOINTMENT (OUTPATIENT)
Dept: INTERNAL MEDICINE | Facility: CLINIC | Age: 86
End: 2021-08-19
Payer: MEDICARE

## 2021-08-19 PROCEDURE — 99441: CPT | Mod: 95

## 2021-09-04 ENCOUNTER — OUTPATIENT (OUTPATIENT)
Dept: OUTPATIENT SERVICES | Facility: HOSPITAL | Age: 86
LOS: 1 days | End: 2021-09-04
Payer: MEDICARE

## 2021-09-04 ENCOUNTER — APPOINTMENT (OUTPATIENT)
Dept: MRI IMAGING | Facility: CLINIC | Age: 86
End: 2021-09-04
Payer: MEDICARE

## 2021-09-04 DIAGNOSIS — R10.31 RIGHT LOWER QUADRANT PAIN: ICD-10-CM

## 2021-09-04 DIAGNOSIS — Z96.649 PRESENCE OF UNSPECIFIED ARTIFICIAL HIP JOINT: Chronic | ICD-10-CM

## 2021-09-04 DIAGNOSIS — Z98.89 OTHER SPECIFIED POSTPROCEDURAL STATES: Chronic | ICD-10-CM

## 2021-09-04 DIAGNOSIS — M16.12 UNILATERAL PRIMARY OSTEOARTHRITIS, LEFT HIP: ICD-10-CM

## 2021-09-04 DIAGNOSIS — Z98.890 OTHER SPECIFIED POSTPROCEDURAL STATES: Chronic | ICD-10-CM

## 2021-09-04 PROCEDURE — 73721 MRI JNT OF LWR EXTRE W/O DYE: CPT

## 2021-09-04 PROCEDURE — 73721 MRI JNT OF LWR EXTRE W/O DYE: CPT | Mod: 26,LT,MH

## 2021-09-20 ENCOUNTER — NON-APPOINTMENT (OUTPATIENT)
Age: 86
End: 2021-09-20

## 2021-09-23 ENCOUNTER — APPOINTMENT (OUTPATIENT)
Dept: UROGYNECOLOGY | Facility: CLINIC | Age: 86
End: 2021-09-23

## 2021-09-30 ENCOUNTER — APPOINTMENT (OUTPATIENT)
Dept: INTERNAL MEDICINE | Facility: CLINIC | Age: 86
End: 2021-09-30
Payer: MEDICARE

## 2021-09-30 LAB
BILIRUB UR QL STRIP: NEGATIVE
GLUCOSE UR-MCNC: NEGATIVE
HCG UR QL: 0.2 EU/DL
HGB UR QL STRIP.AUTO: NORMAL
KETONES UR-MCNC: NEGATIVE
LEUKOCYTE ESTERASE UR QL STRIP: NORMAL
NITRITE UR QL STRIP: NEGATIVE
PH UR STRIP: 6.5
PROT UR STRIP-MCNC: NEGATIVE
SP GR UR STRIP: 1.01

## 2021-09-30 PROCEDURE — 99212 OFFICE O/P EST SF 10 MIN: CPT | Mod: 25

## 2021-09-30 PROCEDURE — 81003 URINALYSIS AUTO W/O SCOPE: CPT | Mod: QW

## 2021-09-30 NOTE — HISTORY OF PRESENT ILLNESS
[FreeTextEntry8] : Patient has history of urinary tract infections and has recurrence of symptoms without blood or fever

## 2021-09-30 NOTE — ASSESSMENT
[FreeTextEntry1] : Patient's urinalysis by stick shows white cells urine and nitrates.  Is sent for culture.  She is started on Bactrim and will repeat a urine in 3 weeks

## 2021-10-02 LAB — BACTERIA UR CULT: NORMAL

## 2021-10-06 PROBLEM — U07.1 PNEUMONIA DUE TO COVID-19 VIRUS: Status: ACTIVE | Noted: 2021-01-14

## 2021-10-22 ENCOUNTER — APPOINTMENT (OUTPATIENT)
Dept: UROLOGY | Facility: CLINIC | Age: 86
End: 2021-10-22
Payer: MEDICARE

## 2021-10-22 PROCEDURE — 99203 OFFICE O/P NEW LOW 30 MIN: CPT

## 2021-10-25 LAB
APPEARANCE: CLEAR
BACTERIA UR CULT: NORMAL
BACTERIA: NEGATIVE
BILIRUBIN URINE: NEGATIVE
BLOOD URINE: NEGATIVE
COLOR: YELLOW
GLUCOSE QUALITATIVE U: NEGATIVE
HYALINE CASTS: 1 /LPF
KETONES URINE: NEGATIVE
LEUKOCYTE ESTERASE URINE: ABNORMAL
MICROSCOPIC-UA: NORMAL
NITRITE URINE: NEGATIVE
PH URINE: 5.5
PROTEIN URINE: NEGATIVE
RED BLOOD CELLS URINE: 1 /HPF
SPECIFIC GRAVITY URINE: 1.02
SQUAMOUS EPITHELIAL CELLS: 1 /HPF
UROBILINOGEN URINE: NORMAL
WHITE BLOOD CELLS URINE: 3 /HPF

## 2021-10-25 NOTE — HISTORY OF PRESENT ILLNESS
[FreeTextEntry1] : here for management of recurrent UTIs\par she had 3 episodes this year of increased urinary pressure, urgency frequency and dysuria. No associated hematuria, fevers or chills back pain. \par last two times took Bactrim, SS first time and DS most recently_: culture at time contaminated. \par did  improved with therapy.\par review imaging in PACS - note she has CT scan from 3/21 which had focus of air in bladder. \par last UTI was @30 years ago and never had stones.

## 2021-10-25 NOTE — ASSESSMENT
[FreeTextEntry1] : sent off urines \par  may need a cystoscopy given the air in past - ? fistula though no h/o diverticulitis and no suspicion on CT

## 2021-10-26 ENCOUNTER — NON-APPOINTMENT (OUTPATIENT)
Age: 86
End: 2021-10-26

## 2021-11-24 ENCOUNTER — LABORATORY RESULT (OUTPATIENT)
Age: 86
End: 2021-11-24

## 2021-11-24 ENCOUNTER — NON-APPOINTMENT (OUTPATIENT)
Age: 86
End: 2021-11-24

## 2021-11-24 ENCOUNTER — APPOINTMENT (OUTPATIENT)
Dept: INTERNAL MEDICINE | Facility: CLINIC | Age: 86
End: 2021-11-24
Payer: MEDICARE

## 2021-11-24 VITALS
SYSTOLIC BLOOD PRESSURE: 120 MMHG | BODY MASS INDEX: 27.48 KG/M2 | WEIGHT: 140 LBS | HEIGHT: 60 IN | TEMPERATURE: 97.9 F | HEART RATE: 63 BPM | DIASTOLIC BLOOD PRESSURE: 80 MMHG | OXYGEN SATURATION: 96 %

## 2021-11-24 DIAGNOSIS — N32.81 OVERACTIVE BLADDER: ICD-10-CM

## 2021-11-24 PROCEDURE — 93000 ELECTROCARDIOGRAM COMPLETE: CPT

## 2021-11-24 PROCEDURE — G0009: CPT | Mod: 59

## 2021-11-24 PROCEDURE — 90471 IMMUNIZATION ADMIN: CPT

## 2021-11-24 PROCEDURE — 36415 COLL VENOUS BLD VENIPUNCTURE: CPT

## 2021-11-24 PROCEDURE — 90732 PPSV23 VACC 2 YRS+ SUBQ/IM: CPT

## 2021-11-24 PROCEDURE — G0439: CPT

## 2021-11-24 RX ORDER — PALBOCICLIB 100 MG/1
100 TABLET, FILM COATED ORAL DAILY
Refills: 0 | Status: ACTIVE | COMMUNITY
Start: 2021-11-24

## 2021-11-24 RX ORDER — EXEMESTANE 25 MG/1
25 TABLET, FILM COATED ORAL DAILY
Refills: 0 | Status: ACTIVE | COMMUNITY
Start: 2021-11-24

## 2021-11-24 RX ORDER — ANASTROZOLE TABLETS 1 MG/1
1 TABLET ORAL
Qty: 90 | Refills: 3 | Status: DISCONTINUED | COMMUNITY
Start: 2021-03-24 | End: 2021-11-24

## 2021-11-24 RX ORDER — ESCITALOPRAM OXALATE 5 MG/1
5 TABLET, FILM COATED ORAL DAILY
Qty: 30 | Refills: 11 | Status: ACTIVE | COMMUNITY
Start: 2021-11-24

## 2021-11-24 NOTE — REVIEW OF SYSTEMS
[Dryness] : dryness  [Hearing Loss] : hearing loss [Nasal Discharge] : nasal discharge [Negative] : Heme/Lymph [FreeTextEntry3] : L eye injury

## 2021-11-24 NOTE — ASSESSMENT
[FreeTextEntry1] : Breast Ca under tx -EVA at present. All screening for cpe . Pneumovax 23 given - up to date on all vax. Flonase R nostril for 1 mon

## 2021-11-24 NOTE — PHYSICAL EXAM
[Normal] : normal gait, coordination grossly intact, no focal deficits and deep tendon reflexes were 2+ and symmetric [de-identified] : ptosis L w normal pupils [de-identified] : normal mucoas  nose [de-identified] : Blat lumpectomies L axillary node dissectioon [FreeTextEntry1] : guaiac neg w/o mass

## 2021-11-24 NOTE — HISTORY OF PRESENT ILLNESS
[de-identified] : 87 F for CPE w hx signif for bilat breast ca on ibrance -Dr Lynne García -hip replacemen t s/p covid w/o sequelae exc possible clear rhinitis r, hyperlipidemia, depression -with successful therapy - lexaro,wellbrutin,mirtazipine. Treated for injury L eye, lipidemia - otherwise ROS neg w exc of freq UTI's.

## 2021-11-26 LAB
25(OH)D3 SERPL-MCNC: 80.4 NG/ML
ALBUMIN SERPL ELPH-MCNC: 4.4 G/DL
ALP BLD-CCNC: 52 U/L
ALT SERPL-CCNC: 16 U/L
ANION GAP SERPL CALC-SCNC: 12 MMOL/L
APPEARANCE: CLEAR
AST SERPL-CCNC: 19 U/L
BACTERIA: NEGATIVE
BASOPHILS # BLD AUTO: 0.06 K/UL
BASOPHILS NFR BLD AUTO: 1.8 %
BILIRUB SERPL-MCNC: 0.2 MG/DL
BILIRUBIN URINE: NEGATIVE
BLOOD URINE: NEGATIVE
BUN SERPL-MCNC: 14 MG/DL
CALCIUM SERPL-MCNC: 9.2 MG/DL
CHLORIDE SERPL-SCNC: 106 MMOL/L
CHOLEST SERPL-MCNC: 143 MG/DL
CK SERPL-CCNC: 79 U/L
CO2 SERPL-SCNC: 26 MMOL/L
COLOR: NORMAL
CREAT SERPL-MCNC: 0.77 MG/DL
CRP SERPL HS-MCNC: 4.75 MG/L
EOSINOPHIL # BLD AUTO: 0.02 K/UL
EOSINOPHIL NFR BLD AUTO: 0.6 %
GLUCOSE QUALITATIVE U: NEGATIVE
GLUCOSE SERPL-MCNC: 80 MG/DL
HCT VFR BLD CALC: 37.6 %
HDLC SERPL-MCNC: 52 MG/DL
HGB BLD-MCNC: 12 G/DL
HYALINE CASTS: 2 /LPF
IMM GRANULOCYTES NFR BLD AUTO: 0.3 %
KETONES URINE: NEGATIVE
LDLC SERPL CALC-MCNC: 72 MG/DL
LEUKOCYTE ESTERASE URINE: ABNORMAL
LYMPHOCYTES # BLD AUTO: 0.94 K/UL
LYMPHOCYTES NFR BLD AUTO: 28.7 %
MAN DIFF?: NORMAL
MCHC RBC-ENTMCNC: 31.9 GM/DL
MCHC RBC-ENTMCNC: 32.1 PG
MCV RBC AUTO: 100.5 FL
MICROSCOPIC-UA: NORMAL
MONOCYTES # BLD AUTO: 0.47 K/UL
MONOCYTES NFR BLD AUTO: 14.3 %
NEUTROPHILS # BLD AUTO: 1.78 K/UL
NEUTROPHILS NFR BLD AUTO: 54.3 %
NITRITE URINE: NEGATIVE
NONHDLC SERPL-MCNC: 91 MG/DL
PH URINE: 7
PLATELET # BLD AUTO: 184 K/UL
POTASSIUM SERPL-SCNC: 4.4 MMOL/L
PROT SERPL-MCNC: 6.9 G/DL
PROTEIN URINE: NEGATIVE
RBC # BLD: 3.74 M/UL
RBC # FLD: 15.4 %
RED BLOOD CELLS URINE: 2 /HPF
SODIUM SERPL-SCNC: 143 MMOL/L
SPECIFIC GRAVITY URINE: 1.01
SQUAMOUS EPITHELIAL CELLS: 0 /HPF
TRIGL SERPL-MCNC: 95 MG/DL
TSH SERPL-ACNC: 2.02 UIU/ML
UROBILINOGEN URINE: NORMAL
WBC # FLD AUTO: 3.28 K/UL
WHITE BLOOD CELLS URINE: 105 /HPF

## 2021-12-12 ENCOUNTER — TRANSCRIPTION ENCOUNTER (OUTPATIENT)
Age: 86
End: 2021-12-12

## 2021-12-15 ENCOUNTER — INPATIENT (INPATIENT)
Facility: HOSPITAL | Age: 86
LOS: 1 days | Discharge: HOME | DRG: 690 | End: 2021-12-17
Attending: HOSPITALIST | Admitting: HOSPITALIST
Payer: MEDICARE

## 2021-12-15 VITALS
HEIGHT: 60 IN | TEMPERATURE: 100 F | DIASTOLIC BLOOD PRESSURE: 62 MMHG | HEART RATE: 92 BPM | RESPIRATION RATE: 16 BRPM | SYSTOLIC BLOOD PRESSURE: 110 MMHG | OXYGEN SATURATION: 95 % | WEIGHT: 139.99 LBS

## 2021-12-15 DIAGNOSIS — Z96.649 PRESENCE OF UNSPECIFIED ARTIFICIAL HIP JOINT: Chronic | ICD-10-CM

## 2021-12-15 DIAGNOSIS — Z98.890 OTHER SPECIFIED POSTPROCEDURAL STATES: Chronic | ICD-10-CM

## 2021-12-15 DIAGNOSIS — N39.0 URINARY TRACT INFECTION, SITE NOT SPECIFIED: ICD-10-CM

## 2021-12-15 DIAGNOSIS — Z98.89 OTHER SPECIFIED POSTPROCEDURAL STATES: Chronic | ICD-10-CM

## 2021-12-15 LAB
ALBUMIN SERPL ELPH-MCNC: 3.4 G/DL — SIGNIFICANT CHANGE UP (ref 3.3–5)
ALP SERPL-CCNC: 75 U/L — SIGNIFICANT CHANGE UP (ref 40–120)
ALT FLD-CCNC: 77 U/L — HIGH (ref 10–45)
ANION GAP SERPL CALC-SCNC: 14 MMOL/L — SIGNIFICANT CHANGE UP (ref 5–17)
ANION GAP SERPL CALC-SCNC: 16 MMOL/L — SIGNIFICANT CHANGE UP (ref 5–17)
ANISOCYTOSIS BLD QL: SLIGHT — SIGNIFICANT CHANGE UP
APPEARANCE UR: CLEAR — SIGNIFICANT CHANGE UP
APTT BLD: 29.1 SEC — SIGNIFICANT CHANGE UP (ref 27.5–35.5)
AST SERPL-CCNC: 127 U/L — HIGH (ref 10–40)
BACTERIA # UR AUTO: NEGATIVE — SIGNIFICANT CHANGE UP
BASE EXCESS BLDV CALC-SCNC: 1 MMOL/L — SIGNIFICANT CHANGE UP (ref -2–2)
BASOPHILS # BLD AUTO: 0 K/UL — SIGNIFICANT CHANGE UP (ref 0–0.2)
BASOPHILS NFR BLD AUTO: 0 % — SIGNIFICANT CHANGE UP (ref 0–2)
BILIRUB SERPL-MCNC: 0.3 MG/DL — SIGNIFICANT CHANGE UP (ref 0.2–1.2)
BILIRUB UR-MCNC: NEGATIVE — SIGNIFICANT CHANGE UP
BUN SERPL-MCNC: 15 MG/DL — SIGNIFICANT CHANGE UP (ref 7–23)
BUN SERPL-MCNC: 16 MG/DL — SIGNIFICANT CHANGE UP (ref 7–23)
CA-I SERPL-SCNC: 1.13 MMOL/L — LOW (ref 1.15–1.33)
CALCIUM SERPL-MCNC: 8.4 MG/DL — SIGNIFICANT CHANGE UP (ref 8.4–10.5)
CALCIUM SERPL-MCNC: 9.1 MG/DL — SIGNIFICANT CHANGE UP (ref 8.4–10.5)
CHLORIDE BLDV-SCNC: 103 MMOL/L — SIGNIFICANT CHANGE UP (ref 96–108)
CHLORIDE SERPL-SCNC: 100 MMOL/L — SIGNIFICANT CHANGE UP (ref 96–108)
CHLORIDE SERPL-SCNC: 99 MMOL/L — SIGNIFICANT CHANGE UP (ref 96–108)
CO2 BLDV-SCNC: 27 MMOL/L — HIGH (ref 22–26)
CO2 SERPL-SCNC: 21 MMOL/L — LOW (ref 22–31)
CO2 SERPL-SCNC: 21 MMOL/L — LOW (ref 22–31)
COLOR SPEC: YELLOW — SIGNIFICANT CHANGE UP
CREAT SERPL-MCNC: 0.81 MG/DL — SIGNIFICANT CHANGE UP (ref 0.5–1.3)
CREAT SERPL-MCNC: 0.99 MG/DL — SIGNIFICANT CHANGE UP (ref 0.5–1.3)
DACRYOCYTES BLD QL SMEAR: SLIGHT — SIGNIFICANT CHANGE UP
DIFF PNL FLD: ABNORMAL
ELLIPTOCYTES BLD QL SMEAR: SLIGHT — SIGNIFICANT CHANGE UP
EOSINOPHIL # BLD AUTO: 0 K/UL — SIGNIFICANT CHANGE UP (ref 0–0.5)
EOSINOPHIL NFR BLD AUTO: 0 % — SIGNIFICANT CHANGE UP (ref 0–6)
EPI CELLS # UR: 1 /HPF — SIGNIFICANT CHANGE UP
GAS PNL BLDV: 133 MMOL/L — LOW (ref 136–145)
GAS PNL BLDV: SIGNIFICANT CHANGE UP
GAS PNL BLDV: SIGNIFICANT CHANGE UP
GLUCOSE BLDV-MCNC: 103 MG/DL — HIGH (ref 70–99)
GLUCOSE SERPL-MCNC: 110 MG/DL — HIGH (ref 70–99)
GLUCOSE SERPL-MCNC: 97 MG/DL — SIGNIFICANT CHANGE UP (ref 70–99)
GLUCOSE UR QL: NEGATIVE — SIGNIFICANT CHANGE UP
HCO3 BLDV-SCNC: 26 MMOL/L — SIGNIFICANT CHANGE UP (ref 22–29)
HCT VFR BLD CALC: 32.6 % — LOW (ref 34.5–45)
HCT VFR BLDA CALC: 34 % — LOW (ref 34.5–46.5)
HGB BLD CALC-MCNC: 11.3 G/DL — LOW (ref 11.7–16.1)
HGB BLD-MCNC: 10.6 G/DL — LOW (ref 11.5–15.5)
HYALINE CASTS # UR AUTO: 1 /LPF — SIGNIFICANT CHANGE UP (ref 0–2)
INR BLD: 1.13 RATIO — SIGNIFICANT CHANGE UP (ref 0.88–1.16)
KETONES UR-MCNC: NEGATIVE — SIGNIFICANT CHANGE UP
LACTATE BLDV-MCNC: 1.5 MMOL/L — SIGNIFICANT CHANGE UP (ref 0.7–2)
LEUKOCYTE ESTERASE UR-ACNC: ABNORMAL
LYMPHOCYTES # BLD AUTO: 0.48 K/UL — LOW (ref 1–3.3)
LYMPHOCYTES # BLD AUTO: 15.6 % — SIGNIFICANT CHANGE UP (ref 13–44)
MACROCYTES BLD QL: SLIGHT — SIGNIFICANT CHANGE UP
MANUAL SMEAR VERIFICATION: SIGNIFICANT CHANGE UP
MCHC RBC-ENTMCNC: 31.5 PG — SIGNIFICANT CHANGE UP (ref 27–34)
MCHC RBC-ENTMCNC: 32.5 GM/DL — SIGNIFICANT CHANGE UP (ref 32–36)
MCV RBC AUTO: 96.7 FL — SIGNIFICANT CHANGE UP (ref 80–100)
MONOCYTES # BLD AUTO: 0.19 K/UL — SIGNIFICANT CHANGE UP (ref 0–0.9)
MONOCYTES NFR BLD AUTO: 6.1 % — SIGNIFICANT CHANGE UP (ref 2–14)
NEUTROPHILS # BLD AUTO: 2.4 K/UL — SIGNIFICANT CHANGE UP (ref 1.8–7.4)
NEUTROPHILS NFR BLD AUTO: 78.3 % — HIGH (ref 43–77)
NITRITE UR-MCNC: NEGATIVE — SIGNIFICANT CHANGE UP
OVALOCYTES BLD QL SMEAR: SLIGHT — SIGNIFICANT CHANGE UP
PCO2 BLDV: 42 MMHG — SIGNIFICANT CHANGE UP (ref 39–42)
PH BLDV: 7.4 — SIGNIFICANT CHANGE UP (ref 7.32–7.43)
PH UR: 6 — SIGNIFICANT CHANGE UP (ref 5–8)
PLAT MORPH BLD: NORMAL — SIGNIFICANT CHANGE UP
PLATELET # BLD AUTO: 129 K/UL — LOW (ref 150–400)
PO2 BLDV: 22 MMHG — LOW (ref 25–45)
POIKILOCYTOSIS BLD QL AUTO: SLIGHT — SIGNIFICANT CHANGE UP
POLYCHROMASIA BLD QL SMEAR: SLIGHT — SIGNIFICANT CHANGE UP
POTASSIUM BLDV-SCNC: 5.1 MMOL/L — SIGNIFICANT CHANGE UP (ref 3.5–5.1)
POTASSIUM SERPL-MCNC: 3.4 MMOL/L — LOW (ref 3.5–5.3)
POTASSIUM SERPL-MCNC: 6.9 MMOL/L — CRITICAL HIGH (ref 3.5–5.3)
POTASSIUM SERPL-SCNC: 3.4 MMOL/L — LOW (ref 3.5–5.3)
POTASSIUM SERPL-SCNC: 6.9 MMOL/L — CRITICAL HIGH (ref 3.5–5.3)
PROT SERPL-MCNC: 7.7 G/DL — SIGNIFICANT CHANGE UP (ref 6–8.3)
PROT UR-MCNC: ABNORMAL
PROTHROM AB SERPL-ACNC: 13.5 SEC — SIGNIFICANT CHANGE UP (ref 10.6–13.6)
RBC # BLD: 3.37 M/UL — LOW (ref 3.8–5.2)
RBC # FLD: 15.7 % — HIGH (ref 10.3–14.5)
RBC BLD AUTO: ABNORMAL
RBC CASTS # UR COMP ASSIST: 9 /HPF — HIGH (ref 0–4)
SAO2 % BLDV: 38.6 % — LOW (ref 67–88)
SARS-COV-2 RNA SPEC QL NAA+PROBE: SIGNIFICANT CHANGE UP
SODIUM SERPL-SCNC: 134 MMOL/L — LOW (ref 135–145)
SODIUM SERPL-SCNC: 137 MMOL/L — SIGNIFICANT CHANGE UP (ref 135–145)
SP GR SPEC: 1.02 — SIGNIFICANT CHANGE UP (ref 1.01–1.02)
UROBILINOGEN FLD QL: NEGATIVE — SIGNIFICANT CHANGE UP
WBC # BLD: 3.07 K/UL — LOW (ref 3.8–10.5)
WBC # FLD AUTO: 3.07 K/UL — LOW (ref 3.8–10.5)
WBC UR QL: 10 /HPF — HIGH (ref 0–5)

## 2021-12-15 PROCEDURE — 93010 ELECTROCARDIOGRAM REPORT: CPT

## 2021-12-15 PROCEDURE — 99285 EMERGENCY DEPT VISIT HI MDM: CPT

## 2021-12-15 PROCEDURE — 71045 X-RAY EXAM CHEST 1 VIEW: CPT | Mod: 26

## 2021-12-15 RX ORDER — POTASSIUM CHLORIDE 20 MEQ
40 PACKET (EA) ORAL ONCE
Refills: 0 | Status: COMPLETED | OUTPATIENT
Start: 2021-12-15 | End: 2021-12-15

## 2021-12-15 RX ADMIN — Medication 40 MILLIEQUIVALENT(S): at 22:50

## 2021-12-15 NOTE — ED PROVIDER NOTE - RAPID ASSESSMENT
87 F PMHx of breast CA on chemo presents c/o dysuria, frequency, f/c last night. Pt notes she has dysuria chronically, has been treated for UTI multiple times in the past without complete resolution of sxs. Pt took levofloxacin for the past 2 days prescribed by her PCP. Pt also notes one episode of "racing heart" pt denies cough and URI sxs.    Patient was seen as a QPA patient. The patient will be seen and further worked up in the main emergency department and their care will be completed by the main emergency department team along with a thorough physical exam. Receiving team will follow up on labs, analgesia, any clinical imaging, reassess and disposition as clinically indicated, all decisions regarding the progression of care will be made at their discretion.

## 2021-12-15 NOTE — ED PROVIDER NOTE - PHYSICAL EXAMINATION
GEN: Patient awake alert NAD.   HEENT: normocephalic, atraumatic, EOMI, no scleral icterus, moist MM  CARDIAC: RRR, S1, S2, no murmur.   PULM: CTA B/L no wheeze, rhonchi, rales.   ABD: soft NT, ND, no rebound no guarding, no CVA tenderness.   MSK: Moving all extremities, no edema. 5/5 strength and full ROM in all extremities.    NEURO: A&Ox3, gait normal, no focal neurological deficits, CN 2-12 grossly intact  SKIN: warm, dry, no rash.

## 2021-12-15 NOTE — ED ADULT NURSE NOTE - OBJECTIVE STATEMENT
87F to ED c/o dysuria r/t hx of UTIs, fast heart rate. Patient is alert and oriented x4, calm/cooperative, NAD, VSS. Patient is not tachycardic at this time. Patient has 20 gauge IV placed to R forearm. Patient recently taking levaquin at home. Patient is ambulatory with minimal assistance and provides urine sample. Patient is updated to plan of care in [facility] ED.

## 2021-12-15 NOTE — ED PROVIDER NOTE - OBJECTIVE STATEMENT
86 yo F hx obreast CA on oral chemo, HLD, chronic UTI, pw suprapubic pressure and chills x 5 days. Per pt, she has had chronic UTI for the past several months, completed multiple courses of antibiotics, including Levaquin and "sulfa". Pt took 2 doses of Levaquin 2 days ago with no relief of symptoms. Pt states she had night sweats, waking up drenched in sweat requiring changing sheets several times during the night.

## 2021-12-15 NOTE — ED PROVIDER NOTE - NS ED ROS FT
GENERAL: No fever, + chills + night sweats  EYES: no vision changes, no discharge.   ENT: no difficulty swallowing or speaking   CARDIAC: no chest pain/pressure, SOB, lower extremity swelling  PULMONARY: no cough, SOB  GI: no abdominal pain, n/v/d  : no dysuria, no hematuria + suprapubic pressure.   SKIN: no rashes, no ecchymosis  NEURO: no headache, lightheadedness  MSK: No joint pain, myalgia, weakness.

## 2021-12-15 NOTE — ED PROVIDER NOTE - ATTENDING CONTRIBUTION TO CARE
Patient is an 86 yo F with history of breast CA on Ibrance here for 5 days of chills, history of chronic UTIs. Patient denies dysuria to me but states a few days ago she had discomfort in her lower pelvis. She was started on Levaquin 2 days ago and taken off Ibrance. She describes waking up soaking through the sheets with severe chills. No chest pain, shortness of breath, no abdominal pain.     VS noted  Gen. no acute distress, Non toxic   HEENT: EOMI, mmm  Lungs: CTAB/L no C/ W /R   CVS: RRR   Abd; Soft non tender, non distended, no CVA tenderness bilaterally  Ext: no edema  Skin: no rash  Neuro AAOx3 non focal clear speech  a/p: immunocompromised, describes rigors/ soaking chills - concern for sepsis/ possibly uro sepsis. She was started on Levaquin 2 days ago. Plan for sepsis labs and likely admission.   - Khoa JUARES

## 2021-12-15 NOTE — ED PROVIDER NOTE - NS ED MD DISPO ISOLATION TYPES
[FreeTextEntry1] : Status post end ileostomy reversal\par -Patient progressing well\par -High fiber diet\par -Follow up in one week for remaining staple removal
None

## 2021-12-15 NOTE — ED ADULT NURSE NOTE - NSICDXPASTSURGICALHX_GEN_ALL_CORE_FT
PAST SURGICAL HISTORY:  H/O lumpectomy left-11/1994    History of hip replacement left-1994    S/P Mohs surgery for basal cell carcinoma right side of nose-2020

## 2021-12-15 NOTE — ED PROVIDER NOTE - PROGRESS NOTE DETAILS
Meera Cummings M.D. Tox Fellow  SPoke to Hospitalist, recommendjayy [id number]. Meera Cummings M.D. Tox Fellow  Spoke to Hospitalist, recommending ceftriaxone. ordered.

## 2021-12-15 NOTE — ED ADULT NURSE NOTE - NSIMPLEMENTINTERV_GEN_ALL_ED
Implemented All Fall with Harm Risk Interventions:  [place] to call system. Call bell, personal items and telephone within reach. Instruct patient to call for assistance. Room bathroom lighting operational. Non-slip footwear when patient is off stretcher. Physically safe environment: no spills, clutter or unnecessary equipment. Stretcher in lowest position, wheels locked, appropriate side rails in place. Provide visual cue, wrist band, yellow gown, etc. Monitor gait and stability. Monitor for mental status changes and reorient to person, place, and time. Review medications for side effects contributing to fall risk. Reinforce activity limits and safety measures with patient and family. Provide visual clues: red socks.

## 2021-12-15 NOTE — ED PROVIDER NOTE - NSICDXPASTMEDICALHX_GEN_ALL_CORE_FT
PAST MEDICAL HISTORY:  2019 novel coronavirus disease (COVID-19) 1/7/2021    Anxiety     Breast cancer     Cholecystitis     Cholelithiasis     Depression     Gallbladder stone without cholecystitis or obstruction     History of chemotherapy and radiation-1994    HLD (hyperlipidemia)     Hyperlipidemia     Recurrent breast cancer opposite side, left-3/2020    S/P chemotherapy, time since less than 4 weeks     UTI (lower urinary tract infection)

## 2021-12-15 NOTE — ED PROVIDER NOTE - CLINICAL SUMMARY MEDICAL DECISION MAKING FREE TEXT BOX
86 yo F hx obreast CA on oral chemo, HLD, chronic UTI, pw suprapubic pressure and chills x 5 days. non tender abd, no CVA ttp. Possible UTI, urosepsis, pyelo. check urine, cultures, and labs, likely require admission for IV abx until urine and blood cultures return as pt has had recurrent UTI failing outpt treatment.

## 2021-12-16 DIAGNOSIS — C50.919 MALIGNANT NEOPLASM OF UNSPECIFIED SITE OF UNSPECIFIED FEMALE BREAST: ICD-10-CM

## 2021-12-16 DIAGNOSIS — R33.9 RETENTION OF URINE, UNSPECIFIED: ICD-10-CM

## 2021-12-16 DIAGNOSIS — R10.2 PELVIC AND PERINEAL PAIN: ICD-10-CM

## 2021-12-16 DIAGNOSIS — F41.9 ANXIETY DISORDER, UNSPECIFIED: ICD-10-CM

## 2021-12-16 DIAGNOSIS — Z00.00 ENCOUNTER FOR GENERAL ADULT MEDICAL EXAMINATION WITHOUT ABNORMAL FINDINGS: ICD-10-CM

## 2021-12-16 DIAGNOSIS — D75.89 OTHER SPECIFIED DISEASES OF BLOOD AND BLOOD-FORMING ORGANS: ICD-10-CM

## 2021-12-16 DIAGNOSIS — R74.01 ELEVATION OF LEVELS OF LIVER TRANSAMINASE LEVELS: ICD-10-CM

## 2021-12-16 DIAGNOSIS — R68.83 CHILLS (WITHOUT FEVER): ICD-10-CM

## 2021-12-16 DIAGNOSIS — E78.5 HYPERLIPIDEMIA, UNSPECIFIED: ICD-10-CM

## 2021-12-16 LAB
ALBUMIN SERPL ELPH-MCNC: 3.2 G/DL — LOW (ref 3.3–5)
ALP SERPL-CCNC: 66 U/L — SIGNIFICANT CHANGE UP (ref 40–120)
ALT FLD-CCNC: 79 U/L — HIGH (ref 10–45)
ANION GAP SERPL CALC-SCNC: 11 MMOL/L — SIGNIFICANT CHANGE UP (ref 5–17)
AST SERPL-CCNC: 64 U/L — HIGH (ref 10–40)
BILIRUB SERPL-MCNC: 0.1 MG/DL — LOW (ref 0.2–1.2)
BUN SERPL-MCNC: 13 MG/DL — SIGNIFICANT CHANGE UP (ref 7–23)
CALCIUM SERPL-MCNC: 8.4 MG/DL — SIGNIFICANT CHANGE UP (ref 8.4–10.5)
CHLORIDE SERPL-SCNC: 106 MMOL/L — SIGNIFICANT CHANGE UP (ref 96–108)
CO2 SERPL-SCNC: 21 MMOL/L — LOW (ref 22–31)
CREAT SERPL-MCNC: 0.95 MG/DL — SIGNIFICANT CHANGE UP (ref 0.5–1.3)
CULTURE RESULTS: NO GROWTH — SIGNIFICANT CHANGE UP
GLUCOSE SERPL-MCNC: 133 MG/DL — HIGH (ref 70–99)
HCT VFR BLD CALC: 27.8 % — LOW (ref 34.5–45)
HGB BLD-MCNC: 9 G/DL — LOW (ref 11.5–15.5)
MAGNESIUM SERPL-MCNC: 2.3 MG/DL — SIGNIFICANT CHANGE UP (ref 1.6–2.6)
MCHC RBC-ENTMCNC: 31.6 PG — SIGNIFICANT CHANGE UP (ref 27–34)
MCHC RBC-ENTMCNC: 32.4 GM/DL — SIGNIFICANT CHANGE UP (ref 32–36)
MCV RBC AUTO: 97.5 FL — SIGNIFICANT CHANGE UP (ref 80–100)
NRBC # BLD: 0 /100 WBCS — SIGNIFICANT CHANGE UP (ref 0–0)
PHOSPHATE SERPL-MCNC: 2.5 MG/DL — SIGNIFICANT CHANGE UP (ref 2.5–4.5)
PLATELET # BLD AUTO: 114 K/UL — LOW (ref 150–400)
POTASSIUM SERPL-MCNC: 3.7 MMOL/L — SIGNIFICANT CHANGE UP (ref 3.5–5.3)
POTASSIUM SERPL-SCNC: 3.7 MMOL/L — SIGNIFICANT CHANGE UP (ref 3.5–5.3)
PROT SERPL-MCNC: 6.7 G/DL — SIGNIFICANT CHANGE UP (ref 6–8.3)
RAPID RVP RESULT: SIGNIFICANT CHANGE UP
RBC # BLD: 2.85 M/UL — LOW (ref 3.8–5.2)
RBC # FLD: 15.7 % — HIGH (ref 10.3–14.5)
SARS-COV-2 RNA SPEC QL NAA+PROBE: SIGNIFICANT CHANGE UP
SODIUM SERPL-SCNC: 138 MMOL/L — SIGNIFICANT CHANGE UP (ref 135–145)
SPECIMEN SOURCE: SIGNIFICANT CHANGE UP
T4 AB SER-ACNC: 5.8 UG/DL — SIGNIFICANT CHANGE UP (ref 4.6–12)
TSH SERPL-MCNC: 1.99 UIU/ML — SIGNIFICANT CHANGE UP (ref 0.27–4.2)
WBC # BLD: 2.6 K/UL — LOW (ref 3.8–10.5)
WBC # FLD AUTO: 2.6 K/UL — LOW (ref 3.8–10.5)

## 2021-12-16 PROCEDURE — 12345: CPT | Mod: NC

## 2021-12-16 PROCEDURE — 99223 1ST HOSP IP/OBS HIGH 75: CPT

## 2021-12-16 RX ORDER — SODIUM CHLORIDE 9 MG/ML
1000 INJECTION, SOLUTION INTRAVENOUS ONCE
Refills: 0 | Status: COMPLETED | OUTPATIENT
Start: 2021-12-16 | End: 2021-12-16

## 2021-12-16 RX ORDER — ESCITALOPRAM OXALATE 10 MG/1
5 TABLET, FILM COATED ORAL DAILY
Refills: 0 | Status: DISCONTINUED | OUTPATIENT
Start: 2021-12-16 | End: 2021-12-17

## 2021-12-16 RX ORDER — BUPROPION HYDROCHLORIDE 150 MG/1
300 TABLET, EXTENDED RELEASE ORAL AT BEDTIME
Refills: 0 | Status: DISCONTINUED | OUTPATIENT
Start: 2021-12-16 | End: 2021-12-17

## 2021-12-16 RX ORDER — PALBOCICLIB 100 MG/1
1 CAPSULE ORAL
Qty: 0 | Refills: 0 | DISCHARGE

## 2021-12-16 RX ORDER — HEPARIN SODIUM 5000 [USP'U]/ML
5000 INJECTION INTRAVENOUS; SUBCUTANEOUS EVERY 12 HOURS
Refills: 0 | Status: DISCONTINUED | OUTPATIENT
Start: 2021-12-16 | End: 2021-12-17

## 2021-12-16 RX ORDER — EXEMESTANE 25 MG/1
25 TABLET, SUGAR COATED ORAL DAILY
Refills: 0 | Status: DISCONTINUED | OUTPATIENT
Start: 2021-12-16 | End: 2021-12-17

## 2021-12-16 RX ORDER — CALCIUM POLYCARBOPHIL 625 MG/1
1 TABLET, FILM COATED ORAL
Qty: 0 | Refills: 0 | DISCHARGE

## 2021-12-16 RX ORDER — CEFTRIAXONE 500 MG/1
1000 INJECTION, POWDER, FOR SOLUTION INTRAMUSCULAR; INTRAVENOUS EVERY 24 HOURS
Refills: 0 | Status: DISCONTINUED | OUTPATIENT
Start: 2021-12-16 | End: 2021-12-17

## 2021-12-16 RX ORDER — ACETAMINOPHEN 500 MG
650 TABLET ORAL ONCE
Refills: 0 | Status: COMPLETED | OUTPATIENT
Start: 2021-12-16 | End: 2021-12-16

## 2021-12-16 RX ORDER — ANASTROZOLE 1 MG/1
1 TABLET ORAL
Qty: 0 | Refills: 0 | DISCHARGE

## 2021-12-16 RX ORDER — MIRTAZAPINE 45 MG/1
7.5 TABLET, ORALLY DISINTEGRATING ORAL AT BEDTIME
Refills: 0 | Status: DISCONTINUED | OUTPATIENT
Start: 2021-12-16 | End: 2021-12-17

## 2021-12-16 RX ORDER — SODIUM CHLORIDE 0.65 %
1 AEROSOL, SPRAY (ML) NASAL THREE TIMES A DAY
Refills: 0 | Status: DISCONTINUED | OUTPATIENT
Start: 2021-12-16 | End: 2021-12-17

## 2021-12-16 RX ORDER — SIMVASTATIN 20 MG/1
20 TABLET, FILM COATED ORAL AT BEDTIME
Refills: 0 | Status: DISCONTINUED | OUTPATIENT
Start: 2021-12-16 | End: 2021-12-17

## 2021-12-16 RX ADMIN — HEPARIN SODIUM 5000 UNIT(S): 5000 INJECTION INTRAVENOUS; SUBCUTANEOUS at 05:06

## 2021-12-16 RX ADMIN — SIMVASTATIN 20 MILLIGRAM(S): 20 TABLET, FILM COATED ORAL at 21:57

## 2021-12-16 RX ADMIN — MIRTAZAPINE 7.5 MILLIGRAM(S): 45 TABLET, ORALLY DISINTEGRATING ORAL at 21:55

## 2021-12-16 RX ADMIN — Medication 650 MILLIGRAM(S): at 19:31

## 2021-12-16 RX ADMIN — Medication 1 TABLET(S): at 12:21

## 2021-12-16 RX ADMIN — HEPARIN SODIUM 5000 UNIT(S): 5000 INJECTION INTRAVENOUS; SUBCUTANEOUS at 17:00

## 2021-12-16 RX ADMIN — EXEMESTANE 25 MILLIGRAM(S): 25 TABLET, SUGAR COATED ORAL at 21:30

## 2021-12-16 RX ADMIN — ESCITALOPRAM OXALATE 5 MILLIGRAM(S): 10 TABLET, FILM COATED ORAL at 12:20

## 2021-12-16 RX ADMIN — SODIUM CHLORIDE 1000 MILLILITER(S): 9 INJECTION, SOLUTION INTRAVENOUS at 00:53

## 2021-12-16 RX ADMIN — Medication 650 MILLIGRAM(S): at 18:26

## 2021-12-16 RX ADMIN — CEFTRIAXONE 100 MILLIGRAM(S): 500 INJECTION, POWDER, FOR SOLUTION INTRAMUSCULAR; INTRAVENOUS at 05:06

## 2021-12-16 NOTE — H&P ADULT - PROBLEM SELECTOR PLAN 2
known hx Recurrent UTI's  Last seen by Gyn in 07/2021, thought to be due to overactive bladder urinary retention (feeling urge to urinate shortly after urination with minimal residual urine) where pt passed urodynamic studies and was prescribed Phenazopyridine, counseled avoidance of caffeine and overhydration, and clotrimazole for Vaginal/ Vulvar atrophy  S/p 2 days Levofloxacin  Plan:   - Post- void Bladder scan  - Strict I' and O's  - f/u Urine cultures  - revisit need for cystoscopy, as recommended at a prior Urology visit in 10/2021  - Avoid caffeine, overhydration Leukopenia w/t thrombocytopenia  Ruling out any infectious etiology versus hematologic derangement if not infectious cause  Plan:   - Trend CBC w/ diff  - Peripheral smear  - Monitor closely

## 2021-12-16 NOTE — H&P ADULT - PROBLEM SELECTOR PLAN 6
Diet: Regular  DVT Prophylaxis: subcutaneous heparin 5,000 U daily  Dispo: Plan:   - c/w Escitalopram 5mg daily  - c/w Wellbutrin 300mg nightly  - holding Alprazolam for now

## 2021-12-16 NOTE — H&P ADULT - NSHPSOCIALHISTORY_GEN_ALL_CORE
Lives alone in apartments,   Sister, Melony lives in the same apt complex  No Tobacco use  Occasional alcohol use Lives alone in apartments,   Sister, Melony lives in the same apt complex  Former smoker >10 years ago  Occasional alcohol use

## 2021-12-16 NOTE — PATIENT PROFILE ADULT - FALL HARM RISK - HARM RISK INTERVENTIONS

## 2021-12-16 NOTE — PROGRESS NOTE ADULT - SUBJECTIVE AND OBJECTIVE BOX
Patient is a 87y old  Female who presents with a chief complaint of Chills (16 Dec 2021 16:14)      SUBJECTIVE / OVERNIGHT EVENTS: Pt seen and examined. No acute events overnight. She denies fever/chills, dysuria.    MEDICATIONS  (STANDING):  buPROPion XL (24-Hour) . 300 milliGRAM(s) Oral at bedtime  cefTRIAXone   IVPB 1000 milliGRAM(s) IV Intermittent every 24 hours  escitalopram 5 milliGRAM(s) Oral daily  exemestane 25 milliGRAM(s) Oral daily  heparin   Injectable 5000 Unit(s) SubCutaneous every 12 hours  mirtazapine 7.5 milliGRAM(s) Oral at bedtime  multivitamin 1 Tablet(s) Oral daily  simvastatin 20 milliGRAM(s) Oral at bedtime    MEDICATIONS  (PRN):      Vital Signs Last 24 Hrs  T(C): 36.7 (16 Dec 2021 07:52), Max: 37.3 (15 Dec 2021 21:02)  T(F): 98.1 (16 Dec 2021 07:52), Max: 99.1 (15 Dec 2021 21:02)  HR: 87 (16 Dec 2021 07:52) (66 - 87)  BP: 102/66 (16 Dec 2021 07:52) (98/51 - 112/57)  BP(mean): --  RR: 18 (16 Dec 2021 07:52) (17 - 18)  SpO2: 97% (16 Dec 2021 07:52) (92% - 97%)  CAPILLARY BLOOD GLUCOSE        I&O's Summary    16 Dec 2021 07:01  -  16 Dec 2021 16:42  --------------------------------------------------------  IN: 240 mL / OUT: 0 mL / NET: 240 mL        PHYSICAL EXAM:  GENERAL: NAD, anicteric, afebrile  HEAD:  Atraumatic, Normocephalic  EYES:  conjunctiva and sclera clear  NECK: Supple, No JVD  CHEST/LUNG: Clear to auscultation bilaterally; No wheeze  HEART: Regular rate and rhythm; No murmurs, rubs, or gallops  ABDOMEN: Soft, Nontender, Nondistended; Bowel sounds present  EXTREMITIES:  2+ Peripheral Pulses, No clubbing, cyanosis, or edema  PSYCH: AAOx3  NEUROLOGY: non-focal  SKIN: No rashes or lesions    LABS:                        9.0    2.60  )-----------( 114      ( 16 Dec 2021 05:03 )             27.8     12-    138  |  106  |  13  ----------------------------<  133<H>  3.7   |  21<L>  |  0.95    Ca    8.4      16 Dec 2021 05:03  Phos  2.5       Mg     2.3     -    TPro  6.7  /  Alb  3.2<L>  /  TBili  0.1<L>  /  DBili  x   /  AST  64<H>  /  ALT  79<H>  /  AlkPhos  66  12-16    PT/INR - ( 15 Dec 2021 14:39 )   PT: 13.5 sec;   INR: 1.13 ratio         PTT - ( 15 Dec 2021 14:39 )  PTT:29.1 sec      Urinalysis Basic - ( 15 Dec 2021 16:25 )    Color: Yellow / Appearance: Clear / S.016 / pH: x  Gluc: x / Ketone: Negative  / Bili: Negative / Urobili: Negative   Blood: x / Protein: 30 mg/dL / Nitrite: Negative   Leuk Esterase: Moderate / RBC: 9 /hpf / WBC 10 /HPF   Sq Epi: x / Non Sq Epi: 1 /hpf / Bacteria: Negative

## 2021-12-16 NOTE — H&P ADULT - NSHPPHYSICALEXAM_GEN_ALL_CORE
PHYSICAL EXAM:  GENERAL: lying supine in bed  PSYCH: 86 yo F, appears stated age, rapid speech, anxious appearing, no suicidal or homicidal ideation noted  HEENT: NC/AT, EOMI, PERRL  NECK: Supple, No JVD  CHEST/LUNG: CTAB, prolonged air movement bilaterally,  no increased WOB  HEART: regular irregular, low grade systolic murmur loudest upper Rt sternal border no m/r/g  ABDOMEN: soft, obese abdomen, mild tenderness in suprapubic region, no CVA tenderness, otherwise Non-tender abdominal quadrants, ND, BS+  EXTREMITIES:  2+ peripheral pulses, no clubbing, no edema  NERVOUS SYSTEM:  A&Ox3, no focal deficits  MSK: FROM all 4 extremities, full and equal strength  SKIN: No rashes or lesions

## 2021-12-16 NOTE — H&P ADULT - PROBLEM SELECTOR PLAN 1
5x days intermittent chills, 1X day sweating  Most likely secondary to possible infection of  etiology/ recurrent UTI versus Upper Respiratory Virus  mildly positive UA, urinary frequency; hx recurrent UTI's  leukopenic with elevated neutrophils;   Plan:   - IV Ceftriaxone 1g Q8 IV   - s/p 1L Fluid bolus, encourage po fluid intake versus reassess need for additional fluids  - f/u fever curve if any; f/u wbc  - f/u urine cx, blood cx, RVP   - f/u TSH, T4 5x days intermittent chills, 1X day sweating, chronic suprapubic discomfort  Most likely secondary to possible infection of  etiology/ recurrent UTI versus Upper Respiratory Virus  mildly positive UA, urinary frequency; hx recurrent UTI's  leukopenic with elevated neutrophils;   Plan:   - IV Ceftriaxone 1g IV Daily  - s/p 1L Fluid bolus, encourage po fluid intake versus reassess need for additional fluids  - f/u fever curve if any; f/u wbc  - f/u urine cx, blood cx, RVP   - f/u TSH, T4

## 2021-12-16 NOTE — H&P ADULT - NSHPREVIEWOFSYSTEMS_GEN_ALL_CORE
REVIEW OF SYSTEMS:    CONSTITUTIONAL: chills as per HPI  EYES/ENT: No visual changes;  No vertigo or throat pain   NECK: No pain or stiffness  RESPIRATORY: No cough, wheezing, hemoptysis; No shortness of breath  CARDIOVASCULAR: No chest pain or palpitations  GASTROINTESTINAL: No abdominal or epigastric pain. No nausea, vomiting, or hematemesis; No diarrhea or constipation. No melena or hematochezia.  GENITOURINARY: as per HPI   NEUROLOGICAL: No numbness or weakness  SKIN: No itching, rashes REVIEW OF SYSTEMS:    CONSTITUTIONAL: chills as per HPI  EYES/ENT: No visual changes;  No vertigo or throat pain   NECK: No pain or stiffness  RESPIRATORY: No cough, wheezing, hemoptysis; No shortness of breath  CARDIOVASCULAR: No chest pain or palpitations  GASTROINTESTINAL: No abdominal or epigastric pain. No nausea, vomiting, or hematemesis; No diarrhea or constipation. No melena or hematochezia.  GENITOURINARY: as per HPI   NEUROLOGICAL: No numbness or weakness  SKIN: No itching, rashes  BAck: no stiffness or pain   MSK: no joint swelling or pain   psyche: no hallucinations , no homicidal ideation

## 2021-12-16 NOTE — H&P ADULT - HISTORY OF PRESENT ILLNESS
86 y/o F pmh  hx bilat breast ca on Exemestrane , recurrent UTI's, s/p covid w/o sequelae, fully vaccinated, hyperlipidemia, depression/ anxiety, who presents with chills, x5 days and profuse sweating. Pt first noted chills, "shaking out of control" intermittently throughout the day since Friday, and sweating like "water pouring out of my body" most notably on the day prior to admission. She denies any productive cough, sore throat, fatigue, weakness, documented fevers, headaches, myalgias, neck rigidity. Pt visited Urgent care on Sunday and tested negative for COVID PCR, Influenza. Her Sweating episode yesterday prompted her to call PCP who recommended ED visit. Pt also endorses urinary retention, which she has had for years, plus urinary frequency due to feeling unable to empty bladder. She has a history of recurrent UTI's and notes being acutely symptomatic 3x in the past year. Pt described that she must apply abdominal pressure or strain to completely urinate. Pt endorses suprapubic pressure though denies any dysuria, since her last acute UTI episode a few weeks ago. Pt was on Levofloxacin, last taken Mon and Tues for prophylaxis. She presently denies pyuria, hematuria. Pt denies any recent travel or sick contacts.     ED course:   99.7 110/62 92bpm 16/min at 99% on RA  CXR no acute pathology  ECG no acute pathology, no rhythm abnormalities,  ST changes, bundle branch block  No interventions     88 y/o F pmh  hx bilat breast ca on Exemestrane , recurrent UTI's, s/p covid w/o sequelae, fully vaccinated, hyperlipidemia, depression/ anxiety, who presents with chills, x5 days and profuse sweating. Pt first noted chills, "shaking out of control" intermittently throughout the day since Friday, and sweating like "water pouring out of my body" most notably on the day prior to admission. She denies any productive cough, sore throat, fatigue, weakness, documented fevers, headaches, myalgias, neck rigidity. Pt visited Urgent care on Sunday and tested negative for COVID PCR, Influenza. Her Sweating episode yesterday prompted her to call PCP who recommended ED visit. Pt also endorses urinary retention, which she has had for years, plus urinary frequency due to feeling unable to empty bladder. She has a history of recurrent UTI's and notes being acutely symptomatic 3x in the past year. Pt described that she must apply abdominal pressure or strain to completely urinate. Pt endorses suprapubic pressure though denies any dysuria, since her last acute UTI episode a few weeks ago. Pt was on Levofloxacin, last taken Mon and Tues for prophylaxis. She presently denies pyuria, hematuria. Pt denies any recent travel or sick contacts.     ED course:   99.7 110/62 92bpm 16/min at 99% on RA  CXR no acute pathology  ECG no acute pathology, no rhythm abnormalities,  ST changes, bundle branch block       86 y/o F pmh  hx bilat breast Ca on Exemestane , recurrent UTI's, s/p covid w/o sequelae, fully vaccinated, hyperlipidemia, depression/ anxiety, who presents with chills, x5 days and profuse sweating. Pt first noted chills, "shaking out of control" intermittently throughout the day since Friday, and sweating like "water pouring out of my body" most notably on the day prior to admission. She denies any productive cough, sore throat, fatigue, weakness, documented fevers, headaches, myalgias, neck rigidity. Pt visited Urgent care on Sunday and tested negative for COVID PCR, Influenza. Her Sweating episode yesterday prompted her to call PCP who recommended ED visit. Pt also endorses urinary retention, which she has had for years, plus urinary frequency due to feeling unable to empty bladder. She has a history of recurrent UTI's and notes being acutely symptomatic 3x in the past year. Pt described that she must apply abdominal pressure or strain to completely urinate. Pt endorses suprapubic pressure though denies any dysuria, since her last acute UTI episode a few weeks ago. Pt was on Levofloxacin, last taken Mon and Tues for prophylaxis. She presently denies pyuria, hematuria. Pt denies any recent travel or sick contacts.     ED course:   99.7 110/62 92bpm 16/min at 99% on RA  CXR no acute pathology  ECG no acute pathology, no rhythm abnormalities,  ST changes, bundle branch block

## 2021-12-16 NOTE — H&P ADULT - ATTENDING COMMENTS
87F w/pmh breast ca on Exemestane , recurrent UTI's, s/p covid HLD, depression/ anxiety, p/w rigors and chills, diaphoresis  x5 days,  currently on levofloxacin x 2 days for UTI ,  afebrile and normotensive ,suprapubic pressure,  labs w/ leukopenia , thrombocytopenia , weakly positive urinalysis , cxr clear , ekg non ischemic ; will treat for UTI w/ ceftriaxone , f/u cultures , trend wbc and platelet , if blood counts do not improve with treatment , would consider further evaluation by hematology for bicytopenia

## 2021-12-16 NOTE — H&P ADULT - PROBLEM SELECTOR PLAN 3
known hx Recurrent UTI's  Last seen by Gyn in 07/2021, thought to be due to overactive bladder urinary retention (feeling urge to urinate shortly after urination with minimal residual urine) where pt passed urodynamic studies and was prescribed Phenazopyridine, counseled avoidance of caffeine and overhydration, and clotrimazole for Vaginal/ Vulvar atrophy  S/p 2 days Levofloxacin  Plan:   - Post- void Bladder scan  - Strict I' and O's  - f/u Urine cultures  - revisit need for cystoscopy, as recommended at a prior Urology visit in 10/2021  - Avoid caffeine, overhydration Bilateral breast Cancer s/p Lt lumpectomy 11/1994, and bilateral biopsy 12/2020 w/t no known recurrence  Follows with Dr. Batista at [facility]   on Exemestane 25mg daily at home

## 2021-12-16 NOTE — H&P ADULT - PROBLEM SELECTOR PLAN 4
Bilateral breast Cancer s/p Lt lumpectomy 11/1994, and bilateral biopsy 12/2020 w/t no known recurrence  Follows with Dr. Batista at [facility]   on Exemestane 25mg daily at home Plan:   - c/w Escitalopram 5mg daily  - c/w Wellbutrin 300mg nightly  - holding Alprazolam for now AST/ALT's >2  127/77  Possibly due to hepatic steatosis i/s/o any recent alcohol use, versus any medication- induced liver injury  No elevated bili or alk phos   Plan:   - Hepatitis panel  - Acetaminophen levels  - Revisit social hx, alcohol use and benzodiazepines   - closely monitor

## 2021-12-16 NOTE — H&P ADULT - NSHPLABSRESULTS_GEN_ALL_CORE
10.6   3.07  )-----------( 129      ( 15 Dec 2021 14:39 )             32.6       12-15    137  |  100  |  16  ----------------------------<  110<H>  3.4<L>   |  21<L>  |  0.99    Ca    9.1      15 Dec 2021 16:23    TPro  7.7  /  Alb  3.4  /  TBili  0.3  /  DBili  x   /  AST  127<H>  /  ALT  77<H>  /  AlkPhos  75  12-15              Urinalysis Basic - ( 15 Dec 2021 16:25 )    Color: Yellow / Appearance: Clear / S.016 / pH: x  Gluc: x / Ketone: Negative  / Bili: Negative / Urobili: Negative   Blood: x / Protein: 30 mg/dL / Nitrite: Negative   Leuk Esterase: Moderate / RBC: 9 /hpf / WBC 10 /HPF   Sq Epi: x / Non Sq Epi: 1 /hpf / Bacteria: Negative        PT/INR - ( 15 Dec 2021 14:39 )   PT: 13.5 sec;   INR: 1.13 ratio         PTT - ( 15 Dec 2021 14:39 )  PTT:29.1 sec    Lactate Trend            CAPILLARY BLOOD GLUCOSE

## 2021-12-16 NOTE — H&P ADULT - PROBLEM SELECTOR PLAN 5
on Simvistatin 20mg  - continue Bilateral breast Cancer s/p Lt lumpectomy 11/1994, and bilateral biopsy 12/2020 w/t no known recurrence  Follows with Dr. Batista at [facility]   on Exemestane 25mg daily at home

## 2021-12-16 NOTE — CONSULT NOTE ADULT - ASSESSMENT
Patient with chronic dysfunctional bladder function with ? oab vs neurogenic bladder  Recurrent UTI  micro hematuria and pyuria with negative culture    no acute gu pathology prompting inpt lantigua at this time  medical management  will require outpatient gu fu with cysto and urodynamics when stable.

## 2021-12-16 NOTE — H&P ADULT - ASSESSMENT
88 y/o F pmh  hx bilat breast ca on Exemestane , recurrent UTI's, s/p covid w/o sequelae, fully vaccinated, hyperlipidemia, depression/ anxiety, who presents with chills, x5 days, profuse sweating, suprapubic pressure, most likely secondary to URI, found with mildly positive urinalysis suggesting persistent UTI 86 y/o F pmh  hx bilat breast ca on Exemestane , recurrent UTI's, s/p covid w/o sequelae, fully vaccinated, hyperlipidemia, depression/ anxiety, who presents with chills, x5 days, profuse sweating, suprapubic pressure, most likely secondary to URI, found with mildly positive urinalysis suggesting persistent UTI.

## 2021-12-17 ENCOUNTER — TRANSCRIPTION ENCOUNTER (OUTPATIENT)
Age: 86
End: 2021-12-17

## 2021-12-17 ENCOUNTER — NON-APPOINTMENT (OUTPATIENT)
Age: 86
End: 2021-12-17

## 2021-12-17 VITALS
SYSTOLIC BLOOD PRESSURE: 105 MMHG | DIASTOLIC BLOOD PRESSURE: 57 MMHG | RESPIRATION RATE: 18 BRPM | HEART RATE: 56 BPM | OXYGEN SATURATION: 94 % | TEMPERATURE: 98 F

## 2021-12-17 LAB
APAP SERPL-MCNC: <15 UG/ML — SIGNIFICANT CHANGE UP (ref 10–30)
HAV IGM SER-ACNC: SIGNIFICANT CHANGE UP
HBV CORE IGM SER-ACNC: SIGNIFICANT CHANGE UP
HBV SURFACE AG SER-ACNC: SIGNIFICANT CHANGE UP
HCV AB S/CO SERPL IA: 0.26 S/CO — SIGNIFICANT CHANGE UP (ref 0–0.99)
HCV AB SERPL-IMP: SIGNIFICANT CHANGE UP

## 2021-12-17 PROCEDURE — 99239 HOSP IP/OBS DSCHRG MGMT >30: CPT

## 2021-12-17 RX ORDER — MIRTAZAPINE 45 MG/1
1 TABLET, ORALLY DISINTEGRATING ORAL
Qty: 30 | Refills: 0
Start: 2021-12-17 | End: 2022-01-15

## 2021-12-17 RX ORDER — CEFUROXIME AXETIL 250 MG
1 TABLET ORAL
Qty: 6 | Refills: 0
Start: 2021-12-17 | End: 2021-12-19

## 2021-12-17 RX ADMIN — CEFTRIAXONE 100 MILLIGRAM(S): 500 INJECTION, POWDER, FOR SOLUTION INTRAMUSCULAR; INTRAVENOUS at 05:39

## 2021-12-17 RX ADMIN — Medication 1 TABLET(S): at 13:01

## 2021-12-17 RX ADMIN — EXEMESTANE 25 MILLIGRAM(S): 25 TABLET, SUGAR COATED ORAL at 13:01

## 2021-12-17 RX ADMIN — HEPARIN SODIUM 5000 UNIT(S): 5000 INJECTION INTRAVENOUS; SUBCUTANEOUS at 05:38

## 2021-12-17 RX ADMIN — ESCITALOPRAM OXALATE 5 MILLIGRAM(S): 10 TABLET, FILM COATED ORAL at 13:01

## 2021-12-17 RX ADMIN — BUPROPION HYDROCHLORIDE 300 MILLIGRAM(S): 150 TABLET, EXTENDED RELEASE ORAL at 13:01

## 2021-12-17 NOTE — PROGRESS NOTE ADULT - PROBLEM SELECTOR PLAN 3
known hx Recurrent UTI's  Last seen by Gyn in 07/2021, thought to be due to overactive bladder urinary retention (feeling urge to urinate shortly after urination with minimal residual urine) where pt passed urodynamic studies and was prescribed Phenazopyridine, counseled avoidance of caffeine and overhydration, and clotrimazole for Vaginal/ Vulvar atrophy  S/p 2 days Levofloxacin  - Post- void Bladder scan  - Strict I' and O's  - Urine cultures NGTD  - Seen by Urology ; reccs outpt follow up with Dr Goldberg
known hx Recurrent UTI's  Last seen by Gyn in 07/2021, thought to be due to overactive bladder urinary retention (feeling urge to urinate shortly after urination with minimal residual urine) where pt passed urodynamic studies and was prescribed Phenazopyridine, counseled avoidance of caffeine and overhydration, and clotrimazole for Vaginal/ Vulvar atrophy  S/p 2 days Levofloxacin  Plan:   - Post- void Bladder scan  - Strict I' and O's  - f/u Urine cultures  - Urology consult given h/o urinary retention

## 2021-12-17 NOTE — PROGRESS NOTE ADULT - ASSESSMENT
86 y/o F pmh  hx bilat breast ca on Exemestane , recurrent UTI's, s/p covid w/o sequelae, fully vaccinated, hyperlipidemia, depression/ anxiety, who presents with chills, x5 days, profuse sweating, suprapubic pressure, most likely secondary to URI, found with mildly positive urinalysis suggesting persistent UTI. 
88 y/o F pmh  hx bilat breast ca on Exemestane , recurrent UTI's, s/p covid w/o sequelae, fully vaccinated, hyperlipidemia, depression/ anxiety, who presents with chills, x5 days, profuse sweating, suprapubic pressure, most likely secondary to URI, found with mildly positive urinalysis suggesting persistent UTI.

## 2021-12-17 NOTE — PROGRESS NOTE ADULT - TIME BILLING
case complexity and discharge planning. Pt is clinically stable for discharge home. She is to f/up with PCP, Heme/onc, Urology.    d/w ACP Zeta

## 2021-12-17 NOTE — DISCHARGE NOTE PROVIDER - CARE PROVIDERS DIRECT ADDRESSES
[email or web address]

## 2021-12-17 NOTE — PROGRESS NOTE ADULT - PROBLEM SELECTOR PLAN 1
5x days intermittent chills, 1X day sweating, chronic suprapubic discomfort  Most likely secondary to possible infection of  etiology/ recurrent UTI versus Upper Respiratory Virus  mildly positive UA, urinary frequency; hx recurrent UTI's  leukopenic with elevated neutrophils;   Plan:   - IV Ceftriaxone 1g IV Daily  - s/p 1L Fluid bolus, encourage po fluid intake versus reassess need for additional fluids  - f/u fever curve if any; f/u wbc  - f/u urine cx, blood cx, RVP   - f/u TSH, T4
5x days intermittent chills, 1X day sweating, chronic suprapubic discomfort  Most likely secondary to possible infection of  etiology/ recurrent UTI   mildly positive UA, urinary frequency; hx recurrent UTI's  leukopenic with elevated neutrophils;   -on Ceftriaxone day 2/5  - urine cx, blood cx NGTD  - pt is clinically stable for discharge to complete a total of 5 days abx ; will d/c home on Cefuroxime 500mg po bid x 3 days

## 2021-12-17 NOTE — PHYSICAL THERAPY INITIAL EVALUATION ADULT - PERTINENT HX OF CURRENT PROBLEM, REHAB EVAL
88 y/o F pmh  hx bilat breast ca on Exemestane , recurrent UTI's, s/p covid w/o sequelae, fully vaccinated, hyperlipidemia, depression/ anxiety, who presents with chills, x5 days, profuse sweating, suprapubic pressure, most likely secondary to URI, found with mildly positive urinalysis suggesting persistent UTI.

## 2021-12-17 NOTE — DISCHARGE NOTE NURSING/CASE MANAGEMENT/SOCIAL WORK - PATIENT PORTAL LINK FT
You can access the FollowMyHealth Patient Portal offered by [facility] by registering at the following website: [email or web address]. By joining [employer]’s FollowMyHealth portal, you will also be able to view your health information using other applications (apps) compatible with our system.

## 2021-12-17 NOTE — DISCHARGE NOTE PROVIDER - NSDCCPCAREPLAN_GEN_ALL_CORE_FT
PRINCIPAL DISCHARGE DIAGNOSIS  Diagnosis: Recurrent UTI (urinary tract infection)  Assessment and Plan of Treatment: You had a bladder &/or kidney infection  Call your doctor with pain or burning with urination, frequent urination, feeling to urinate suddenly, blood in urine, fever, back pain, nausea or vomiting  You need to take and finish your antibiotic as prescribed so that the infection does not reoccur  Drink adequate fluids - there is no harm to try cranberry juice  Follow up with Dr Goldberg in one week

## 2021-12-17 NOTE — PROGRESS NOTE ADULT - PROBLEM SELECTOR PLAN 5
Bilateral breast Cancer s/p Lt lumpectomy 11/1994, and bilateral biopsy 12/2020 w/t no known recurrence  Follows with Dr. Batista at [place]  c/w Exemestane 25mg daily
Bilateral breast Cancer s/p Lt lumpectomy 11/1994, and bilateral biopsy 12/2020 w/t no known recurrence  Follows with Dr. Batista at [place]  c/w Exemestane 25mg daily at home

## 2021-12-17 NOTE — PROGRESS NOTE ADULT - PROBLEM SELECTOR PLAN 8
Diet: Regular  DVT Prophylaxis: subcutaneous heparin 5,000 U daily
Diet: Regular  DVT Prophylaxis: subcutaneous heparin 5,000 U daily  Dispo:

## 2021-12-17 NOTE — PROGRESS NOTE ADULT - PROBLEM SELECTOR PLAN 4
AST/ALT's >2  127/77  Possibly due to hepatic steatosis i/s/o any recent alcohol use, versus any medication- induced liver injury  No elevated bili or alk phos   Plan:   - f/up Hepatitis panel  - f/up Acetaminophen levels  - closely monitor
AST/ALT's >2  127/77  Possibly due to hepatic steatosis i/s/o any recent alcohol use, versus any medication- induced liver injury  No elevated bili or alk phos   - Hepatitis panel negative  - Acetaminophen levels unremarkable

## 2021-12-17 NOTE — PROGRESS NOTE ADULT - PROBLEM SELECTOR PLAN 2
Leukopenia w/t thrombocytopenia  - likely 2/2 Ibrance which was d/frandy 4 days ago by Heme/onc  - c/w out pt follow up
Leukopenia w/t thrombocytopenia  Ruling out any infectious etiology versus hematologic derangement if not infectious cause  Plan:   - Trend CBC w/ diff  - Peripheral smear  - Monitor closely  - may be 2/2 Ibrance which was d/frandy 3 days ago

## 2021-12-17 NOTE — PROGRESS NOTE ADULT - SUBJECTIVE AND OBJECTIVE BOX
Patient is a 87y old  Female who presents with a chief complaint of Chills (17 Dec 2021 13:17)      SUBJECTIVE / OVERNIGHT EVENTS:  Pt seen and examined. No acute events overnight. She denies fever/chills, n/v, urinary symptoms.    MEDICATIONS  (STANDING):  buPROPion XL (24-Hour) . 300 milliGRAM(s) Oral at bedtime  cefTRIAXone   IVPB 1000 milliGRAM(s) IV Intermittent every 24 hours  escitalopram 5 milliGRAM(s) Oral daily  exemestane 25 milliGRAM(s) Oral daily  heparin   Injectable 5000 Unit(s) SubCutaneous every 12 hours  mirtazapine 7.5 milliGRAM(s) Oral at bedtime  multivitamin 1 Tablet(s) Oral daily  simvastatin 20 milliGRAM(s) Oral at bedtime    MEDICATIONS  (PRN):  sodium chloride 0.65% Nasal 1 Spray(s) Both Nostrils three times a day PRN Nasal Congestion      Vital Signs Last 24 Hrs  T(C): 36.6 (17 Dec 2021 13:56), Max: 37 (17 Dec 2021 05:35)  T(F): 97.8 (17 Dec 2021 13:56), Max: 98.6 (17 Dec 2021 05:35)  HR: 56 (17 Dec 2021 13:56) (56 - 70)  BP: 105/57 (17 Dec 2021 13:56) (103/65 - 150/67)  BP(mean): --  RR: 18 (17 Dec 2021 13:56) (18 - 18)  SpO2: 94% (17 Dec 2021 13:56) (94% - 98%)  CAPILLARY BLOOD GLUCOSE        I&O's Summary    16 Dec 2021 07:01  -  17 Dec 2021 07:00  --------------------------------------------------------  IN: 530 mL / OUT: 675 mL / NET: -145 mL    17 Dec 2021 07:01  -  17 Dec 2021 16:49  --------------------------------------------------------  IN: 490 mL / OUT: 0 mL / NET: 490 mL        PHYSICAL EXAM:  GENERAL: NAD, anicteric, afebrile  HEAD:  Atraumatic, Normocephalic  EYES: conjunctiva and sclera clear  NECK: Supple, No JVD  CHEST/LUNG: Clear to auscultation bilaterally; No wheeze  HEART: Regular rate and rhythm; No murmurs, rubs, or gallops  ABDOMEN: Soft, Nontender, Nondistended; Bowel sounds present  EXTREMITIES:  2+ Peripheral Pulses, No clubbing, cyanosis, or edema  PSYCH: AAOx3  NEUROLOGY: non-focal  SKIN: No rashes or lesions    LABS:                        9.0    2.60  )-----------( 114      ( 16 Dec 2021 05:03 )             27.8     12-16    138  |  106  |  13  ----------------------------<  133<H>  3.7   |  21<L>  |  0.95    Ca    8.4      16 Dec 2021 05:03  Phos  2.5     12-16  Mg     2.3     12-16    TPro  6.7  /  Alb  3.2<L>  /  TBili  0.1<L>  /  DBili  x   /  AST  64<H>  /  ALT  79<H>  /  AlkPhos  66  12-16

## 2021-12-17 NOTE — DISCHARGE NOTE NURSING/CASE MANAGEMENT/SOCIAL WORK - NSDCPEFALRISK_GEN_ALL_CORE
For information on Fall & Injury Prevention, visit: [email or web address] OR  [email or web address] OR  https://www.cdc.gov/steadi/patient.html

## 2021-12-17 NOTE — PROGRESS NOTE ADULT - PROBLEM SELECTOR PLAN 6
Plan:   - c/w Escitalopram 5mg daily  - c/w Wellbutrin 300mg nightly  - c/t hold Alprazolam for now
- c/w Escitalopram 5mg daily  - c/w Wellbutrin 300mg nightly  - c/t hold Alprazolam for now

## 2021-12-17 NOTE — DISCHARGE NOTE PROVIDER - NSDCMRMEDTOKEN_GEN_ALL_CORE_FT
ALPRAZolam 0.25 mg oral tablet: 1 tab(s) orally 2 times a day  biotin: 1 cap(s) orally once a day  escitalopram 5 mg oral tablet: orally once a day  exemestane 25 mg oral tablet: 1 tab(s) orally once a day  multivitamin: 1 tab(s) orally once a day. Last dose 5/2/21.  simvastatin 20 mg oral tablet: 1 tab(s) orally once a day (at bedtime)  vitamin D3: 1 tab(s) orally once a day  Wellbutrin  mg/24 hours oral tablet, extended release: 1 tab(s) orally every 24 hours   ALPRAZolam 0.25 mg oral tablet: 1 tab(s) orally 2 times a day  biotin: 1 cap(s) orally once a day  cefuroxime 500 mg oral tablet: 1 tab(s) orally 2 times a day   escitalopram 5 mg oral tablet: orally once a day  exemestane 25 mg oral tablet: 1 tab(s) orally once a day  mirtazapine 7.5 mg oral tablet: 1 tab(s) orally once a day (at bedtime)  Multiple Vitamins oral tablet: 1 tab(s) orally once a day  multivitamin: 1 tab(s) orally once a day. Last dose 5/2/21.  simvastatin 20 mg oral tablet: 1 tab(s) orally once a day (at bedtime)  vitamin D3: 1 tab(s) orally once a day  Wellbutrin  mg/24 hours oral tablet, extended release: 1 tab(s) orally every 24 hours

## 2021-12-17 NOTE — PHYSICAL THERAPY INITIAL EVALUATION ADULT - GENERAL OBSERVATIONS, REHAB EVAL
Pt received ambulating independently in NAD, A&Ox4, agreeable to PT. Pt received ambulating independently in room in NAD, A&Ox4, agreeable to PT.

## 2021-12-17 NOTE — DISCHARGE NOTE PROVIDER - CARE PROVIDER_API CALL
Goldberg, Gary D D  UROLOGY  [address], [address]  [place] [address]  Phone: [phone]  Fax: [phone]  Follow Up Time: 1 week    CORNELIUS POSADAS  Internal Medicine  [address] [place] [id number]  Phone: [phone]  Fax: ()-  Follow Up Time:

## 2021-12-17 NOTE — PHYSICAL THERAPY INITIAL EVALUATION ADULT - ADDITIONAL COMMENTS
PTA pt was independent with all functional mobility, performed all ADLs independently. Pt does not own any DME, has walk in shower. Pt's sister lives nearby.

## 2021-12-17 NOTE — DISCHARGE NOTE PROVIDER - HOSPITAL COURSE
DCP with med rec discussed with Dr Elizondo PT cleared for DC home follow up with Dr Goldberg Urology in one week 86 y/o F pmh  hx bilat breast ca on Exemestane , recurrent UTI's, s/p covid w/o sequelae, fully vaccinated, hyperlipidemia, depression/ anxiety, who presents with chills, x5 days, profuse sweating, suprapubic pressure, most likely secondary to URI, found with mildly positive urinalysis suggesting persistent UTI.    Urology consulted   Most likely secondary to possible infection of  etiology/ recurrent UTI versus Upper Respiratory Virus  mildly positive UA, urinary frequency; hx recurrent UTI's  leukopenic with elevated neutrophils;   Plan:   IV Ceftriaxone 1g IV Daily  Problem: Bicytopenia.   Plan: Leukopenia w/t thrombocytopenia  Ruling out any infectious etiology versus hematologic derangement if not infectious cause  Patient with chronic dysfunctional bladder function with ? oab vs neurogenic bladder  Recurrent UTI  micro hematuria and pyuria with negative culture  no acute gu pathology prompting inpt lantigua at this time  medical management  will require outpatient gu fu with cysto and urodynamics when stable.   DCP with med rec discussed with Dr Elizondo PT cleared for DC home follow up with Dr Goldberg Urology in one week

## 2021-12-20 LAB
CULTURE RESULTS: SIGNIFICANT CHANGE UP
CULTURE RESULTS: SIGNIFICANT CHANGE UP
SPECIMEN SOURCE: SIGNIFICANT CHANGE UP
SPECIMEN SOURCE: SIGNIFICANT CHANGE UP

## 2021-12-22 DIAGNOSIS — Z11.59 ENCOUNTER FOR SCREENING FOR OTHER VIRAL DISEASES: ICD-10-CM

## 2021-12-28 LAB — SARS-COV-2 N GENE NPH QL NAA+PROBE: NOT DETECTED

## 2021-12-30 PROCEDURE — 85610 PROTHROMBIN TIME: CPT

## 2021-12-30 PROCEDURE — 84145 PROCALCITONIN (PCT): CPT

## 2021-12-30 PROCEDURE — 85027 COMPLETE CBC AUTOMATED: CPT

## 2021-12-30 PROCEDURE — 87086 URINE CULTURE/COLONY COUNT: CPT

## 2021-12-30 PROCEDURE — 82435 ASSAY OF BLOOD CHLORIDE: CPT

## 2021-12-30 PROCEDURE — 87040 BLOOD CULTURE FOR BACTERIA: CPT

## 2021-12-30 PROCEDURE — 83735 ASSAY OF MAGNESIUM: CPT

## 2021-12-30 PROCEDURE — 80307 DRUG TEST PRSMV CHEM ANLYZR: CPT

## 2021-12-30 PROCEDURE — 85025 COMPLETE CBC W/AUTO DIFF WBC: CPT

## 2021-12-30 PROCEDURE — 82330 ASSAY OF CALCIUM: CPT

## 2021-12-30 PROCEDURE — 84436 ASSAY OF TOTAL THYROXINE: CPT

## 2021-12-30 PROCEDURE — 84132 ASSAY OF SERUM POTASSIUM: CPT

## 2021-12-30 PROCEDURE — 81001 URINALYSIS AUTO W/SCOPE: CPT

## 2021-12-30 PROCEDURE — 82565 ASSAY OF CREATININE: CPT

## 2021-12-30 PROCEDURE — 80048 BASIC METABOLIC PNL TOTAL CA: CPT

## 2021-12-30 PROCEDURE — 36415 COLL VENOUS BLD VENIPUNCTURE: CPT

## 2021-12-30 PROCEDURE — 71045 X-RAY EXAM CHEST 1 VIEW: CPT

## 2021-12-30 PROCEDURE — 83605 ASSAY OF LACTIC ACID: CPT

## 2021-12-30 PROCEDURE — 82947 ASSAY GLUCOSE BLOOD QUANT: CPT

## 2021-12-30 PROCEDURE — 97161 PT EVAL LOW COMPLEX 20 MIN: CPT

## 2021-12-30 PROCEDURE — 93005 ELECTROCARDIOGRAM TRACING: CPT

## 2021-12-30 PROCEDURE — 0225U NFCT DS DNA&RNA 21 SARSCOV2: CPT

## 2021-12-30 PROCEDURE — 85018 HEMOGLOBIN: CPT

## 2021-12-30 PROCEDURE — 84100 ASSAY OF PHOSPHORUS: CPT

## 2021-12-30 PROCEDURE — 84443 ASSAY THYROID STIM HORMONE: CPT

## 2021-12-30 PROCEDURE — U0005: CPT

## 2021-12-30 PROCEDURE — 84295 ASSAY OF SERUM SODIUM: CPT

## 2021-12-30 PROCEDURE — U0003: CPT

## 2021-12-30 PROCEDURE — 99285 EMERGENCY DEPT VISIT HI MDM: CPT

## 2021-12-30 PROCEDURE — 85730 THROMBOPLASTIN TIME PARTIAL: CPT

## 2021-12-30 PROCEDURE — 80074 ACUTE HEPATITIS PANEL: CPT

## 2021-12-30 PROCEDURE — 80053 COMPREHEN METABOLIC PANEL: CPT

## 2021-12-30 PROCEDURE — 82803 BLOOD GASES ANY COMBINATION: CPT

## 2021-12-30 PROCEDURE — 85014 HEMATOCRIT: CPT

## 2022-04-11 PROBLEM — Z11.59 SCREENING FOR VIRAL DISEASE: Status: ACTIVE | Noted: 2021-12-22

## 2022-04-25 NOTE — ED PROVIDER NOTE - OBJECTIVE STATEMENT
85 yo female PMHx HTN, HLD, breast CA (on IBRANCE and Anastrozole) recently seen in ED 3/22/21 found to have UTI prescribed cefpodoxime now presenting to the ED with RUQ pain, nausea, and lack of appetite x1-2 days. Patient has been hydrating but not really eating. Patient is worse after eating and is described to be crampy. Feels nauesous without detailed exam 87 yo female PMHx HTN, HLD, breast CA (on IBRANCE and Anastrozole) recently seen in ED 3/22/21 found to have UTI prescribed cefpodoxime now presenting to the ED with RUQ pain, nausea, and lack of appetite x1-2 days. Patient has been hydrating but not really eating. Patient is worse after eating and is described to be crampy. Feels nauseous without vomiting. Patient had COVID 1/2021. Patient denied CP, SOB, diarrhea, urinary, dysuria, hematuria, rash. Sent in by PMD for hypotension and tachycardia

## 2022-05-12 NOTE — ED PROVIDER NOTE - CONDITION AT DISCHARGE:
Only smokes a cigar every once in a while.  Smoking cessation discussed.   Discussed benefits of quitting including improved health, decreased cardiac/vascular/pulmonary/stroke risks as well as saving money.     Improved

## 2022-05-25 ENCOUNTER — APPOINTMENT (OUTPATIENT)
Dept: ORTHOPEDIC SURGERY | Facility: CLINIC | Age: 87
End: 2022-05-25
Payer: MEDICARE

## 2022-05-25 VITALS — HEIGHT: 60 IN | BODY MASS INDEX: 28.86 KG/M2 | WEIGHT: 147 LBS

## 2022-05-25 PROCEDURE — 99213 OFFICE O/P EST LOW 20 MIN: CPT

## 2022-05-25 PROCEDURE — 72170 X-RAY EXAM OF PELVIS: CPT | Mod: LT

## 2022-05-25 PROCEDURE — 73502 X-RAY EXAM HIP UNI 2-3 VIEWS: CPT | Mod: LT

## 2022-05-25 NOTE — PHYSICAL EXAM
[de-identified] : Constitutional:Well nourished , well developed and in no acute distress\par Psychiatric: Alert and oriented to time place and person.Appropriate affect \par Skin:Head, neck, arms and lower extremities:no lesions or discoloration\par HEENT: Normocephalic, EOM intact, Nasal septum midline,\par Respiratory: Unlabored respirations,no audible wheezing ,no tachypnea, no cyanosis\par Cardiovascular: no leg swelling  no ankle edema no JVD, pulse regular\par Vascular: no calf or thigh tenderness, \par Peripheral pulses; intact\par Lymphatics:No groin adenopathy,no lymphedema lower  or upper extremities\par Left hip Flexion 100 internal 10 provocation of pain external 50 [de-identified] : X-ray AP pelvis left hip reveals left hip cemented bipolar hemiarthroplasty with narrowing of joint space and lucencies cement bone interface proximal two thirds of femur

## 2022-05-25 NOTE — DISCUSSION/SUMMARY
[de-identified] : Impression painful left hip bipolar arthroplasty.  Differential diagnosis includes arthritis of acetabulum, rule out polyethylene wear with secondary osteolysis, \par Plan Trial of Tylenol, ibuprofen, cane, follow-up 1 month.  Patient advised if symptoms unresponsive she may require conversion to total hip replacement versus full revision\par

## 2022-05-25 NOTE — HISTORY OF PRESENT ILLNESS
[de-identified] : 87-year-old female seen in follow-up.  Status post left hip bipolar hemiarthroplasty 1994.  Recent onset of intermittent pain left anterior thigh rating up into groin thigh pain is subsided.  Not experiences intermittent groin pain when walking.  Denies neurovascular symptoms lower extremity

## 2022-06-14 ENCOUNTER — RX RENEWAL (OUTPATIENT)
Age: 87
End: 2022-06-14

## 2022-09-09 ENCOUNTER — APPOINTMENT (OUTPATIENT)
Dept: INTERNAL MEDICINE | Facility: CLINIC | Age: 87
End: 2022-09-09

## 2022-09-09 ENCOUNTER — LABORATORY RESULT (OUTPATIENT)
Age: 87
End: 2022-09-09

## 2022-09-09 VITALS
SYSTOLIC BLOOD PRESSURE: 110 MMHG | TEMPERATURE: 97.8 F | HEIGHT: 60 IN | WEIGHT: 152 LBS | BODY MASS INDEX: 29.84 KG/M2 | OXYGEN SATURATION: 95 % | DIASTOLIC BLOOD PRESSURE: 70 MMHG | HEART RATE: 95 BPM

## 2022-09-09 DIAGNOSIS — R07.89 OTHER CHEST PAIN: ICD-10-CM

## 2022-09-09 PROCEDURE — 36415 COLL VENOUS BLD VENIPUNCTURE: CPT

## 2022-09-09 PROCEDURE — 99214 OFFICE O/P EST MOD 30 MIN: CPT | Mod: 25

## 2022-09-09 NOTE — PHYSICAL EXAM
[Normal] : no CVA or spinal tenderness [de-identified] : Elderly female in no acute distress [de-identified] : 1/6 aortic stenosis murmur in the base of the heart [de-identified] : Status post left axillary lymph node removal/no other abnormal lymph nodes [de-identified] : Patient's pain is in the right sciatic notch and is worse on movement and palpation [de-identified] : Patient's Romberg is positive and her gait is slightly imbalanced strength is good hand to nose is normal and she has mild vertigo when changing position

## 2022-09-09 NOTE — HISTORY OF PRESENT ILLNESS
[FreeTextEntry8] : Patient is an 88-year-old female who has known breast cancer and is on Ibrance and chemotherapy and comes in for 3 to 4-day history of right lateral rib pain which appears to increase with movement and is dull in nature and is relieved by sitting.  She does not know of any particular exacerbating injury or movement.  She denies cardiopulmonary symptoms as well as GI and  manifestations.  She has had a recent PET scan that she said was positive in 1 bone and we are getting the records

## 2022-09-09 NOTE — ASSESSMENT
[FreeTextEntry1] : Patient has musculoskeletal pain of the right sciatic notch region and we will treat her with rest heat and Tylenol if it does not get better in the possibility of metastatic disease is raised and she will need further testing.  She is seeing a neuro-ophthalmologist and we will get those records as well.  We are sending for the records from oncology.  We are doing CBC chemistry and sed rate

## 2022-09-10 LAB
ALBUMIN SERPL ELPH-MCNC: 4.5 G/DL
ALP BLD-CCNC: 51 U/L
ALT SERPL-CCNC: 16 U/L
ANION GAP SERPL CALC-SCNC: 13 MMOL/L
AST SERPL-CCNC: 19 U/L
BILIRUB SERPL-MCNC: 0.3 MG/DL
BUN SERPL-MCNC: 17 MG/DL
CALCIUM SERPL-MCNC: 9.5 MG/DL
CHLORIDE SERPL-SCNC: 106 MMOL/L
CO2 SERPL-SCNC: 25 MMOL/L
CREAT SERPL-MCNC: 1 MG/DL
EGFR: 54 ML/MIN/1.73M2
ERYTHROCYTE [SEDIMENTATION RATE] IN BLOOD BY WESTERGREN METHOD: 47 MM/HR
GLUCOSE SERPL-MCNC: 105 MG/DL
POTASSIUM SERPL-SCNC: 4.5 MMOL/L
PROT SERPL-MCNC: 7.2 G/DL
SODIUM SERPL-SCNC: 143 MMOL/L

## 2022-09-12 LAB
BASOPHILS # BLD AUTO: 0.12 K/UL
BASOPHILS NFR BLD AUTO: 4.4 %
EOSINOPHIL # BLD AUTO: 0.08 K/UL
EOSINOPHIL NFR BLD AUTO: 2.7 %
HCT VFR BLD CALC: 37.4 %
HGB BLD-MCNC: 11.8 G/DL
LYMPHOCYTES # BLD AUTO: 0.83 K/UL
LYMPHOCYTES NFR BLD AUTO: 29.5 %
MAN DIFF?: NORMAL
MCHC RBC-ENTMCNC: 31.6 GM/DL
MCHC RBC-ENTMCNC: 31.7 PG
MCV RBC AUTO: 100.5 FL
MONOCYTES # BLD AUTO: 0.1 K/UL
MONOCYTES NFR BLD AUTO: 3.6 %
NEUTROPHILS # BLD AUTO: 1.67 K/UL
NEUTROPHILS NFR BLD AUTO: 59.8 %
PLATELET # BLD AUTO: 263 K/UL
RBC # BLD: 3.72 M/UL
RBC # FLD: 14.4 %
WBC # FLD AUTO: 2.8 K/UL

## 2022-09-19 ENCOUNTER — APPOINTMENT (OUTPATIENT)
Dept: INTERNAL MEDICINE | Facility: CLINIC | Age: 87
End: 2022-09-19

## 2022-09-19 ENCOUNTER — TRANSCRIPTION ENCOUNTER (OUTPATIENT)
Age: 87
End: 2022-09-19

## 2022-09-19 ENCOUNTER — EMERGENCY (EMERGENCY)
Facility: HOSPITAL | Age: 87
LOS: 1 days | Discharge: HOME | End: 2022-09-19
Attending: EMERGENCY MEDICINE
Payer: MEDICARE

## 2022-09-19 VITALS
HEIGHT: 60 IN | SYSTOLIC BLOOD PRESSURE: 134 MMHG | RESPIRATION RATE: 18 BRPM | WEIGHT: 151.9 LBS | DIASTOLIC BLOOD PRESSURE: 66 MMHG | HEART RATE: 97 BPM | TEMPERATURE: 100 F | OXYGEN SATURATION: 94 %

## 2022-09-19 VITALS — OXYGEN SATURATION: 97 %

## 2022-09-19 DIAGNOSIS — Z98.89 OTHER SPECIFIED POSTPROCEDURAL STATES: Chronic | ICD-10-CM

## 2022-09-19 DIAGNOSIS — Z98.890 OTHER SPECIFIED POSTPROCEDURAL STATES: Chronic | ICD-10-CM

## 2022-09-19 DIAGNOSIS — Z96.649 PRESENCE OF UNSPECIFIED ARTIFICIAL HIP JOINT: Chronic | ICD-10-CM

## 2022-09-19 PROCEDURE — 99282 EMERGENCY DEPT VISIT SF MDM: CPT

## 2022-09-19 PROCEDURE — 99282 EMERGENCY DEPT VISIT SF MDM: CPT | Mod: CS

## 2022-09-19 PROCEDURE — 99443: CPT | Mod: 95

## 2022-09-19 NOTE — ED PROVIDER NOTE - CLINICAL SUMMARY MEDICAL DECISION MAKING FREE TEXT BOX
ambulatory sat with me 96-97% on room air.  no respiratory distress.  lungs clear.  patient states she feels well and feels silly for coming into the ED tonight.  reinforced return precautions for SOB or persistently low oxygen levels at home.  instructed to f/u with Dr. Slade tomorrow AM.

## 2022-09-19 NOTE — ED PROVIDER NOTE - PHYSICAL EXAMINATION
GEN: calm, cooperative  EYES: EOM intact  ENT: mucous membranes moist  HEAD: NCAT  CV: regular  RESP: no respiratory distress, lungs CTA b/l, no wheezing/rales/rhonchi  ABD: no abdominal distension  MSK: moves all extremities  NEURO: awake, alert, oriented  PSYCH: affect normal  SKIN: warm

## 2022-09-19 NOTE — ED ADULT NURSE NOTE - OBJECTIVE STATEMENT
88y female PMH breast cancer on chemo to the ED from home c/o of low oxygen saturations. Pt reports not feeling well x a few days- mostly head congestion, runny nose, and some feelings of fevers. Pt took a covid test yesterday which came back positive. Pt consulted with PCP on what to do about testing positive and PCP told pt to check oxygen saturations. Pt has a pulse oximeter at home and states that oxygen saturation was reading in the high 80s. Pt denies chest pain, palpitations, shortness of breath, headache, visual disturbances, numbness/tingling, fever, chills, diaphoresis,  nausea, vomiting, constipation, diarrhea, or urinary symptoms. Pt placed on continuos pulse oximetry- oxygen saturation 97%. Stretcher in lowest position and locked, appropriate side rails in place, room cleared of clutter and safety hazards, call bell in reach- pt oriented to use, blankets given for comfort

## 2022-09-19 NOTE — ED PROVIDER NOTE - PATIENT PORTAL LINK FT
You can access the FollowMyHealth Patient Portal offered by [facility] by registering at the following website: [email or web address]. By joining [employer]’s FollowMyHealth portal, you will also be able to view your health information using other applications (apps) compatible with our system.

## 2022-09-19 NOTE — ED PROVIDER NOTE - OBJECTIVE STATEMENT
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  was referred to ED by PCP (Yany).  denies CP, LOC, diarrhea.  states she has had COVID in the past.

## 2022-09-19 NOTE — ED PROVIDER NOTE - NSFOLLOWUPINSTRUCTIONS_ED_ALL_ED_FT
FOLLOW UP WITH DR. GORE TOMORROW.    RETURN TO THE ED IMMEDIATELY FOR TROUBLE BREATHING OR A CONSISTENTLY LOW OXYGEN LEVEL.

## 2022-09-19 NOTE — ED ADULT NURSE NOTE - SUICIDE SCREENING QUESTION 2
Last office visit: 1/23/20  Upcoming visit: 7/23/20    Last medication refill: 1/6/20 #90 tablets with 0 refills    Medication refill approved per protocol.         No

## 2022-09-25 ENCOUNTER — NON-APPOINTMENT (OUTPATIENT)
Age: 87
End: 2022-09-25

## 2022-10-12 ENCOUNTER — LABORATORY RESULT (OUTPATIENT)
Age: 87
End: 2022-10-12

## 2022-10-12 DIAGNOSIS — N39.0 URINARY TRACT INFECTION, SITE NOT SPECIFIED: ICD-10-CM

## 2022-10-13 LAB
APPEARANCE: ABNORMAL
BILIRUBIN URINE: NEGATIVE
BLOOD URINE: ABNORMAL
COLOR: YELLOW
GLUCOSE QUALITATIVE U: NEGATIVE
KETONES URINE: NEGATIVE
LEUKOCYTE ESTERASE URINE: ABNORMAL
NITRITE URINE: NEGATIVE
PH URINE: 6.5
PROTEIN URINE: NORMAL
SPECIFIC GRAVITY URINE: 1.01
UROBILINOGEN URINE: NORMAL

## 2022-11-02 ENCOUNTER — APPOINTMENT (OUTPATIENT)
Dept: ORTHOPEDIC SURGERY | Facility: CLINIC | Age: 87
End: 2022-11-02

## 2022-11-02 VITALS — BODY MASS INDEX: 29.84 KG/M2 | HEIGHT: 60 IN | WEIGHT: 152 LBS

## 2022-11-02 DIAGNOSIS — Z96.642 PRESENCE OF LEFT ARTIFICIAL HIP JOINT: ICD-10-CM

## 2022-11-02 PROCEDURE — 99214 OFFICE O/P EST MOD 30 MIN: CPT

## 2022-11-09 ENCOUNTER — TRANSCRIPTION ENCOUNTER (OUTPATIENT)
Age: 87
End: 2022-11-09

## 2022-12-06 NOTE — PRE-OP CHECKLIST - NS PREOP CHK CHLOROHEX WASH
Some day cigarette smoker. 12 pack years.    Assistance with smoking cessation was offered, including:  []  Medications  [x]  Counseling  []  Printed Information on Smoking Cessation  [x]  Referral to a Smoking Cessation Program    Patient was counseled regarding smoking for 3-10 minutes.   N/A

## 2022-12-28 ENCOUNTER — APPOINTMENT (OUTPATIENT)
Dept: INTERNAL MEDICINE | Facility: CLINIC | Age: 87
End: 2022-12-28
Payer: MEDICARE

## 2022-12-28 ENCOUNTER — LABORATORY RESULT (OUTPATIENT)
Age: 87
End: 2022-12-28

## 2022-12-28 ENCOUNTER — NON-APPOINTMENT (OUTPATIENT)
Age: 87
End: 2022-12-28

## 2022-12-28 VITALS
DIASTOLIC BLOOD PRESSURE: 80 MMHG | HEART RATE: 70 BPM | SYSTOLIC BLOOD PRESSURE: 140 MMHG | TEMPERATURE: 97.9 F | OXYGEN SATURATION: 97 % | HEIGHT: 60 IN | BODY MASS INDEX: 30.82 KG/M2 | WEIGHT: 157 LBS

## 2022-12-28 DIAGNOSIS — C50.912 MALIGNANT NEOPLASM OF UNSPECIFIED SITE OF LEFT FEMALE BREAST: ICD-10-CM

## 2022-12-28 DIAGNOSIS — Z96.642 PRESENCE OF LEFT ARTIFICIAL HIP JOINT: ICD-10-CM

## 2022-12-28 DIAGNOSIS — Z00.00 ENCOUNTER FOR GENERAL ADULT MEDICAL EXAMINATION W/OUT ABNORMAL FINDINGS: ICD-10-CM

## 2022-12-28 PROCEDURE — 82270 OCCULT BLOOD FECES: CPT

## 2022-12-28 PROCEDURE — G0439: CPT

## 2022-12-28 PROCEDURE — 36415 COLL VENOUS BLD VENIPUNCTURE: CPT

## 2022-12-28 PROCEDURE — 93000 ELECTROCARDIOGRAM COMPLETE: CPT | Mod: 59

## 2022-12-28 NOTE — PHYSICAL EXAM
[Normal] : normal gait, coordination grossly intact, no focal deficits [de-identified] : Elderly lady looking younger than her stated age and walking well without aids [de-identified] : Mild ptosis of the left greater than the right, right [de-identified] : No specific lesions with the left showing induration at the 3 o'clock position secondary to surgery and the right showing changes as well around the areola area secondary to surgery [FreeTextEntry1] : Guaiac negative without masses [de-identified] : No adenopathy/status post axillary node dissections [de-identified] : Thyroid is not palpable [de-identified] : No significant lesions [de-identified] : Nonfocal although the patient complains of tingling along the calves.  She has good proprioception and light touch as well as 2+ 2+ reflexes in the knees and ankles [de-identified] : Normal affect

## 2022-12-28 NOTE — HEALTH RISK ASSESSMENT
[0] : 2) Feeling down, depressed, or hopeless: Not at all (0) [PHQ-2 Negative - No further assessment needed] : PHQ-2 Negative - No further assessment needed [[id number]] : 0

## 2022-12-28 NOTE — HISTORY OF PRESENT ILLNESS
[de-identified] : Patient is an 88-year-old female who has bilateral breast CA and is treated with [facility] and is on Ibrance and doing well.  She is also treated by psychiatry for depression and is on combination medications and doing quite well at this time.  She is also treated with a statin for hyperlipidemia.  With these exceptions she has been generally well and lives alone and takes care of all her ADLs.  Her active.  He is generally walking and doing housework and she denies chest pain palpitations swelling fainting or dyspnea.  She has occasional heartburn for which he uses Pepcid 20 twice a day with good results.  She denies blood in the stool black stool or change in bowel habit.  She occasionally has had urinary tract infections but is not having symptoms at this point and no gynecologic symptoms.  She had a hip replacement 20 years ago and would like to see an orthopedic doctor who she was referred to and she was referred to a dentist as well for denture replacement.  With these exception she is generally feeling well

## 2022-12-28 NOTE — ASSESSMENT
[FreeTextEntry1] : The patient is being followed for hyperlipidemia and we are checking her lipids CPK and liver functions and she is seeing oncology for bilateral breast CA which is under control and she is due for a PET scan.  She is treated for depression by psychiatry and doing quite well on combination meds.  She is advised to see dentistry for her teeth and orthopedics for her hip.  All screening tests including EKG urine and bloods were completed.  Her EKG is within normal limits

## 2022-12-28 NOTE — REVIEW OF SYSTEMS
[Negative] : Heme/Lymph [FreeTextEntry4] : Needs dental work [FreeTextEntry9] : To see orthopedics for hip

## 2022-12-29 LAB
ALBUMIN SERPL ELPH-MCNC: 4.4 G/DL
ALP BLD-CCNC: 49 U/L
ALT SERPL-CCNC: 16 U/L
ANION GAP SERPL CALC-SCNC: 9 MMOL/L
APPEARANCE: CLEAR
AST SERPL-CCNC: 18 U/L
BACTERIA: NEGATIVE
BASOPHILS # BLD AUTO: 0.06 K/UL
BASOPHILS NFR BLD AUTO: 2.2 %
BILIRUB SERPL-MCNC: 0.3 MG/DL
BILIRUBIN URINE: NEGATIVE
BLOOD URINE: NEGATIVE
BUN SERPL-MCNC: 16 MG/DL
CALCIUM SERPL-MCNC: 9.3 MG/DL
CHLORIDE SERPL-SCNC: 106 MMOL/L
CHOLEST SERPL-MCNC: 131 MG/DL
CK SERPL-CCNC: 78 U/L
CO2 SERPL-SCNC: 27 MMOL/L
COLOR: YELLOW
CREAT SERPL-MCNC: 1 MG/DL
CRP SERPL HS-MCNC: 6.7 MG/L
EGFR: 54 ML/MIN/1.73M2
EOSINOPHIL # BLD AUTO: 0.05 K/UL
EOSINOPHIL NFR BLD AUTO: 1.9 %
GLUCOSE QUALITATIVE U: NEGATIVE
GLUCOSE SERPL-MCNC: 96 MG/DL
HCT VFR BLD CALC: 36.4 %
HDLC SERPL-MCNC: 50 MG/DL
HGB BLD-MCNC: 11.7 G/DL
HYALINE CASTS: 0 /LPF
IMM GRANULOCYTES NFR BLD AUTO: 0.4 %
KETONES URINE: NEGATIVE
LDLC SERPL CALC-MCNC: 65 MG/DL
LEUKOCYTE ESTERASE URINE: ABNORMAL
LYMPHOCYTES # BLD AUTO: 0.67 K/UL
LYMPHOCYTES NFR BLD AUTO: 25.1 %
MAN DIFF?: NORMAL
MCHC RBC-ENTMCNC: 32.1 GM/DL
MCHC RBC-ENTMCNC: 33 PG
MCV RBC AUTO: 102.5 FL
MICROSCOPIC-UA: NORMAL
MONOCYTES # BLD AUTO: 0.31 K/UL
MONOCYTES NFR BLD AUTO: 11.6 %
NEUTROPHILS # BLD AUTO: 1.57 K/UL
NEUTROPHILS NFR BLD AUTO: 58.8 %
NITRITE URINE: NEGATIVE
NONHDLC SERPL-MCNC: 81 MG/DL
PH URINE: 6
PLATELET # BLD AUTO: 272 K/UL
POTASSIUM SERPL-SCNC: 4.8 MMOL/L
PROT SERPL-MCNC: 6.6 G/DL
PROTEIN URINE: NEGATIVE
RBC # BLD: 3.55 M/UL
RBC # FLD: 14.5 %
RED BLOOD CELLS URINE: 0 /HPF
SODIUM SERPL-SCNC: 142 MMOL/L
SPECIFIC GRAVITY URINE: 1.01
SQUAMOUS EPITHELIAL CELLS: 2 /HPF
TRIGL SERPL-MCNC: 78 MG/DL
TSH SERPL-ACNC: 2.02 UIU/ML
UROBILINOGEN URINE: NORMAL
WBC # FLD AUTO: 2.67 K/UL
WHITE BLOOD CELLS URINE: 3 /HPF

## 2023-01-01 NOTE — ED ADULT NURSE NOTE - NSICDXPASTMEDICALHX_GEN_ALL_CORE_FT
PAST MEDICAL HISTORY:  2019 novel coronavirus disease (COVID-19) 1/7/2021    Anxiety     Breast cancer     Cholecystitis     Cholelithiasis     Depression     Gallbladder stone without cholecystitis or obstruction     History of chemotherapy and radiation-1994    HLD (hyperlipidemia)     Hyperlipidemia     Recurrent breast cancer opposite side, left-3/2020    S/P chemotherapy, time since less than 4 weeks     UTI (lower urinary tract infection)      This document is complete and the patient is ready for discharge.

## 2023-02-17 NOTE — H&P PST ADULT - BREASTS
Impression: Amyloid pterygium of left eye: H11.012. Plan: Discussed diagnosis in detail with patient. Discussed treatment options with patient. Patient instructed to use artificial tears and sun protection. Will continue to observe condition and or symptoms. detailed exam

## 2023-03-01 ENCOUNTER — APPOINTMENT (OUTPATIENT)
Dept: GASTROENTEROLOGY | Facility: CLINIC | Age: 88
End: 2023-03-01

## 2023-03-17 NOTE — ED PROVIDER NOTE - NSICDXPASTSURGICALHX_GEN_ALL_CORE_FT
PAST SURGICAL HISTORY:  H/O lumpectomy left-11/1994    History of hip replacement left-1994    S/P Mohs surgery for basal cell carcinoma right side of nose-2020     no

## 2023-03-21 ENCOUNTER — RESULT REVIEW (OUTPATIENT)
Age: 88
End: 2023-03-21

## 2023-03-21 ENCOUNTER — APPOINTMENT (OUTPATIENT)
Dept: GASTROENTEROLOGY | Facility: CLINIC | Age: 88
End: 2023-03-21
Payer: MEDICARE

## 2023-03-21 VITALS
WEIGHT: 156 LBS | BODY MASS INDEX: 30.63 KG/M2 | HEART RATE: 85 BPM | DIASTOLIC BLOOD PRESSURE: 57 MMHG | SYSTOLIC BLOOD PRESSURE: 131 MMHG | HEIGHT: 60 IN

## 2023-03-21 DIAGNOSIS — R13.10 DYSPHAGIA, UNSPECIFIED: ICD-10-CM

## 2023-03-21 DIAGNOSIS — R14.2 ERUCTATION: ICD-10-CM

## 2023-03-21 DIAGNOSIS — R12 HEARTBURN: ICD-10-CM

## 2023-03-21 PROCEDURE — 99204 OFFICE O/P NEW MOD 45 MIN: CPT

## 2023-03-21 RX ORDER — SULFAMETHOXAZOLE AND TRIMETHOPRIM 400; 80 MG/1; MG/1
400-80 TABLET ORAL TWICE DAILY
Qty: 14 | Refills: 1 | Status: DISCONTINUED | COMMUNITY
Start: 2021-08-19 | End: 2023-03-21

## 2023-03-21 RX ORDER — CLOBETASOL PROPIONATE 0.5 MG/ML
0.05 SOLUTION TOPICAL
Qty: 1 | Refills: 2 | Status: DISCONTINUED | COMMUNITY
Start: 2019-11-05 | End: 2023-03-21

## 2023-03-21 RX ORDER — SULFAMETHOXAZOLE AND TRIMETHOPRIM 800; 160 MG/1; MG/1
800-160 TABLET ORAL TWICE DAILY
Qty: 10 | Refills: 0 | Status: DISCONTINUED | COMMUNITY
Start: 2021-09-09 | End: 2023-03-21

## 2023-03-21 RX ORDER — CEFPODOXIME PROXETIL 200 MG/1
200 TABLET, FILM COATED ORAL
Refills: 0 | Status: DISCONTINUED | COMMUNITY
Start: 2021-04-02 | End: 2023-03-21

## 2023-03-21 RX ORDER — CLOTRIMAZOLE AND BETAMETHASONE DIPROPIONATE 10; .5 MG/G; MG/G
1-0.05 CREAM TOPICAL
Qty: 1 | Refills: 3 | Status: DISCONTINUED | COMMUNITY
Start: 2021-07-22 | End: 2023-03-21

## 2023-03-21 RX ORDER — KETOCONAZOLE 20 MG/G
2 CREAM TOPICAL
Qty: 1 | Refills: 2 | Status: DISCONTINUED | COMMUNITY
Start: 2019-11-05 | End: 2023-03-21

## 2023-03-21 NOTE — HISTORY OF PRESENT ILLNESS
[FreeTextEntry1] : Quinton presents to the office today for evaluation of upper GI symptoms.\par \par For the past 3 months, the patient reports that her "digestive system has gone crazy".  First, she feels an abnormal sensation as she swallows with both solids and liquids.  She can feel the food slowly going down her esophagus.  She denies any impactions, nausea of odynophagia.  Second, she has been having increased heartburn, usually after she eats.  Third, she has been burping excessively after eating.  She started taking Pepcid about one month ago and her heartburn has improved but she continues to have difficulty swallowing and eructation.  She also notes that her bowel movements are not always regular since her upper GI symptoms started.  She denies any recent illness, diet or medication changes before her GI symptoms became worse.  She did have extensive dental work done though and was eating mostly soft foods before that.  She has started to eat more but reports that she feels full quicker.  Her weight has been stable.\par \par The patient has a history of breast cancer treated with lumpectomy, chemo and RT in 1994.  In November 2020, she was diagnosed with a new second primary breast cancer with metastatic chest wall lesions.  She started anastrazole in 2020 and Ibrance in 2021.  She reports that she had a PET-CT within the last 2 months.

## 2023-03-21 NOTE — REASON FOR VISIT
[Consultation] : a consultation visit [FreeTextEntry1] : Difficulty swallowing, along with heartburn and belching

## 2023-04-04 ENCOUNTER — OUTPATIENT (OUTPATIENT)
Dept: OUTPATIENT SERVICES | Facility: HOSPITAL | Age: 88
LOS: 1 days | End: 2023-04-04
Payer: MEDICARE

## 2023-04-04 ENCOUNTER — APPOINTMENT (OUTPATIENT)
Dept: RADIOLOGY | Facility: HOSPITAL | Age: 88
End: 2023-04-04
Payer: MEDICARE

## 2023-04-04 DIAGNOSIS — Z98.89 OTHER SPECIFIED POSTPROCEDURAL STATES: Chronic | ICD-10-CM

## 2023-04-04 DIAGNOSIS — R13.10 DYSPHAGIA, UNSPECIFIED: ICD-10-CM

## 2023-04-04 DIAGNOSIS — Z96.649 PRESENCE OF UNSPECIFIED ARTIFICIAL HIP JOINT: Chronic | ICD-10-CM

## 2023-04-04 DIAGNOSIS — Z98.890 OTHER SPECIFIED POSTPROCEDURAL STATES: Chronic | ICD-10-CM

## 2023-04-04 PROCEDURE — 74221 X-RAY XM ESOPHAGUS 2CNTRST: CPT

## 2023-04-04 PROCEDURE — 74221 X-RAY XM ESOPHAGUS 2CNTRST: CPT | Mod: 26

## 2023-04-05 ENCOUNTER — NON-APPOINTMENT (OUTPATIENT)
Age: 88
End: 2023-04-05

## 2023-04-17 ENCOUNTER — NON-APPOINTMENT (OUTPATIENT)
Age: 88
End: 2023-04-17

## 2023-05-05 ENCOUNTER — APPOINTMENT (OUTPATIENT)
Dept: RADIOLOGY | Facility: HOSPITAL | Age: 88
End: 2023-05-05

## 2023-06-11 ENCOUNTER — NON-APPOINTMENT (OUTPATIENT)
Age: 88
End: 2023-06-11

## 2023-06-12 ENCOUNTER — RX RENEWAL (OUTPATIENT)
Age: 88
End: 2023-06-12

## 2023-06-12 ENCOUNTER — NON-APPOINTMENT (OUTPATIENT)
Age: 88
End: 2023-06-12

## 2023-06-15 ENCOUNTER — RX RENEWAL (OUTPATIENT)
Age: 88
End: 2023-06-15

## 2023-06-19 ENCOUNTER — LABORATORY RESULT (OUTPATIENT)
Age: 88
End: 2023-06-19

## 2023-06-19 ENCOUNTER — APPOINTMENT (OUTPATIENT)
Dept: INTERNAL MEDICINE | Facility: CLINIC | Age: 88
End: 2023-06-19
Payer: MEDICARE

## 2023-06-19 VITALS
WEIGHT: 159 LBS | OXYGEN SATURATION: 93 % | BODY MASS INDEX: 31.22 KG/M2 | DIASTOLIC BLOOD PRESSURE: 63 MMHG | TEMPERATURE: 98.1 F | HEIGHT: 60 IN | HEART RATE: 80 BPM | SYSTOLIC BLOOD PRESSURE: 154 MMHG

## 2023-06-19 VITALS — SYSTOLIC BLOOD PRESSURE: 130 MMHG | DIASTOLIC BLOOD PRESSURE: 70 MMHG

## 2023-06-19 DIAGNOSIS — M25.471 EFFUSION, RIGHT ANKLE: ICD-10-CM

## 2023-06-19 DIAGNOSIS — Z86.39 PERSONAL HISTORY OF OTHER ENDOCRINE, NUTRITIONAL AND METABOLIC DISEASE: ICD-10-CM

## 2023-06-19 DIAGNOSIS — R35.0 FREQUENCY OF MICTURITION: ICD-10-CM

## 2023-06-19 PROCEDURE — 99214 OFFICE O/P EST MOD 30 MIN: CPT | Mod: 25

## 2023-06-19 PROCEDURE — 36415 COLL VENOUS BLD VENIPUNCTURE: CPT

## 2023-06-19 RX ORDER — MIRTAZAPINE 7.5 MG/1
7.5 TABLET, FILM COATED ORAL
Qty: 90 | Refills: 3 | Status: DISCONTINUED | COMMUNITY
Start: 2021-04-02 | End: 2023-06-19

## 2023-06-19 RX ORDER — ESCITALOPRAM OXALATE 10 MG/1
10 TABLET ORAL
Qty: 90 | Refills: 0 | Status: ACTIVE | COMMUNITY
Start: 2023-02-04

## 2023-06-19 RX ORDER — METRONIDAZOLE 500 MG/1
500 TABLET ORAL EVERY 8 HOURS
Refills: 0 | Status: DISCONTINUED | COMMUNITY
Start: 2021-04-02 | End: 2023-06-19

## 2023-06-19 RX ORDER — IPRATROPIUM BROMIDE 42 UG/1
0.06 SPRAY NASAL
Qty: 15 | Refills: 0 | Status: ACTIVE | COMMUNITY
Start: 2023-04-11

## 2023-06-19 RX ORDER — PHENAZOPYRIDINE HYDROCHLORIDE 200 MG/1
200 TABLET ORAL
Qty: 60 | Refills: 0 | Status: DISCONTINUED | COMMUNITY
Start: 2021-07-22 | End: 2023-06-19

## 2023-06-20 ENCOUNTER — APPOINTMENT (OUTPATIENT)
Dept: ULTRASOUND IMAGING | Facility: CLINIC | Age: 88
End: 2023-06-20
Payer: MEDICARE

## 2023-06-20 ENCOUNTER — OUTPATIENT (OUTPATIENT)
Dept: OUTPATIENT SERVICES | Facility: HOSPITAL | Age: 88
LOS: 1 days | End: 2023-06-20
Payer: MEDICARE

## 2023-06-20 DIAGNOSIS — Z98.890 OTHER SPECIFIED POSTPROCEDURAL STATES: Chronic | ICD-10-CM

## 2023-06-20 DIAGNOSIS — Z98.89 OTHER SPECIFIED POSTPROCEDURAL STATES: Chronic | ICD-10-CM

## 2023-06-20 DIAGNOSIS — Z96.649 PRESENCE OF UNSPECIFIED ARTIFICIAL HIP JOINT: Chronic | ICD-10-CM

## 2023-06-20 DIAGNOSIS — M25.471 EFFUSION, RIGHT ANKLE: ICD-10-CM

## 2023-06-20 LAB
ALBUMIN SERPL ELPH-MCNC: 4.2 G/DL
ALP BLD-CCNC: 60 U/L
ALT SERPL-CCNC: 18 U/L
ANION GAP SERPL CALC-SCNC: 11 MMOL/L
APPEARANCE: ABNORMAL
AST SERPL-CCNC: 21 U/L
BACTERIA: NEGATIVE /HPF
BILIRUB SERPL-MCNC: 0.2 MG/DL
BILIRUBIN URINE: NEGATIVE
BLOOD URINE: ABNORMAL
BUN SERPL-MCNC: 15 MG/DL
CALCIUM SERPL-MCNC: 8.9 MG/DL
CAST: 0 /LPF
CHLORIDE SERPL-SCNC: 107 MMOL/L
CHOLEST SERPL-MCNC: 142 MG/DL
CO2 SERPL-SCNC: 24 MMOL/L
COLOR: YELLOW
CREAT SERPL-MCNC: 0.99 MG/DL
EGFR: 55 ML/MIN/1.73M2
EPITHELIAL CELLS: 2 /HPF
GLUCOSE QUALITATIVE U: NEGATIVE MG/DL
GLUCOSE SERPL-MCNC: 132 MG/DL
HDLC SERPL-MCNC: 47 MG/DL
KETONES URINE: NEGATIVE MG/DL
LDLC SERPL CALC-MCNC: 75 MG/DL
LEUKOCYTE ESTERASE URINE: ABNORMAL
MICROSCOPIC-UA: NORMAL
NITRITE URINE: NEGATIVE
NONHDLC SERPL-MCNC: 96 MG/DL
PH URINE: 6
POTASSIUM SERPL-SCNC: 4 MMOL/L
PROT SERPL-MCNC: 6.5 G/DL
PROTEIN URINE: NEGATIVE MG/DL
RED BLOOD CELLS URINE: 0 /HPF
SODIUM SERPL-SCNC: 142 MMOL/L
SPECIFIC GRAVITY URINE: 1.01
TRIGL SERPL-MCNC: 103 MG/DL
URATE SERPL-MCNC: 6.2 MG/DL
UROBILINOGEN URINE: 0.2 MG/DL
WHITE BLOOD CELLS URINE: 342 /HPF

## 2023-06-20 PROCEDURE — 93971 EXTREMITY STUDY: CPT

## 2023-06-20 PROCEDURE — 93971 EXTREMITY STUDY: CPT | Mod: 26,RT

## 2023-06-20 RX ORDER — CIPROFLOXACIN HYDROCHLORIDE 500 MG/1
500 TABLET, FILM COATED ORAL
Qty: 10 | Refills: 0 | Status: ACTIVE | COMMUNITY
Start: 2022-11-09 | End: 1900-01-01

## 2023-06-21 LAB — BACTERIA UR CULT: ABNORMAL

## 2023-07-07 ENCOUNTER — RX RENEWAL (OUTPATIENT)
Age: 88
End: 2023-07-07

## 2023-07-07 RX ORDER — PANTOPRAZOLE 20 MG/1
20 TABLET, DELAYED RELEASE ORAL DAILY
Qty: 30 | Refills: 3 | Status: ACTIVE | COMMUNITY
Start: 2023-03-21 | End: 1900-01-01

## 2023-08-23 ENCOUNTER — INPATIENT (INPATIENT)
Facility: HOSPITAL | Age: 88
LOS: 8 days | Discharge: HOME | DRG: 189 | End: 2023-09-01
Attending: INTERNAL MEDICINE | Admitting: STUDENT IN AN ORGANIZED HEALTH CARE EDUCATION/TRAINING PROGRAM
Payer: MEDICARE

## 2023-08-23 VITALS
TEMPERATURE: 98 F | HEART RATE: 78 BPM | SYSTOLIC BLOOD PRESSURE: 120 MMHG | DIASTOLIC BLOOD PRESSURE: 70 MMHG | WEIGHT: 154.98 LBS | OXYGEN SATURATION: 87 % | RESPIRATION RATE: 18 BRPM

## 2023-08-23 DIAGNOSIS — Z96.649 PRESENCE OF UNSPECIFIED ARTIFICIAL HIP JOINT: Chronic | ICD-10-CM

## 2023-08-23 DIAGNOSIS — Z98.890 OTHER SPECIFIED POSTPROCEDURAL STATES: Chronic | ICD-10-CM

## 2023-08-23 DIAGNOSIS — Z98.89 OTHER SPECIFIED POSTPROCEDURAL STATES: Chronic | ICD-10-CM

## 2023-08-23 LAB
ALBUMIN SERPL ELPH-MCNC: 3.7 G/DL — SIGNIFICANT CHANGE UP (ref 3.3–5)
ALP SERPL-CCNC: 61 U/L — SIGNIFICANT CHANGE UP (ref 40–120)
ALT FLD-CCNC: 32 U/L — SIGNIFICANT CHANGE UP (ref 10–45)
ANION GAP SERPL CALC-SCNC: 13 MMOL/L — SIGNIFICANT CHANGE UP (ref 5–17)
ANISOCYTOSIS BLD QL: SLIGHT — SIGNIFICANT CHANGE UP
AST SERPL-CCNC: 27 U/L — SIGNIFICANT CHANGE UP (ref 10–40)
BASE EXCESS BLDV CALC-SCNC: -0.3 MMOL/L — SIGNIFICANT CHANGE UP (ref -2–3)
BASOPHILS # BLD AUTO: 0.03 K/UL — SIGNIFICANT CHANGE UP (ref 0–0.2)
BASOPHILS NFR BLD AUTO: 0.9 % — SIGNIFICANT CHANGE UP (ref 0–2)
BILIRUB SERPL-MCNC: 0.3 MG/DL — SIGNIFICANT CHANGE UP (ref 0.2–1.2)
BUN SERPL-MCNC: 12 MG/DL — SIGNIFICANT CHANGE UP (ref 7–23)
BURR CELLS BLD QL SMEAR: PRESENT — SIGNIFICANT CHANGE UP
CA-I SERPL-SCNC: 1.13 MMOL/L — LOW (ref 1.15–1.33)
CALCIUM SERPL-MCNC: 8.6 MG/DL — SIGNIFICANT CHANGE UP (ref 8.4–10.5)
CHLORIDE BLDV-SCNC: 105 MMOL/L — SIGNIFICANT CHANGE UP (ref 96–108)
CHLORIDE SERPL-SCNC: 105 MMOL/L — SIGNIFICANT CHANGE UP (ref 96–108)
CO2 BLDV-SCNC: 25 MMOL/L — SIGNIFICANT CHANGE UP (ref 22–26)
CO2 SERPL-SCNC: 21 MMOL/L — LOW (ref 22–31)
CREAT SERPL-MCNC: 0.96 MG/DL — SIGNIFICANT CHANGE UP (ref 0.5–1.3)
D DIMER BLD IA.RAPID-MCNC: 836 NG/ML DDU — HIGH
EGFR: 57 ML/MIN/1.73M2 — LOW
ELLIPTOCYTES BLD QL SMEAR: SLIGHT — SIGNIFICANT CHANGE UP
EOSINOPHIL # BLD AUTO: 0.07 K/UL — SIGNIFICANT CHANGE UP (ref 0–0.5)
EOSINOPHIL NFR BLD AUTO: 2.6 % — SIGNIFICANT CHANGE UP (ref 0–6)
GAS PNL BLDV: 135 MMOL/L — LOW (ref 136–145)
GAS PNL BLDV: SIGNIFICANT CHANGE UP
GLUCOSE BLDV-MCNC: 111 MG/DL — HIGH (ref 70–99)
GLUCOSE SERPL-MCNC: 121 MG/DL — HIGH (ref 70–99)
HCO3 BLDV-SCNC: 24 MMOL/L — SIGNIFICANT CHANGE UP (ref 22–29)
HCT VFR BLD CALC: 29.3 % — LOW (ref 34.5–45)
HCT VFR BLDA CALC: 28 % — LOW (ref 34.5–46.5)
HGB BLD CALC-MCNC: 9.2 G/DL — LOW (ref 11.7–16.1)
HGB BLD-MCNC: 9.3 G/DL — LOW (ref 11.5–15.5)
LACTATE BLDV-MCNC: 0.7 MMOL/L — SIGNIFICANT CHANGE UP (ref 0.5–2)
LYMPHOCYTES # BLD AUTO: 0.32 K/UL — LOW (ref 1–3.3)
LYMPHOCYTES # BLD AUTO: 11.4 % — LOW (ref 13–44)
MACROCYTES BLD QL: SLIGHT — SIGNIFICANT CHANGE UP
MANUAL SMEAR VERIFICATION: SIGNIFICANT CHANGE UP
MCHC RBC-ENTMCNC: 31.3 PG — SIGNIFICANT CHANGE UP (ref 27–34)
MCHC RBC-ENTMCNC: 31.7 GM/DL — LOW (ref 32–36)
MCV RBC AUTO: 98.7 FL — SIGNIFICANT CHANGE UP (ref 80–100)
MONOCYTES # BLD AUTO: 0.17 K/UL — SIGNIFICANT CHANGE UP (ref 0–0.9)
MONOCYTES NFR BLD AUTO: 6.1 % — SIGNIFICANT CHANGE UP (ref 2–14)
NEUTROPHILS # BLD AUTO: 2.21 K/UL — SIGNIFICANT CHANGE UP (ref 1.8–7.4)
NEUTROPHILS NFR BLD AUTO: 79 % — HIGH (ref 43–77)
OVALOCYTES BLD QL SMEAR: SLIGHT — SIGNIFICANT CHANGE UP
PCO2 BLDV: 37 MMHG — LOW (ref 39–42)
PH BLDV: 7.42 — SIGNIFICANT CHANGE UP (ref 7.32–7.43)
PLAT MORPH BLD: ABNORMAL
PLATELET # BLD AUTO: 317 K/UL — SIGNIFICANT CHANGE UP (ref 150–400)
PO2 BLDV: 62 MMHG — HIGH (ref 25–45)
POIKILOCYTOSIS BLD QL AUTO: SLIGHT — SIGNIFICANT CHANGE UP
POTASSIUM BLDV-SCNC: 4.6 MMOL/L — SIGNIFICANT CHANGE UP (ref 3.5–5.1)
POTASSIUM SERPL-MCNC: 4.1 MMOL/L — SIGNIFICANT CHANGE UP (ref 3.5–5.3)
POTASSIUM SERPL-SCNC: 4.1 MMOL/L — SIGNIFICANT CHANGE UP (ref 3.5–5.3)
PROT SERPL-MCNC: 7.3 G/DL — SIGNIFICANT CHANGE UP (ref 6–8.3)
RAPID RVP RESULT: SIGNIFICANT CHANGE UP
RBC # BLD: 2.97 M/UL — LOW (ref 3.8–5.2)
RBC # FLD: 14.7 % — HIGH (ref 10.3–14.5)
RBC BLD AUTO: ABNORMAL
SAO2 % BLDV: 92.7 % — HIGH (ref 67–88)
SARS-COV-2 RNA SPEC QL NAA+PROBE: SIGNIFICANT CHANGE UP
SCHISTOCYTES BLD QL AUTO: SLIGHT — SIGNIFICANT CHANGE UP
SODIUM SERPL-SCNC: 139 MMOL/L — SIGNIFICANT CHANGE UP (ref 135–145)
WBC # BLD: 2.8 K/UL — LOW (ref 3.8–10.5)
WBC # FLD AUTO: 2.8 K/UL — LOW (ref 3.8–10.5)

## 2023-08-23 PROCEDURE — 71045 X-RAY EXAM CHEST 1 VIEW: CPT | Mod: 26

## 2023-08-23 PROCEDURE — 99285 EMERGENCY DEPT VISIT HI MDM: CPT | Mod: GC

## 2023-08-23 NOTE — ED PROVIDER NOTE - PHYSICAL EXAMINATION
Constitutional: VS reviewed. Alert and orientedx3, well appearing, no apparent distress  HEENT: Atraumatic, EOMI  CV: RRR  Lungs: Clear and equal bilaterally, no wheezes, rales or crackles. On 2 L NC satting 96%  Abdomen: Soft, nondistended, nontender  MSK: No deformities  Skin: Warm and dry. As visualized no rashes, lesions, bruising or erythema  Neuro: Appears nonfocal  Lymph: No pitting edema in extremities.

## 2023-08-23 NOTE — ED PROVIDER NOTE - ATTENDING CONTRIBUTION TO CARE
89 y.o. F A&Ox4 PMHx of Breast CA currently on oral chemo, HLD, presenting to ED via EMS for SOB Found to have sats in the 80s.  Patient with dyspnea on exertion denies any fevers or cough no back pain no leg swelling.  Lungs are clear to auscultation on arrival labs chest x-ray ordered likely will be need to be admitted for pulmonology consultation and further work-up

## 2023-08-23 NOTE — ED ADULT NURSE NOTE - OBJECTIVE STATEMENT
89 y.o. F A&Ox4 PMHx of Breast CA currently on oral chemo, HLD, presenting to ED via EMS for SOB. Pt states that she went to her PCP today and was told that her SpO2 was in the 80s and that she needed to go to the ED for a possible emphysema work-up. Pt states that she feels okay at rest, but has SOB with activity. Denies cough, fever, chills, sick contacts. Pt denies H/A, lightheadedness/dizziness, vision changes, chest pain, abd pain, N/V/D, constipation, dysuria, hematuria, hematochezia, weakness, numbness, and tingling. Upon assessment, pt alert, grossly neurologically intact, and well appearing. Pupils PERRLA and no drainage noted. Airway patent, chest rise symmetrical with no advantageous L/S, and pt is eupneic. Skin is warm, dry, and normal for race. No cyanosis noted to lips or fingernails. Mucous membranes moist. Negative clubbed fingernails. Radial pulses equal and +2 bilaterally. Abd soft, nontender, nondistended. ROM intact and strength +5 in all four extremities. No edema noted to bilateral legs or arms. Pt 80s on RA, Pt now on 3 L NC 97%. Placed on CM, NSR in the 70s. Pt resting in stretcher in position of comfort. Stretcher locked and lowered and call bell within reach.

## 2023-08-23 NOTE — ED PROVIDER NOTE - OBJECTIVE STATEMENT
89-year-old female with past medical history of breast CA on oral chemo, HLD presents emergency department for shortness of breath.  Patient states she saw her PCP and was told she had O2 sat in the 80s.  Patient complains of worsening dyspnea on exertion over the last 1 week.  Patient denies fevers, chills, headache, vision changes, chest pain, abdominal pain, nausea/vomiting, diarrhea/constipation, dysuria, hematuria.

## 2023-08-24 DIAGNOSIS — Z29.9 ENCOUNTER FOR PROPHYLACTIC MEASURES, UNSPECIFIED: ICD-10-CM

## 2023-08-24 DIAGNOSIS — E78.5 HYPERLIPIDEMIA, UNSPECIFIED: ICD-10-CM

## 2023-08-24 DIAGNOSIS — J96.01 ACUTE RESPIRATORY FAILURE WITH HYPOXIA: ICD-10-CM

## 2023-08-24 DIAGNOSIS — C50.919 MALIGNANT NEOPLASM OF UNSPECIFIED SITE OF UNSPECIFIED FEMALE BREAST: ICD-10-CM

## 2023-08-24 DIAGNOSIS — I50.9 HEART FAILURE, UNSPECIFIED: ICD-10-CM

## 2023-08-24 PROCEDURE — 99223 1ST HOSP IP/OBS HIGH 75: CPT | Mod: GC

## 2023-08-24 PROCEDURE — 93306 TTE W/DOPPLER COMPLETE: CPT | Mod: 26

## 2023-08-24 PROCEDURE — 71275 CT ANGIOGRAPHY CHEST: CPT | Mod: 26,MA

## 2023-08-24 PROCEDURE — 93971 EXTREMITY STUDY: CPT | Mod: 26,LT

## 2023-08-24 RX ORDER — ALPRAZOLAM 0.25 MG
1 TABLET ORAL
Refills: 0 | DISCHARGE

## 2023-08-24 RX ORDER — ALPRAZOLAM 0.25 MG
1 TABLET ORAL
Qty: 0 | Refills: 0 | DISCHARGE

## 2023-08-24 RX ORDER — ESCITALOPRAM OXALATE 10 MG/1
0 TABLET, FILM COATED ORAL
Qty: 0 | Refills: 0 | DISCHARGE

## 2023-08-24 RX ORDER — SIMVASTATIN 20 MG/1
20 TABLET, FILM COATED ORAL AT BEDTIME
Refills: 0 | Status: DISCONTINUED | OUTPATIENT
Start: 2023-08-24 | End: 2023-09-01

## 2023-08-24 RX ORDER — ACETAMINOPHEN 500 MG
650 TABLET ORAL EVERY 6 HOURS
Refills: 0 | Status: DISCONTINUED | OUTPATIENT
Start: 2023-08-24 | End: 2023-09-01

## 2023-08-24 RX ORDER — MIRTAZAPINE 45 MG/1
7.5 TABLET, ORALLY DISINTEGRATING ORAL AT BEDTIME
Refills: 0 | Status: DISCONTINUED | OUTPATIENT
Start: 2023-08-24 | End: 2023-09-01

## 2023-08-24 RX ORDER — FUROSEMIDE 40 MG
20 TABLET ORAL ONCE
Refills: 0 | Status: COMPLETED | OUTPATIENT
Start: 2023-08-24 | End: 2023-08-24

## 2023-08-24 RX ORDER — ESCITALOPRAM OXALATE 10 MG/1
5 TABLET, FILM COATED ORAL DAILY
Refills: 0 | Status: DISCONTINUED | OUTPATIENT
Start: 2023-08-24 | End: 2023-09-01

## 2023-08-24 RX ORDER — BUPROPION HYDROCHLORIDE 150 MG/1
300 TABLET, EXTENDED RELEASE ORAL DAILY
Refills: 0 | Status: DISCONTINUED | OUTPATIENT
Start: 2023-08-24 | End: 2023-09-01

## 2023-08-24 RX ORDER — ONDANSETRON 8 MG/1
4 TABLET, FILM COATED ORAL EVERY 8 HOURS
Refills: 0 | Status: DISCONTINUED | OUTPATIENT
Start: 2023-08-24 | End: 2023-09-01

## 2023-08-24 RX ORDER — CHOLECALCIFEROL (VITAMIN D3) 125 MCG
1 CAPSULE ORAL
Qty: 0 | Refills: 0 | DISCHARGE

## 2023-08-24 RX ORDER — LANOLIN ALCOHOL/MO/W.PET/CERES
3 CREAM (GRAM) TOPICAL AT BEDTIME
Refills: 0 | Status: DISCONTINUED | OUTPATIENT
Start: 2023-08-24 | End: 2023-09-01

## 2023-08-24 RX ORDER — IPRATROPIUM BROMIDE 21 MCG
2 AEROSOL, SPRAY (ML) NASAL
Refills: 0 | DISCHARGE

## 2023-08-24 RX ORDER — ESCITALOPRAM OXALATE 10 MG/1
10 TABLET, FILM COATED ORAL DAILY
Refills: 0 | Status: DISCONTINUED | OUTPATIENT
Start: 2023-08-24 | End: 2023-09-01

## 2023-08-24 RX ADMIN — MIRTAZAPINE 7.5 MILLIGRAM(S): 45 TABLET, ORALLY DISINTEGRATING ORAL at 21:17

## 2023-08-24 RX ADMIN — Medication 20 MILLIGRAM(S): at 21:16

## 2023-08-24 RX ADMIN — ESCITALOPRAM OXALATE 10 MILLIGRAM(S): 10 TABLET, FILM COATED ORAL at 13:08

## 2023-08-24 RX ADMIN — BUPROPION HYDROCHLORIDE 300 MILLIGRAM(S): 150 TABLET, EXTENDED RELEASE ORAL at 13:08

## 2023-08-24 RX ADMIN — SIMVASTATIN 20 MILLIGRAM(S): 20 TABLET, FILM COATED ORAL at 21:17

## 2023-08-24 RX ADMIN — ESCITALOPRAM OXALATE 5 MILLIGRAM(S): 10 TABLET, FILM COATED ORAL at 13:08

## 2023-08-24 NOTE — H&P ADULT - ASSESSMENT
Patient is an 85-year-old female w/ PMH of HLD and breast cancer presenting with a one week history of SOB. Pt being admitted for acute hypoxic respiratory failure.

## 2023-08-24 NOTE — H&P ADULT - NSHPADDITIONALINFOADULT_GEN_ALL_CORE
< from: CT Angio Chest PE Protocol w/ IV Cont (08.24.23 @ 01:36) >      FINDINGS:    LUNGS AND AIRWAYS: Patchy bilateral groundglass opacities, quite   confluent in the lung apices, slowly dissipating more inferiorly.   Underlying interstitial prominence. Mild underlying emphysema. No  pneumothorax or pleural effusion.  PLEURA: No pleural effusion.  MEDIASTINUM AND EMMA: Mild prevascular, AP window, and paratracheal   lymphadenopathy is new.  VESSELS: No thoracic aortic dissection or aneurysm. No pulmonary embolus.  HEART: Mild four-chamber cardiomegaly with distention of the superior and   inferior vena cava. No pericardial effusion.  CHEST WALL AND LOWER NECK: Surgical clips and soft tissue density deep in   the left lateral breast similar to prior. No acute finding.  VISUALIZED UPPER ABDOMEN: No concerning findings. Status post   cholecystectomy.  BONES: No acute finding. Bony demineralization. No aggressive osseous   lesions.    IMPRESSION:  No pulmonary embolus.    Symmetric groundglass opacity and interstitial prominence bilaterally   favoring the upper lungs. This could represent pulmonary edema, atypical   pneumonia, or noninfectious interstitial pneumonitis.    Cardiomegaly.    < end of copied text >

## 2023-08-24 NOTE — PATIENT PROFILE ADULT - FALL HARM RISK - HARM RISK INTERVENTIONS

## 2023-08-24 NOTE — H&P ADULT - HISTORY OF PRESENT ILLNESS
Ms. Quinton Bridges is a Ms. Quinton Bridges is an 89-year-old female w/ PMH of HLD and breast cancer receiving oral chemotherapy who is presenting following a one week history of SOB. She states this is new for her and has been constant. She denies fever, chills, chest pain, orthopnea, cough, nausea, vomiting, and dysuria. She presented to the ED via EMS after being found hypoxic with O2 saturation in the low 80s at her outpatient oncologist office. Ms. Quinton Bridges is an 89-year-old female w/ PMH of HLD and breast cancer receiving oral chemotherapy who is presenting following a one week history of SOB. She states this is new for her and has been constant. She denies fever, chills, chest pain, orthopnea, cough, nausea, vomiting, and dysuria. She presented to the ED via EMS after being found hypoxic with O2 saturation in the low 80s at her outpatient oncologist office.     In the ED, pt was afebrile and hemodynamically stable on 2L NC. Anemic and leukopenic on CBC. Elevated RVP and COVID negative.  Ms. Quinton Bridges is an 89-year-old female w/ PMH of HLD and breast cancer receiving oral chemotherapy who is presenting following a one week history of SOB. She states this is new for her and has been constant. She denies fever, chills, chest pain, cough, nausea, vomiting, and dysuria. No orthopnea or LE swelling. No sick contacts. She presented to the ED via EMS after being found hypoxic with O2 saturation in the low 80s at her outpatient oncologist office.

## 2023-08-24 NOTE — ED ADULT NURSE REASSESSMENT NOTE - COMFORT CARE
assisted to bathroom/darkened lights/meal provided/plan of care explained/side rails up/treatment delay explained/warm blanket provided

## 2023-08-24 NOTE — H&P ADULT - NSHPLABSRESULTS_GEN_ALL_CORE
Labs:                  9.3    2.80  )-----------( 317      ( 23 Aug 2023 21:03 )             29.3       08-23    139  |  105  |  12  ----------------------------<  121<H>  4.1   |  21<L>  |  0.96    Ca    8.6      23 Aug 2023 21:03    TPro  7.3  /  Alb  3.7  /  TBili  0.3  /  DBili  x   /  AST  27  /  ALT  32  /  AlkPhos  61  08-23              Urinalysis Basic - ( 23 Aug 2023 21:03 )    Color: x / Appearance: x / SG: x / pH: x  Gluc: 121 mg/dL / Ketone: x  / Bili: x / Urobili: x   Blood: x / Protein: x / Nitrite: x   Leuk Esterase: x / RBC: x / WBC x   Sq Epi: x / Non Sq Epi: x / Bacteria: x      Imaging:    CXR (8/23)  IMPRESSION:    No focal consolidations.      CTA (8/24)  IMPRESSION:  No pulmonary embolus.    Symmetric groundglass opacity and interstitial prominence bilaterally   favoring the upper lungs. This could represent pulmonary edema, atypical   pneumonia, or noninfectious interstitial pneumonitis.    Cardiomegaly.      US of RLE  IMPRESSION:  No evidence of right lower extremity deep venous thrombosis.

## 2023-08-24 NOTE — H&P ADULT - PROBLEM SELECTOR PLAN 1
- P/w one week h/o SOB, O2 in low 80s at outpatient office, referred to ED  - - P/w one week h/o SOB, O2 in low 80s at outpatient office, transported to ED via EMS  - RVP, COVID negative  - Elevated D-dimer -> US of RLE unremarkable  - CTA -> Nonspecific ground-glass opacities, negative for PE  - Elevated BNP -> TTE pending  - C/w supplemental O2 prn - P/w one week h/o SOB, O2 in low 80s at outpatient office, transported to ED via EMS  - Afebrile, leukopenic on admission  - RVP, COVID negative  - Elevated D-dimer -> US of RLE unremarkable  - CTA -> Nonspecific ground-glass opacities, negative for PE  - Elevated BNP -> TTE pending  - C/w supplemental O2 prn

## 2023-08-24 NOTE — CONSULT NOTE ADULT - SUBJECTIVE AND OBJECTIVE BOX
DATE OF SERVICE: 08-24-23 @ 16:49    CHIEF COMPLAINT:Patient is a 89y old  Female who presents with a chief complaint of Shortness of breath (24 Aug 2023 15:24)      HISTORY OF PRESENT ILLNESS:  Ms. Quinton Bridges is an 89-year-old female w/ PMH of HLD and breast cancer receiving oral chemotherapy who is presenting following a one week history of SOB. She states this is new for her and has been constant. She denies fever, chills, chest pain, cough, nausea, vomiting, and dysuria. No orthopnea or LE swelling. No sick contacts. She presented to the ED via EMS after being found hypoxic with O2 saturation in the low 80s at her outpatient oncologist office. (24 Aug 2023 12:56)      PAST MEDICAL & SURGICAL HISTORY:  Anxiety      Hyperlipidemia      UTI (lower urinary tract infection)      Depression      Breast cancer      HLD (hyperlipidemia)      Gallbladder stone without cholecystitis or obstruction      Cholecystitis      2019 novel coronavirus disease (COVID-19)  1/7/2021      Recurrent breast cancer  opposite side, left-3/2020      History of chemotherapy  and radiation-1994      S/P chemotherapy, time since less than 4 weeks      Cholelithiasis      H/O lumpectomy  left-11/1994      History of hip replacement  left-1994      S/P Mohs surgery for basal cell carcinoma  right side of nose-2020              MEDICATIONS:        acetaminophen     Tablet .. 650 milliGRAM(s) Oral every 6 hours PRN  buPROPion XL (24-Hour) . 300 milliGRAM(s) Oral daily  escitalopram 5 milliGRAM(s) Oral daily  escitalopram 10 milliGRAM(s) Oral daily  melatonin 3 milliGRAM(s) Oral at bedtime PRN  mirtazapine 7.5 milliGRAM(s) Oral at bedtime  ondansetron Injectable 4 milliGRAM(s) IV Push every 8 hours PRN    aluminum hydroxide/magnesium hydroxide/simethicone Suspension 30 milliLiter(s) Oral every 4 hours PRN    simvastatin 20 milliGRAM(s) Oral at bedtime        FAMILY HISTORY:  FH: breast cancer  mother    FH: lung cancer (Mother)        Non-contributory    SOCIAL HISTORY:    [- ] Tobacco  [+ ] Drugs- occ marijuana use  [- ] Alcohol    Allergies    penicillin (Hives)    Intolerances    	    REVIEW OF SYSTEMS:  CONSTITUTIONAL: No fever  EYES: No eye pain, visual disturbances, or discharge  ENMT:  No difficulty hearing, tinnitus  NECK: No pain or stiffness  RESPIRATORY: No cough, wheezing, +SOB  CARDIOVASCULAR: No chest pain, palpitations, passing out, dizziness, or leg swelling  GASTROINTESTINAL:  No nausea, vomiting, diarrhea or constipation. No melena.  GENITOURINARY: No dysuria, hematuria  NEUROLOGICAL: No stroke like symptoms  SKIN: No burning or lesions   ENDOCRINE: No heat or cold intolerance  MUSCULOSKELETAL: No joint pain or swelling  PSYCHIATRIC: No  anxiety, mood swings  HEME/LYMPH: No bleeding gums  ALLERGY AND IMMUNOLOGIC: No hives or eczema	    All other ROS negative    PHYSICAL EXAM:  T(C): 36.8 (08-24-23 @ 09:54), Max: 36.8 (08-23-23 @ 18:55)  HR: 63 (08-24-23 @ 09:54) (63 - 78)  BP: 118/72 (08-24-23 @ 09:54) (108/52 - 134/71)  RR: 18 (08-24-23 @ 09:54) (15 - 19)  SpO2: 96% (08-24-23 @ 09:54) (87% - 97%)  Wt(kg): --  I&O's Summary      Appearance: Normal	  HEENT:   Normal oral mucosa, EOMI	  Cardiovascular:  S1 S2, No JVD,    Respiratory: Lungs clear to auscultation	  Psychiatry: Alert  Gastrointestinal:  Soft, Non-tender, + BS	  Skin: No rashes   Neurologic: Non-focal  Extremities:  No edema  Vascular: Peripheral pulses palpable    	    	  	  CARDIAC MARKERS:  Labs personally reviewed by me                                  9.3    2.80  )-----------( 317      ( 23 Aug 2023 21:03 )             29.3     08-23    139  |  105  |  12  ----------------------------<  121<H>  4.1   |  21<L>  |  0.96    Ca    8.6      23 Aug 2023 21:03    TPro  7.3  /  Alb  3.7  /  TBili  0.3  /  DBili  x   /  AST  27  /  ALT  32  /  AlkPhos  61  08-23          EKG: Personally reviewed by me - NSR  Radiology: Personally reviewed by me -     < from: Xray Chest 1 View- PORTABLE-Urgent (08.23.23 @ 21:19) >    The heart size is poorly evaluated on this projection. Thoracic aortic   calcifications.  No focal consolidations.  There is no pneumothorax or pleural effusion.  No acute bony abnormalities.    IMPRESSION:    No focal consolidations.    < end of copied text >  < from: CT Angio Chest PE Protocol w/ IV Cont (08.24.23 @ 01:36) >  IMPRESSION:  No pulmonary embolus.    Symmetric groundglass opacity and interstitial prominence bilaterally   favoring the upper lungs. This could represent pulmonary edema, atypical   pneumonia, or noninfectious interstitial pneumonitis.    Cardiomegaly.    < end of copied text >      Assessment /Plan:     Ms. Quinton Bridges is an 89-year-old female w/ PMH of HLD and breast cancer receiving oral chemotherapy who is presenting following a one week history of SOB. She states this is new for her and has been constant. She denies fever, chills, chest pain, cough, nausea, vomiting, and dysuria. No orthopnea or LE swelling. No sick contacts. She presented to the ED via EMS after being found hypoxic with O2 saturation in the low 80s at her outpatient oncologist office.    1. Shortness of Breath  - ECG NSR with no ischemia noted  - COVID/RVP negative  - afebile, no leukocytosis  - CT negative for PE but reveals ? pulm edema and cardiomegaly  - BNP slightly elevated at 599  - TTE shows preserved EF, no WMA and mild MS  - D-Dimer 836--> US of RLE negative for DVT, PE neg as noted above  - DDx includes ADHF vs. Atypical PNA vs. Pneumonitis (as per Heme/Onc, Ibrance can cause ILD/pneumonitis)  - Can trial lasix 20mg IVP once and assess clinical response  - Supplemental oxygen as needed    2. HLD  - c/w simvastatin 20mg PO daily    3. Breast CA  - Heme/Onc following    Differential diagnosis and plan of care discussed with patient after the evaluation. Counseling on diet, nutritional counseling, weight management, exercise and medication compliance was done.   Advanced care planning/advanced directives discussed with patient/family. DNR status including forceful chest compressions to attempt to restart the heart, ventilator support/artificial breathing, electric shock, artificial nutrition, health care proxy, Molst form all discussed with pt. Pt wishes to consider. More than fifteen minutes spent on discussing advanced directives.     Jolene Gambino [facility]anner-BC  Young Gaspar, DO [facility]  Cardiovascular Medicine  [address], [address]  Available for call or text via [employer]  Office [phone]   DATE OF SERVICE: 08-24-23 @ 16:49    CHIEF COMPLAINT:Patient is a 89y old  Female who presents with a chief complaint of Shortness of breath (24 Aug 2023 15:24)      HISTORY OF PRESENT ILLNESS:  Ms. Quinton Bridges is an 89-year-old female w/ PMH of HLD and breast cancer receiving oral chemotherapy who is presenting following a one week history of SOB. She states this is new for her and has been constant. She denies fever, chills, chest pain, cough, nausea, vomiting, and dysuria. No orthopnea or LE swelling. No sick contacts. She presented to the ED via EMS after being found hypoxic with O2 saturation in the low 80s at her outpatient oncologist office. (24 Aug 2023 12:56)      PAST MEDICAL & SURGICAL HISTORY:  Anxiety      Hyperlipidemia      UTI (lower urinary tract infection)      Depression      Breast cancer      HLD (hyperlipidemia)      Gallbladder stone without cholecystitis or obstruction      Cholecystitis      2019 novel coronavirus disease (COVID-19)  1/7/2021      Recurrent breast cancer  opposite side, left-3/2020      History of chemotherapy  and radiation-1994      S/P chemotherapy, time since less than 4 weeks      Cholelithiasis      H/O lumpectomy  left-11/1994      History of hip replacement  left-1994      S/P Mohs surgery for basal cell carcinoma  right side of nose-2020              MEDICATIONS:        acetaminophen     Tablet .. 650 milliGRAM(s) Oral every 6 hours PRN  buPROPion XL (24-Hour) . 300 milliGRAM(s) Oral daily  escitalopram 5 milliGRAM(s) Oral daily  escitalopram 10 milliGRAM(s) Oral daily  melatonin 3 milliGRAM(s) Oral at bedtime PRN  mirtazapine 7.5 milliGRAM(s) Oral at bedtime  ondansetron Injectable 4 milliGRAM(s) IV Push every 8 hours PRN    aluminum hydroxide/magnesium hydroxide/simethicone Suspension 30 milliLiter(s) Oral every 4 hours PRN    simvastatin 20 milliGRAM(s) Oral at bedtime        FAMILY HISTORY:  FH: breast cancer  mother    FH: lung cancer (Mother)        Non-contributory    SOCIAL HISTORY:    [- ] Tobacco  [+ ] Drugs- occ marijuana use  [- ] Alcohol    Allergies    penicillin (Hives)    Intolerances    	    REVIEW OF SYSTEMS:  CONSTITUTIONAL: No fever  EYES: No eye pain, visual disturbances, or discharge  ENMT:  No difficulty hearing, tinnitus  NECK: No pain or stiffness  RESPIRATORY: No cough, wheezing, +SOB  CARDIOVASCULAR: No chest pain, palpitations, passing out, dizziness, or leg swelling  GASTROINTESTINAL:  No nausea, vomiting, diarrhea or constipation. No melena.  GENITOURINARY: No dysuria, hematuria  NEUROLOGICAL: No stroke like symptoms  SKIN: No burning or lesions   ENDOCRINE: No heat or cold intolerance  MUSCULOSKELETAL: No joint pain or swelling  PSYCHIATRIC: No  anxiety, mood swings  HEME/LYMPH: No bleeding gums  ALLERGY AND IMMUNOLOGIC: No hives or eczema	    All other ROS negative    PHYSICAL EXAM:  T(C): 36.8 (08-24-23 @ 09:54), Max: 36.8 (08-23-23 @ 18:55)  HR: 63 (08-24-23 @ 09:54) (63 - 78)  BP: 118/72 (08-24-23 @ 09:54) (108/52 - 134/71)  RR: 18 (08-24-23 @ 09:54) (15 - 19)  SpO2: 96% (08-24-23 @ 09:54) (87% - 97%)  Wt(kg): --  I&O's Summary      Appearance: Normal	  HEENT:   Normal oral mucosa, EOMI	  Cardiovascular:  S1 S2, No JVD,    Respiratory: Lungs clear to auscultation	  Psychiatry: Alert  Gastrointestinal:  Soft, Non-tender, + BS	  Skin: No rashes   Neurologic: Non-focal  Extremities:  No edema  Vascular: Peripheral pulses palpable    	    	  	  CARDIAC MARKERS:  Labs personally reviewed by me                                  9.3    2.80  )-----------( 317      ( 23 Aug 2023 21:03 )             29.3     08-23    139  |  105  |  12  ----------------------------<  121<H>  4.1   |  21<L>  |  0.96    Ca    8.6      23 Aug 2023 21:03    TPro  7.3  /  Alb  3.7  /  TBili  0.3  /  DBili  x   /  AST  27  /  ALT  32  /  AlkPhos  61  08-23          EKG: Personally reviewed by me - NSR  Radiology: Personally reviewed by me -     < from: Xray Chest 1 View- PORTABLE-Urgent (08.23.23 @ 21:19) >    The heart size is poorly evaluated on this projection. Thoracic aortic   calcifications.  No focal consolidations.  There is no pneumothorax or pleural effusion.  No acute bony abnormalities.    IMPRESSION:    No focal consolidations.    < end of copied text >  < from: CT Angio Chest PE Protocol w/ IV Cont (08.24.23 @ 01:36) >  IMPRESSION:  No pulmonary embolus.    Symmetric groundglass opacity and interstitial prominence bilaterally   favoring the upper lungs. This could represent pulmonary edema, atypical   pneumonia, or noninfectious interstitial pneumonitis.    Cardiomegaly.    < end of copied text >      Assessment /Plan:     Ms. Quinton Bridges is an 89-year-old female w/ PMH of HLD and breast cancer receiving oral chemotherapy who is presenting following a one week history of SOB. She states this is new for her and has been constant. She denies fever, chills, chest pain, cough, nausea, vomiting, and dysuria. No orthopnea or LE swelling. No sick contacts. She presented to the ED via EMS after being found hypoxic with O2 saturation in the low 80s at her outpatient oncologist office.    1. Shortness of Breath  - ECG NSR with no ischemia noted  - COVID/RVP negative  - afebile, no leukocytosis  - CT negative for PE but reveals ? pulm edema and cardiomegaly  - BNP slightly elevated at 599  - TTE shows preserved EF, no WMA and mild MS  - D-Dimer 836--> US of RLE negative for DVT, PE neg as noted above  - DDx includes ADHF vs. Atypical PNA vs. Pneumonitis (as per Heme/Onc, Ibrance can cause ILD/pneumonitis)  - Can trial lasix 20mg IVP once and assess clinical response  - Supplemental oxygen as needed    2. HLD  - c/w simvastatin 20mg PO daily    3. Breast CA  - Heme/Onc following          Differential diagnosis and plan of care discussed with patient after the evaluation. Counseling on diet, nutritional counseling, weight management, exercise and medication compliance was done.   Advanced care planning/advanced directives discussed with patient/family. DNR status including forceful chest compressions to attempt to restart the heart, ventilator support/artificial breathing, electric shock, artificial nutrition, health care proxy, Molst form all discussed with pt. Pt wishes to consider. More than fifteen minutes spent on discussing advanced directives.     Jolene Gambino [facility]-BC  Young Gaspar, DO [facility]  Cardiovascular Medicine  [address], [address]  Available for call or text via [employer]  Office [phone]

## 2023-08-24 NOTE — H&P ADULT - PROBLEM SELECTOR PLAN 2
- C/w exemestane  - Pt holding Ibrance since last week due to possible lung toxicity, as recommended by her outpatient oncologist - On exemestane, Ibrance at home  - Pt holding Ibrance since last week due to possible lung toxicity, as recommended by her outpatient oncologist

## 2023-08-24 NOTE — H&P ADULT - ATTENDING COMMENTS
-Acute respiratory failure with hypoxia d/t ?CHF, vs ?infectious, vs ?Ibranz pneumonitis. -F/u echo. Assess response to trial of lasix 20mg iv x 1. Strict I's/O's and daily weights. -B/l LE duplex negative. -Would seek pulmonary evaluation and recs. Of note, ex-smoke has some mild emphysematous changes on CT chest. ?role for inhalers? ?steroids?   -F/u infectious w/u including sputum cx, Legionella, Strep pneumo Ur, MRSA swab.   -Heme/onco appreciated; anemia w/u.   -PT eval - recently starting walking with a cane in setting of SOB/LIZARRAGA. -Acute respiratory failure with hypoxia d/t ?CHF, vs ?infectious, vs ?Ibranz pneumonitis. -F/u echo. Assess response to trial of lasix 20mg iv x 1. Strict I's/O's and daily weights. -B/l LE duplex negative. Cards recs appreciated.   -Would also seek pulmonary evaluation and recs. Of note, ex-smoke has some mild emphysematous changes on CT chest. ?role for inhalers? ?steroids?   -F/u infectious w/u including sputum cx, Legionella, Strep pneumo Ur, MRSA swab.   -Heme/onco appreciated; anemia w/u.   -PT eval - recently starting walking with a cane in setting of SOB/LIZARRAGA.

## 2023-08-24 NOTE — CONSULT NOTE ADULT - SUBJECTIVE AND OBJECTIVE BOX
Patient is a 89y old  Female who presents with a chief complaint of Shortness of breath (24 Aug 2023 12:56)      HPI:  Ms. Quinton Bridges is an 89-year-old female w/ PMH of HLD and breast cancer receiving oral chemotherapy who is presenting following a one week history of SOB. She states this is new for her and has been constant. She denies fever, chills, chest pain, cough, nausea, vomiting, and dysuria. No orthopnea or LE swelling. No sick contacts. She presented to the ED via EMS after being found hypoxic with O2 saturation in the low 80s at her outpatient oncologist office. (24 Aug 2023 12:56)    Pt seen by Dr. Batista in office yesterday, noted to be hypoxic and short of breath, recommended transfer to hospital but pt refused. After being home, called with more shortness of breath and presented to ED.    seen by cardiology in past week, was scheduled for stress test- pt cancelled. was to see Pulmonary Dr. Hallman today    dyspneic with minimal activity. no f/c, no cough/uri sx, no sick contacts, no n/v, with more frequent stool earlier in week but resolved, no bleeding, no leg pain/swelling      PAST MEDICAL & SURGICAL HISTORY:  Anxiety      Hyperlipidemia      UTI (lower urinary tract infection)      Depression      Breast cancer      HLD (hyperlipidemia)      Gallbladder stone without cholecystitis or obstruction      Cholecystitis      2019 novel coronavirus disease (COVID-19)  1/7/2021      Recurrent breast cancer  opposite side, left-3/2020      History of chemotherapy  and radiation-1994      S/P chemotherapy, time since less than 4 weeks      Cholelithiasis      H/O lumpectomy  left-11/1994      History of hip replacement  left-1994      S/P Mohs surgery for basal cell carcinoma  right side of nose-2020          SOCIAL HISTORY:  Smoking - previous  Alcohol - Social  Drugs - No drug use  lives alone    FAMILY HISTORY:  FH: breast cancer  mother    FH: lung cancer (Mother)        MEDICATIONS  (STANDING):  buPROPion XL (24-Hour) . 300 milliGRAM(s) Oral daily  escitalopram 10 milliGRAM(s) Oral daily  escitalopram 5 milliGRAM(s) Oral daily  mirtazapine 7.5 milliGRAM(s) Oral at bedtime  simvastatin 20 milliGRAM(s) Oral at bedtime    MEDICATIONS  (PRN):  acetaminophen     Tablet .. 650 milliGRAM(s) Oral every 6 hours PRN Temp greater or equal to 38C (100.4F), Mild Pain (1 - 3)  aluminum hydroxide/magnesium hydroxide/simethicone Suspension 30 milliLiter(s) Oral every 4 hours PRN Dyspepsia  melatonin 3 milliGRAM(s) Oral at bedtime PRN Insomnia  ondansetron Injectable 4 milliGRAM(s) IV Push every 8 hours PRN Nausea and/or Vomiting      Allergies    penicillin (Hives)    Intolerances    ROS  gen- no f/c, weight stable  heent- no epistaxis/gingival bleed  cv- no chest pain, no palpitation  resp- +LIZARRAGA, no cough  gi- no n/v/abd pain, no melena/brbpr  gu- no hematuria  musculosk- no back pain  skin- no rash  neuro- no dizziness  ROS otherwise reviewed and stable      Vital Signs Last 24 Hrs  T(C): 36.8 (24 Aug 2023 09:54), Max: 36.8 (23 Aug 2023 18:55)  T(F): 98.2 (24 Aug 2023 09:54), Max: 98.2 (23 Aug 2023 18:55)  HR: 63 (24 Aug 2023 09:54) (63 - 78)  BP: 118/72 (24 Aug 2023 09:54) (108/52 - 134/71)  BP(mean): 92 (24 Aug 2023 06:06) (70 - 92)  RR: 18 (24 Aug 2023 09:54) (15 - 19)  SpO2: 96% (24 Aug 2023 09:54) (87% - 97%)    Parameters below as of 24 Aug 2023 09:54  Patient On (Oxygen Delivery Method): nasal cannula  O2 Flow (L/min): 2      PHYSICAL EXAM  General: adult in NAD  HEENT: clear oropharynx, anicteric sclera, pink conjunctiva  Neck: supple  CV: normal S1/S2   Lungs: clear to auscultation, no wheezes, no rales  Abdomen: soft non-tender non-distended, positive bowel sounds  Ext: no clubbing cyanosis or edema; no calf tenderness  Skin: no rashes and no petechiae  Lymph Nodes: No LAD in axillae, neck  Neuro: alert and oriented X 3, no focal deficits    LABS:                          9.3    2.80  )-----------( 317      ( 23 Aug 2023 21:03 )             29.3         Mean Cell Volume : 98.7 fl  Mean Cell Hemoglobin : 31.3 pg  Mean Cell Hemoglobin Concentration : 31.7 gm/dL  Auto Neutrophil # : 2.21 K/uL  Auto Lymphocyte # : 0.32 K/uL  Auto Monocyte # : 0.17 K/uL  Auto Eosinophil # : 0.07 K/uL  Auto Basophil # : 0.03 K/uL  Auto Neutrophil % : 79.0 %  Auto Lymphocyte % : 11.4 %  Auto Monocyte % : 6.1 %  Auto Eosinophil % : 2.6 %  Auto Basophil % : 0.9 %      Serial CBC's  08-23 @ 21:03  Hct-29.3 / Hgb-9.3 / Plat-317 / RBC-2.97 / WBC-2.80      08-23    139  |  105  |  12  ----------------------------<  121<H>  4.1   |  21<L>  |  0.96    Ca    8.6      23 Aug 2023 21:03    TPro  7.3  /  Alb  3.7  /  TBili  0.3  /  DBili  x   /  AST  27  /  ALT  32  /  AlkPhos  61  08-23            bPro-Brain Natriuretic Peptide: 599 pg/mL (08.23.23 @ 21:03)             Radiology:    < from: CT Angio Chest PE Protocol w/ IV Cont (08.24.23 @ 01:36) >  IMPRESSION:  No pulmonary embolus.    Symmetric groundglass opacity and interstitial prominence bilaterally   favoring the upper lungs. This could represent pulmonary edema, atypical   pneumonia, or noninfectious interstitial pneumonitis.    Cardiomegaly.

## 2023-08-24 NOTE — PATIENT PROFILE ADULT - FLU SEASON?
Called and made aware letter ready for pickup.
Letter dictated
Pt called again wondering if a letter has been done for him as yet?
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 Let pt know when ready for .
No

## 2023-08-24 NOTE — H&P ADULT - NSHPROSALLOTHERNEGRD_GEN_ALL_CORE
Admission Reconciliation is Completed  Discharge Reconciliation is Completed All other review of systems negative, except as noted in HPI

## 2023-08-24 NOTE — H&P ADULT - NSHPREVIEWOFSYSTEMS_GEN_ALL_CORE
REVIEW OF SYSTEMS:    CONSTITUTIONAL:  No weakness, fevers or chills  EYES/ENT:  No visual changes;  No vertigo or throat pain   NECK:  No pain or stiffness  RESPIRATORY:  Shortness of breath; No cough, wheezing, hemoptysis  CARDIOVASCULAR:  No chest pain or palpitations  GASTROINTESTINAL:  No abdominal or epigastric pain. No nausea, vomiting, or hematemesis; No diarrhea or constipation. No melena or hematochezia.  GENITOURINARY:  No dysuria, frequency or hematuria  NEUROLOGICAL:  No numbness or weakness  SKIN:  No itching, rashes

## 2023-08-24 NOTE — H&P ADULT - NSHPPHYSICALEXAM_GEN_ALL_CORE
T(C): 36.8 (08-24-23 @ 09:54), Max: 36.8 (08-23-23 @ 18:55)  HR: 63 (08-24-23 @ 09:54) (63 - 78)  BP: 118/72 (08-24-23 @ 09:54) (108/52 - 134/71)  RR: 18 (08-24-23 @ 09:54) (15 - 19)  SpO2: 96% (08-24-23 @ 09:54) (87% - 97%)    CONSTITUTIONAL: Well groomed, no apparent distress  EYES: PERRLA and symmetric, EOMI, No conjunctival or scleral injection, non-icteric  ENMT: Oral mucosa with moist membranes. Normal dentition; no pharyngeal injection or exudates             NECK: Supple, symmetric and without tracheal deviation   RESP: No respiratory distress, no use of accessory muscles; CTA b/l, no WRR  CV: RRR, +S1S2, no MRG; no JVD; no peripheral edema  GI: Soft, NT, ND, no rebound, no guarding; no palpable masses; no hepatosplenomegaly; no hernia palpated  LYMPH: No cervical LAD or tenderness; no axillary LAD or tenderness; no inguinal LAD or tenderness  MSK: Normal gait; No digital clubbing or cyanosis; examination of the (head/neck/spine/ribs/pelvis, RUE, LUE, RLE, LLE) without misalignment,            Normal ROM without pain, no spinal tenderness, normal muscle strength/tone  SKIN: No rashes or ulcers noted; no subcutaneous nodules or induration palpable  NEURO: CN II-XII intact; normal reflexes in upper and lower extremities, sensation intact in upper and lower extremities b/l to light touch   PSYCH: Appropriate insight/judgment; A+O x 3, mood and affect appropriate, recent/remote memory intact T(C): 36.8 (08-24-23 @ 09:54), Max: 36.8 (08-23-23 @ 18:55)  HR: 63 (08-24-23 @ 09:54) (63 - 78)  BP: 118/72 (08-24-23 @ 09:54) (108/52 - 134/71)  RR: 18 (08-24-23 @ 09:54) (15 - 19)  SpO2: 96% (08-24-23 @ 09:54) (87% - 97%)    CONSTITUTIONAL: NAD; well-developed  HEENT: PERRL, clear conjunctiva  RESPIRATORY: Normal respiratory effort; lungs are clear to auscultation bilaterally; no crackles, rhonchi, wheezing  CARDIOVASCULAR: Regular rate and rhythm, normal S1 and S2, no murmur/rub/gallop; no lower extremity edema; peripheral pulses are 2+ bilaterally  ABDOMEN: No abdominal or epigastric pain, normoactive bowel sounds, no rebound/guarding; no hepatosplenomegaly  MUSCULOSKELETAL: No clubbing or cyanosis of digits; no joint swelling or tenderness to palpation  EXTREMITY: Lower extremities non-erythematous B/L  NEURO: A&Ox3; no focal deficits   PSYCH: Normal mood; affect appropriate

## 2023-08-24 NOTE — CONSULT NOTE ADULT - ASSESSMENT
89F HLD and breast cancer receiving oral chemotherapy who is presenting following a one week history of SOB    #Breast cancer  - recurrent  - on ibrance and exemestane since 2021 with stable disease; last PET scan 1/2023  - hold ibrance for now    #hypoxia- recent onset  - dyspneic and hypoxic with even minimal activity in stretcher currently  - CTA no PE, upper lobe gg opacity- ? pulmonary edema vs pneumonitis  - no fever or cough; BNP elevated  - Ibrance can cause ILD/pneumonitis- will hold for now  - Echo is pending  - recommend Pulmonary input, pt was scheduled to see Dr. Cadena as outpatient    #anemia, normocytic  - sl lower than prior but should not be cause of such severe symptoms  - no overt bleeding; bili normal, cr normal  - will check iron studies, b12, folate 89F HLD and breast cancer receiving oral chemotherapy who is presenting following a one week history of SOB    #Breast cancer  - recurrent  - on ibrance and exemestane since 2021 with stable disease; last PET scan 1/2023  - hold ibrance for now    #hypoxia- recent onset  - dyspneic and hypoxic with even minimal activity in stretcher currently  - CTA no PE, upper lobe gg opacity- ? pulmonary edema vs pneumonitis  - no fever or cough; BNP elevated  - Ibrance can cause ILD/pneumonitis- will hold for now  - Echo is pending  - recommend Pulmonary input, pt was scheduled to see Dr. Cadena as outpatient    #leukopenia and normocytic anemia- chronic  - likely secondary to chemo  - WBC at baseline  - Hg sl lower than prior but should not be cause of such severe symptoms  - no overt bleeding; bili normal, cr normal  - will check iron studies, b12, folate

## 2023-08-25 ENCOUNTER — TRANSCRIPTION ENCOUNTER (OUTPATIENT)
Age: 88
End: 2023-08-25

## 2023-08-25 LAB
ANION GAP SERPL CALC-SCNC: 14 MMOL/L — SIGNIFICANT CHANGE UP (ref 5–17)
BUN SERPL-MCNC: 10 MG/DL — SIGNIFICANT CHANGE UP (ref 7–23)
CALCIUM SERPL-MCNC: 8.7 MG/DL — SIGNIFICANT CHANGE UP (ref 8.4–10.5)
CHLORIDE SERPL-SCNC: 101 MMOL/L — SIGNIFICANT CHANGE UP (ref 96–108)
CO2 SERPL-SCNC: 24 MMOL/L — SIGNIFICANT CHANGE UP (ref 22–31)
CREAT SERPL-MCNC: 0.98 MG/DL — SIGNIFICANT CHANGE UP (ref 0.5–1.3)
EGFR: 55 ML/MIN/1.73M2 — LOW
FERRITIN SERPL-MCNC: 304 NG/ML — SIGNIFICANT CHANGE UP (ref 13–330)
FOLATE SERPL-MCNC: >20 NG/ML — SIGNIFICANT CHANGE UP
GLUCOSE SERPL-MCNC: 99 MG/DL — SIGNIFICANT CHANGE UP (ref 70–99)
HCT VFR BLD CALC: 28.5 % — LOW (ref 34.5–45)
HGB BLD-MCNC: 9.2 G/DL — LOW (ref 11.5–15.5)
IRON SATN MFR SERPL: 10 % — LOW (ref 14–50)
IRON SATN MFR SERPL: 23 UG/DL — LOW (ref 30–160)
LEGIONELLA AG UR QL: NEGATIVE — SIGNIFICANT CHANGE UP
MAGNESIUM SERPL-MCNC: 2.3 MG/DL — SIGNIFICANT CHANGE UP (ref 1.6–2.6)
MCHC RBC-ENTMCNC: 31.4 PG — SIGNIFICANT CHANGE UP (ref 27–34)
MCHC RBC-ENTMCNC: 32.3 GM/DL — SIGNIFICANT CHANGE UP (ref 32–36)
MCV RBC AUTO: 97.3 FL — SIGNIFICANT CHANGE UP (ref 80–100)
MRSA PCR RESULT.: SIGNIFICANT CHANGE UP
NRBC # BLD: 0 /100 WBCS — SIGNIFICANT CHANGE UP (ref 0–0)
PHOSPHATE SERPL-MCNC: 3.5 MG/DL — SIGNIFICANT CHANGE UP (ref 2.5–4.5)
PLATELET # BLD AUTO: 271 K/UL — SIGNIFICANT CHANGE UP (ref 150–400)
POTASSIUM SERPL-MCNC: 3.8 MMOL/L — SIGNIFICANT CHANGE UP (ref 3.5–5.3)
POTASSIUM SERPL-SCNC: 3.8 MMOL/L — SIGNIFICANT CHANGE UP (ref 3.5–5.3)
PROCALCITONIN SERPL-MCNC: 0.07 NG/ML — SIGNIFICANT CHANGE UP (ref 0.02–0.1)
RBC # BLD: 2.93 M/UL — LOW (ref 3.8–5.2)
RBC # FLD: 14.9 % — HIGH (ref 10.3–14.5)
S AUREUS DNA NOSE QL NAA+PROBE: SIGNIFICANT CHANGE UP
S PNEUM AG UR QL: NEGATIVE — SIGNIFICANT CHANGE UP
SODIUM SERPL-SCNC: 139 MMOL/L — SIGNIFICANT CHANGE UP (ref 135–145)
TIBC SERPL-MCNC: 241 UG/DL — SIGNIFICANT CHANGE UP (ref 220–430)
UIBC SERPL-MCNC: 217 UG/DL — SIGNIFICANT CHANGE UP (ref 110–370)
VIT B12 SERPL-MCNC: 684 PG/ML — SIGNIFICANT CHANGE UP (ref 232–1245)
WBC # BLD: 2.57 K/UL — LOW (ref 3.8–10.5)
WBC # FLD AUTO: 2.57 K/UL — LOW (ref 3.8–10.5)

## 2023-08-25 PROCEDURE — 99223 1ST HOSP IP/OBS HIGH 75: CPT | Mod: GC

## 2023-08-25 PROCEDURE — 99233 SBSQ HOSP IP/OBS HIGH 50: CPT | Mod: GC

## 2023-08-25 RX ORDER — EXEMESTANE 25 MG/1
25 TABLET, SUGAR COATED ORAL DAILY
Refills: 0 | Status: DISCONTINUED | OUTPATIENT
Start: 2023-08-25 | End: 2023-09-01

## 2023-08-25 RX ORDER — FUROSEMIDE 40 MG
20 TABLET ORAL ONCE
Refills: 0 | Status: COMPLETED | OUTPATIENT
Start: 2023-08-25 | End: 2023-08-25

## 2023-08-25 RX ORDER — FUROSEMIDE 40 MG
20 TABLET ORAL DAILY
Refills: 0 | Status: DISCONTINUED | OUTPATIENT
Start: 2023-08-25 | End: 2023-08-25

## 2023-08-25 RX ORDER — FUROSEMIDE 40 MG
40 TABLET ORAL DAILY
Refills: 0 | Status: DISCONTINUED | OUTPATIENT
Start: 2023-08-25 | End: 2023-08-28

## 2023-08-25 RX ORDER — ENOXAPARIN SODIUM 100 MG/ML
40 INJECTION SUBCUTANEOUS EVERY 24 HOURS
Refills: 0 | Status: DISCONTINUED | OUTPATIENT
Start: 2023-08-25 | End: 2023-09-01

## 2023-08-25 RX ORDER — TAMSULOSIN HYDROCHLORIDE 0.4 MG/1
0.4 CAPSULE ORAL AT BEDTIME
Refills: 0 | Status: DISCONTINUED | OUTPATIENT
Start: 2023-08-25 | End: 2023-09-01

## 2023-08-25 RX ADMIN — SIMVASTATIN 20 MILLIGRAM(S): 20 TABLET, FILM COATED ORAL at 21:03

## 2023-08-25 RX ADMIN — ENOXAPARIN SODIUM 40 MILLIGRAM(S): 100 INJECTION SUBCUTANEOUS at 17:14

## 2023-08-25 RX ADMIN — ESCITALOPRAM OXALATE 10 MILLIGRAM(S): 10 TABLET, FILM COATED ORAL at 11:27

## 2023-08-25 RX ADMIN — ESCITALOPRAM OXALATE 5 MILLIGRAM(S): 10 TABLET, FILM COATED ORAL at 11:27

## 2023-08-25 RX ADMIN — TAMSULOSIN HYDROCHLORIDE 0.4 MILLIGRAM(S): 0.4 CAPSULE ORAL at 21:03

## 2023-08-25 RX ADMIN — BUPROPION HYDROCHLORIDE 300 MILLIGRAM(S): 150 TABLET, EXTENDED RELEASE ORAL at 11:27

## 2023-08-25 RX ADMIN — Medication 20 MILLIGRAM(S): at 11:27

## 2023-08-25 RX ADMIN — Medication 20 MILLIGRAM(S): at 20:55

## 2023-08-25 RX ADMIN — EXEMESTANE 25 MILLIGRAM(S): 25 TABLET, SUGAR COATED ORAL at 21:02

## 2023-08-25 RX ADMIN — MIRTAZAPINE 7.5 MILLIGRAM(S): 45 TABLET, ORALLY DISINTEGRATING ORAL at 21:03

## 2023-08-25 NOTE — PROGRESS NOTE ADULT - SUBJECTIVE AND OBJECTIVE BOX
Chief Complaint: FU breast cancer, hypoxia    History of Present Illness:  able to ambulate to bathroom; no f/c, no cp, no persistent cough; no n/v/abd pain, no bleeding, tolerating diet, urinating post lasix      MEDICATIONS  (STANDING):  buPROPion XL (24-Hour) . 300 milliGRAM(s) Oral daily  enoxaparin Injectable 40 milliGRAM(s) SubCutaneous every 24 hours  escitalopram 10 milliGRAM(s) Oral daily  escitalopram 5 milliGRAM(s) Oral daily  exemestane 25 milliGRAM(s) Oral daily  furosemide   Injectable 20 milliGRAM(s) IV Push daily  mirtazapine 7.5 milliGRAM(s) Oral at bedtime  simvastatin 20 milliGRAM(s) Oral at bedtime    MEDICATIONS  (PRN):  acetaminophen     Tablet .. 650 milliGRAM(s) Oral every 6 hours PRN Temp greater or equal to 38C (100.4F), Mild Pain (1 - 3)  aluminum hydroxide/magnesium hydroxide/simethicone Suspension 30 milliLiter(s) Oral every 4 hours PRN Dyspepsia  melatonin 3 milliGRAM(s) Oral at bedtime PRN Insomnia  ondansetron Injectable 4 milliGRAM(s) IV Push every 8 hours PRN Nausea and/or Vomiting      Allergies    penicillin (Hives)    Intolerances        Vital Signs Last 24 Hrs  T(C): 37.3 (25 Aug 2023 11:49), Max: 37.3 (25 Aug 2023 11:49)  T(F): 99.2 (25 Aug 2023 11:49), Max: 99.2 (25 Aug 2023 11:49)  HR: 68 (25 Aug 2023 11:49) (68 - 74)  BP: 119/69 (25 Aug 2023 11:49) (106/64 - 132/70)  BP(mean): --  RR: 18 (25 Aug 2023 11:49) (18 - 18)  SpO2: 94% (25 Aug 2023 11:49) (92% - 94%)    Parameters below as of 25 Aug 2023 11:49  Patient On (Oxygen Delivery Method): nasal cannula  O2 Flow (L/min): 2      PHYSICAL EXAM  General: adult in NAD  HEENT: clear oropharynx, anicteric sclera, pink conjunctiva  Neck: supple  CV: normal S1/S2   Lungs: clear to auscultation, no wheezes, no rales  Abdomen: soft non-tender non-distended, no hepatosplenomegaly, positive bowel sounds  Ext: no clubbing cyanosis or edema  Skin: no rashes and no petechiae  Lymph Nodes: No LAD in neck  Neuro: alert and oriented X 3, no focal deficits    LABS:                          9.2    2.57  )-----------( 271      ( 25 Aug 2023 06:46 )             28.5         Mean Cell Volume : 97.3 fl  Mean Cell Hemoglobin : 31.4 pg  Mean Cell Hemoglobin Concentration : 32.3 gm/dL  Auto Neutrophil # : x  Auto Lymphocyte # : x  Auto Monocyte # : x  Auto Eosinophil # : x  Auto Basophil # : x  Auto Neutrophil % : x  Auto Lymphocyte % : x  Auto Monocyte % : x  Auto Eosinophil % : x  Auto Basophil % : x      Serial CBC's  08-25 @ 06:46  Hct-28.5 / Hgb-9.2 / Plat-271 / RBC-2.93 / WBC-2.57  Serial CBC's  08-23 @ 21:03  Hct-29.3 / Hgb-9.3 / Plat-317 / RBC-2.97 / WBC-2.80      08-25    139  |  101  |  10  ----------------------------<  99  3.8   |  24  |  0.98    Ca    8.7      25 Aug 2023 06:46  Phos  3.5     08-25  Mg     2.3     08-25    TPro  7.3  /  Alb  3.7  /  TBili  0.3  /  DBili  x   /  AST  27  /  ALT  32  /  AlkPhos  61  08-23          Iron - Total Binding Capacity.: 241 ug/dL (08-25 @ 06:46)  Ferritin: 304 ng/mL (08-25 @ 06:46)  Vitamin B12, Serum: 684 pg/mL (08-25 @ 06:46)  Folate, Serum: >20.0 ng/mL (08-25 @ 06:46)              Radiology:        < from: TTE W or WO Ultrasound Enhancing Agent (08.24.23 @ 14:14) >  CONCLUSIONS:      1. Normal left ventricular cavity size. Left ventricular systolic function is normal. There are no regional wall motion abnormalities seen.   2. There is mild mitral valve stenosis, secondary to dystrophic mitral annular calcification.   3. Mild pulmonary hypertension.    < end of copied text >

## 2023-08-25 NOTE — PROGRESS NOTE ADULT - SUBJECTIVE AND OBJECTIVE BOX
PROGRESS NOTE:     Patient is a 89y old  Female who presents with a chief complaint of Shortness of breath (25 Aug 2023 09:43)      SUBJECTIVE / OVERNIGHT EVENTS:    OVERNIGHT: No acute overnight events.      Patient was examined at bedside and feels well this morning. SOB is unchanged from yesterday. Denies fever, chills, chest pain, cough, nausea, vomiting. ROS otherwise negative and pt is amenable to current treatment.      REVIEW OF SYSTEMS:    CONSTITUTIONAL:  No weakness, fevers or chills  EYES/ENT:  No visual changes;  No vertigo or throat pain   NECK:  No pain or stiffness  RESPIRATORY:  Shortness of breath; No cough, wheezing, hemoptysis  CARDIOVASCULAR:  No chest pain or palpitations  GASTROINTESTINAL:  No abdominal or epigastric pain. No nausea, vomiting, or hematemesis; No diarrhea or constipation. No melena or hematochezia.  GENITOURINARY:  No dysuria, frequency or hematuria  NEUROLOGICAL:  No numbness or weakness  SKIN:  No itching, rashes      MEDICATIONS  (STANDING):  buPROPion XL (24-Hour) . 300 milliGRAM(s) Oral daily  escitalopram 5 milliGRAM(s) Oral daily  escitalopram 10 milliGRAM(s) Oral daily  exemestane 25 milliGRAM(s) Oral daily  furosemide   Injectable 20 milliGRAM(s) IV Push daily  mirtazapine 7.5 milliGRAM(s) Oral at bedtime  simvastatin 20 milliGRAM(s) Oral at bedtime    MEDICATIONS  (PRN):  acetaminophen     Tablet .. 650 milliGRAM(s) Oral every 6 hours PRN Temp greater or equal to 38C (100.4F), Mild Pain (1 - 3)  aluminum hydroxide/magnesium hydroxide/simethicone Suspension 30 milliLiter(s) Oral every 4 hours PRN Dyspepsia  melatonin 3 milliGRAM(s) Oral at bedtime PRN Insomnia  ondansetron Injectable 4 milliGRAM(s) IV Push every 8 hours PRN Nausea and/or Vomiting      CAPILLARY BLOOD GLUCOSE        I&O's Summary    24 Aug 2023 07:01  -  25 Aug 2023 07:00  --------------------------------------------------------  IN: 200 mL / OUT: 350 mL / NET: -150 mL        PHYSICAL EXAM:  Vital Signs Last 24 Hrs  T(C): 36.9 (25 Aug 2023 05:04), Max: 37.1 (24 Aug 2023 18:13)  T(F): 98.4 (25 Aug 2023 05:04), Max: 98.7 (24 Aug 2023 18:13)  HR: 69 (25 Aug 2023 11:12) (69 - 74)  BP: 106/64 (25 Aug 2023 11:12) (106/64 - 132/70)  BP(mean): --  RR: 18 (25 Aug 2023 05:04) (18 - 18)  SpO2: 94% (25 Aug 2023 11:12) (92% - 94%)    Parameters below as of 25 Aug 2023 11:12  Patient On (Oxygen Delivery Method): nasal cannula  O2 Flow (L/min): 2      CONSTITUTIONAL: NAD; well-developed  HEENT: PERRL, clear conjunctiva  RESPIRATORY: Normal respiratory effort; lungs are clear to auscultation bilaterally; No Crackles/Rhonchi/Wheezing  CARDIOVASCULAR: Regular rate and rhythm, normal S1 and S2, no murmur/rub/gallop; No lower extremity edema; Peripheral pulses are 2+ bilaterally  ABDOMEN: Nontender to palpation, normoactive bowel sounds, no rebound/guarding; No hepatosplenomegaly  MUSCULOSKELETAL: no clubbing or cyanosis of digits; no joint swelling or tenderness to palpation  EXTREMITY: Lower extremities Non-tender to palpation; non-erythematous B/L  NEURO: A&Ox3; no focal deficits   PSYCH: normal mood; Affect appropriate    LABS:                        9.2    2.57  )-----------( 271      ( 25 Aug 2023 06:46 )             28.5     08-25    139  |  101  |  10  ----------------------------<  99  3.8   |  24  |  0.98    Ca    8.7      25 Aug 2023 06:46  Phos  3.5     08-25  Mg     2.3     08-25    TPro  7.3  /  Alb  3.7  /  TBili  0.3  /  DBili  x   /  AST  27  /  ALT  32  /  AlkPhos  61  08-23          Urinalysis Basic - ( 25 Aug 2023 06:46 )    Color: x / Appearance: x / SG: x / pH: x  Gluc: 99 mg/dL / Ketone: x  / Bili: x / Urobili: x   Blood: x / Protein: x / Nitrite: x   Leuk Esterase: x / RBC: x / WBC x   Sq Epi: x / Non Sq Epi: x / Bacteria: x          RADIOLOGY & ADDITIONAL TESTS:    CXR (8/23)  IMPRESSION:    No focal consolidations.      CTA (8/24)  IMPRESSION:  No pulmonary embolus.    Symmetric groundglass opacity and interstitial prominence bilaterally   favoring the upper lungs. This could represent pulmonary edema, atypical   pneumonia, or noninfectious interstitial pneumonitis.    Cardiomegaly.      TTE (8/24)  CONCLUSIONS:      1. Normal left ventricular cavity size. Left ventricular systolic function is normal. There are no regional wall motion abnormalities seen.   2. There is mild mitral valve stenosis, secondary to dystrophic mitral annular calcification.   3. Mild pulmonary hypertension.

## 2023-08-25 NOTE — PHYSICAL THERAPY INITIAL EVALUATION ADULT - GENERAL OBSERVATIONS, REHAB EVAL
Pt received semisupine in bed with +Ivan, +IVL and +2LNC. NAD noted. Pt received semisupine in bed with +Ivan, +IVL and +2LNC. NAD noted.; 8/29/23 pt received seated b/s chair son at b/s BP low 97/63 low endurance  pt take off nc O2 (was on 1 L nc O2 ) and montior per anil Gold , Dr Griggs and Dr Florez from pulmonary in to see , pt for most part staying in 90's up to 97 % but then fluctuate to as low as 83 -85 % with deep breaths increase to 93 % , placed back on 1 L nc o2 pt 95 % on 1 L ; pt then stood with assist and walk 5 ft to bed min unsteady in assist lightheaded whoozy  /69 seated on bed the assist into bed anil Gold inform and lightheadedness subside , pt on ivan 93 % on 1 L nc o2 , on tele sinus 90's

## 2023-08-25 NOTE — PHYSICAL THERAPY INITIAL EVALUATION ADULT - PERTINENT HX OF CURRENT PROBLEM, REHAB EVAL
Pt is a 89-year-old female w/ PMH of HLD and breast cancer receiving oral chemotherapy who is presenting following a one week history of SOB. She states this is new for her and has been constant. She denies fever, chills, chest pain, cough, nausea, vomiting, and dysuria. No orthopnea or LE swelling. No sick contacts. She presented to the ED via EMS after being found hypoxic with O2 saturation in the low 80s at her outpatient oncologist office. Chest CTA 8/24/23: No pulmonary embolus. Symmetric groundglass opacity and interstitial prominence bilaterally favoring the upper lungs. Cardiomegaly. (-) [facility] Duplex LLE Vein Scan 8/24/23. (-) CXR 8/23/23. Pt is a 89-year-old female w/ PMH of HLD and breast cancer receiving oral chemotherapy who is presenting following a one week history of SOB. She states this is new for her and has been constant. She denies fever, chills, chest pain, cough, nausea, vomiting, and dysuria. No orthopnea or LE swelling. No sick contacts. She presented to the ED via EMS after being found hypoxic with O2 saturation in the low 80s at her outpatient oncologist office. Chest CTA 8/24/23: No pulmonary embolus. Symmetric groundglass opacity and interstitial prominence bilaterally favoring the upper lungs. Cardiomegaly. (-) VA Duplex LLE Vein Scan 8/24/23. (-) CXR 8/23/23.  8/28/23 4 beats WCT 1.2 sec   8/27/23 HR elevated to 160's and another episode  for 4 sec  8/25/23 Sputum gram stain : moderate gram + cocci prs / gram negative rods   8/28/23 CXR (-)

## 2023-08-25 NOTE — DISCHARGE NOTE PROVIDER - NSDCCPCAREPLAN_GEN_ALL_CORE_FT
PRINCIPAL DISCHARGE DIAGNOSIS  Diagnosis: Acute respiratory failure with hypoxia  Assessment and Plan of Treatment: You came to the hospital with shortness of breath. You were found to have acute hypoxic respiratory failure. This is when your lungs are unable to get enought oxygen into your blood. We believe this problem occured due to inflammation in your lungs from the medication you were taking, ibrance. Please do not restart this medication. For this problem you were started on the medication prednisone. Please continue to take prednisone 30 mg twice per day until you follow up with your pulmonologist.   Please follow up with your primary care physician within the next 2 weeks. Also see the pulmonologist xx at xx. Also follow up with you oncologist within the next 2 weeks.   Please return to the emergency department if you develope worsening shortness of breath, chest pain, swelling in your legs, severe dizziness or lightheadedness.     PRINCIPAL DISCHARGE DIAGNOSIS  Diagnosis: Acute respiratory failure with hypoxia  Assessment and Plan of Treatment: You came to the hospital with shortness of breath. You were found to have acute hypoxic respiratory failure. This is when your lungs are unable to get enought oxygen into your blood. We believe this problem occured due to inflammation in your lungs from the medication you were taking, ibrance. Please do not restart this medication. For this problem you were started on the medication prednisone. Please continue to take prednisone 30 mg twice per day until you follow up with your pulmonologist. You were also started on the medication bactrim for prophylaxis agains lung infections. Please continue to take this medication once per day until you follow up with your pulmonologist.    Please follow up with your primary care physician within the next 2 weeks. Also follow up with pulmonolgy within the next 1-2 weeks. Also follow up with you oncologist within the next 2 weeks.   Please return to the emergency department if you develope worsening shortness of breath, chest pain, swelling in your legs, severe dizziness or lightheadedness.     PRINCIPAL DISCHARGE DIAGNOSIS  Diagnosis: Acute respiratory failure with hypoxia  Assessment and Plan of Treatment: You came to the hospital with shortness of breath. You were found to have acute hypoxic respiratory failure. This is when your lungs are unable to get enought oxygen into your blood. We believe this problem occured due to inflammation in your lungs from the medication you were taking, ibrance. Please do not restart this medication. For this problem you were started on the medication prednisone. Please continue to take prednisone 30 mg twice per day until 9/5/23. You will then take prednisone 20 mg twice a day until you are able to follow up with your primary care physician. You were also started on the medication bactrim for prophylaxis agains lung infections. Please continue to take this medication once per day until you follow up with your pulmonologist.    Please follow up with your primary care physician within the next 2 weeks. Also follow up with pulmonolgy within the next 1-2 weeks. Also follow up with you oncologist within the next 2 weeks.   Please return to the emergency department if you develope worsening shortness of breath, chest pain, swelling in your legs, severe dizziness or lightheadedness.

## 2023-08-25 NOTE — PHYSICAL THERAPY INITIAL EVALUATION ADULT - IMPAIRMENTS FOUND, PT EVAL
aerobic capacity/endurance/gait, locomotion, and balance/muscle strength on room air with pulvalentin sanchez's above present pt off nc o2 mostly in the 90's but fluctuate and periodical drop to 83-85 % room air w/ deep bretahs 91-93 % ,place back on 1 L nc O2 at rest 96 % and with activity 91-93 %/aerobic capacity/endurance/gait, locomotion, and balance/muscle strength/ventilation and respiration/gas exchange

## 2023-08-25 NOTE — DISCHARGE NOTE PROVIDER - PROVIDER TOKENS
PROVIDER:[TOKEN:[[id number]]],PROVIDER:[TOKEN:[[id number]]] PROVIDER:[TOKEN:[[id number]]],PROVIDER:[TOKEN:[[id number]]],PROVIDER:[TOKEN:[[id number]]]

## 2023-08-25 NOTE — PROGRESS NOTE ADULT - ASSESSMENT
89F HLD and breast cancer receiving oral chemotherapy who is presenting following a one week history of SOB    #Breast cancer  - recurrent  - on ibrance and exemestane since 2021 with stable disease; last PET scan 1/2023  - hold ibrance for now; can continue exemestane    #hypoxia- recent onset  - dyspneic and hypoxic with even minimal activity in stretcher currently  - CTA no PE, upper lobe gg opacity- ? pulmonary edema vs pneumonitis  - no fever or cough; BNP elevated  - Ibrance can cause ILD/pneumonitis- will hold for now  - Echo with normal LV  - being diuresed per Cardiology  - appreciate Cardiology, pulmonary input- trial of steroids may be considering depending on response to diuresis    #leukopenia and normocytic anemia- chronic  - likely secondary to chemo  - WBC at baseline  - Hg sl lower than prior but should not be cause of such severe symptoms  - no overt bleeding; bili normal, cr normal  - iron studies overall stable, b12/folate ok    d/w Medicine

## 2023-08-25 NOTE — DISCHARGE NOTE PROVIDER - NSDCFUADDAPPT_GEN_ALL_CORE_FT
APPTS ARE READY TO BE MADE: [x ] YES    Best Family or Patient Contact (if needed):    Additional Information about above appointments (if needed):    1: PCP  2: Pulm  3: Oncology     Other comments or requests:    APPTS ARE READY TO BE MADE: [x ] YES  Best Family or Patient Contact (if needed): [phone]    Additional Information about above appointments (if needed):     1: PCP  2: Pulm  3: Oncology     Other comments or requests:    APPTS ARE READY TO BE MADE: [x ] YES  Best Family or Patient Contact (if needed): [phone]    Additional Information about above appointments (if needed):     1: PCP  2: Pulm  3: Oncology       Other comments or requests:    APPTS ARE READY TO BE MADE: [x ] YES  Best Family or Patient Contact (if needed): [phone]    Additional Information about above appointments (if needed):     1: PCP  2: Pulm  3: Oncology       Other comments or requests:       Patient is being discharged to SNF. Patient/caregiver will arrange follow up appointments.

## 2023-08-25 NOTE — PHYSICAL THERAPY INITIAL EVALUATION ADULT - IMPAIRED TRANSFERS: SIT/STAND, REHAB EVAL
deccrease endurance , min unsteady , lightheaded ad whoozy per pt/impaired balance/impaired postural control/decreased strength

## 2023-08-25 NOTE — CONSULT NOTE ADULT - SUBJECTIVE AND OBJECTIVE BOX
CHIEF COMPLAINT:    HPI:    89yo F w/ PMH of HLD    PAST MEDICAL & SURGICAL HISTORY:  Anxiety      Hyperlipidemia      UTI (lower urinary tract infection)      Depression      Breast cancer      HLD (hyperlipidemia)      Gallbladder stone without cholecystitis or obstruction      Cholecystitis      2019 novel coronavirus disease (COVID-19)  1/7/2021      Recurrent breast cancer  opposite side, left-3/2020      History of chemotherapy  and radiation-1994      S/P chemotherapy, time since less than 4 weeks      Cholelithiasis      H/O lumpectomy  left-11/1994      History of hip replacement  left-1994      S/P Mohs surgery for basal cell carcinoma  right side of nose-2020          FAMILY HISTORY:  FH: breast cancer  mother    FH: lung cancer (Mother)        SOCIAL HISTORY:  Smoking: [ ] Never Smoked [ ] Former Smoker (__ packs x ___ years) [ ] Current Smoker  (__ packs x ___ years)  Substance Use: [ ] Never Used [ ] Used ____  EtOH Use:  Marital Status: [ ] Single [ ]  [ ]  [ ]   Sexual History:   Occupation:  Recent Travel:  Country of Birth:  Advance Directives:    Allergies    penicillin (Hives)    Intolerances        HOME MEDICATIONS:  Home Medications:  ALPRAZolam 0.25 mg oral tablet: 1 tab(s) orally 2 times a day as needed (24 Aug 2023 10:49)  biotin: 1 cap(s) orally once a day (24 Aug 2023 10:22)  Cranberry tablet: 1 tablet orally once a day (24 Aug 2023 10:22)  escitalopram 10 mg oral tablet: 1 tab(s) orally once a day with 5mg tablet orally once a day, total of 15mg daily (24 Aug 2023 12:25)  escitalopram 5 mg oral tablet: 1 tab(s) orally once a day (24 Aug 2023 10:48)  exemestane 25 mg oral tablet: 1 tab(s) orally once a day (24 Aug 2023 10:49)  Ibrance 100 mg oral tablet: 1 orally once a day (24 Aug 2023 12:10)  ipratropium 21 mcg/inh (0.03%) nasal spray: 2 spray(s) intranasally once a day (24 Aug 2023 12:11)  Multiple Vitamins oral tablet: 1 tab(s) orally once a day (24 Aug 2023 10:49)  simvastatin 20 mg oral tablet: 1 tab(s) orally once a day (at bedtime) (24 Aug 2023 10:49)  vitamin D3: 1 tab(s) orally once a day (24 Aug 2023 10:22)  Wellbutrin  mg/24 hours oral tablet, extended release: 1 tab(s) orally every 24 hours (24 Aug 2023 10:49)      REVIEW OF SYSTEMS:  Constitutional: [ ] negative [ ] fevers [ ] chills [ ] weight loss [ ] weight gain  HEENT: [ ] negative [ ] dry eyes [ ] eye irritation [ ] postnasal drip [ ] nasal congestion  CV: [ ] negative  [ ] chest pain [ ] orthopnea [ ] palpitations [ ] murmur  Resp: [ ] negative [ ] cough [ ] shortness of breath [ ] dyspnea [ ] wheezing [ ] sputum [ ] hemoptysis  GI: [ ] negative [ ] nausea [ ] vomiting [ ] diarrhea [ ] constipation [ ] abd pain [ ] dysphagia   : [ ] negative [ ] dysuria [ ] nocturia [ ] hematuria [ ] increased urinary frequency  Musculoskeletal: [ ] negative [ ] back pain [ ] myalgias [ ] arthralgias [ ] fracture  Skin: [ ] negative [ ] rash [ ] itch  Neurological: [ ] negative [ ] headache [ ] dizziness [ ] syncope [ ] weakness [ ] numbness  Psychiatric: [ ] negative [ ] anxiety [ ] depression  Endocrine: [ ] negative [ ] diabetes [ ] thyroid problem  Hematologic/Lymphatic: [ ] negative [ ] anemia [ ] bleeding problem  Allergic/Immunologic: [ ] negative [ ] itchy eyes [ ] nasal discharge [ ] hives [ ] angioedema  [ ] All other systems negative  [ ] Unable to assess ROS because ________    OBJECTIVE:  ICU Vital Signs Last 24 Hrs  T(C): 36.9 (25 Aug 2023 05:04), Max: 37.1 (24 Aug 2023 18:13)  T(F): 98.4 (25 Aug 2023 05:04), Max: 98.7 (24 Aug 2023 18:13)  HR: 74 (25 Aug 2023 05:04) (63 - 74)  BP: 132/70 (25 Aug 2023 05:04) (113/57 - 132/70)  BP(mean): --  ABP: --  ABP(mean): --  RR: 18 (25 Aug 2023 05:04) (18 - 18)  SpO2: 94% (25 Aug 2023 05:04) (92% - 96%)    O2 Parameters below as of 25 Aug 2023 05:04  Patient On (Oxygen Delivery Method): nasal cannula  O2 Flow (L/min): 2            08-24 @ 07:01  -  08-25 @ 07:00  --------------------------------------------------------  IN: 200 mL / OUT: 350 mL / NET: -150 mL      CAPILLARY BLOOD GLUCOSE          PHYSICAL EXAM:  General:   HEENT:   Lymph Nodes:  Neck:   Respiratory:   Cardiovascular:   Abdomen:   Extremities:   Skin:   Neurological:  Psychiatry:    LINES:     HOSPITAL MEDICATIONS:  Standing Meds:  buPROPion XL (24-Hour) . 300 milliGRAM(s) Oral daily  escitalopram 5 milliGRAM(s) Oral daily  escitalopram 10 milliGRAM(s) Oral daily  mirtazapine 7.5 milliGRAM(s) Oral at bedtime  simvastatin 20 milliGRAM(s) Oral at bedtime      PRN Meds:  acetaminophen     Tablet .. 650 milliGRAM(s) Oral every 6 hours PRN  aluminum hydroxide/magnesium hydroxide/simethicone Suspension 30 milliLiter(s) Oral every 4 hours PRN  melatonin 3 milliGRAM(s) Oral at bedtime PRN  ondansetron Injectable 4 milliGRAM(s) IV Push every 8 hours PRN      LABS:                        9.2    2.57  )-----------( 271      ( 25 Aug 2023 06:46 )             28.5     Hgb Trend: 9.2<--, 9.3<--  08-25    139  |  101  |  10  ----------------------------<  99  3.8   |  24  |  0.98    Ca    8.7      25 Aug 2023 06:46  Phos  3.5     08-25  Mg     2.3     08-25    TPro  7.3  /  Alb  3.7  /  TBili  0.3  /  DBili  x   /  AST  27  /  ALT  32  /  AlkPhos  61  08-23    Creatinine Trend: 0.98<--, 0.96<--    Urinalysis Basic - ( 25 Aug 2023 06:46 )    Color: x / Appearance: x / SG: x / pH: x  Gluc: 99 mg/dL / Ketone: x  / Bili: x / Urobili: x   Blood: x / Protein: x / Nitrite: x   Leuk Esterase: x / RBC: x / WBC x   Sq Epi: x / Non Sq Epi: x / Bacteria: x        Venous Blood Gas:  08-23 @ 21:03  7.42/37/62/24/92.7  VBG Lactate: 0.7      MICROBIOLOGY:       RADIOLOGY:  [ x ] Reviewed and interpreted by me    PULMONARY FUNCTION TESTS:    EKG:   CHIEF COMPLAINT:    HPI:    89yo F w/ PMH of HLD. breast cancer on oral therapy since 2021, presented from oncology office where she was found to be hypoxemic.      PAST MEDICAL & SURGICAL HISTORY:  Anxiety      Hyperlipidemia      UTI (lower urinary tract infection)      Depression      Breast cancer      HLD (hyperlipidemia)      Gallbladder stone without cholecystitis or obstruction      Cholecystitis      2019 novel coronavirus disease (COVID-19)  1/7/2021      Recurrent breast cancer  opposite side, left-3/2020      History of chemotherapy  and radiation-1994      S/P chemotherapy, time since less than 4 weeks      Cholelithiasis      H/O lumpectomy  left-11/1994      History of hip replacement  left-1994      S/P Mohs surgery for basal cell carcinoma  right side of nose-2020          FAMILY HISTORY:  FH: breast cancer  mother    FH: lung cancer (Mother)        SOCIAL HISTORY:  Smoking: [ ] Never Smoked [ ] Former Smoker (__ packs x ___ years) [ ] Current Smoker  (__ packs x ___ years)  Substance Use: [ ] Never Used [ ] Used ____  EtOH Use:  Marital Status: [ ] Single [ ]  [ ]  [ ]   Sexual History:   Occupation:  Recent Travel:  Country of Birth:  Advance Directives:    Allergies    penicillin (Hives)    Intolerances        HOME MEDICATIONS:  Home Medications:  ALPRAZolam 0.25 mg oral tablet: 1 tab(s) orally 2 times a day as needed (24 Aug 2023 10:49)  biotin: 1 cap(s) orally once a day (24 Aug 2023 10:22)  Cranberry tablet: 1 tablet orally once a day (24 Aug 2023 10:22)  escitalopram 10 mg oral tablet: 1 tab(s) orally once a day with 5mg tablet orally once a day, total of 15mg daily (24 Aug 2023 12:25)  escitalopram 5 mg oral tablet: 1 tab(s) orally once a day (24 Aug 2023 10:48)  exemestane 25 mg oral tablet: 1 tab(s) orally once a day (24 Aug 2023 10:49)  Ibrance 100 mg oral tablet: 1 orally once a day (24 Aug 2023 12:10)  ipratropium 21 mcg/inh (0.03%) nasal spray: 2 spray(s) intranasally once a day (24 Aug 2023 12:11)  Multiple Vitamins oral tablet: 1 tab(s) orally once a day (24 Aug 2023 10:49)  simvastatin 20 mg oral tablet: 1 tab(s) orally once a day (at bedtime) (24 Aug 2023 10:49)  vitamin D3: 1 tab(s) orally once a day (24 Aug 2023 10:22)  Wellbutrin  mg/24 hours oral tablet, extended release: 1 tab(s) orally every 24 hours (24 Aug 2023 10:49)      REVIEW OF SYSTEMS:  Constitutional: [ ] negative [ ] fevers [ ] chills [ ] weight loss [ ] weight gain  HEENT: [ ] negative [ ] dry eyes [ ] eye irritation [ ] postnasal drip [ ] nasal congestion  CV: [ ] negative  [ ] chest pain [ ] orthopnea [ ] palpitations [ ] murmur  Resp: [ ] negative [ ] cough [ ] shortness of breath [ ] dyspnea [ ] wheezing [ ] sputum [ ] hemoptysis  GI: [ ] negative [ ] nausea [ ] vomiting [ ] diarrhea [ ] constipation [ ] abd pain [ ] dysphagia   : [ ] negative [ ] dysuria [ ] nocturia [ ] hematuria [ ] increased urinary frequency  Musculoskeletal: [ ] negative [ ] back pain [ ] myalgias [ ] arthralgias [ ] fracture  Skin: [ ] negative [ ] rash [ ] itch  Neurological: [ ] negative [ ] headache [ ] dizziness [ ] syncope [ ] weakness [ ] numbness  Psychiatric: [ ] negative [ ] anxiety [ ] depression  Endocrine: [ ] negative [ ] diabetes [ ] thyroid problem  Hematologic/Lymphatic: [ ] negative [ ] anemia [ ] bleeding problem  Allergic/Immunologic: [ ] negative [ ] itchy eyes [ ] nasal discharge [ ] hives [ ] angioedema  [ ] All other systems negative  [ ] Unable to assess ROS because ________    OBJECTIVE:  ICU Vital Signs Last 24 Hrs  T(C): 36.9 (25 Aug 2023 05:04), Max: 37.1 (24 Aug 2023 18:13)  T(F): 98.4 (25 Aug 2023 05:04), Max: 98.7 (24 Aug 2023 18:13)  HR: 74 (25 Aug 2023 05:04) (63 - 74)  BP: 132/70 (25 Aug 2023 05:04) (113/57 - 132/70)  BP(mean): --  ABP: --  ABP(mean): --  RR: 18 (25 Aug 2023 05:04) (18 - 18)  SpO2: 94% (25 Aug 2023 05:04) (92% - 96%)    O2 Parameters below as of 25 Aug 2023 05:04  Patient On (Oxygen Delivery Method): nasal cannula  O2 Flow (L/min): 2            08-24 @ 07:01  -  08-25 @ 07:00  --------------------------------------------------------  IN: 200 mL / OUT: 350 mL / NET: -150 mL      CAPILLARY BLOOD GLUCOSE          PHYSICAL EXAM:  General:   HEENT:   Lymph Nodes:  Neck:   Respiratory:   Cardiovascular:   Abdomen:   Extremities:   Skin:   Neurological:  Psychiatry:    LINES:     HOSPITAL MEDICATIONS:  Standing Meds:  buPROPion XL (24-Hour) . 300 milliGRAM(s) Oral daily  escitalopram 5 milliGRAM(s) Oral daily  escitalopram 10 milliGRAM(s) Oral daily  mirtazapine 7.5 milliGRAM(s) Oral at bedtime  simvastatin 20 milliGRAM(s) Oral at bedtime      PRN Meds:  acetaminophen     Tablet .. 650 milliGRAM(s) Oral every 6 hours PRN  aluminum hydroxide/magnesium hydroxide/simethicone Suspension 30 milliLiter(s) Oral every 4 hours PRN  melatonin 3 milliGRAM(s) Oral at bedtime PRN  ondansetron Injectable 4 milliGRAM(s) IV Push every 8 hours PRN      LABS:                        9.2    2.57  )-----------( 271      ( 25 Aug 2023 06:46 )             28.5     Hgb Trend: 9.2<--, 9.3<--  08-25    139  |  101  |  10  ----------------------------<  99  3.8   |  24  |  0.98    Ca    8.7      25 Aug 2023 06:46  Phos  3.5     08-25  Mg     2.3     08-25    TPro  7.3  /  Alb  3.7  /  TBili  0.3  /  DBili  x   /  AST  27  /  ALT  32  /  AlkPhos  61  08-23    Creatinine Trend: 0.98<--, 0.96<--    Urinalysis Basic - ( 25 Aug 2023 06:46 )    Color: x / Appearance: x / SG: x / pH: x  Gluc: 99 mg/dL / Ketone: x  / Bili: x / Urobili: x   Blood: x / Protein: x / Nitrite: x   Leuk Esterase: x / RBC: x / WBC x   Sq Epi: x / Non Sq Epi: x / Bacteria: x        Venous Blood Gas:  08-23 @ 21:03  7.42/37/62/24/92.7  VBG Lactate: 0.7      MICROBIOLOGY:       RADIOLOGY:  [ x ] Reviewed and interpreted by me    PULMONARY FUNCTION TESTS:    EKG:   CHIEF COMPLAINT:    HPI:    88yo F w/ PMH of HLD. breast cancer on oral therapy since 2021, presented from oncology office where she was found to be hypoxemic.  She endorses a roughly 1 week history of progressive dyspnea  She has no known history of heart failure or lung disease. No other active complaints. She denies leg swelling, cough, fevers.  No sick contacts and no recent infections.    She was taking Ibrance prescribed by her oncologist for breast cancer therapy for years and had been well controlled.  During her work up, she was found to have diffuse ground glass opacities on CT of the chest and pulmonology was consulted for further evaluation.    She is a nonsmoker with no prior medical history.    When seen, she endorses overall improvement in symptoms since being started on Lasix with strong urine output.    No other active complaints.    PAST MEDICAL & SURGICAL HISTORY:  Anxiety      Hyperlipidemia      UTI (lower urinary tract infection)      Depression      Breast cancer      HLD (hyperlipidemia)      Gallbladder stone without cholecystitis or obstruction      Cholecystitis      2019 novel coronavirus disease (COVID-19)  1/7/2021      Recurrent breast cancer  opposite side, left-3/2020      History of chemotherapy  and radiation-1994      S/P chemotherapy, time since less than 4 weeks      Cholelithiasis      H/O lumpectomy  left-11/1994      History of hip replacement  left-1994      S/P Mohs surgery for basal cell carcinoma  right side of nose-2020          FAMILY HISTORY:  FH: breast cancer  mother    FH: lung cancer (Mother)          Allergies    penicillin (Hives)    Intolerances        HOME MEDICATIONS:  Home Medications:  ALPRAZolam 0.25 mg oral tablet: 1 tab(s) orally 2 times a day as needed (24 Aug 2023 10:49)  biotin: 1 cap(s) orally once a day (24 Aug 2023 10:22)  Cranberry tablet: 1 tablet orally once a day (24 Aug 2023 10:22)  escitalopram 10 mg oral tablet: 1 tab(s) orally once a day with 5mg tablet orally once a day, total of 15mg daily (24 Aug 2023 12:25)  escitalopram 5 mg oral tablet: 1 tab(s) orally once a day (24 Aug 2023 10:48)  exemestane 25 mg oral tablet: 1 tab(s) orally once a day (24 Aug 2023 10:49)  Ibrance 100 mg oral tablet: 1 orally once a day (24 Aug 2023 12:10)  ipratropium 21 mcg/inh (0.03%) nasal spray: 2 spray(s) intranasally once a day (24 Aug 2023 12:11)  Multiple Vitamins oral tablet: 1 tab(s) orally once a day (24 Aug 2023 10:49)  simvastatin 20 mg oral tablet: 1 tab(s) orally once a day (at bedtime) (24 Aug 2023 10:49)  vitamin D3: 1 tab(s) orally once a day (24 Aug 2023 10:22)  Wellbutrin  mg/24 hours oral tablet, extended release: 1 tab(s) orally every 24 hours (24 Aug 2023 10:49)      REVIEW OF SYSTEMS:  Constitutional: [x ] negative [ ] fevers [ ] chills [ ] weight loss [ ] weight gain  HEENT: [x ] negative [ ] dry eyes [ ] eye irritation [ ] postnasal drip [ ] nasal congestion  CV: [x ] negative  [ ] chest pain [ ] orthopnea [ ] palpitations [ ] murmur  Resp: [ ] negative [ ] cough [x ] shortness of breath [ ] dyspnea [ ] wheezing [ ] sputum [ ] hemoptysis  GI: [ ] negative [ ] nausea [ ] vomiting [ ] diarrhea [ ] constipation [ ] abd pain [ ] dysphagia   : [ ] negative [ ] dysuria [ ] nocturia [ ] hematuria [ ] increased urinary frequency  Musculoskeletal: [ ] negative [ ] back pain [ ] myalgias [ ] arthralgias [ ] fracture  Skin: [ ] negative [ ] rash [ ] itch  Neurological: [ ] negative [ ] headache [ ] dizziness [ ] syncope [ ] weakness [ ] numbness  Psychiatric: [ ] negative [ ] anxiety [ ] depression  Endocrine: [ ] negative [ ] diabetes [ ] thyroid problem  Hematologic/Lymphatic: [ ] negative [ ] anemia [ ] bleeding problem  Allergic/Immunologic: [ ] negative [ ] itchy eyes [ ] nasal discharge [ ] hives [ ] angioedema  [ x] All other systems negative  [ ] Unable to assess ROS because ________    OBJECTIVE:  ICU Vital Signs Last 24 Hrs  T(C): 36.9 (25 Aug 2023 05:04), Max: 37.1 (24 Aug 2023 18:13)  T(F): 98.4 (25 Aug 2023 05:04), Max: 98.7 (24 Aug 2023 18:13)  HR: 74 (25 Aug 2023 05:04) (63 - 74)  BP: 132/70 (25 Aug 2023 05:04) (113/57 - 132/70)  BP(mean): --  ABP: --  ABP(mean): --  RR: 18 (25 Aug 2023 05:04) (18 - 18)  SpO2: 94% (25 Aug 2023 05:04) (92% - 96%)    O2 Parameters below as of 25 Aug 2023 05:04  Patient On (Oxygen Delivery Method): nasal cannula  O2 Flow (L/min): 2            08-24 @ 07:01  -  08-25 @ 07:00  --------------------------------------------------------  IN: 200 mL / OUT: 350 mL / NET: -150 mL      CAPILLARY BLOOD GLUCOSE      PHYSICAL EXAM:  General: Well appearing female sitting comfortably in bed  HEENT: Normal sclera  Lymph Nodes: No LAD  Respiratory: Faint crackles in bilateral bases  Cardiovascular: Regular rate and rhythm no m/r/g  Abdomen: Nontender nondistended  Extremities: No peripheral edema  Skin: No rashes  Neurological: No appreciable neurologic deficits  Psychiatry: Appropriate mood and affect    LINES:     HOSPITAL MEDICATIONS:  Standing Meds:  buPROPion XL (24-Hour) . 300 milliGRAM(s) Oral daily  escitalopram 5 milliGRAM(s) Oral daily  escitalopram 10 milliGRAM(s) Oral daily  mirtazapine 7.5 milliGRAM(s) Oral at bedtime  simvastatin 20 milliGRAM(s) Oral at bedtime      PRN Meds:  acetaminophen     Tablet .. 650 milliGRAM(s) Oral every 6 hours PRN  aluminum hydroxide/magnesium hydroxide/simethicone Suspension 30 milliLiter(s) Oral every 4 hours PRN  melatonin 3 milliGRAM(s) Oral at bedtime PRN  ondansetron Injectable 4 milliGRAM(s) IV Push every 8 hours PRN      LABS:                        9.2    2.57  )-----------( 271      ( 25 Aug 2023 06:46 )             28.5     Hgb Trend: 9.2<--, 9.3<--  08-25    139  |  101  |  10  ----------------------------<  99  3.8   |  24  |  0.98    Ca    8.7      25 Aug 2023 06:46  Phos  3.5     08-25  Mg     2.3     08-25    TPro  7.3  /  Alb  3.7  /  TBili  0.3  /  DBili  x   /  AST  27  /  ALT  32  /  AlkPhos  61  08-23    Creatinine Trend: 0.98<--, 0.96<--    Urinalysis Basic - ( 25 Aug 2023 06:46 )    Color: x / Appearance: x / SG: x / pH: x  Gluc: 99 mg/dL / Ketone: x  / Bili: x / Urobili: x   Blood: x / Protein: x / Nitrite: x   Leuk Esterase: x / RBC: x / WBC x   Sq Epi: x / Non Sq Epi: x / Bacteria: x        Venous Blood Gas:  08-23 @ 21:03  7.42/37/62/24/92.7  VBG Lactate: 0.7      MICROBIOLOGY:       RADIOLOGY:  [ x ] Reviewed and interpreted by me    PULMONARY FUNCTION TESTS:    EKG:

## 2023-08-25 NOTE — DISCHARGE NOTE PROVIDER - CARE PROVIDER_API CALL
EMORY EJFFRIES  Phone: [phone]  Fax: ()-  Follow Up Time:     Juvencio Batista  Medical Oncology  [address] [place] [address], [address]  [place] [address]  Phone: [phone]  Fax: [phone]  Follow Up Time:    EMORY JEFFRIES  Phone: [phone]  Fax: ()-  Follow Up Time:     Juvencio Batista  Medical Oncology  [address] [place] [address], [address]  [place] [address]  Phone: [phone]  Fax: [phone]  Follow Up Time:     Neeta Preston  Cardiovascular Disease  [address] [place] [id number]  Phone: [phone]  Fax: [phone]  Follow Up Time:

## 2023-08-25 NOTE — PHYSICAL THERAPY INITIAL EVALUATION ADULT - ADDITIONAL COMMENTS
Pt reports she lives alone in a apt with +elevator. Pt reports her sister is available to assist when needed. Pt reports she started using a cane for ambulation for the past week, prior to that she did not use a AD. Pt with no home O2 requirements PTA. +drives. +reading eyeglasses. Pt reports she lives alone in a apt with +elevator. Pt reports her sister is available to assist when needed. lives in same building ; Pt reports she started using a cane for ambulation for the past week, prior to that she did not use a AD. Pt with no home O2 requirements PTA. +drives. +reading eyeglasses.  has walk-in shower no seat ; pt son at b/s for session

## 2023-08-25 NOTE — PHYSICAL THERAPY INITIAL EVALUATION ADULT - IMPAIRMENTS CONTRIBUTING TO GAIT DEVIATIONS, PT EVAL
decrease eneudrance , lightheaded and whozzy n stand sat on bed BP taken right away 106/69  RR18 pulse ox on 1 L nc o2 93 %/impaired balance/impaired postural control/decreased strength

## 2023-08-25 NOTE — PROGRESS NOTE ADULT - SUBJECTIVE AND OBJECTIVE BOX
DATE OF SERVICE: 08-25-23 @ 12:57    Patient is a 89y old  Female who presents with a chief complaint of Shortness of breath (25 Aug 2023 11:32)      INTERVAL HISTORY: Feels ok.     REVIEW OF SYSTEMS:  CONSTITUTIONAL: No weakness  EYES/ENT: No visual changes;  No throat pain   NECK: No pain or stiffness  RESPIRATORY: No cough, wheezing; No shortness of breath  CARDIOVASCULAR: No chest pain or palpitations  GASTROINTESTINAL: No abdominal  pain. No nausea, vomiting, or hematemesis  GENITOURINARY: No dysuria, frequency or hematuria  NEUROLOGICAL: No stroke like symptoms  SKIN: No rashes    TELEMETRY Personally reviewed:  SR  60-70s  	  MEDICATIONS:  furosemide   Injectable 20 milliGRAM(s) IV Push daily        PHYSICAL EXAM:  T(C): 36.9 (08-25-23 @ 05:04), Max: 37.1 (08-24-23 @ 18:13)  HR: 69 (08-25-23 @ 11:12) (69 - 74)  BP: 106/64 (08-25-23 @ 11:12) (106/64 - 132/70)  RR: 18 (08-25-23 @ 05:04) (18 - 18)  SpO2: 94% (08-25-23 @ 11:12) (92% - 94%)  Wt(kg): --  I&O's Summary    24 Aug 2023 07:01  -  25 Aug 2023 07:00  --------------------------------------------------------  IN: 200 mL / OUT: 350 mL / NET: -150 mL    25 Aug 2023 07:01  -  25 Aug 2023 12:57  --------------------------------------------------------  IN: 360 mL / OUT: 0 mL / NET: 360 mL          Appearance: In no distress	  HEENT:    PERRL, EOMI	  Cardiovascular:  S1 S2, + JVD  Respiratory: Lungs clear to auscultation	  Gastrointestinal:  Soft, Non-tender, + BS	  Vascularature:  No edema of LE  Psychiatric: Appropriate affect   Neuro: no acute focal deficits                               9.2    2.57  )-----------( 271      ( 25 Aug 2023 06:46 )             28.5     08-25    139  |  101  |  10  ----------------------------<  99  3.8   |  24  |  0.98    Ca    8.7      25 Aug 2023 06:46  Phos  3.5     08-25  Mg     2.3     08-25    TPro  7.3  /  Alb  3.7  /  TBili  0.3  /  DBili  x   /  AST  27  /  ALT  32  /  AlkPhos  61  08-23        Labs personally reviewed      ASSESSMENT/PLAN: 	    Ms. Quinton Bridges is an 89-year-old female w/ PMH of HLD and breast cancer receiving oral chemotherapy who is presenting following a one week history of SOB. She states this is new for her and has been constant. She denies fever, chills, chest pain, cough, nausea, vomiting, and dysuria. No orthopnea or LE swelling. No sick contacts. She presented to the ED via EMS after being found hypoxic with O2 saturation in the low 80s at her outpatient oncologist office.    1. Shortness of Breath  - ECG NSR with no ischemia noted  - COVID/RVP/legionella/Strep PNA negative  - afebile, no leukocytosis  - CT negative for PE but reveals ? pulm edema and cardiomegaly  - BNP slightly elevated at 599  - TTE shows preserved EF, no WMA and mild MS  - D-Dimer 836--> US of RLE negative for DVT, PE neg as noted above  - DDx includes ADHF vs. Atypical PNA vs. Pneumonitis (as per Heme/Onc, Ibrance can cause ILD/pneumonitis)  - Supplemental oxygen as needed  - Pulm following  - Patient with +  JVD and hypoia. c/w Lasix 20mg IVP daily with goal of net neg 1-1.5L. If UOP inadequate, escalate to lasix 40 IV.     2. HLD  - c/w simvastatin 20mg PO daily    3. Breast CA  - Heme/Onc following    Joleen Gambino, AG-NP   Young Gaspar DO [facility]  Cardiovascular Medicine  [address], [address]  Available through call or text on [employer] TE[employer]  Office: [phone]   DATE OF SERVICE: 08-25-23 @ 12:57    Patient is a 89y old  Female who presents with a chief complaint of Shortness of breath (25 Aug 2023 11:32)      INTERVAL HISTORY: Feels ok.     REVIEW OF SYSTEMS:  CONSTITUTIONAL: No weakness  EYES/ENT: No visual changes;  No throat pain   NECK: No pain or stiffness  RESPIRATORY: No cough, wheezing; No shortness of breath  CARDIOVASCULAR: No chest pain or palpitations  GASTROINTESTINAL: No abdominal  pain. No nausea, vomiting, or hematemesis  GENITOURINARY: No dysuria, frequency or hematuria  NEUROLOGICAL: No stroke like symptoms  SKIN: No rashes    TELEMETRY Personally reviewed:  SR  60-70s  	  MEDICATIONS:  furosemide   Injectable 20 milliGRAM(s) IV Push daily        PHYSICAL EXAM:  T(C): 36.9 (08-25-23 @ 05:04), Max: 37.1 (08-24-23 @ 18:13)  HR: 69 (08-25-23 @ 11:12) (69 - 74)  BP: 106/64 (08-25-23 @ 11:12) (106/64 - 132/70)  RR: 18 (08-25-23 @ 05:04) (18 - 18)  SpO2: 94% (08-25-23 @ 11:12) (92% - 94%)  Wt(kg): --  I&O's Summary    24 Aug 2023 07:01  -  25 Aug 2023 07:00  --------------------------------------------------------  IN: 200 mL / OUT: 350 mL / NET: -150 mL    25 Aug 2023 07:01  -  25 Aug 2023 12:57  --------------------------------------------------------  IN: 360 mL / OUT: 0 mL / NET: 360 mL          Appearance: In no distress	  HEENT:    PERRL, EOMI	  Cardiovascular:  S1 S2, + JVD  Respiratory: Lungs clear to auscultation	  Gastrointestinal:  Soft, Non-tender, + BS	  Vascularature:  No edema of LE  Psychiatric: Appropriate affect   Neuro: no acute focal deficits                               9.2    2.57  )-----------( 271      ( 25 Aug 2023 06:46 )             28.5     08-25    139  |  101  |  10  ----------------------------<  99  3.8   |  24  |  0.98    Ca    8.7      25 Aug 2023 06:46  Phos  3.5     08-25  Mg     2.3     08-25    TPro  7.3  /  Alb  3.7  /  TBili  0.3  /  DBili  x   /  AST  27  /  ALT  32  /  AlkPhos  61  08-23        Labs personally reviewed      ASSESSMENT/PLAN: 	    Ms. Quinton Bridges is an 89-year-old female w/ PMH of HLD and breast cancer receiving oral chemotherapy who is presenting following a one week history of SOB. She states this is new for her and has been constant. She denies fever, chills, chest pain, cough, nausea, vomiting, and dysuria. No orthopnea or LE swelling. No sick contacts. She presented to the ED via EMS after being found hypoxic with O2 saturation in the low 80s at her outpatient oncologist office.    1. Shortness of Breath  - ECG NSR with no ischemia noted  - COVID/RVP/legionella/Strep PNA negative  - afebile, no leukocytosis  - CT negative for PE but reveals ? pulm edema and cardiomegaly  - BNP slightly elevated at 599  - TTE shows preserved EF, no WMA and mild MS  - D-Dimer 836--> US of RLE negative for DVT, PE neg as noted above  - DDx includes ADHF vs. Atypical PNA vs. Pneumonitis (as per Heme/Onc, Ibrance can cause ILD/pneumonitis)  - Supplemental oxygen as needed  - Pulm following  - Continue trial of diuresis with c/w Lasix 40mg IVP daily, though appears less likely that SOB is related to pulm edema      2. HLD  - c/w simvastatin 20mg PO daily    3. Breast CA  - Heme/Onc following          Jolene Gambino, AG-NP   Young Gaspar DO [facility]  Cardiovascular Medicine  [address], [address]  Available through call or text on [employer] TEAM[employer]  Office: [phone]

## 2023-08-25 NOTE — CONSULT NOTE ADULT - ASSESSMENT
# abnormal CT chest   # acute hypoxemic respiratory failure  # abnormal CT chest  Patient on ibrance and exemestane therapy as an outpatient. Ibrance stopped by heme/onc. Presents with 1 week history of dyspnea without cough, fevers or pleuritic pain.   with normal renal function. Echocardiogram with normal LV systolic function, no significant diastolic issues. Mild LVH and mild MS, no PH.  CT of the chest without signs of PE, DVT study negative. CT shows diffuse widespread GGOs, no honeycombing or bronchiectasis, possibly mild emphysematous changes. Apically predominant but present in all lobes, no peripheral or central sparing.  Patient on 2L O2 via NC currently, not acutely struggling to breathe.  Picture is concerning for palbociclib-associated acute pneumonitis, however other causes could be pulmonary edema or diffuse infection.  Her RVP was negative, no signs or symptoms of infection.  - agree with holding ibrance  - empirically diurese, could increase IV Lasix dose to achieve net negative fluid balance of -1-1.5L per day, if creatinine permits  - check procal  - low threshhold to initiate corticosteroids if her respiratory status worsens, otherwise will assess response to diuretics # acute hypoxemic respiratory failure  # abnormal CT chest  Patient on ibrance and exemestane therapy as an outpatient. Ibrance stopped by heme/onc. Presents with 1 week history of dyspnea without cough, fevers or pleuritic pain.   with normal renal function. Echocardiogram with normal LV systolic function, no significant diastolic issues. Mild LVH and mild MS, no PH.  CT of the chest without signs of PE, DVT study negative. CT shows diffuse widespread GGOs, no honeycombing or bronchiectasis, possibly mild emphysematous changes. Apically predominant but present in all lobes, no peripheral or central sparing.  Patient on 2L O2 via NC currently, not acutely struggling to breathe.  Picture is concerning for palbociclib-associated acute pneumonitis, however other causes could be pulmonary edema or diffuse infection.  Her RVP was negative, no signs or symptoms of infection.  - agree with holding ibrance  - empirically diurese, could increase IV Lasix dose to achieve net negative fluid balance of -1-1.5L per day, if creatinine permits  - check procal  - low threshhold to initiate corticosteroids if her respiratory status worsens, otherwise will assess response to diuretics

## 2023-08-25 NOTE — PHYSICAL THERAPY INITIAL EVALUATION ADULT - NSPTDISCHREC_GEN_A_CORE
TBD once functional evaluation is completed. TBD once functional evaluation is completed.; 8/29/23 spoke with NELLA Stone re plan fot subacute rehab to restore to previous level of functional independence , strength, balance, endurane , fall prevention education continued ; If pt goes home needs assist all functional activity and adl's pt and son at b/s understood ; if home currently need nc O2 , shower seat , rolling walker , and w/c for longer distances ;pt has a std cane/Sub-acute Rehab

## 2023-08-25 NOTE — DISCHARGE NOTE PROVIDER - NSDCMRMEDTOKEN_GEN_ALL_CORE_FT
ALPRAZolam 0.25 mg oral tablet: 1 tab(s) orally 2 times a day as needed  biotin: 1 cap(s) orally once a day  Cranberry tablet: 1 tablet orally once a day  escitalopram 10 mg oral tablet: 1 tab(s) orally once a day with 5mg tablet orally once a day, total of 15mg daily  escitalopram 5 mg oral tablet: 1 tab(s) orally once a day  exemestane 25 mg oral tablet: 1 tab(s) orally once a day  Ibrance 100 mg oral tablet: 1 orally once a day  ipratropium 21 mcg/inh (0.03%) nasal spray: 2 spray(s) intranasally once a day  mirtazapine 7.5 mg oral tablet: 1 tab(s) orally once a day (at bedtime)  Multiple Vitamins oral tablet: 1 tab(s) orally once a day  simvastatin 20 mg oral tablet: 1 tab(s) orally once a day (at bedtime)  vitamin D3: 1 tab(s) orally once a day  Wellbutrin  mg/24 hours oral tablet, extended release: 1 tab(s) orally every 24 hours   ALPRAZolam 0.25 mg oral tablet: 1 tab(s) orally 2 times a day as needed  biotin: 1 cap(s) orally once a day  buPROPion 300 mg/24 hours (XL) oral tablet, extended release: 1 tab(s) orally once a day  Cranberry tablet: 1 tablet orally once a day  escitalopram 10 mg oral tablet: 1 tab(s) orally once a day  escitalopram 5 mg oral tablet: 1 tab(s) orally once a day  exemestane 25 mg oral tablet: 1 tab(s) orally once a day  furosemide 20 mg oral tablet: 1 tab(s) orally once a day  ipratropium 21 mcg/inh (0.03%) nasal spray: 2 spray(s) intranasally once a day  mirtazapine 7.5 mg oral tablet: 1 tab(s) orally once a day (at bedtime)  pantoprazole 40 mg oral delayed release tablet: 1 tab(s) orally once a day (before a meal)  predniSONE 10 mg oral tablet: 3 tab(s) orally 2 times a day  simvastatin 20 mg oral tablet: 1 tab(s) orally once a day (at bedtime)  sulfamethoxazole-trimethoprim 800 mg-160 mg oral tablet: 1 tab(s) orally once a day  tamsulosin 0.4 mg oral capsule: 1 cap(s) orally once a day (at bedtime)  vitamin D3: 1 tab(s) orally once a day   ALPRAZolam 0.25 mg oral tablet: 1 tab(s) orally 2 times a day as needed  biotin: 1 cap(s) orally once a day  buPROPion 300 mg/24 hours (XL) oral tablet, extended release: 1 tab(s) orally once a day  Cranberry tablet: 1 tablet orally once a day  escitalopram 10 mg oral tablet: 1 tab(s) orally once a day  escitalopram 5 mg oral tablet: 1 tab(s) orally once a day  exemestane 25 mg oral tablet: 1 tab(s) orally once a day  furosemide 20 mg oral tablet: 1 tab(s) orally once a day  ipratropium 21 mcg/inh (0.03%) nasal spray: 2 spray(s) intranasally once a day  mirtazapine 7.5 mg oral tablet: 1 tab(s) orally once a day (at bedtime)  pantoprazole 40 mg oral delayed release tablet: 1 tab(s) orally once a day (before a meal)  predniSONE 10 mg oral tablet: 2 tab(s) orally 2 times a day  predniSONE 10 mg oral tablet: 3 tab(s) orally 2 times a day  simvastatin 20 mg oral tablet: 1 tab(s) orally once a day (at bedtime)  sulfamethoxazole-trimethoprim 800 mg-160 mg oral tablet: 1 tab(s) orally once a day  tamsulosin 0.4 mg oral capsule: 1 cap(s) orally once a day (at bedtime)  vitamin D3: 1 tab(s) orally once a day

## 2023-08-25 NOTE — DISCHARGE NOTE PROVIDER - DISCHARGE SERVICE FOR PATIENT
on the discharge service for the patient. I have reviewed and made amendments to the documentation where necessary. 152.4

## 2023-08-25 NOTE — DISCHARGE NOTE PROVIDER - NSDCFUSCHEDAPPT_GEN_ALL_CORE_FT
Lisker, Gita N  [facility]  [address]  Scheduled Appointment: 08/31/2023

## 2023-08-25 NOTE — PHYSICAL THERAPY INITIAL EVALUATION ADULT - IMPAIRMENTS CONTRIBUTING IMPAIRED BED MOBILITY, REHAB EVAL
decreased flexibility/decreased strength 8/29/23 pt weak , low endurance , lightheaded/decreased flexibility/decreased strength

## 2023-08-25 NOTE — PROGRESS NOTE ADULT - PROBLEM SELECTOR PLAN 1
- P/w one week h/o SOB, O2 in low 80s at outpatient office, transported to ED via EMS  - Afebrile, leukopenic on admission  - RVP, COVID negative  - Elevated D-dimer -> US of RLE unremarkable  - CTA -> Nonspecific ground-glass opacities, negative for PE  - Elevated BNP -> TTE revealed normal LVSF, mild MV stenosis, mild pulm HTN  - C/w IV Lasix 20mg daily for diuresis  - C/w supplemental O2 prn  - C/w strict I/Os  - Pulmonology consulted, will appreciate recs - P/w one week h/o SOB, O2 in low 80s at outpatient office, transported to ED via EMS  - Afebrile, leukopenic on admission  - RVP, COVID negative  - Legionella, strep negative; MRSA pending  - Elevated D-dimer -> US of RLE unremarkable  - CTA -> Nonspecific ground-glass opacities, negative for PE  - Elevated BNP -> TTE revealed normal LVSF, mild MV stenosis, mild pulm HTN  - C/w IV Lasix 20mg daily for diuresis  - C/w supplemental O2 prn  - C/w strict I/Os  - Pulmonology consulted, will appreciate recs

## 2023-08-25 NOTE — DISCHARGE NOTE PROVIDER - HOSPITAL COURSE
Ms. Quinton Bridges is an 89-year-old female w/ PMH of HLD and breast cancer receiving oral chemotherapy who presented following a one week history of SOB. She stated this is new for her and has been constant. She denied fever, chills, chest pain, cough, nausea, vomiting, and dysuria. No orthopnea or LE swelling. No sick contacts. She presented to the ED via EMS after being found hypoxic with O2 saturation in the low 80s at her outpatient oncologist office. In the ED, pt was afebrile and hemodynamically stable on 2L NC. Leukopenia and anemia noted on CBC. RVP and COVID tests negative. Elevated D-dimer, subsequent duplex US of the LEs negative for DVT. CTA negative for PE but revealed nonspecific bilateral ground-glass opacities. BNP was also elevated. TTE displayed normal LVSF with mild MV stenosis and mild pulmonary HTN. Cardiology and pulmonology consulted, with recommendations for a trial of diuresis and to monitor for clinical response.    On day of discharge, patient is clinically stable with no new exam findings or acute symptoms compared to prior. The patient was seen by the attending physician on the date of discharge and deemed stable and acceptable for discharge. The patient's chronic medical conditions were treated accordingly per the patient's home medication regimen. The patient's medication reconciliation (with changes made to chronic medications), follow up appointments, discharge orders, instructions, and significant lab and diagnostic studies are as noted.     Discharge Summary     Admit Date: 08-24-23  Discharge Date:    Admission diagnoses:   Acute hypoxic respiratory failure     Discharge diagnoses:   Acute hypoxic respiratory failure     Hospital Course:   For full details, please see H&P, progress notes, consult notes and ancillary notes. Ms. Quinton Bridges is an 89-year-old female w/ PMH of HLD and breast cancer receiving oral chemotherapy (iBrance) who presented following a one week history of SOB. She stated this is new for her and has been constant. She denied fever, chills, chest pain, cough, nausea, vomiting, and dysuria. No orthopnea or LE swelling. No sick contacts. She presented to the ED via EMS after being found hypoxic with O2 saturation in the low 80s at her outpatient oncologist office. In the ED, pt was afebrile and hemodynamically stable on 2L NC. Leukopenia and anemia noted on CBC. RVP and COVID tests negative. Elevated D-dimer, subsequent duplex US of the LEs negative for DVT. CTA negative for PE but revealed nonspecific bilateral ground-glass opacities. BNP was also elevated. TTE displayed normal LVSF with mild MV stenosis and mild pulmonary HTN. Cardiology and pulmonology consulted, with recommendations for a trial of diuresis and to monitor for clinical response. PT w/o improvement in respiratory status w/ IV lasix 40mg qd. Pulm suspected symptoms more likely 2/2 pneumanitis given lack of response. Pt started on prednisone 30mg BID. PTs O2 sat improved w/ pred and able to wean off supplemental O2 requirements.     Discharge follow up action items:     1. Follow up with PCP in 1-2 weeks.   2. Follow up with Pulm at   3. F/u w/ onc in   3. Medication changes started prednisone 30 mg qday until f/u with pulm    Patient's ordered code status: FULL CODE    Patient disposition: [facility]      On day of discharge, patient is clinically stable with no new exam findings or acute symptoms compared to prior. The patient was seen by the attending physician on the date of discharge and deemed stable and acceptable for discharge. The patient's chronic medical conditions were treated accordingly per the patient's home medication regimen. The patient's medication reconciliation (with changes made to chronic medications), follow up appointments, discharge orders, instructions, and significant lab and diagnostic studies are as noted.     Discharge Summary     Admit Date: 08-24-23  Discharge Date:    Admission diagnoses:   Acute hypoxic respiratory failure     Discharge diagnoses:   Acute hypoxic respiratory failure     Hospital Course:   For full details, please see H&P, progress notes, consult notes and ancillary notes. Ms. Quinton Bridges is an 89-year-old female w/ PMH of HLD and breast cancer receiving oral chemotherapy (iBrance) who presented following a one week history of SOB. She stated this is new for her and has been constant. She denied fever, chills, chest pain, cough, nausea, vomiting, and dysuria. No orthopnea or LE swelling. No sick contacts. She presented to the ED via EMS after being found hypoxic with O2 saturation in the low 80s at her outpatient oncologist office. In the ED, pt was afebrile and hemodynamically stable on 2L NC. Leukopenia and anemia noted on CBC. RVP and COVID tests negative. Elevated D-dimer, subsequent duplex US of the LEs negative for DVT. CTA negative for PE but revealed nonspecific bilateral ground-glass opacities. BNP was also elevated. TTE displayed normal LVSF with mild MV stenosis and mild pulmonary HTN. Cardiology and pulmonology consulted, with recommendations for a trial of diuresis and to monitor for clinical response. PT w/o improvement in respiratory status w/ IV lasix 40mg qd. Pulm suspected symptoms more likely 2/2 pneumanitis given lack of response. Pt started on prednisone 30mg BID. PTs O2 sat improved w/ pred and able to wean off supplemental O2 requirements.     #Acute respiratory failure with hypoxia.   - P/w one week h/o SOB, O2 in low 80s at outpatient office, transported to ED via EMS  - Afebrile, leukopenic on admission  - RVP, COVID negative  - Legionella, strep negative  - Elevated D-dimer -> US of RLE unremarkable  - CTA -> Nonspecific ground-glass opacities, negative for PE  - Elevated BNP -> TTE revealed normal LVSF, mild MV stenosis, mild pulm HTN  -  Cards and pulm were consulted and recommended a trial of IV Lasix 40mg qd which was stopped after 3 daysas no improvement in resp status w/ diuresis  - Pt was started on pred 30mg BID for pneumonitis w/ improvements in respiratory status  - O2 sats increased to low-mid 90s on room, but had continued dessats to high 70s overnight.   - Walking O2 study w/ desats to 87%  - TTE w/ bubble study showing no intracardiac shunt  - Start lasix 20 mg po qd per pulm  - Pt will be discharged on home O2, lasix 20 mg qd, and continue prednisone 30 mg BID until she follows up with pulm; she was started on bactrim ds BID for PCP prophylaxis.    # Breast cancer  - Pt w/ a hx of breast on exemestane, Ibrance at home  - Pt holding Ibrance since due to possible lung toxicity, as recommended by her outpatient oncologist; will continue to hold until follows up with onc  - C/w exemestane.        Discharge follow up action items:     1. Follow up with PCP in 1-2 weeks.   2. Follow up with Pulm at   3. F/u w/ onc in   3. Medication changes started prednisone 30 mg qday until f/u with pulm    Patient's ordered code status: FULL CODE    Patient disposition: [facility]      On day of discharge, patient is clinically stable with no new exam findings or acute symptoms compared to prior. The patient was seen by the attending physician on the date of discharge and deemed stable and acceptable for discharge. The patient's chronic medical conditions were treated accordingly per the patient's home medication regimen. The patient's medication reconciliation (with changes made to chronic medications), follow up appointments, discharge orders, instructions, and significant lab and diagnostic studies are as noted.     Discharge Summary     Admit Date: 08-24-23  Discharge Date:    Admission diagnoses:   Acute hypoxic respiratory failure     Discharge diagnoses:   Acute hypoxic respiratory failure     Hospital Course:   For full details, please see H&P, progress notes, consult notes and ancillary notes. Ms. Quinton Bridges is an 89-year-old female w/ PMH of HLD and breast cancer receiving oral chemotherapy (iBrance) who presented following a one week history of SOB. She stated this is new for her and has been constant. She denied fever, chills, chest pain, cough, nausea, vomiting, and dysuria. No orthopnea or LE swelling. No sick contacts. She presented to the ED via EMS after being found hypoxic with O2 saturation in the low 80s at her outpatient oncologist office. In the ED, pt was afebrile and hemodynamically stable on 2L NC. Leukopenia and anemia noted on CBC. RVP and COVID tests negative. Elevated D-dimer, subsequent duplex US of the LEs negative for DVT. CTA negative for PE but revealed nonspecific bilateral ground-glass opacities. BNP was also elevated. TTE displayed normal LVSF with mild MV stenosis and mild pulmonary HTN. Cardiology and pulmonology consulted, with recommendations for a trial of diuresis and to monitor for clinical response. PT w/o improvement in respiratory status w/ IV lasix 40mg qd. Pulm suspected symptoms more likely 2/2 pneumanitis given lack of response. Pt started on prednisone 30mg BID. PTs O2 sat improved w/ pred and able to wean off supplemental O2 requirements.     #Acute respiratory failure with hypoxia.   - P/w one week h/o SOB, O2 in low 80s at outpatient office, transported to ED via EMS  - Afebrile, leukopenic on admission  - RVP, COVID negative  - Legionella, strep negative  - Elevated D-dimer -> US of RLE unremarkable  - CTA -> Nonspecific ground-glass opacities, negative for PE  - Elevated BNP -> TTE revealed normal LVSF, mild MV stenosis, mild pulm HTN  -  Cards and pulm were consulted and recommended a trial of IV Lasix 40mg qd which was stopped after 3 daysas no improvement in resp status w/ diuresis  - Pt was started on pred 30mg BID for pneumonitis w/ improvements in respiratory status  - O2 sats increased to low-mid 90s on room, but had continued dessats to high 70s overnight.   - Walking O2 study w/ desats to 87%  - TTE w/ bubble study showing no intracardiac shunt  - Start lasix 20 mg po qd per pulm  - Pt will be discharged on home O2, lasix 20 mg qd, and continue prednisone 30 mg BID until she follows up with pulm; she was started on bactrim ds BID for PCP prophylaxis.    # Breast cancer  - Pt w/ a hx of breast on exemestane, Ibrance at home  - Pt holding Ibrance since due to possible lung toxicity, as recommended by her outpatient oncologist; will continue to hold until follows up with onc  - C/w exemestane.        Discharge follow up action items:     1. Follow up with PCP in 1-2 weeks.   2. Follow up with Pulm within the next 1-2 weeks   3. F/u w/ onc, Dr Batista  3. Medication changes:   Started: prednisone 30 mg qday until f/u with pulm, started lasix 20mg qd, pantoprazole 40mg qd, bactrim 800-160qd, tamsulosin 0.4mg qhs.    Stopped: Ibrance,     Patient's ordered code status: FULL CODE    Patient disposition: [facility]      On day of discharge, patient is clinically stable with no new exam findings or acute symptoms compared to prior. The patient was seen by the attending physician on the date of discharge and deemed stable and acceptable for discharge. The patient's chronic medical conditions were treated accordingly per the patient's home medication regimen. The patient's medication reconciliation (with changes made to chronic medications), follow up appointments, discharge orders, instructions, and significant lab and diagnostic studies are as noted.     Discharge Summary     Admit Date: 08-24-23  Discharge Date:    Admission diagnoses:   Acute hypoxic respiratory failure     Discharge diagnoses:   Acute hypoxic respiratory failure     Hospital Course:   For full details, please see H&P, progress notes, consult notes and ancillary notes. Ms. Quinton Bridges is an 89-year-old female w/ PMH of HLD and breast cancer receiving oral chemotherapy (iBrance) who presented following a one week history of SOB. She stated this is new for her and has been constant. She denied fever, chills, chest pain, cough, nausea, vomiting, and dysuria. No orthopnea or LE swelling. No sick contacts. She presented to the ED via EMS after being found hypoxic with O2 saturation in the low 80s at her outpatient oncologist office. In the ED, pt was afebrile and hemodynamically stable on 2L NC. Leukopenia and anemia noted on CBC. RVP and COVID tests negative. Elevated D-dimer, subsequent duplex US of the LEs negative for DVT. CTA negative for PE but revealed nonspecific bilateral ground-glass opacities. BNP was also elevated. TTE displayed normal LVSF with mild MV stenosis and mild pulmonary HTN. Cardiology and pulmonology consulted, with recommendations for a trial of diuresis and to monitor for clinical response. PT w/o improvement in respiratory status w/ IV lasix 40mg qd. Pulm suspected symptoms more likely 2/2 pneumonitis given lack of response. Pt started on prednisone 30mg BID. PTs O2 sat improved w/ pred and able to wean off supplemental O2 requirements.     #Acute respiratory failure with hypoxia.   - P/w one week h/o SOB, O2 in low 80s at outpatient office, transported to ED via EMS  - Afebrile, leukopenic on admission  - RVP, COVID negative  - Legionella, strep negative  - Elevated D-dimer -> US of RLE unremarkable  - CTA -> Nonspecific ground-glass opacities, negative for PE  - Elevated BNP -> TTE revealed normal LVSF, mild MV stenosis, mild pulm HTN  -  Cards and pulm were consulted and recommended a trial of IV Lasix 40mg qd which was stopped after 3 daysas no improvement in resp status w/ diuresis  - Pt was started on pred 30mg BID for pneumonitis w/ improvements in respiratory status  - O2 sats increased to low-mid 90s on room, but had continued dessats to high 70s overnight.   - Walking O2 study w/ desats to 87%  - TTE w/ bubble study showing no intracardiac shunt  - Start lasix 20 mg po qd per pulm  - Pt will be discharged on home O2, lasix 20 mg qd, and continue prednisone 30 mg BID until she follows up with pulm; she was started on bactrim ds BID for PCP prophylaxis.    # Breast cancer  - Pt w/ a hx of breast on exemestane, Ibrance at home  - Pt holding Ibrance since due to possible lung toxicity, as recommended by her outpatient oncologist; will continue to hold until follows up with onc  - C/w exemestane.        Discharge follow up action items:     1. Follow up with PCP in 1-2 weeks.   2. Follow up with Pulm within the next 1-2 weeks   3. F/u w/ onc, Dr Batista  3. Medication changes:   Started: prednisone 30 mg BID qday until  9/5, switch to prednisone to 20 mg BID (f/u with pulm), started lasix 20mg qd, pantoprazole 40mg qd, bactrim 800-160qd, tamsulosin 0.4mg qhs.    Stopped: Ibrance,     Patient's ordered code status: FULL CODE    Patient disposition: [facility]      On day of discharge, patient is clinically stable with no new exam findings or acute symptoms compared to prior. The patient was seen by the attending physician on the date of discharge and deemed stable and acceptable for discharge. The patient's chronic medical conditions were treated accordingly per the patient's home medication regimen. The patient's medication reconciliation (with changes made to chronic medications), follow up appointments, discharge orders, instructions, and significant lab and diagnostic studies are as noted.

## 2023-08-25 NOTE — CONSULT NOTE ADULT - ATTENDING COMMENTS
88 yo F w/ PMH of breast cancer on Ibrance and exemestane who was sent from oncology when found to be hypoxemic. She states over the last few weeks she has had progressive dyspnea with exertion. She has a home pulse oximeter and found her SpO2 to be in the 80s so she went to her doctor for evaluation. She has some fatigue but no cough, no wheezing, no fevers, no other complaints.     She has diffuse fine crackles on exam, but minimal LE edema. She is awake, alert, answering questions appropriately. She saturates 89% on room air at rest while recumbent. SpO2 improves to 94% on 2L NC. Labs sig for elevated BNP to 599. TTE w/ normal LV systolic function, no significant diastolic issues. Mild LVH and mild MS, no PH. CT of the chest   widespread GGOs, no honeycombing or bronchiectasis, possibly mild emphysematous changes.  Of note she has a remote hx of light smoking but was never diagnosed with asthma or emphysema. No prior inhalers/prednsione use.     Her presentation is concerning for possible drug induced pneumonitis (known side effect of Ibrance). Cannot rule out pulmonary edema and as such would attempt trial of diuresis and monitor for improvement. Infection is unlikely given diffuse pattern, lack of signs or symptoms - no fevers, no leukocytosis, no cough.     - agree with holding Ibrance  - agree with diuresis per primary team   - check procal  - if no improvement with diuresis will discus risks/benefits/trial of high dose steroids for possible pneumonitis   - will require outpatient PFTs, repeat CT in 6-8 weeks

## 2023-08-25 NOTE — PHYSICAL THERAPY INITIAL EVALUATION ADULT - GAIT DEVIATIONS NOTED, PT EVAL
decreased lizette/increased time in double stance/decreased step length/decreased stride length/decreased weight-shifting ability

## 2023-08-26 LAB
ANION GAP SERPL CALC-SCNC: 12 MMOL/L — SIGNIFICANT CHANGE UP (ref 5–17)
BUN SERPL-MCNC: 19 MG/DL — SIGNIFICANT CHANGE UP (ref 7–23)
CALCIUM SERPL-MCNC: 8.9 MG/DL — SIGNIFICANT CHANGE UP (ref 8.4–10.5)
CHLORIDE SERPL-SCNC: 99 MMOL/L — SIGNIFICANT CHANGE UP (ref 96–108)
CO2 SERPL-SCNC: 25 MMOL/L — SIGNIFICANT CHANGE UP (ref 22–31)
CREAT SERPL-MCNC: 1.23 MG/DL — SIGNIFICANT CHANGE UP (ref 0.5–1.3)
EGFR: 42 ML/MIN/1.73M2 — LOW
GLUCOSE SERPL-MCNC: 115 MG/DL — HIGH (ref 70–99)
GRAM STN FLD: SIGNIFICANT CHANGE UP
HCT VFR BLD CALC: 30.6 % — LOW (ref 34.5–45)
HGB BLD-MCNC: 9.6 G/DL — LOW (ref 11.5–15.5)
MAGNESIUM SERPL-MCNC: 2.3 MG/DL — SIGNIFICANT CHANGE UP (ref 1.6–2.6)
MCHC RBC-ENTMCNC: 30.8 PG — SIGNIFICANT CHANGE UP (ref 27–34)
MCHC RBC-ENTMCNC: 31.4 GM/DL — LOW (ref 32–36)
MCV RBC AUTO: 98.1 FL — SIGNIFICANT CHANGE UP (ref 80–100)
NRBC # BLD: 0 /100 WBCS — SIGNIFICANT CHANGE UP (ref 0–0)
PHOSPHATE SERPL-MCNC: 3.5 MG/DL — SIGNIFICANT CHANGE UP (ref 2.5–4.5)
PLATELET # BLD AUTO: 251 K/UL — SIGNIFICANT CHANGE UP (ref 150–400)
POTASSIUM SERPL-MCNC: 4 MMOL/L — SIGNIFICANT CHANGE UP (ref 3.5–5.3)
POTASSIUM SERPL-SCNC: 4 MMOL/L — SIGNIFICANT CHANGE UP (ref 3.5–5.3)
RBC # BLD: 3.12 M/UL — LOW (ref 3.8–5.2)
RBC # FLD: 14.6 % — HIGH (ref 10.3–14.5)
SODIUM SERPL-SCNC: 136 MMOL/L — SIGNIFICANT CHANGE UP (ref 135–145)
SPECIMEN SOURCE: SIGNIFICANT CHANGE UP
WBC # BLD: 2.45 K/UL — LOW (ref 3.8–10.5)
WBC # FLD AUTO: 2.45 K/UL — LOW (ref 3.8–10.5)

## 2023-08-26 PROCEDURE — 99232 SBSQ HOSP IP/OBS MODERATE 35: CPT

## 2023-08-26 RX ADMIN — ENOXAPARIN SODIUM 40 MILLIGRAM(S): 100 INJECTION SUBCUTANEOUS at 17:02

## 2023-08-26 RX ADMIN — ESCITALOPRAM OXALATE 10 MILLIGRAM(S): 10 TABLET, FILM COATED ORAL at 11:02

## 2023-08-26 RX ADMIN — MIRTAZAPINE 7.5 MILLIGRAM(S): 45 TABLET, ORALLY DISINTEGRATING ORAL at 21:58

## 2023-08-26 RX ADMIN — EXEMESTANE 25 MILLIGRAM(S): 25 TABLET, SUGAR COATED ORAL at 21:58

## 2023-08-26 RX ADMIN — ESCITALOPRAM OXALATE 5 MILLIGRAM(S): 10 TABLET, FILM COATED ORAL at 11:01

## 2023-08-26 RX ADMIN — Medication 40 MILLIGRAM(S): at 07:53

## 2023-08-26 RX ADMIN — TAMSULOSIN HYDROCHLORIDE 0.4 MILLIGRAM(S): 0.4 CAPSULE ORAL at 21:58

## 2023-08-26 RX ADMIN — BUPROPION HYDROCHLORIDE 300 MILLIGRAM(S): 150 TABLET, EXTENDED RELEASE ORAL at 11:03

## 2023-08-26 RX ADMIN — SIMVASTATIN 20 MILLIGRAM(S): 20 TABLET, FILM COATED ORAL at 21:58

## 2023-08-26 NOTE — PROGRESS NOTE ADULT - SUBJECTIVE AND OBJECTIVE BOX
PROGRESS NOTE:     Patient is a 89y old  Female who presents with a chief complaint of Shortness of breath (25 Aug 2023 15:25)      SUBJECTIVE / OVERNIGHT EVENTS:    OVERNIGHT: No acute overnight events.      Patient was examined at bedside and feels unchanged compared to previous exams. Continues to endorse SOB, though comfortable on 2L NC. Denies fever, chills, chest pain, nausea, vomiting. ROS otherwise negative and pt is amenable to current treatment plan.      REVIEW OF SYSTEMS:    CONSTITUTIONAL:  No weakness, fevers or chills  EYES/ENT:  No visual changes;  No vertigo or throat pain   NECK:  No pain or stiffness  RESPIRATORY:  Shortness of breath; No cough, wheezing, hemoptysis  CARDIOVASCULAR:  No chest pain or palpitations  GASTROINTESTINAL:  No abdominal or epigastric pain. No nausea, vomiting, or hematemesis; No diarrhea or constipation. No melena or hematochezia.  GENITOURINARY:  No dysuria, frequency or hematuria  NEUROLOGICAL:  No numbness or weakness  SKIN:  No itching, rashes      MEDICATIONS  (STANDING):  buPROPion XL (24-Hour) . 300 milliGRAM(s) Oral daily  enoxaparin Injectable 40 milliGRAM(s) SubCutaneous every 24 hours  escitalopram 10 milliGRAM(s) Oral daily  escitalopram 5 milliGRAM(s) Oral daily  exemestane 25 milliGRAM(s) Oral daily  furosemide   Injectable 40 milliGRAM(s) IV Push daily  mirtazapine 7.5 milliGRAM(s) Oral at bedtime  simvastatin 20 milliGRAM(s) Oral at bedtime  tamsulosin 0.4 milliGRAM(s) Oral at bedtime    MEDICATIONS  (PRN):  acetaminophen     Tablet .. 650 milliGRAM(s) Oral every 6 hours PRN Temp greater or equal to 38C (100.4F), Mild Pain (1 - 3)  aluminum hydroxide/magnesium hydroxide/simethicone Suspension 30 milliLiter(s) Oral every 4 hours PRN Dyspepsia  melatonin 3 milliGRAM(s) Oral at bedtime PRN Insomnia  ondansetron Injectable 4 milliGRAM(s) IV Push every 8 hours PRN Nausea and/or Vomiting      CAPILLARY BLOOD GLUCOSE        I&O's Summary    25 Aug 2023 07:01  -  26 Aug 2023 07:00  --------------------------------------------------------  IN: 600 mL / OUT: 400 mL / NET: 200 mL        PHYSICAL EXAM:  Vital Signs Last 24 Hrs  T(C): 36.7 (26 Aug 2023 05:52), Max: 37.3 (25 Aug 2023 11:49)  T(F): 98 (26 Aug 2023 05:52), Max: 99.2 (25 Aug 2023 11:49)  HR: 88 (26 Aug 2023 05:52) (68 - 88)  BP: 96/56 (26 Aug 2023 05:52) (96/56 - 119/69)  BP(mean): --  RR: 18 (26 Aug 2023 05:52) (18 - 18)  SpO2: 91% (26 Aug 2023 05:52) (91% - 94%)    Parameters below as of 26 Aug 2023 05:52  Patient On (Oxygen Delivery Method): nasal cannula  O2 Flow (L/min): 2      CONSTITUTIONAL: NAD; well-developed  HEENT: PERRL, clear conjunctiva  RESPIRATORY: Normal respiratory effort; lungs are clear to auscultation bilaterally; No Crackles/Rhonchi/Wheezing  CARDIOVASCULAR: Regular rate and rhythm, normal S1 and S2, no murmur/rub/gallop; No lower extremity edema; Peripheral pulses are 2+ bilaterally  ABDOMEN: Nontender to palpation, normoactive bowel sounds, no rebound/guarding; No hepatosplenomegaly  MUSCULOSKELETAL: no clubbing or cyanosis of digits; no joint swelling or tenderness to palpation  EXTREMITY: Lower extremities Non-tender to palpation; non-erythematous B/L  NEURO: A&Ox3; no focal deficits   PSYCH: normal mood; Affect appropriate    LABS:                        9.6    2.45  )-----------( 251      ( 26 Aug 2023 07:15 )             30.6     08-26    136  |  99  |  19  ----------------------------<  115<H>  4.0   |  25  |  1.23    Ca    8.9      26 Aug 2023 07:13  Phos  3.5     08-26  Mg     2.3     08-26            Urinalysis Basic - ( 26 Aug 2023 07:13 )    Color: x / Appearance: x / SG: x / pH: x  Gluc: 115 mg/dL / Ketone: x  / Bili: x / Urobili: x   Blood: x / Protein: x / Nitrite: x   Leuk Esterase: x / RBC: x / WBC x   Sq Epi: x / Non Sq Epi: x / Bacteria: x        Culture - Sputum (collected 25 Aug 2023 19:05)  Source: .Sputum Sputum  Gram Stain (26 Aug 2023 06:36):    Rare polymorphonuclear leukocytes per low power field    No Squamous epithelial cells per low power field    Moderate Gram positive cocci in pairs per oil power field    Moderate Gram Positive Rods per oil power field    Few Gram Negative Rods per oil power field        RADIOLOGY & ADDITIONAL TESTS:    CXR (8/23)  IMPRESSION:    No focal consolidations.      CTA (8/24)  IMPRESSION:  No pulmonary embolus.    Symmetric groundglass opacity and interstitial prominence bilaterally   favoring the upper lungs. This could represent pulmonary edema, atypical   pneumonia, or noninfectious interstitial pneumonitis.    Cardiomegaly.      TTE (8/24)  CONCLUSIONS:      1. Normal left ventricular cavity size. Left ventricular systolic function is normal. There are no regional wall motion abnormalities seen.   2. There is mild mitral valve stenosis, secondary to dystrophic mitral annular calcification.   3. Mild pulmonary hypertension.

## 2023-08-26 NOTE — PROGRESS NOTE ADULT - PROBLEM SELECTOR PLAN 1
- P/w one week h/o SOB, O2 in low 80s at outpatient office, transported to ED via EMS  - Afebrile, leukopenic on admission  - RVP, COVID negative  - Legionella, strep negative; MRSA pending  - Elevated D-dimer -> US of RLE unremarkable  - CTA -> Nonspecific ground-glass opacities, negative for PE  - Elevated BNP -> TTE revealed normal LVSF, mild MV stenosis, mild pulm HTN  - C/w IV Lasix 20mg daily for diuresis  - C/w supplemental O2 prn  - C/w strict I/Os  - Pulmonology consulted, recommended continued diuresis, if no response may consider steroids - P/w one week h/o SOB, O2 in low 80s at outpatient office, transported to ED via EMS  - Afebrile, leukopenic on admission  - RVP, COVID negative  - Legionella, strep negative; MRSA pending  - Elevated D-dimer -> US of RLE unremarkable  - CTA -> Nonspecific ground-glass opacities, negative for PE  - Elevated BNP -> TTE revealed normal LVSF, mild MV stenosis, mild pulm HTN  - C/w IV Lasix 20mg daily for diuresis  - C/w supplemental O2 prn  - C/w strict I/Os  - Pulmonology consulted, recommended continued diuresis, if no response may consider steroids  - Cardiology consulted, agree with trial of diuresis

## 2023-08-26 NOTE — PROGRESS NOTE ADULT - SUBJECTIVE AND OBJECTIVE BOX
DATE OF SERVICE: 08-26-23 @ 12:42    Patient is a 89y old  Female who presents with a chief complaint of Shortness of breath (26 Aug 2023 12:15)      INTERVAL HISTORY: no complaints     REVIEW OF SYSTEMS:  CONSTITUTIONAL: No weakness  EYES/ENT: No visual changes;  No throat pain   NECK: No pain or stiffness  RESPIRATORY: No cough, wheezing; No shortness of breath  CARDIOVASCULAR: No chest pain or palpitations  GASTROINTESTINAL: No abdominal  pain. No nausea, vomiting, or hematemesis  GENITOURINARY: No dysuria, frequency or hematuria  NEUROLOGICAL: No stroke like symptoms  SKIN: No rashes      	  MEDICATIONS:  furosemide   Injectable 40 milliGRAM(s) IV Push daily        PHYSICAL EXAM:  T(C): 36.8 (08-26-23 @ 11:26), Max: 36.9 (08-25-23 @ 20:44)  HR: 91 (08-26-23 @ 11:26) (69 - 91)  BP: 111/68 (08-26-23 @ 11:26) (96/56 - 113/54)  RR: 18 (08-26-23 @ 11:26) (18 - 18)  SpO2: 94% (08-26-23 @ 11:26) (91% - 94%)  Wt(kg): --  I&O's Summary    25 Aug 2023 07:01  -  26 Aug 2023 07:00  --------------------------------------------------------  IN: 600 mL / OUT: 400 mL / NET: 200 mL    26 Aug 2023 07:01  -  26 Aug 2023 12:42  --------------------------------------------------------  IN: 240 mL / OUT: 0 mL / NET: 240 mL          Appearance: In no distress	  HEENT:    PERRL, EOMI	  Cardiovascular:  S1 S2, No JVD  Respiratory: Lungs clear to auscultation	  Gastrointestinal:  Soft, Non-tender, + BS	  Vascularature:  No edema of LE  Psychiatric: Appropriate affect   Neuro: no acute focal deficits                               9.6    2.45  )-----------( 251      ( 26 Aug 2023 07:15 )             30.6     08-26    136  |  99  |  19  ----------------------------<  115<H>  4.0   |  25  |  1.23    Ca    8.9      26 Aug 2023 07:13  Phos  3.5     08-26  Mg     2.3     08-26          Labs personally reviewed      ASSESSMENT/PLAN: 	  Ms. Quinton Bridges is an 89-year-old female w/ PMH of HLD and breast cancer receiving oral chemotherapy who is presenting following a one week history of SOB. She states this is new for her and has been constant. She denies fever, chills, chest pain, cough, nausea, vomiting, and dysuria. No orthopnea or LE swelling. No sick contacts. She presented to the ED via EMS after being found hypoxic with O2 saturation in the low 80s at her outpatient oncologist office.    1. Shortness of Breath  - ECG NSR with no ischemia noted  - COVID/RVP/legionella/Strep PNA negative  - afebile, no leukocytosis  - CT negative for PE but reveals ? pulm edema and cardiomegaly  - BNP slightly elevated at 599  - TTE shows preserved EF, no WMA and mild MS  - D-Dimer 836--> US of RLE negative for DVT, PE neg as noted above  - DDx includes ADHF vs. Atypical PNA vs. Pneumonitis (as per Heme/Onc, Ibrance can cause ILD/pneumonitis)  - Supplemental oxygen as needed  - Pulm following  - Continue trial of diuresis with c/w Lasix 40mg IVP daily, though appears less likely that SOB is related to pulm edema      2. HLD  - c/w simvastatin 20mg PO daily    3. Breast CA  - Heme/Onc following            GUNNAR Barclay DO [facility]  Cardiovascular Medicine  [address], [address]  Office: [phone]  Available via call/text on Microsoft Teams

## 2023-08-26 NOTE — PROGRESS NOTE ADULT - ASSESSMENT
88 yo woman with metastatic breast cancer not involving the lungs, with excellent NM on Ibrance/exemestane  Admitted with SOB and hypoxia  COVID negative  with intersititial changes i am concerned about lung toxicity from Ibrance which was held 5 days ago  not clinically improved on diuresis      SUGGEST: await pulmonary evaluation  would have no objection to a course of steroids

## 2023-08-26 NOTE — PROGRESS NOTE ADULT - SUBJECTIVE AND OBJECTIVE BOX
HPI:  metastatic breast cancer on Ibrance/exemestane with excellent response, admitted with SOB for one week  CT shows interstitial changes, hypoxia requiring 02. comfortable at rest  COVID negative  yesterday received diuresis for possible fluid overload, but reports no improvement. still felt SOB going to bathroom  now with cough which was not present a few days ago      ROS:  Negative except for: dry cough, LIZARRAGA.     MEDICATIONS  (STANDING):  buPROPion XL (24-Hour) . 300 milliGRAM(s) Oral daily  enoxaparin Injectable 40 milliGRAM(s) SubCutaneous every 24 hours  escitalopram 10 milliGRAM(s) Oral daily  escitalopram 5 milliGRAM(s) Oral daily  exemestane 25 milliGRAM(s) Oral daily  furosemide   Injectable 40 milliGRAM(s) IV Push daily  mirtazapine 7.5 milliGRAM(s) Oral at bedtime  simvastatin 20 milliGRAM(s) Oral at bedtime  tamsulosin 0.4 milliGRAM(s) Oral at bedtime    MEDICATIONS  (PRN):  acetaminophen     Tablet .. 650 milliGRAM(s) Oral every 6 hours PRN Temp greater or equal to 38C (100.4F), Mild Pain (1 - 3)  aluminum hydroxide/magnesium hydroxide/simethicone Suspension 30 milliLiter(s) Oral every 4 hours PRN Dyspepsia  melatonin 3 milliGRAM(s) Oral at bedtime PRN Insomnia  ondansetron Injectable 4 milliGRAM(s) IV Push every 8 hours PRN Nausea and/or Vomiting      Allergies    penicillin (Hives)    Intolerances        Vital Signs Last 24 Hrs  T(C): 36.8 (26 Aug 2023 11:26), Max: 36.9 (25 Aug 2023 20:44)  T(F): 98.3 (26 Aug 2023 11:26), Max: 98.4 (25 Aug 2023 20:44)  HR: 91 (26 Aug 2023 11:26) (69 - 91)  BP: 111/68 (26 Aug 2023 11:26) (96/56 - 113/54)  BP(mean): --  RR: 18 (26 Aug 2023 11:26) (18 - 18)  SpO2: 94% (26 Aug 2023 11:26) (91% - 94%)    Parameters below as of 26 Aug 2023 11:26  Patient On (Oxygen Delivery Method): nasal cannula  O2 Flow (L/min): 2      PHYSICAL EXAM:      Constitutional: NAD    Eyes: anicteric    ENMT:    Neck:    Lymph nodes:    Respiratory: bibasilar rales    Cardiovascular: RRR    Gastrointestinal: nontender    Extremities: no edema    Skin:                    LABS:                          9.6    2.45  )-----------( 251      ( 26 Aug 2023 07:15 )             30.6         Mean Cell Volume : 98.1 fl  Mean Cell Hemoglobin : 30.8 pg  Mean Cell Hemoglobin Concentration : 31.4 gm/dL  Auto Neutrophil # : x  Auto Lymphocyte # : x  Auto Monocyte # : x  Auto Eosinophil # : x  Auto Basophil # : x  Auto Neutrophil % : x  Auto Lymphocyte % : x  Auto Monocyte % : x  Auto Eosinophil % : x  Auto Basophil % : x    Serial CBC  Hematocrit 30.6  Hemoglobin 9.6  Plat 251  RBC 3.12  WBC 2.45  Serial CBC  Hematocrit 28.5  Hemoglobin 9.2  Plat 271  RBC 2.93  WBC 2.57  Serial CBC  Hematocrit 29.3  Hemoglobin 9.3  Plat 317  RBC 2.97  WBC 2.80    08-26    136  |  99  |  19  ----------------------------<  115<H>  4.0   |  25  |  1.23    Ca    8.9      26 Aug 2023 07:13  Phos  3.5     08-26  Mg     2.3     08-26

## 2023-08-27 LAB
ANION GAP SERPL CALC-SCNC: 14 MMOL/L — SIGNIFICANT CHANGE UP (ref 5–17)
BUN SERPL-MCNC: 22 MG/DL — SIGNIFICANT CHANGE UP (ref 7–23)
CALCIUM SERPL-MCNC: 8.8 MG/DL — SIGNIFICANT CHANGE UP (ref 8.4–10.5)
CHLORIDE SERPL-SCNC: 99 MMOL/L — SIGNIFICANT CHANGE UP (ref 96–108)
CO2 SERPL-SCNC: 25 MMOL/L — SIGNIFICANT CHANGE UP (ref 22–31)
CREAT SERPL-MCNC: 1.17 MG/DL — SIGNIFICANT CHANGE UP (ref 0.5–1.3)
CULTURE RESULTS: SIGNIFICANT CHANGE UP
EGFR: 45 ML/MIN/1.73M2 — LOW
GLUCOSE SERPL-MCNC: 119 MG/DL — HIGH (ref 70–99)
HCT VFR BLD CALC: 32.3 % — LOW (ref 34.5–45)
HGB BLD-MCNC: 10.1 G/DL — LOW (ref 11.5–15.5)
MAGNESIUM SERPL-MCNC: 2.3 MG/DL — SIGNIFICANT CHANGE UP (ref 1.6–2.6)
MCHC RBC-ENTMCNC: 30.7 PG — SIGNIFICANT CHANGE UP (ref 27–34)
MCHC RBC-ENTMCNC: 31.3 GM/DL — LOW (ref 32–36)
MCV RBC AUTO: 98.2 FL — SIGNIFICANT CHANGE UP (ref 80–100)
NRBC # BLD: 0 /100 WBCS — SIGNIFICANT CHANGE UP (ref 0–0)
PHOSPHATE SERPL-MCNC: 3.3 MG/DL — SIGNIFICANT CHANGE UP (ref 2.5–4.5)
PLATELET # BLD AUTO: 224 K/UL — SIGNIFICANT CHANGE UP (ref 150–400)
POTASSIUM SERPL-MCNC: 3.9 MMOL/L — SIGNIFICANT CHANGE UP (ref 3.5–5.3)
POTASSIUM SERPL-SCNC: 3.9 MMOL/L — SIGNIFICANT CHANGE UP (ref 3.5–5.3)
RBC # BLD: 3.29 M/UL — LOW (ref 3.8–5.2)
RBC # FLD: 14.5 % — SIGNIFICANT CHANGE UP (ref 10.3–14.5)
SODIUM SERPL-SCNC: 138 MMOL/L — SIGNIFICANT CHANGE UP (ref 135–145)
SPECIMEN SOURCE: SIGNIFICANT CHANGE UP
WBC # BLD: 2.24 K/UL — LOW (ref 3.8–10.5)
WBC # FLD AUTO: 2.24 K/UL — LOW (ref 3.8–10.5)

## 2023-08-27 PROCEDURE — 99232 SBSQ HOSP IP/OBS MODERATE 35: CPT

## 2023-08-27 RX ADMIN — EXEMESTANE 25 MILLIGRAM(S): 25 TABLET, SUGAR COATED ORAL at 21:06

## 2023-08-27 RX ADMIN — Medication 40 MILLIGRAM(S): at 05:34

## 2023-08-27 RX ADMIN — MIRTAZAPINE 7.5 MILLIGRAM(S): 45 TABLET, ORALLY DISINTEGRATING ORAL at 21:07

## 2023-08-27 RX ADMIN — TAMSULOSIN HYDROCHLORIDE 0.4 MILLIGRAM(S): 0.4 CAPSULE ORAL at 21:06

## 2023-08-27 RX ADMIN — ESCITALOPRAM OXALATE 5 MILLIGRAM(S): 10 TABLET, FILM COATED ORAL at 09:59

## 2023-08-27 RX ADMIN — BUPROPION HYDROCHLORIDE 300 MILLIGRAM(S): 150 TABLET, EXTENDED RELEASE ORAL at 09:59

## 2023-08-27 RX ADMIN — Medication 3 MILLIGRAM(S): at 21:06

## 2023-08-27 RX ADMIN — ENOXAPARIN SODIUM 40 MILLIGRAM(S): 100 INJECTION SUBCUTANEOUS at 17:11

## 2023-08-27 RX ADMIN — SIMVASTATIN 20 MILLIGRAM(S): 20 TABLET, FILM COATED ORAL at 21:07

## 2023-08-27 RX ADMIN — ESCITALOPRAM OXALATE 10 MILLIGRAM(S): 10 TABLET, FILM COATED ORAL at 09:59

## 2023-08-27 NOTE — PROGRESS NOTE ADULT - SUBJECTIVE AND OBJECTIVE BOX
DATE OF SERVICE: 08-27-23 @ 13:51    Patient is a 89y old  Female who presents with a chief complaint of Shortness of breath (27 Aug 2023 12:39)      INTERVAL HISTORY: no complaints     REVIEW OF SYSTEMS:  CONSTITUTIONAL: No weakness  EYES/ENT: No visual changes;  No throat pain   NECK: No pain or stiffness  RESPIRATORY: No cough, wheezing; No shortness of breath  CARDIOVASCULAR: No chest pain or palpitations  GASTROINTESTINAL: No abdominal  pain. No nausea, vomiting, or hematemesis  GENITOURINARY: No dysuria, frequency or hematuria  NEUROLOGICAL: No stroke like symptoms  SKIN: No rashes      	  MEDICATIONS:  furosemide   Injectable 40 milliGRAM(s) IV Push daily        PHYSICAL EXAM:  T(C): 36.7 (08-27-23 @ 12:38), Max: 37.1 (08-27-23 @ 04:56)  HR: 94 (08-27-23 @ 12:38) (79 - 94)  BP: 110/70 (08-27-23 @ 12:38) (106/61 - 120/71)  RR: 18 (08-27-23 @ 12:38) (18 - 20)  SpO2: 96% (08-27-23 @ 12:38) (93% - 96%)  Wt(kg): --  I&O's Summary    26 Aug 2023 07:01  -  27 Aug 2023 07:00  --------------------------------------------------------  IN: 1170 mL / OUT: 150 mL / NET: 1020 mL          Appearance: In no distress	  HEENT:    PERRL, EOMI	  Cardiovascular:  S1 S2, No JVD  Respiratory: Lungs clear to auscultation	  Gastrointestinal:  Soft, Non-tender, + BS	  Vascularature:  No edema of LE  Psychiatric: Appropriate affect   Neuro: no acute focal deficits                               10.1   2.24  )-----------( 224      ( 27 Aug 2023 06:40 )             32.3     08-27    138  |  99  |  22  ----------------------------<  119<H>  3.9   |  25  |  1.17    Ca    8.8      27 Aug 2023 06:39  Phos  3.3     08-27  Mg     2.3     08-27          Labs personally reviewed      ASSESSMENT/PLAN: 	    Ms. Quinton Bridges is an 89-year-old female w/ PMH of HLD and breast cancer receiving oral chemotherapy who is presenting following a one week history of SOB. She states this is new for her and has been constant. She denies fever, chills, chest pain, cough, nausea, vomiting, and dysuria. No orthopnea or LE swelling. No sick contacts. She presented to the ED via EMS after being found hypoxic with O2 saturation in the low 80s at her outpatient oncologist office.    1. Shortness of Breath  - ECG NSR with no ischemia noted  - COVID/RVP/legionella/Strep PNA negative  - afebile, no leukocytosis  - CT negative for PE but reveals ? pulm edema and cardiomegaly  - BNP slightly elevated at 599  - TTE shows preserved EF, no WMA and mild MS  - D-Dimer 836--> US of RLE negative for DVT, PE neg as noted above  - DDx includes ADHF vs. Atypical PNA vs. Pneumonitis (as per Heme/Onc, Ibrance can cause ILD/pneumonitis)  - Supplemental oxygen as needed  - Pulm following  - Continue trial of diuresis with c/w Lasix 40mg IVP daily, though appears less likely that SOB is related to pulm edema      2. HLD  - c/w simvastatin 20mg PO daily    3. Breast CA  - Heme/Onc following            GUNNAR Barclay DO [facility]  Cardiovascular Medicine  [address] [place] [address], [address]  Office: [phone]  Available via call/text on Microsoft Teams  DATE OF SERVICE: 08-27-23 @ 13:51    Patient is a 89y old  Female who presents with a chief complaint of Shortness of breath (27 Aug 2023 12:39)      INTERVAL HISTORY: no complaints     REVIEW OF SYSTEMS:  CONSTITUTIONAL: No weakness  EYES/ENT: No visual changes;  No throat pain   NECK: No pain or stiffness  RESPIRATORY: No cough, wheezing; No shortness of breath  CARDIOVASCULAR: No chest pain or palpitations  GASTROINTESTINAL: No abdominal  pain. No nausea, vomiting, or hematemesis  GENITOURINARY: No dysuria, frequency or hematuria  NEUROLOGICAL: No stroke like symptoms  SKIN: No rashes      	  MEDICATIONS:  furosemide   Injectable 40 milliGRAM(s) IV Push daily        PHYSICAL EXAM:  T(C): 36.7 (08-27-23 @ 12:38), Max: 37.1 (08-27-23 @ 04:56)  HR: 94 (08-27-23 @ 12:38) (79 - 94)  BP: 110/70 (08-27-23 @ 12:38) (106/61 - 120/71)  RR: 18 (08-27-23 @ 12:38) (18 - 20)  SpO2: 96% (08-27-23 @ 12:38) (93% - 96%)  Wt(kg): --  I&O's Summary    26 Aug 2023 07:01  -  27 Aug 2023 07:00  --------------------------------------------------------  IN: 1170 mL / OUT: 150 mL / NET: 1020 mL          Appearance: In no distress	  HEENT:    PERRL, EOMI	  Cardiovascular:  S1 S2, No JVD  Respiratory: Lungs clear to auscultation	  Gastrointestinal:  Soft, Non-tender, + BS	  Vascularature:  No edema of LE  Psychiatric: Appropriate affect   Neuro: no acute focal deficits                               10.1   2.24  )-----------( 224      ( 27 Aug 2023 06:40 )             32.3     08-27    138  |  99  |  22  ----------------------------<  119<H>  3.9   |  25  |  1.17    Ca    8.8      27 Aug 2023 06:39  Phos  3.3     08-27  Mg     2.3     08-27          Labs personally reviewed      ASSESSMENT/PLAN: 	    Ms. Quinton Bridges is an 89-year-old female w/ PMH of HLD and breast cancer receiving oral chemotherapy who is presenting following a one week history of SOB. She states this is new for her and has been constant. She denies fever, chills, chest pain, cough, nausea, vomiting, and dysuria. No orthopnea or LE swelling. No sick contacts. She presented to the ED via EMS after being found hypoxic with O2 saturation in the low 80s at her outpatient oncologist office.    1. Shortness of Breath  - ECG NSR with no ischemia noted  - COVID/RVP/legionella/Strep PNA negative  - afebile, no leukocytosis  - CT negative for PE but reveals ? pulm edema and cardiomegaly  - BNP slightly elevated at 599  - TTE shows preserved EF, no WMA and mild MS  - D-Dimer 836--> US of RLE negative for DVT, PE neg as noted above  - DDx includes ADHF vs. Atypical PNA vs. Pneumonitis (as per Heme/Onc, Ibrance can cause ILD/pneumonitis)  - Supplemental oxygen as needed  - Pulm following  - Continue trial of diuresis with c/w Lasix 40mg IVP daily, though appears less likely that SOB is related to pulm edema      2. HLD  - c/w simvastatin 20mg PO daily    3. Breast CA  - Heme/Onc following    I will be covered by Vinod Jin from Monday August 28th to Friday September 1st. He can be reached at [phone]        GUNNAR Barclay DO [facility]  Cardiovascular Medicine  [address] [place] [address], [address]  Office: [phone]  Available via call/text on Microsoft Teams

## 2023-08-27 NOTE — PROGRESS NOTE ADULT - SUBJECTIVE AND OBJECTIVE BOX
HPI:  Ms. Quinton Bridges is an 89-year-old female w/ PMH of HLD and breast cancer receiving oral chemotherapy who is presenting following a one week history of SOB. She states this is new for her and has been constant. She denies fever, chills, chest pain, cough, nausea, vomiting, and dysuria. No orthopnea or LE swelling. No sick contacts. She presented to the ED via EMS after being found hypoxic with O2 saturation in the low 80s at her outpatient oncologist office. (24 Aug 2023 12:56).    she has been on diuresis with Lasix and her urine output has improved but she is still hypoxic upon removing her n/c oxygen. when on 02 at rest she is comfortable. Dry cough since yesterday         ROS:  Negative except for: SOB, cough    MEDICATIONS  (STANDING):  buPROPion XL (24-Hour) . 300 milliGRAM(s) Oral daily  enoxaparin Injectable 40 milliGRAM(s) SubCutaneous every 24 hours  escitalopram 10 milliGRAM(s) Oral daily  escitalopram 5 milliGRAM(s) Oral daily  exemestane 25 milliGRAM(s) Oral daily  furosemide   Injectable 40 milliGRAM(s) IV Push daily  mirtazapine 7.5 milliGRAM(s) Oral at bedtime  simvastatin 20 milliGRAM(s) Oral at bedtime  tamsulosin 0.4 milliGRAM(s) Oral at bedtime    MEDICATIONS  (PRN):  acetaminophen     Tablet .. 650 milliGRAM(s) Oral every 6 hours PRN Temp greater or equal to 38C (100.4F), Mild Pain (1 - 3)  aluminum hydroxide/magnesium hydroxide/simethicone Suspension 30 milliLiter(s) Oral every 4 hours PRN Dyspepsia  melatonin 3 milliGRAM(s) Oral at bedtime PRN Insomnia  ondansetron Injectable 4 milliGRAM(s) IV Push every 8 hours PRN Nausea and/or Vomiting      Allergies    penicillin (Hives)    Intolerances        Vital Signs Last 24 Hrs  T(C): 37.1 (27 Aug 2023 04:56), Max: 37.1 (27 Aug 2023 04:56)  T(F): 98.7 (27 Aug 2023 04:56), Max: 98.7 (27 Aug 2023 04:56)  HR: 80 (27 Aug 2023 04:56) (79 - 80)  BP: 112/61 (27 Aug 2023 04:56) (106/61 - 120/71)  BP(mean): --  RR: 18 (27 Aug 2023 04:56) (18 - 20)  SpO2: 94% (27 Aug 2023 04:56) (93% - 94%)    Parameters below as of 27 Aug 2023 04:56  Patient On (Oxygen Delivery Method): nasal cannula  O2 Flow (L/min): 2      PHYSICAL EXAM:      Constitutional: no acute distress    Eyes:    ENMT:    Neck:    Lymph nodes:    Respiratory: coarse breath sounds    Cardiovascular:    Gastrointestinal:     Extremities: no edema    Skin:                    LABS:                          10.1   2.24  )-----------( 224      ( 27 Aug 2023 06:40 )             32.3         Mean Cell Volume : 98.2 fl  Mean Cell Hemoglobin : 30.7 pg  Mean Cell Hemoglobin Concentration : 31.3 gm/dL  Auto Neutrophil # : x  Auto Lymphocyte # : x  Auto Monocyte # : x  Auto Eosinophil # : x  Auto Basophil # : x  Auto Neutrophil % : x  Auto Lymphocyte % : x  Auto Monocyte % : x  Auto Eosinophil % : x  Auto Basophil % : x    Serial CBC  Hematocrit 32.3  Hemoglobin 10.1  Plat 224  RBC 3.29  WBC 2.24  Serial CBC  Hematocrit 30.6  Hemoglobin 9.6  Plat 251  RBC 3.12  WBC 2.45  Serial CBC  Hematocrit 28.5  Hemoglobin 9.2  Plat 271  RBC 2.93  WBC 2.57  Serial CBC  Hematocrit 29.3  Hemoglobin 9.3  Plat 317  RBC 2.97  WBC 2.80    08-27    138  |  99  |  22  ----------------------------<  119<H>  3.9   |  25  |  1.17    Ca    8.8      27 Aug 2023 06:39  Phos  3.3     08-27  Mg     2.3     08-27

## 2023-08-27 NOTE — PROGRESS NOTE ADULT - ASSESSMENT
Patient is an 85-year-old female w/ PMH of HLD and breast cancer presenting with a one week history of SOB. Pt being admitted for acute hypoxic respiratory failure. Patient is an 85-year-old female w/ PMH of HLD and breast cancer presenting with a one week history of SOB. Pt admitted for acute hypoxic respiratory failure, currently undergoing IV diuresis.

## 2023-08-27 NOTE — PROGRESS NOTE ADULT - PROBLEM SELECTOR PLAN 1
- P/w one week h/o SOB, O2 in low 80s at outpatient office, transported to ED via EMS  - Afebrile, leukopenic on admission  - RVP, COVID negative  - Legionella, strep negative; MRSA pending  - Elevated D-dimer -> US of RLE unremarkable  - CTA -> Nonspecific ground-glass opacities, negative for PE  - Elevated BNP -> TTE revealed normal LVSF, mild MV stenosis, mild pulm HTN  - C/w IV Lasix 20mg daily for diuresis  - C/w supplemental O2 prn  - C/w strict I/Os  - Pulmonology consulted, recommended continued diuresis, if no response may consider steroids  - Cardiology consulted, agree with trial of diuresis - P/w one week h/o SOB, O2 in low 80s at outpatient office, transported to ED via EMS  - Afebrile, leukopenic on admission  - RVP, COVID negative  - Legionella, strep negative; MRSA pending  - Elevated D-dimer -> US of RLE unremarkable  - CTA -> Nonspecific ground-glass opacities, negative for PE  - Elevated BNP -> TTE revealed normal LVSF, mild MV stenosis, mild pulm HTN  - C/w IV Lasix 40mg daily for diuresis  - C/w supplemental O2 prn  - C/w strict I/Os  - Pulmonology consulted, recommended continued diuresis, if no response may consider steroids  - Cardiology consulted, agree with trial of diuresis

## 2023-08-27 NOTE — PROGRESS NOTE ADULT - SUBJECTIVE AND OBJECTIVE BOX
INTERVAL HPI/OVERNIGHT EVENTS:  Pt seen and examined at bedside. No acute overnight events or complaints.    VITAL SIGNS:  T(F): 98.7 (08-27-23 @ 04:56)  HR: 80 (08-27-23 @ 04:56)  BP: 112/61 (08-27-23 @ 04:56)  RR: 18 (08-27-23 @ 04:56)  SpO2: 94% (08-27-23 @ 04:56)  Wt(kg): --    PHYSICAL EXAM:    CONSTITUTIONAL: NAD; well-developed  HEENT: PERRL, clear conjunctiva  RESPIRATORY: Normal respiratory effort; lungs are clear to auscultation bilaterally; No Crackles/Rhonchi/Wheezing  CARDIOVASCULAR: Regular rate and rhythm, normal S1 and S2, no murmur/rub/gallop; No lower extremity edema; Peripheral pulses are 2+ bilaterally  ABDOMEN: Nontender to palpation, normoactive bowel sounds, no rebound/guarding; No hepatosplenomegaly  MUSCULOSKELETAL: no clubbing or cyanosis of digits; no joint swelling or tenderness to palpation  EXTREMITY: Lower extremities Non-tender to palpation; non-erythematous B/L  NEURO: A&Ox3; no focal deficits   PSYCH: normal mood; Affect appropriate    MEDICATIONS  (STANDING):  buPROPion XL (24-Hour) . 300 milliGRAM(s) Oral daily  enoxaparin Injectable 40 milliGRAM(s) SubCutaneous every 24 hours  escitalopram 10 milliGRAM(s) Oral daily  escitalopram 5 milliGRAM(s) Oral daily  exemestane 25 milliGRAM(s) Oral daily  furosemide   Injectable 40 milliGRAM(s) IV Push daily  mirtazapine 7.5 milliGRAM(s) Oral at bedtime  simvastatin 20 milliGRAM(s) Oral at bedtime  tamsulosin 0.4 milliGRAM(s) Oral at bedtime    MEDICATIONS  (PRN):  acetaminophen     Tablet .. 650 milliGRAM(s) Oral every 6 hours PRN Temp greater or equal to 38C (100.4F), Mild Pain (1 - 3)  aluminum hydroxide/magnesium hydroxide/simethicone Suspension 30 milliLiter(s) Oral every 4 hours PRN Dyspepsia  melatonin 3 milliGRAM(s) Oral at bedtime PRN Insomnia  ondansetron Injectable 4 milliGRAM(s) IV Push every 8 hours PRN Nausea and/or Vomiting      Allergies    penicillin (Hives)    Intolerances        LABS:                        9.6    2.45  )-----------( 251      ( 26 Aug 2023 07:15 )             30.6     08-26    136  |  99  |  19  ----------------------------<  115<H>  4.0   |  25  |  1.23    Ca    8.9      26 Aug 2023 07:13  Phos  3.5     08-26  Mg     2.3     08-26        Urinalysis Basic - ( 26 Aug 2023 07:13 )    Color: x / Appearance: x / SG: x / pH: x  Gluc: 115 mg/dL / Ketone: x  / Bili: x / Urobili: x   Blood: x / Protein: x / Nitrite: x   Leuk Esterase: x / RBC: x / WBC x   Sq Epi: x / Non Sq Epi: x / Bacteria: x        RADIOLOGY & ADDITIONAL TESTS:  Reviewed      ******************  Authored By: Cara Arredondo MD PGY2  Internal Medicine  Pager: [phone] [facility]// [id number] AIDE  MS Teams Preferred  ******************   INTERVAL HPI/OVERNIGHT EVENTS:  Pt seen and examined at bedside.   Tele events x2  HR went to 175 for 4 seconds on cardiac monitor at 0100 am and HR went up to 160's. VSS otherwise stable and asymptomatic.  Pt on 3L initially, desat to 77% when nasal canula was removed.     VITAL SIGNS:  T(F): 98.7 (08-27-23 @ 04:56)  HR: 80 (08-27-23 @ 04:56)  BP: 112/61 (08-27-23 @ 04:56)  RR: 18 (08-27-23 @ 04:56)  SpO2: 94% (08-27-23 @ 04:56)  Wt(kg): --    PHYSICAL EXAM:    CONSTITUTIONAL: No acute distress, well-developed  HEENT: PERRL, clear conjunctiva  RESPIRATORY: on 3L NC, normal respiratory effort; diffuse crackles in b/l lung fields   CARDIOVASCULAR: Regular rate and rhythm, normal S1 and S2, no murmur/rub/gallop  ABDOMEN: Nontender to palpation, normoactive bowel sounds, no rebound/guarding  MUSCULOSKELETAL: no clubbing or cyanosis of digits; no joint swelling or tenderness to palpation  EXTREMITY: Lower extremities Non-tender to palpation; non-erythematous B/L  NEURO: A&Ox3; no focal deficits   PSYCH: normal mood; Affect appropriate    MEDICATIONS  (STANDING):  buPROPion XL (24-Hour) . 300 milliGRAM(s) Oral daily  enoxaparin Injectable 40 milliGRAM(s) SubCutaneous every 24 hours  escitalopram 10 milliGRAM(s) Oral daily  escitalopram 5 milliGRAM(s) Oral daily  exemestane 25 milliGRAM(s) Oral daily  furosemide   Injectable 40 milliGRAM(s) IV Push daily  mirtazapine 7.5 milliGRAM(s) Oral at bedtime  simvastatin 20 milliGRAM(s) Oral at bedtime  tamsulosin 0.4 milliGRAM(s) Oral at bedtime    MEDICATIONS  (PRN):  acetaminophen     Tablet .. 650 milliGRAM(s) Oral every 6 hours PRN Temp greater or equal to 38C (100.4F), Mild Pain (1 - 3)  aluminum hydroxide/magnesium hydroxide/simethicone Suspension 30 milliLiter(s) Oral every 4 hours PRN Dyspepsia  melatonin 3 milliGRAM(s) Oral at bedtime PRN Insomnia  ondansetron Injectable 4 milliGRAM(s) IV Push every 8 hours PRN Nausea and/or Vomiting      Allergies    penicillin (Hives)    Intolerances        LABS:                        9.6    2.45  )-----------( 251      ( 26 Aug 2023 07:15 )             30.6     08-26    136  |  99  |  19  ----------------------------<  115<H>  4.0   |  25  |  1.23    Ca    8.9      26 Aug 2023 07:13  Phos  3.5     08-26  Mg     2.3     08-26        Urinalysis Basic - ( 26 Aug 2023 07:13 )    Color: x / Appearance: x / SG: x / pH: x  Gluc: 115 mg/dL / Ketone: x  / Bili: x / Urobili: x   Blood: x / Protein: x / Nitrite: x   Leuk Esterase: x / RBC: x / WBC x   Sq Epi: x / Non Sq Epi: x / Bacteria: x        RADIOLOGY & ADDITIONAL TESTS:  Reviewed      ******************  Authored By: Cara Arredondo MD PGY2  Internal Medicine  Pager: [phone] [facility]// [id number] [facility]  MS Teams Preferred  ******************

## 2023-08-27 NOTE — PROGRESS NOTE ADULT - ASSESSMENT
metastatic lung cancer, excellent control of disease with Ibrance and exemestane  admitted with hypoxia and interstitial changes in lungs  still requiring 02  concern for lung toxicity from Ibrance which was put on hold since 8/21    SUGGEST: pulmonary followup  no objection to steroids if Ibrance toxicity suspected  continue exemestane  updated son and daughter-in-law

## 2023-08-27 NOTE — PROGRESS NOTE ADULT - PROBLEM SELECTOR PLAN 4
VTE prophylaxis: Lovenox  Diet: DASH VTE prophylaxis: Lovenox  Diet: DASH  Dispo: pending clinical workup

## 2023-08-28 LAB
ANION GAP SERPL CALC-SCNC: 12 MMOL/L — SIGNIFICANT CHANGE UP (ref 5–17)
BUN SERPL-MCNC: 24 MG/DL — HIGH (ref 7–23)
CALCIUM SERPL-MCNC: 8.8 MG/DL — SIGNIFICANT CHANGE UP (ref 8.4–10.5)
CHLORIDE SERPL-SCNC: 97 MMOL/L — SIGNIFICANT CHANGE UP (ref 96–108)
CO2 SERPL-SCNC: 28 MMOL/L — SIGNIFICANT CHANGE UP (ref 22–31)
CREAT SERPL-MCNC: 1.2 MG/DL — SIGNIFICANT CHANGE UP (ref 0.5–1.3)
EGFR: 43 ML/MIN/1.73M2 — LOW
GLUCOSE SERPL-MCNC: 105 MG/DL — HIGH (ref 70–99)
HCT VFR BLD CALC: 30.6 % — LOW (ref 34.5–45)
HGB BLD-MCNC: 9.7 G/DL — LOW (ref 11.5–15.5)
MAGNESIUM SERPL-MCNC: 2.4 MG/DL — SIGNIFICANT CHANGE UP (ref 1.6–2.6)
MCHC RBC-ENTMCNC: 31.1 PG — SIGNIFICANT CHANGE UP (ref 27–34)
MCHC RBC-ENTMCNC: 31.7 GM/DL — LOW (ref 32–36)
MCV RBC AUTO: 98.1 FL — SIGNIFICANT CHANGE UP (ref 80–100)
NRBC # BLD: 0 /100 WBCS — SIGNIFICANT CHANGE UP (ref 0–0)
PHOSPHATE SERPL-MCNC: 3 MG/DL — SIGNIFICANT CHANGE UP (ref 2.5–4.5)
PLATELET # BLD AUTO: 200 K/UL — SIGNIFICANT CHANGE UP (ref 150–400)
POTASSIUM SERPL-MCNC: 3.9 MMOL/L — SIGNIFICANT CHANGE UP (ref 3.5–5.3)
POTASSIUM SERPL-SCNC: 3.9 MMOL/L — SIGNIFICANT CHANGE UP (ref 3.5–5.3)
RBC # BLD: 3.12 M/UL — LOW (ref 3.8–5.2)
RBC # FLD: 14.6 % — HIGH (ref 10.3–14.5)
SODIUM SERPL-SCNC: 137 MMOL/L — SIGNIFICANT CHANGE UP (ref 135–145)
WBC # BLD: 2.31 K/UL — LOW (ref 3.8–10.5)
WBC # FLD AUTO: 2.31 K/UL — LOW (ref 3.8–10.5)

## 2023-08-28 PROCEDURE — 99233 SBSQ HOSP IP/OBS HIGH 50: CPT | Mod: GC

## 2023-08-28 PROCEDURE — 93010 ELECTROCARDIOGRAM REPORT: CPT

## 2023-08-28 PROCEDURE — 71045 X-RAY EXAM CHEST 1 VIEW: CPT | Mod: 26

## 2023-08-28 RX ORDER — PANTOPRAZOLE SODIUM 20 MG/1
40 TABLET, DELAYED RELEASE ORAL
Refills: 0 | Status: DISCONTINUED | OUTPATIENT
Start: 2023-08-28 | End: 2023-09-01

## 2023-08-28 RX ORDER — PANTOPRAZOLE SODIUM 20 MG/1
40 TABLET, DELAYED RELEASE ORAL ONCE
Refills: 0 | Status: COMPLETED | OUTPATIENT
Start: 2023-08-28 | End: 2023-08-28

## 2023-08-28 RX ADMIN — BUPROPION HYDROCHLORIDE 300 MILLIGRAM(S): 150 TABLET, EXTENDED RELEASE ORAL at 11:17

## 2023-08-28 RX ADMIN — MIRTAZAPINE 7.5 MILLIGRAM(S): 45 TABLET, ORALLY DISINTEGRATING ORAL at 21:21

## 2023-08-28 RX ADMIN — ESCITALOPRAM OXALATE 5 MILLIGRAM(S): 10 TABLET, FILM COATED ORAL at 11:16

## 2023-08-28 RX ADMIN — TAMSULOSIN HYDROCHLORIDE 0.4 MILLIGRAM(S): 0.4 CAPSULE ORAL at 21:20

## 2023-08-28 RX ADMIN — ENOXAPARIN SODIUM 40 MILLIGRAM(S): 100 INJECTION SUBCUTANEOUS at 17:40

## 2023-08-28 RX ADMIN — SIMVASTATIN 20 MILLIGRAM(S): 20 TABLET, FILM COATED ORAL at 21:21

## 2023-08-28 RX ADMIN — ESCITALOPRAM OXALATE 10 MILLIGRAM(S): 10 TABLET, FILM COATED ORAL at 11:17

## 2023-08-28 RX ADMIN — Medication 40 MILLIGRAM(S): at 05:28

## 2023-08-28 RX ADMIN — EXEMESTANE 25 MILLIGRAM(S): 25 TABLET, SUGAR COATED ORAL at 21:21

## 2023-08-28 NOTE — PROGRESS NOTE ADULT - ASSESSMENT
89F HLD and breast cancer receiving oral chemotherapy who is presenting following a one week history of SOB    #Breast cancer  - recurrent; follows with Dr. Batista  - on ibrance and exemestane since 2021 with stable disease; last PET scan 1/2023  - Ibrance held; continue exemestane    #hypoxia- recent onset  - CTA no PE, upper lobe gg opacity- ? pulmonary edema vs pneumonitis  - no fever or cough; BNP elevated  - Ibrance can cause ILD/pneumonitis- now held  - Echo with normal LV  - no significant clinical improvement with diuresis  - may need trial of steroids; Pulmonary fu    #leukopenia and normocytic anemia- chronic, secondary to rx for breast cancer  - WBC at baseline  - Hg sl lower than prior but should not be cause of such severe symptoms  - no overt bleeding; bili normal, cr normal  - iron studies overall stable, b12/folate ok  - monitor CBC

## 2023-08-28 NOTE — PROGRESS NOTE ADULT - PROBLEM SELECTOR PLAN 1
- P/w one week h/o SOB, O2 in low 80s at outpatient office, transported to ED via EMS  - Afebrile, leukopenic on admission  - RVP, COVID negative  - Legionella, strep negative; MRSA pending  - Elevated D-dimer -> US of RLE unremarkable  - CTA -> Nonspecific ground-glass opacities, negative for PE  - Elevated BNP -> TTE revealed normal LVSF, mild MV stenosis, mild pulm HTN  - C/w IV Lasix 40mg daily for diuresis  - C/w supplemental O2 prn  - C/w strict I/Os  - Pulmonology consulted, recommended continued diuresis, if no response may consider steroids  - Cardiology consulted, agree with trial of diuresis - P/w one week h/o SOB, O2 in low 80s at outpatient office, transported to ED via EMS  - Afebrile, leukopenic on admission  - RVP, COVID negative  - Legionella, strep negative; MRSA pending  - Elevated D-dimer -> US of RLE unremarkable  - CTA -> Nonspecific ground-glass opacities, negative for PE  - Elevated BNP -> TTE revealed normal LVSF, mild MV stenosis, mild pulm HTN  - stop IV Lasix 40mg daily for diuresis  -start pred 30mg BID  - C/w supplemental O2 prn  - C/w strict I/Os  - Pulmonology consulted, f/u recs  - Cardiology consulted, agree with trial of diuresis

## 2023-08-28 NOTE — PROVIDER CONTACT NOTE (OTHER) - REASON
HR went to 175 for 4 seconds on cardiac monitor
Pt had 4 beats wide complex
Pt has 4 beats wide complex on tele
as per tele tech, patient's HR went up to 160's.

## 2023-08-28 NOTE — PROVIDER CONTACT NOTE (OTHER) - BACKGROUND
Pt presented to ED for SOB, hypoxic to 80s on RA. Dx with HF
Pt admitted with hypoxia and heart failure. Pt has a history of breast cancer, s/p chemo. Pt placed on 2L NC on admission.
dx: heart failure
Pt presented to ED for SOB, hypoxic to 80s on RA. Dx w/ CHF, on 2L NC

## 2023-08-28 NOTE — PROGRESS NOTE ADULT - SUBJECTIVE AND OBJECTIVE BOX
Chief Complaint:  FU breast cancer    History of Present Illness:  was seated in chair, now back in bed, ambulating short distances; remains on 3L NC; no f/c, sl cough, no n/v/abd pain, tolerating diet, no bleeding; no leg edema/pain      MEDICATIONS  (STANDING):  buPROPion XL (24-Hour) . 300 milliGRAM(s) Oral daily  enoxaparin Injectable 40 milliGRAM(s) SubCutaneous every 24 hours  escitalopram 10 milliGRAM(s) Oral daily  escitalopram 5 milliGRAM(s) Oral daily  exemestane 25 milliGRAM(s) Oral daily  furosemide   Injectable 40 milliGRAM(s) IV Push daily  mirtazapine 7.5 milliGRAM(s) Oral at bedtime  simvastatin 20 milliGRAM(s) Oral at bedtime  tamsulosin 0.4 milliGRAM(s) Oral at bedtime    MEDICATIONS  (PRN):  acetaminophen     Tablet .. 650 milliGRAM(s) Oral every 6 hours PRN Temp greater or equal to 38C (100.4F), Mild Pain (1 - 3)  aluminum hydroxide/magnesium hydroxide/simethicone Suspension 30 milliLiter(s) Oral every 4 hours PRN Dyspepsia  melatonin 3 milliGRAM(s) Oral at bedtime PRN Insomnia  ondansetron Injectable 4 milliGRAM(s) IV Push every 8 hours PRN Nausea and/or Vomiting      Allergies    penicillin (Hives)    Intolerances        Vital Signs Last 24 Hrs  T(C): 36.5 (28 Aug 2023 12:00), Max: 37 (27 Aug 2023 21:02)  T(F): 97.7 (28 Aug 2023 12:00), Max: 98.6 (27 Aug 2023 21:02)  HR: 96 (28 Aug 2023 12:00) (73 - 96)  BP: 112/69 (28 Aug 2023 12:00) (107/64 - 126/67)  BP(mean): --  RR: 19 (28 Aug 2023 12:00) (18 - 19)  SpO2: 96% (28 Aug 2023 12:00) (94% - 96%)    Parameters below as of 28 Aug 2023 12:00  Patient On (Oxygen Delivery Method): nasal cannula  O2 Flow (L/min): 3      PHYSICAL EXAM  General: adult in NAD  HEENT: clear oropharynx, anicteric sclera, pink conjunctiva  Neck: supple  CV: normal S1/S2  Lungs: clear to auscultation, no wheezes, no rales  Abdomen: soft non-tender non-distended, no hepatosplenomegaly, positive bowel sounds  Ext: no clubbing cyanosis or edema  Skin: no rashes and no petechiae  Lymph Nodes: No LAD in neck  Neuro: alert and oriented X 3, no focal deficits    LABS:                          9.7    2.31  )-----------( 200      ( 28 Aug 2023 07:47 )             30.6         Mean Cell Volume : 98.1 fl  Mean Cell Hemoglobin : 31.1 pg  Mean Cell Hemoglobin Concentration : 31.7 gm/dL  Auto Neutrophil # : x  Auto Lymphocyte # : x  Auto Monocyte # : x  Auto Eosinophil # : x  Auto Basophil # : x  Auto Neutrophil % : x  Auto Lymphocyte % : x  Auto Monocyte % : x  Auto Eosinophil % : x  Auto Basophil % : x      Serial CBC's  08-28 @ 07:47  Hct-30.6 / Hgb-9.7 / Plat-200 / RBC-3.12 / WBC-2.31  Serial CBC's  08-27 @ 06:40  Hct-32.3 / Hgb-10.1 / Plat-224 / RBC-3.29 / WBC-2.24  Serial CBC's  08-26 @ 07:15  Hct-30.6 / Hgb-9.6 / Plat-251 / RBC-3.12 / WBC-2.45  Serial CBC's  08-25 @ 06:46  Hct-28.5 / Hgb-9.2 / Plat-271 / RBC-2.93 / WBC-2.57      08-28    137  |  97  |  24<H>  ----------------------------<  105<H>  3.9   |  28  |  1.20    Ca    8.8      28 Aug 2023 07:45  Phos  3.0     08-28  Mg     2.4     08-28            Iron - Total Binding Capacity.: 241 ug/dL (08-25 @ 06:46)  Ferritin: 304 ng/mL (08-25 @ 06:46)  Vitamin B12, Serum: 684 pg/mL (08-25 @ 06:46)  Folate, Serum: >20.0 ng/mL (08-25 @ 06:46)              Radiology:

## 2023-08-28 NOTE — PROGRESS NOTE ADULT - SUBJECTIVE AND OBJECTIVE BOX
*******************************  Navi Hernandez MD (PGY-1)  Internal Medicine  Contact via [employer] TEAMS  *******************************    PROGRESS NOTE:     Patient is a 89y old  Female who presents with a chief complaint of Shortness of breath (27 Aug 2023 13:51)      INTERVAL EVENTS: No acute overnight events.     SUBJECTIVE: Patient seen and examined at bedside. This morning, the patient is comfortable and doing well. No acute complaints. Denies fevers, chills, N/V/D, chest pain, SOB, abdominal pain.    MEDICATIONS  (STANDING):  buPROPion XL (24-Hour) . 300 milliGRAM(s) Oral daily  enoxaparin Injectable 40 milliGRAM(s) SubCutaneous every 24 hours  escitalopram 10 milliGRAM(s) Oral daily  escitalopram 5 milliGRAM(s) Oral daily  exemestane 25 milliGRAM(s) Oral daily  furosemide   Injectable 40 milliGRAM(s) IV Push daily  mirtazapine 7.5 milliGRAM(s) Oral at bedtime  simvastatin 20 milliGRAM(s) Oral at bedtime  tamsulosin 0.4 milliGRAM(s) Oral at bedtime    MEDICATIONS  (PRN):  acetaminophen     Tablet .. 650 milliGRAM(s) Oral every 6 hours PRN Temp greater or equal to 38C (100.4F), Mild Pain (1 - 3)  aluminum hydroxide/magnesium hydroxide/simethicone Suspension 30 milliLiter(s) Oral every 4 hours PRN Dyspepsia  melatonin 3 milliGRAM(s) Oral at bedtime PRN Insomnia  ondansetron Injectable 4 milliGRAM(s) IV Push every 8 hours PRN Nausea and/or Vomiting      CAPILLARY BLOOD GLUCOSE        I&O's Summary    26 Aug 2023 07:01  -  27 Aug 2023 07:00  --------------------------------------------------------  IN: 1170 mL / OUT: 150 mL / NET: 1020 mL    27 Aug 2023 07:01  -  28 Aug 2023 06:32  --------------------------------------------------------  IN: 240 mL / OUT: 1450 mL / NET: -1210 mL        PHYSICAL EXAM:  Vital Signs Last 24 Hrs  T(C): 36.5 (28 Aug 2023 05:15), Max: 37 (27 Aug 2023 21:02)  T(F): 97.7 (28 Aug 2023 05:15), Max: 98.6 (27 Aug 2023 21:02)  HR: 82 (28 Aug 2023 05:15) (73 - 94)  BP: 126/67 (28 Aug 2023 05:15) (107/64 - 126/67)  BP(mean): --  RR: 18 (28 Aug 2023 05:15) (18 - 18)  SpO2: 94% (28 Aug 2023 05:15) (94% - 96%)    Parameters below as of 28 Aug 2023 05:15  Patient On (Oxygen Delivery Method): nasal cannula  O2 Flow (L/min): 2      GENERAL: NAD, lying in bed comfortably  HEAD: Atraumatic, normocephalic  EYES: EOMI, PERRLA  ENT: Moist mucous membranes  NECK: Supple, no JVD  HEART: S1, S2, Regular rate and rhythm, no murmurs, rubs, or gallops  LUNGS: Unlabored respirations, clear to auscultation bilaterally, no crackles, wheezing, or rhonchi  ABDOMEN: Soft, nontender, nondistended, +BS  EXTREMITIES: No clubbing, cyanosis, or edema  NERVOUS SYSTEM:  A&Ox3, no focal deficits   SKIN: No rashes or lesions    LABS:                        10.1   2.24  )-----------( 224      ( 27 Aug 2023 06:40 )             32.3     08-27    138  |  99  |  22  ----------------------------<  119<H>  3.9   |  25  |  1.17    Ca    8.8      27 Aug 2023 06:39  Phos  3.3     08-27  Mg     2.3     08-27            Urinalysis Basic - ( 27 Aug 2023 06:39 )    Color: x / Appearance: x / SG: x / pH: x  Gluc: 119 mg/dL / Ketone: x  / Bili: x / Urobili: x   Blood: x / Protein: x / Nitrite: x   Leuk Esterase: x / RBC: x / WBC x   Sq Epi: x / Non Sq Epi: x / Bacteria: x        Culture - Sputum (collected 25 Aug 2023 19:05)  Source: .Sputum Sputum  Gram Stain (26 Aug 2023 06:36):    Rare polymorphonuclear leukocytes per low power field    No Squamous epithelial cells per low power field    Moderate Gram positive cocci in pairs per oil power field    Moderate Gram Positive Rods per oil power field    Few Gram Negative Rods per oil power field  Final Report (27 Aug 2023 15:19):    Normal Respiratory Nathalie present        RADIOLOGY & ADDITIONAL TESTS:  Results Reviewed:   Imaging Personally Reviewed:  Electrocardiogram Personally Reviewed:  Tele:

## 2023-08-28 NOTE — PROGRESS NOTE ADULT - ASSESSMENT
Patient is an 85-year-old female w/ PMH of HLD and breast cancer presenting with a one week history of SOB. Pt admitted for acute hypoxic respiratory failure, currently undergoing IV diuresis.

## 2023-08-28 NOTE — PROVIDER CONTACT NOTE (OTHER) - SITUATION
as per tele tech, patient's HR went up to 160's.
HR went to 175 for 4 seconds on cardiac monitor at 0100 am, notified by tele tech.
Pt had 4 beats wide complex for 1.2s on tele
Pt had 4 beats wide complex on tele for 1.2s

## 2023-08-28 NOTE — PROGRESS NOTE ADULT - ASSESSMENT
# acute hypoxemic respiratory failure  # abnormal CT chest  Patient on ibrance and exemestane therapy as an outpatient. Ibrance stopped by heme/onc. Presents with 1 week history of dyspnea without cough, fevers or pleuritic pain.   with normal renal function. Echocardiogram with normal LV systolic function, no significant diastolic issues. Mild LVH and mild MS, no PH.  CT of the chest without signs of PE, DVT study negative. CT shows diffuse widespread GGOs, no honeycombing or bronchiectasis, possibly mild emphysematous changes. Apically predominant but present in all lobes, no peripheral or central sparing.  Patient on 2L O2 via NC currently, not acutely struggling to breathe.  Picture is concerning for palbociclib-associated acute pneumonitis, however other causes could be pulmonary edema or diffuse infection.  Her RVP was negative, no signs or symptoms of infection.  - agree with holding ibrance  - empirically diurese, could increase IV Lasix dose to achieve net negative fluid balance of -1-1.5L per day, if creatinine permits  - procal low, no infectious symptoms  -     INCOMPLETE NOTE # acute hypoxemic respiratory failure  # abnormal CT chest  Patient on ibrance and exemestane therapy as an outpatient. Ibrance stopped by heme/onc. Presents with 1 week history of dyspnea without cough, fevers or pleuritic pain.   with normal renal function. Echocardiogram with normal LV systolic function, no significant diastolic issues. Mild LVH and mild MS, no PH.  CT of the chest without signs of PE, DVT study negative. CT shows diffuse widespread GGOs, no honeycombing or bronchiectasis, possibly mild emphysematous changes. Apically predominant but present in all lobes, no peripheral or central sparing.  Patient on 2L O2 via NC currently, not acutely struggling to breathe.  Picture is concerning for palbociclib-associated acute pneumonitis, however other causes could be pulmonary edema or diffuse infection.  Her RVP was negative, no signs or symptoms of infection.  - agree with holding ibrance  - empirically diurese, could increase IV Lasix dose to achieve net negative fluid balance of -1-1.5L per day, if creatinine permits  - procal low, no infectious symptoms  - recommend walking O2 test  - repeat CXR today # acute hypoxemic respiratory failure  # abnormal CT chest  Patient on ibrance and exemestane therapy as an outpatient. Ibrance stopped by heme/onc. Presents with 1 week history of dyspnea without cough, fevers or pleuritic pain.   with normal renal function. Echocardiogram with normal LV systolic function, no significant diastolic issues. Mild LVH and mild MS, no PH.  CT of the chest without signs of PE, DVT study negative. CT shows diffuse widespread GGOs, no honeycombing or bronchiectasis, possibly mild emphysematous changes. Apically predominant but present in all lobes, no peripheral or central sparing.  Patient on 2L O2 via NC currently, not acutely struggling to breathe.  Picture is concerning for palbociclib-associated acute pneumonitis, however other causes could be pulmonary edema or diffuse infection.  Her RVP was negative, no signs or symptoms of infection.  - agree with holding ibrance  - empirically diurese, could increase IV Lasix dose to achieve net negative fluid balance of -1-1.5L per day, if creatinine permits  - procal low, no infectious symptoms  - recommend walking O2 test  - start prednisone at 30mg BID

## 2023-08-28 NOTE — PROGRESS NOTE ADULT - SUBJECTIVE AND OBJECTIVE BOX
CHIEF COMPLAINT: dsypnea    Interval Events:    NAEON. Patient on stable oxygen levels. No significant improvement despite diuresis.    REVIEW OF SYSTEMS:  Constitutional: [ ] negative [ ] fevers [ ] chills [ ] weight loss [ ] weight gain  HEENT: [ ] negative [ ] dry eyes [ ] eye irritation [ ] postnasal drip [ ] nasal congestion  CV: [ ] negative  [ ] chest pain [ ] orthopnea [ ] palpitations [ ] murmur  Resp: [ ] negative [ ] cough [ ] shortness of breath [ ] dyspnea [ ] wheezing [ ] sputum [ ] hemoptysis  GI: [ ] negative [ ] nausea [ ] vomiting [ ] diarrhea [ ] constipation [ ] abd pain [ ] dysphagia   : [ ] negative [ ] dysuria [ ] nocturia [ ] hematuria [ ] increased urinary frequency  Musculoskeletal: [ ] negative [ ] back pain [ ] myalgias [ ] arthralgias [ ] fracture  Skin: [ ] negative [ ] rash [ ] itch  Neurological: [ ] negative [ ] headache [ ] dizziness [ ] syncope [ ] weakness [ ] numbness  Psychiatric: [ ] negative [ ] anxiety [ ] depression  Endocrine: [ ] negative [ ] diabetes [ ] thyroid problem  Hematologic/Lymphatic: [ ] negative [ ] anemia [ ] bleeding problem  Allergic/Immunologic: [ ] negative [ ] itchy eyes [ ] nasal discharge [ ] hives [ ] angioedema  [ ] All other systems negative  [ ] Unable to assess ROS because ________    OBJECTIVE:  ICU Vital Signs Last 24 Hrs  T(C): 36.5 (28 Aug 2023 05:15), Max: 37 (27 Aug 2023 21:02)  T(F): 97.7 (28 Aug 2023 05:15), Max: 98.6 (27 Aug 2023 21:02)  HR: 82 (28 Aug 2023 05:15) (73 - 94)  BP: 126/67 (28 Aug 2023 05:15) (107/64 - 126/67)  BP(mean): --  ABP: --  ABP(mean): --  RR: 18 (28 Aug 2023 05:15) (18 - 18)  SpO2: 94% (28 Aug 2023 05:15) (94% - 96%)    O2 Parameters below as of 28 Aug 2023 05:15  Patient On (Oxygen Delivery Method): nasal cannula  O2 Flow (L/min): 2            08-27 @ 07:01  -  08-28 @ 07:00  --------------------------------------------------------  IN: 240 mL / OUT: 1450 mL / NET: -1210 mL      CAPILLARY BLOOD GLUCOSE          PHYSICAL EXAM:  General:   HEENT:   Lymph Nodes:  Neck:   Respiratory:   Cardiovascular:   Abdomen:   Extremities:   Skin:   Neurological:  Psychiatry:    HOSPITAL MEDICATIONS:  MEDICATIONS  (STANDING):  buPROPion XL (24-Hour) . 300 milliGRAM(s) Oral daily  enoxaparin Injectable 40 milliGRAM(s) SubCutaneous every 24 hours  escitalopram 10 milliGRAM(s) Oral daily  escitalopram 5 milliGRAM(s) Oral daily  exemestane 25 milliGRAM(s) Oral daily  furosemide   Injectable 40 milliGRAM(s) IV Push daily  mirtazapine 7.5 milliGRAM(s) Oral at bedtime  simvastatin 20 milliGRAM(s) Oral at bedtime  tamsulosin 0.4 milliGRAM(s) Oral at bedtime    MEDICATIONS  (PRN):  acetaminophen     Tablet .. 650 milliGRAM(s) Oral every 6 hours PRN Temp greater or equal to 38C (100.4F), Mild Pain (1 - 3)  aluminum hydroxide/magnesium hydroxide/simethicone Suspension 30 milliLiter(s) Oral every 4 hours PRN Dyspepsia  melatonin 3 milliGRAM(s) Oral at bedtime PRN Insomnia  ondansetron Injectable 4 milliGRAM(s) IV Push every 8 hours PRN Nausea and/or Vomiting      LABS:                        9.7    2.31  )-----------( 200      ( 28 Aug 2023 07:47 )             30.6     Hgb Trend: 9.7<--, 10.1<--, 9.6<--, 9.2<--, 9.3<--  08-28    137  |  97  |  24<H>  ----------------------------<  105<H>  3.9   |  28  |  1.20    Ca    8.8      28 Aug 2023 07:45  Phos  3.0     08-28  Mg     2.4     08-28      Creatinine Trend: 1.20<--, 1.17<--, 1.23<--, 0.98<--, 0.96<--    Urinalysis Basic - ( 28 Aug 2023 07:45 )    Color: x / Appearance: x / SG: x / pH: x  Gluc: 105 mg/dL / Ketone: x  / Bili: x / Urobili: x   Blood: x / Protein: x / Nitrite: x   Leuk Esterase: x / RBC: x / WBC x   Sq Epi: x / Non Sq Epi: x / Bacteria: x            MICROBIOLOGY:     Culture - Sputum (collected 25 Aug 2023 19:05)  Source: .Sputum Sputum  Gram Stain (26 Aug 2023 06:36):    Rare polymorphonuclear leukocytes per low power field    No Squamous epithelial cells per low power field    Moderate Gram positive cocci in pairs per oil power field    Moderate Gram Positive Rods per oil power field    Few Gram Negative Rods per oil power field  Final Report (27 Aug 2023 15:19):    Normal Respiratory Nathalie present        RADIOLOGY:  [ ] Reviewed and interpreted by me    PULMONARY FUNCTION TESTS:    EKG: CHIEF COMPLAINT: dsypnea    Interval Events:    NAEON. Patient on stable oxygen levels. No significant improvement despite diuresis.    REVIEW OF SYSTEMS:  Constitutional: [ x ] negative [ ] fevers [ ] chills [ ] weight loss [ ] weight gain  HEENT: [ x ] negative [ ] dry eyes [ ] eye irritation [ ] postnasal drip [ ] nasal congestion  CV: [ x ] negative  [ ] chest pain [ ] orthopnea [ ] palpitations [ ] murmur  Resp: [ ] negative [ ] cough [ ] shortness of breath [ x ] dyspnea [ ] wheezing [ ] sputum [ ] hemoptysis  GI: [ x ] negative [ ] nausea [ ] vomiting [ ] diarrhea [ ] constipation [ ] abd pain [ ] dysphagia   : [ x ] negative [ ] dysuria [ ] nocturia [ ] hematuria [ ] increased urinary frequency  Musculoskeletal: [ ] negative [ ] back pain [ ] myalgias [ ] arthralgias [ ] fracture  Skin: [ ] negative [ ] rash [ ] itch  Neurological: [ ] negative [ ] headache [ ] dizziness [ ] syncope [ ] weakness [ ] numbness  Psychiatric: [ ] negative [ ] anxiety [ ] depression  Endocrine: [ ] negative [ ] diabetes [ ] thyroid problem  Hematologic/Lymphatic: [ ] negative [ ] anemia [ ] bleeding problem  Allergic/Immunologic: [ ] negative [ ] itchy eyes [ ] nasal discharge [ ] hives [ ] angioedema  [ x ] All other systems negative  [ ] Unable to assess ROS because ________    OBJECTIVE:  ICU Vital Signs Last 24 Hrs  T(C): 36.5 (28 Aug 2023 05:15), Max: 37 (27 Aug 2023 21:02)  T(F): 97.7 (28 Aug 2023 05:15), Max: 98.6 (27 Aug 2023 21:02)  HR: 82 (28 Aug 2023 05:15) (73 - 94)  BP: 126/67 (28 Aug 2023 05:15) (107/64 - 126/67)  BP(mean): --  ABP: --  ABP(mean): --  RR: 18 (28 Aug 2023 05:15) (18 - 18)  SpO2: 94% (28 Aug 2023 05:15) (94% - 96%)    O2 Parameters below as of 28 Aug 2023 05:15  Patient On (Oxygen Delivery Method): nasal cannula  O2 Flow (L/min): 2            08-27 @ 07:01  -  08-28 @ 07:00  --------------------------------------------------------  IN: 240 mL / OUT: 1450 mL / NET: -1210 mL      CAPILLARY BLOOD GLUCOSE        PHYSICAL EXAM:  General: Well appearing female sitting comfortably in bed  HEENT: Normal sclera  Lymph Nodes: No LAD  Respiratory: Faint crackles diffusely, worse at the bases  Cardiovascular: Regular rate and rhythm no m/r/g  Abdomen: Nontender nondistended  Extremities: No peripheral edema  Skin: No rashes  Neurological: No appreciable neurologic deficits  Psychiatry: Appropriate mood and affect    HOSPITAL MEDICATIONS:  MEDICATIONS  (STANDING):  buPROPion XL (24-Hour) . 300 milliGRAM(s) Oral daily  enoxaparin Injectable 40 milliGRAM(s) SubCutaneous every 24 hours  escitalopram 10 milliGRAM(s) Oral daily  escitalopram 5 milliGRAM(s) Oral daily  exemestane 25 milliGRAM(s) Oral daily  furosemide   Injectable 40 milliGRAM(s) IV Push daily  mirtazapine 7.5 milliGRAM(s) Oral at bedtime  simvastatin 20 milliGRAM(s) Oral at bedtime  tamsulosin 0.4 milliGRAM(s) Oral at bedtime    MEDICATIONS  (PRN):  acetaminophen     Tablet .. 650 milliGRAM(s) Oral every 6 hours PRN Temp greater or equal to 38C (100.4F), Mild Pain (1 - 3)  aluminum hydroxide/magnesium hydroxide/simethicone Suspension 30 milliLiter(s) Oral every 4 hours PRN Dyspepsia  melatonin 3 milliGRAM(s) Oral at bedtime PRN Insomnia  ondansetron Injectable 4 milliGRAM(s) IV Push every 8 hours PRN Nausea and/or Vomiting      LABS:                        9.7    2.31  )-----------( 200      ( 28 Aug 2023 07:47 )             30.6     Hgb Trend: 9.7<--, 10.1<--, 9.6<--, 9.2<--, 9.3<--  08-28    137  |  97  |  24<H>  ----------------------------<  105<H>  3.9   |  28  |  1.20    Ca    8.8      28 Aug 2023 07:45  Phos  3.0     08-28  Mg     2.4     08-28      Creatinine Trend: 1.20<--, 1.17<--, 1.23<--, 0.98<--, 0.96<--    Urinalysis Basic - ( 28 Aug 2023 07:45 )    Color: x / Appearance: x / SG: x / pH: x  Gluc: 105 mg/dL / Ketone: x  / Bili: x / Urobili: x   Blood: x / Protein: x / Nitrite: x   Leuk Esterase: x / RBC: x / WBC x   Sq Epi: x / Non Sq Epi: x / Bacteria: x            MICROBIOLOGY:     Culture - Sputum (collected 25 Aug 2023 19:05)  Source: .Sputum Sputum  Gram Stain (26 Aug 2023 06:36):    Rare polymorphonuclear leukocytes per low power field    No Squamous epithelial cells per low power field    Moderate Gram positive cocci in pairs per oil power field    Moderate Gram Positive Rods per oil power field    Few Gram Negative Rods per oil power field  Final Report (27 Aug 2023 15:19):    Normal Respiratory Nathalie present        RADIOLOGY:  [ x ] Reviewed and interpreted by me    PULMONARY FUNCTION TESTS:    EKG:

## 2023-08-28 NOTE — PROVIDER CONTACT NOTE (OTHER) - ASSESSMENT
Pt is AxOx4, all VSS. Pt HR now 80, NSR on cardiac monitor. /71, temp 97.7, satting 94% on 2L NC. Pt denies chest pain, no palpitations, no SOB or dizziness. Pt asymptomatic and asleep during event. Per [employer] tech, pt had episode of PAT on 8/25 as well.
Pt is AxO4, 2L NC, all VS as charted. Pt is asymptomatic
Pt is AxO4, all VS as charted. Asymptomatic
VS as noted. patient denies s/sx, chest pain or shortness of breath. patient stated "I am peeing" upon assessment. telemetry leads in place. patient is currently tolerating 2LNC. patient is currently NSR in 90's.

## 2023-08-29 LAB
ALBUMIN SERPL ELPH-MCNC: 3.6 G/DL — SIGNIFICANT CHANGE UP (ref 3.3–5)
ALP SERPL-CCNC: 60 U/L — SIGNIFICANT CHANGE UP (ref 40–120)
ALT FLD-CCNC: 29 U/L — SIGNIFICANT CHANGE UP (ref 10–45)
ANION GAP SERPL CALC-SCNC: 11 MMOL/L — SIGNIFICANT CHANGE UP (ref 5–17)
AST SERPL-CCNC: 22 U/L — SIGNIFICANT CHANGE UP (ref 10–40)
BASOPHILS # BLD AUTO: 0.02 K/UL — SIGNIFICANT CHANGE UP (ref 0–0.2)
BASOPHILS NFR BLD AUTO: 0.9 % — SIGNIFICANT CHANGE UP (ref 0–2)
BILIRUB SERPL-MCNC: 0.3 MG/DL — SIGNIFICANT CHANGE UP (ref 0.2–1.2)
BUN SERPL-MCNC: 23 MG/DL — SIGNIFICANT CHANGE UP (ref 7–23)
CALCIUM SERPL-MCNC: 8.9 MG/DL — SIGNIFICANT CHANGE UP (ref 8.4–10.5)
CHLORIDE SERPL-SCNC: 97 MMOL/L — SIGNIFICANT CHANGE UP (ref 96–108)
CO2 SERPL-SCNC: 27 MMOL/L — SIGNIFICANT CHANGE UP (ref 22–31)
CREAT SERPL-MCNC: 1.19 MG/DL — SIGNIFICANT CHANGE UP (ref 0.5–1.3)
EGFR: 44 ML/MIN/1.73M2 — LOW
EOSINOPHIL # BLD AUTO: 0.02 K/UL — SIGNIFICANT CHANGE UP (ref 0–0.5)
EOSINOPHIL NFR BLD AUTO: 0.9 % — SIGNIFICANT CHANGE UP (ref 0–6)
GIANT PLATELETS BLD QL SMEAR: PRESENT — SIGNIFICANT CHANGE UP
GLUCOSE SERPL-MCNC: 98 MG/DL — SIGNIFICANT CHANGE UP (ref 70–99)
HCT VFR BLD CALC: 32.7 % — LOW (ref 34.5–45)
HGB BLD-MCNC: 10.1 G/DL — LOW (ref 11.5–15.5)
LYMPHOCYTES # BLD AUTO: 0.32 K/UL — LOW (ref 1–3.3)
LYMPHOCYTES # BLD AUTO: 12.3 % — LOW (ref 13–44)
MAGNESIUM SERPL-MCNC: 2.4 MG/DL — SIGNIFICANT CHANGE UP (ref 1.6–2.6)
MANUAL SMEAR VERIFICATION: SIGNIFICANT CHANGE UP
MCHC RBC-ENTMCNC: 30.7 PG — SIGNIFICANT CHANGE UP (ref 27–34)
MCHC RBC-ENTMCNC: 30.9 GM/DL — LOW (ref 32–36)
MCV RBC AUTO: 99.4 FL — SIGNIFICANT CHANGE UP (ref 80–100)
MONOCYTES # BLD AUTO: 0.52 K/UL — SIGNIFICANT CHANGE UP (ref 0–0.9)
MONOCYTES NFR BLD AUTO: 20.2 % — HIGH (ref 2–14)
MYELOCYTES NFR BLD: 0.9 % — HIGH (ref 0–0)
NEUTROPHILS # BLD AUTO: 1.67 K/UL — LOW (ref 1.8–7.4)
NEUTROPHILS NFR BLD AUTO: 63.1 % — SIGNIFICANT CHANGE UP (ref 43–77)
NEUTS BAND # BLD: 1.7 % — SIGNIFICANT CHANGE UP (ref 0–8)
PHOSPHATE SERPL-MCNC: 2.9 MG/DL — SIGNIFICANT CHANGE UP (ref 2.5–4.5)
PLAT MORPH BLD: ABNORMAL
PLATELET # BLD AUTO: 191 K/UL — SIGNIFICANT CHANGE UP (ref 150–400)
POTASSIUM SERPL-MCNC: 4.1 MMOL/L — SIGNIFICANT CHANGE UP (ref 3.5–5.3)
POTASSIUM SERPL-SCNC: 4.1 MMOL/L — SIGNIFICANT CHANGE UP (ref 3.5–5.3)
PROT SERPL-MCNC: 7.1 G/DL — SIGNIFICANT CHANGE UP (ref 6–8.3)
RBC # BLD: 3.29 M/UL — LOW (ref 3.8–5.2)
RBC # FLD: 14.4 % — SIGNIFICANT CHANGE UP (ref 10.3–14.5)
RBC BLD AUTO: SIGNIFICANT CHANGE UP
SODIUM SERPL-SCNC: 135 MMOL/L — SIGNIFICANT CHANGE UP (ref 135–145)
WBC # BLD: 2.58 K/UL — LOW (ref 3.8–10.5)
WBC # FLD AUTO: 2.58 K/UL — LOW (ref 3.8–10.5)

## 2023-08-29 PROCEDURE — 99233 SBSQ HOSP IP/OBS HIGH 50: CPT | Mod: GC

## 2023-08-29 RX ADMIN — EXEMESTANE 25 MILLIGRAM(S): 25 TABLET, SUGAR COATED ORAL at 21:02

## 2023-08-29 RX ADMIN — TAMSULOSIN HYDROCHLORIDE 0.4 MILLIGRAM(S): 0.4 CAPSULE ORAL at 21:02

## 2023-08-29 RX ADMIN — Medication 30 MILLIGRAM(S): at 05:23

## 2023-08-29 RX ADMIN — ESCITALOPRAM OXALATE 10 MILLIGRAM(S): 10 TABLET, FILM COATED ORAL at 11:08

## 2023-08-29 RX ADMIN — Medication 30 MILLILITER(S): at 13:55

## 2023-08-29 RX ADMIN — Medication 30 MILLIGRAM(S): at 17:04

## 2023-08-29 RX ADMIN — ESCITALOPRAM OXALATE 5 MILLIGRAM(S): 10 TABLET, FILM COATED ORAL at 11:07

## 2023-08-29 RX ADMIN — MIRTAZAPINE 7.5 MILLIGRAM(S): 45 TABLET, ORALLY DISINTEGRATING ORAL at 21:02

## 2023-08-29 RX ADMIN — PANTOPRAZOLE SODIUM 40 MILLIGRAM(S): 20 TABLET, DELAYED RELEASE ORAL at 05:23

## 2023-08-29 RX ADMIN — SIMVASTATIN 20 MILLIGRAM(S): 20 TABLET, FILM COATED ORAL at 21:02

## 2023-08-29 RX ADMIN — ENOXAPARIN SODIUM 40 MILLIGRAM(S): 100 INJECTION SUBCUTANEOUS at 17:04

## 2023-08-29 RX ADMIN — BUPROPION HYDROCHLORIDE 300 MILLIGRAM(S): 150 TABLET, EXTENDED RELEASE ORAL at 11:08

## 2023-08-29 NOTE — PROGRESS NOTE ADULT - SUBJECTIVE AND OBJECTIVE BOX
HPI:  Ms. Quinton Bridges is an 89-year-old female w/ PMH of HLD and breast cancer receiving oral chemotherapy who is presented 8/24/23 following a one week history of SOB. She stated this is new for her and has been constant. She denied fever, chills, chest pain, cough, nausea, vomiting, and dysuria. No orthopnea or LE swelling. No sick contacts. She presented to the ED via EMS after being found hypoxic with O2 saturation in the low 80s at her outpatient oncologist office.     PAST MEDICAL & SURGICAL HISTORY:  Anxiety      Hyperlipidemia      UTI (lower urinary tract infection)      Depression      Breast cancer      HLD (hyperlipidemia)      Gallbladder stone without cholecystitis or obstruction      Cholecystitis      2019 novel coronavirus disease (COVID-19)  1/7/2021      Recurrent breast cancer  opposite side, left-3/2020      History of chemotherapy  and radiation-1994      S/P chemotherapy, time since less than 4 weeks      Cholelithiasis      H/O lumpectomy  left-11/1994      History of hip replacement  left-1994      S/P Mohs surgery for basal cell carcinoma  right side of nose-2020        Allergies    penicillin (Hives)    Intolerances      Social History:  Lives alone, sister lives in building with her, completely independent (24 Aug 2023 12:56)    Medications:  acetaminophen     Tablet .. 650 milliGRAM(s) Oral every 6 hours PRN Temp greater or equal to 38C (100.4F), Mild Pain (1 - 3)  aluminum hydroxide/magnesium hydroxide/simethicone Suspension 30 milliLiter(s) Oral every 4 hours PRN Dyspepsia  buPROPion XL (24-Hour) . 300 milliGRAM(s) Oral daily  enoxaparin Injectable 40 milliGRAM(s) SubCutaneous every 24 hours  escitalopram 10 milliGRAM(s) Oral daily  escitalopram 5 milliGRAM(s) Oral daily  exemestane 25 milliGRAM(s) Oral daily  melatonin 3 milliGRAM(s) Oral at bedtime PRN Insomnia  mirtazapine 7.5 milliGRAM(s) Oral at bedtime  ondansetron Injectable 4 milliGRAM(s) IV Push every 8 hours PRN Nausea and/or Vomiting  pantoprazole    Tablet 40 milliGRAM(s) Oral before breakfast  predniSONE   Tablet 30 milliGRAM(s) Oral two times a day  simvastatin 20 milliGRAM(s) Oral at bedtime  tamsulosin 0.4 milliGRAM(s) Oral at bedtime    Labs:  CBC Full  -  ( 29 Aug 2023 07:21 )  WBC Count : 2.58 K/uL  RBC Count : 3.29 M/uL  Hemoglobin : 10.1 g/dL  Hematocrit : 32.7 %  Platelet Count - Automated : 191 K/uL  Mean Cell Volume : 99.4 fl  Mean Cell Hemoglobin : 30.7 pg  Mean Cell Hemoglobin Concentration : 30.9 gm/dL  Auto Neutrophil # : 1.67 K/uL  Auto Lymphocyte # : 0.32 K/uL  Auto Monocyte # : 0.52 K/uL  Auto Eosinophil # : 0.02 K/uL  Auto Basophil # : 0.02 K/uL  Auto Neutrophil % : 63.1 %  Auto Lymphocyte % : 12.3 %  Auto Monocyte % : 20.2 %  Auto Eosinophil % : 0.9 %  Auto Basophil % : 0.9 %    08-29    135  |  97  |  23  ----------------------------<  98  4.1   |  27  |  1.19    Ca    8.9      29 Aug 2023 07:22  Phos  2.9     08-29  Mg     2.4     08-29    TPro  7.1  /  Alb  3.6  /  TBili  0.3  /  DBili  x   /  AST  22  /  ALT  29  /  AlkPhos  60  08-29      Radiology:             ROS:  Patient comfortable without distress  No SOB or chest pain  No palpitation  No abdominal pain, diarrhaea or constipation  No weakness of extremities  No skin changes or swelling of legs  Rest of the comprehensive ROS was negative  Vital Signs Last 24 Hrs  T(C): 36.7 (29 Aug 2023 12:08), Max: 37.1 (28 Aug 2023 16:47)  T(F): 98.1 (29 Aug 2023 12:08), Max: 98.8 (28 Aug 2023 16:47)  HR: 86 (29 Aug 2023 12:08) (83 - 95)  BP: 127/75 (29 Aug 2023 12:08) (101/52 - 127/75)  BP(mean): --  RR: 18 (29 Aug 2023 12:08) (18 - 20)  SpO2: 91% (29 Aug 2023 12:08) (86% - 94%)    Parameters below as of 29 Aug 2023 12:08  Patient On (Oxygen Delivery Method): nasal cannula  O2 Flow (L/min): 1      Physical exam:  Patient alert and oriented  No distress  CVS: S1, S2 regular or murmur  Chest: bilateral breath sound without rales  Abdomen: soft, not tender, no organomegaly or masses  CNS: No focal neuro deficit  Musculoskeletal:  Normal range of motion  Skin: No rash    Assessment and Plan: HPI:  Ms. Quinton Bridges is an 89-year-old female w/ PMH of HLD and breast cancer receiving oral chemotherapy who is presented 8/24/23 following a one week history of SOB. She stated this was new for her and has been constant. She denied fever, chills, chest pain, cough, nausea, vomiting, and dysuria. No orthopnea or LE swelling.     PAST MEDICAL & SURGICAL HISTORY:  Anxiety      Hyperlipidemia      UTI (lower urinary tract infection)      Depression      Breast cancer      HLD (hyperlipidemia)      Gallbladder stone without cholecystitis or obstruction      Cholecystitis      2019 novel coronavirus disease (COVID-19)  1/7/2021      Recurrent breast cancer  opposite side, left-3/2020      History of chemotherapy  and radiation-1994      S/P chemotherapy, time since less than 4 weeks      Cholelithiasis      H/O lumpectomy  left-11/1994      History of hip replacement  left-1994      S/P Mohs surgery for basal cell carcinoma  right side of nose-2020        Allergies    penicillin (Hives)    Intolerances      Social History:  Lives alone, sister lives in building with her, completely independent (24 Aug 2023 12:56)    Medications:  acetaminophen     Tablet .. 650 milliGRAM(s) Oral every 6 hours PRN Temp greater or equal to 38C (100.4F), Mild Pain (1 - 3)  aluminum hydroxide/magnesium hydroxide/simethicone Suspension 30 milliLiter(s) Oral every 4 hours PRN Dyspepsia  buPROPion XL (24-Hour) . 300 milliGRAM(s) Oral daily  enoxaparin Injectable 40 milliGRAM(s) SubCutaneous every 24 hours  escitalopram 10 milliGRAM(s) Oral daily  escitalopram 5 milliGRAM(s) Oral daily  exemestane 25 milliGRAM(s) Oral daily  melatonin 3 milliGRAM(s) Oral at bedtime PRN Insomnia  mirtazapine 7.5 milliGRAM(s) Oral at bedtime  ondansetron Injectable 4 milliGRAM(s) IV Push every 8 hours PRN Nausea and/or Vomiting  pantoprazole    Tablet 40 milliGRAM(s) Oral before breakfast  predniSONE   Tablet 30 milliGRAM(s) Oral two times a day  simvastatin 20 milliGRAM(s) Oral at bedtime  tamsulosin 0.4 milliGRAM(s) Oral at bedtime    Labs:  CBC Full  -  ( 29 Aug 2023 07:21 )  WBC Count : 2.58 K/uL  RBC Count : 3.29 M/uL  Hemoglobin : 10.1 g/dL  Hematocrit : 32.7 %  Platelet Count - Automated : 191 K/uL  Mean Cell Volume : 99.4 fl  Mean Cell Hemoglobin : 30.7 pg  Mean Cell Hemoglobin Concentration : 30.9 gm/dL  Auto Neutrophil # : 1.67 K/uL  Auto Lymphocyte # : 0.32 K/uL  Auto Monocyte # : 0.52 K/uL  Auto Eosinophil # : 0.02 K/uL  Auto Basophil # : 0.02 K/uL  Auto Neutrophil % : 63.1 %  Auto Lymphocyte % : 12.3 %  Auto Monocyte % : 20.2 %  Auto Eosinophil % : 0.9 %  Auto Basophil % : 0.9 %    08-29    135  |  97  |  23  ----------------------------<  98  4.1   |  27  |  1.19    Ca    8.9      29 Aug 2023 07:22  Phos  2.9     08-29  Mg     2.4     08-29    TPro  7.1  /  Alb  3.6  /  TBili  0.3  /  DBili  x   /  AST  22  /  ALT  29  /  AlkPhos  60  08-29      Radiology:     < from: CT Angio Chest PE Protocol w/ IV Cont (08.24.23 @ 01:36) >  IMPRESSION:  No pulmonary embolus.    Symmetric groundglass opacity and interstitial prominence bilaterally   favoring the upper lungs. This could represent pulmonary edema, atypical   pneumonia, or noninfectious interstitial pneumonitis.    Cardiomegaly.    < end of copied text >          ROS:  Patient comfortable without distress  No SOB or chest pain  No palpitation  No abdominal pain, diarrhaea or constipation  No weakness of extremities  No skin changes or swelling of legs  Rest of the comprehensive ROS was negative  Vital Signs Last 24 Hrs  T(C): 36.7 (29 Aug 2023 12:08), Max: 37.1 (28 Aug 2023 16:47)  T(F): 98.1 (29 Aug 2023 12:08), Max: 98.8 (28 Aug 2023 16:47)  HR: 86 (29 Aug 2023 12:08) (83 - 95)  BP: 127/75 (29 Aug 2023 12:08) (101/52 - 127/75)  BP(mean): --  RR: 18 (29 Aug 2023 12:08) (18 - 20)  SpO2: 91% (29 Aug 2023 12:08) (86% - 94%)    Parameters below as of 29 Aug 2023 12:08  Patient On (Oxygen Delivery Method): nasal cannula  O2 Flow (L/min): 1      Physical exam:  Patient alert and oriented  No distress  CVS: S1, S2   Chest: bilateral breath sound with diffuse fine rales  Abdomen: soft, not tender, no organomegaly or masses  CNS: No focal neuro deficit  Musculoskeletal:  Normal range of motion  Skin: No rash    Assessment and Plan:

## 2023-08-29 NOTE — PROGRESS NOTE ADULT - SUBJECTIVE AND OBJECTIVE BOX
Cardiovascular Disease Progress Note  Date of service: 08-29-23 @ 16:57  Covering Dr. Gaspar  Overnight events: No acute events overnight.  Pt denies chest pain. SOB improving.   Otherwise review of systems negative    Objective Findings:  T(C): 36.7 (08-29-23 @ 12:08), Max: 37 (08-28-23 @ 21:43)  HR: 90 (08-29-23 @ 13:38) (83 - 90)  BP: 97/63 (08-29-23 @ 13:38) (97/63 - 127/75)  RR: 18 (08-29-23 @ 13:38) (18 - 18)  SpO2: 93% (08-29-23 @ 13:38) (91% - 93%)  Wt(kg): --  Daily     Daily       Physical Exam:  Gen: NAD; Patient resting comfortably  HEENT: EOMI, Normocephalic/ atraumatic  CV: RRR, normal S1 + S2, no m/r/g  Lungs:  Normal respiratory effort; clear to auscultation bilaterally  Abd: soft, non-tender; bowel sounds present  Ext: No edema; warm and well perfused    Telemetry: Sinus     Laboratory Data:                        10.1   2.58  )-----------( 191      ( 29 Aug 2023 07:21 )             32.7     08-29    135  |  97  |  23  ----------------------------<  98  4.1   |  27  |  1.19    Ca    8.9      29 Aug 2023 07:22  Phos  2.9     08-29  Mg     2.4     08-29    TPro  7.1  /  Alb  3.6  /  TBili  0.3  /  DBili  x   /  AST  22  /  ALT  29  /  AlkPhos  60  08-29              Inpatient Medications:  MEDICATIONS  (STANDING):  buPROPion XL (24-Hour) . 300 milliGRAM(s) Oral daily  enoxaparin Injectable 40 milliGRAM(s) SubCutaneous every 24 hours  escitalopram 10 milliGRAM(s) Oral daily  escitalopram 5 milliGRAM(s) Oral daily  exemestane 25 milliGRAM(s) Oral daily  mirtazapine 7.5 milliGRAM(s) Oral at bedtime  pantoprazole    Tablet 40 milliGRAM(s) Oral before breakfast  predniSONE   Tablet 30 milliGRAM(s) Oral two times a day  simvastatin 20 milliGRAM(s) Oral at bedtime  tamsulosin 0.4 milliGRAM(s) Oral at bedtime      Assessment:  89-year-old female w/ PMH of HLD and breast cancer receiving oral chemotherapy who is presenting following a one week history of SOB. She states this is new for her and has been constant. She denies fever, chills, chest pain, cough, nausea, vomiting, and dysuria. No orthopnea or LE swelling. No sick contacts. She presented to the ED via EMS after being found hypoxic with O2 saturation in the low 80s at her outpatient oncologist office.    1. Shortness of Breath  - ECG NSR with no ischemia noted  - COVID/RVP/legionella/Strep PNA negative  - afebile, no leukocytosis  - CT negative for PE but reveals ? pulm edema and cardiomegaly vs pneumonitis.   - TTE shows preserved EF, no WMA and mild MS  - D-Dimer 836--> US of RLE negative for DVT, PE neg as noted above  - DDx includes ADHF vs. Atypical PNA vs. Pneumonitis (as per Heme/Onc, Ibrance can cause ILD/pneumonitis)  - Supplemental oxygen as needed  - Pulm following, steroids started.   - Lasix stopped.     2. HLD  - c/w simvastatin 20mg PO daily    3. Breast CA  - Heme/Onc following    4. NSVT  - Pt asymptomatic  - Continue to monitor.       #ACP (advance care planning)-  Advanced care planning was discussed with the patient.  Risks, benefits and alternatives of medical treatment and procedures were discussed in detail and all questions were answered. 30 additional minutes spent addressing advance care plans.          Over 25 minutes spent on total encounter; more than 50% of the visit was spent counseling and/or coordinating care by the attending physician.      Filipe Jin,  [facility]  Cardiovascular Disease  [phone]

## 2023-08-29 NOTE — PROGRESS NOTE ADULT - ASSESSMENT
89F HLD and breast cancer receiving oral chemotherapy who is presenting following a one week history of SOB    #Breast cancer, recurrent; follows with Dr. Batista  On ibrance and exemestane since 2021 with stable disease; last PET scan 1/2023  Ibrance held; continue exemestane    Hypoxia with no PE, upper lobe gg opacity- ? pulmonary edema vs pneumonitis, no significant improvement with diuresis, LV ok  Pulmonary follwoing, on steroids  Ibrance can cause ILD/pneumonitis- now held    leukopenia and normocytic anemia- chronic, secondary to rx for breast cancer, WBC at baseline  Monitor CBC   Ms. Quinton Bridges is an 89-year-old female w/ PMH of HLD and breast cancer who was  receiving oral Ibrance and examestane  presented 8/24/23 following a one week history of SOB.    She was on ibrance and exemestane since 2021 with stable disease; last PET scan 1/2023.  Clinically there was no evidence of fluid overload, ECHO was OK, no PE, pneumonia and CT findings could be from Ibrance pneumonitis  Thus Ibrance was held; continuing exemestane  She is getting prednisone.  Feels better but still needs oxygen by NC  Explained to patient and her  that we will continue holding Ibrance in hospital. Consideration to any further change in breast cancer management will be mad post dc in office    Leukopenia and normocytic anemia- chronic, secondary to rx for breast cancer, WBC at baseline  Monitor CBC

## 2023-08-29 NOTE — PROGRESS NOTE ADULT - PROBLEM SELECTOR PLAN 1
- P/w one week h/o SOB, O2 in low 80s at outpatient office, transported to ED via EMS  - Afebrile, leukopenic on admission  - RVP, COVID negative  - Legionella, strep negative; MRSA pending  - Elevated D-dimer -> US of RLE unremarkable  - CTA -> Nonspecific ground-glass opacities, negative for PE  - Elevated BNP -> TTE revealed normal LVSF, mild MV stenosis, mild pulm HTN  - stop IV Lasix 40mg daily for diuresis  -start pred 30mg BID  - C/w supplemental O2 prn  - C/w strict I/Os  - Pulmonology consulted, f/u recs  - Cardiology consulted, agree with trial of diuresis - P/w one week h/o SOB, O2 in low 80s at outpatient office, transported to ED via EMS  - Afebrile, leukopenic on admission  - RVP, COVID negative  - Legionella, strep negative; MRSA pending  - Elevated D-dimer -> US of RLE unremarkable  - CTA -> Nonspecific ground-glass opacities, negative for PE  - Elevated BNP -> TTE revealed normal LVSF, mild MV stenosis, mild pulm HTN  -  IV Lasix 40mg daily stopped 8/28 as no improvement in resp status w/ diuresis  - c/w pred 30mg BID  - C/w supplemental O2 prn  - C/w strict I/Os  - Pulmonology consulted, f/u recs  - Cardiology consulted, agree with trial of diuresis

## 2023-08-29 NOTE — PROGRESS NOTE ADULT - ASSESSMENT
# acute hypoxemic respiratory failure  # abnormal CT chest  Patient on ibrance and exemestane therapy as an outpatient. Ibrance stopped by heme/onc. Presents with 1 week history of dyspnea without cough, fevers or pleuritic pain.   with normal renal function. Echocardiogram with normal LV systolic function, no significant diastolic issues. Mild LVH and mild MS, no PH.  CT of the chest without signs of PE, DVT study negative. CT shows diffuse widespread GGOs, no honeycombing or bronchiectasis, possibly mild emphysematous changes. Apically predominant but present in all lobes, no peripheral or central sparing.  Patient on 2L O2 via NC currently, not acutely struggling to breathe.  Picture is concerning for palbociclib-associated acute pneumonitis, however other causes could be pulmonary edema or diffuse infection.  Her RVP was negative, no signs or symptoms of infection.  - agree with holding ibrance  - continue prednisone 30mg BID  - OOB as able  - incentive spirometry  - wean supplemental O2 for goal SpO2 >92%

## 2023-08-29 NOTE — PROGRESS NOTE ADULT - SUBJECTIVE AND OBJECTIVE BOX
*******************************  Navi Hernandez MD (PGY-1)  Internal Medicine  Contact via [employer] TEAMS  *******************************    PROGRESS NOTE:     Patient is a 89y old  Female who presents with a chief complaint of Shortness of breath (28 Aug 2023 17:34)      INTERVAL EVENTS: No acute overnight events.     SUBJECTIVE: Patient seen and examined at bedside. This morning, the patient is comfortable and doing well. No acute complaints. Denies fevers, chills, N/V/D, chest pain, SOB, abdominal pain.    MEDICATIONS  (STANDING):  buPROPion XL (24-Hour) . 300 milliGRAM(s) Oral daily  enoxaparin Injectable 40 milliGRAM(s) SubCutaneous every 24 hours  escitalopram 10 milliGRAM(s) Oral daily  escitalopram 5 milliGRAM(s) Oral daily  exemestane 25 milliGRAM(s) Oral daily  mirtazapine 7.5 milliGRAM(s) Oral at bedtime  pantoprazole    Tablet 40 milliGRAM(s) Oral before breakfast  predniSONE   Tablet 30 milliGRAM(s) Oral two times a day  simvastatin 20 milliGRAM(s) Oral at bedtime  tamsulosin 0.4 milliGRAM(s) Oral at bedtime    MEDICATIONS  (PRN):  acetaminophen     Tablet .. 650 milliGRAM(s) Oral every 6 hours PRN Temp greater or equal to 38C (100.4F), Mild Pain (1 - 3)  aluminum hydroxide/magnesium hydroxide/simethicone Suspension 30 milliLiter(s) Oral every 4 hours PRN Dyspepsia  melatonin 3 milliGRAM(s) Oral at bedtime PRN Insomnia  ondansetron Injectable 4 milliGRAM(s) IV Push every 8 hours PRN Nausea and/or Vomiting      CAPILLARY BLOOD GLUCOSE        I&O's Summary    27 Aug 2023 07:01  -  28 Aug 2023 07:00  --------------------------------------------------------  IN: 240 mL / OUT: 1450 mL / NET: -1210 mL    28 Aug 2023 07:01  -  29 Aug 2023 06:30  --------------------------------------------------------  IN: 720 mL / OUT: 0 mL / NET: 720 mL        PHYSICAL EXAM:  Vital Signs Last 24 Hrs  T(C): 36.7 (29 Aug 2023 05:39), Max: 37.1 (28 Aug 2023 16:47)  T(F): 98 (29 Aug 2023 05:39), Max: 98.8 (28 Aug 2023 16:47)  HR: 90 (29 Aug 2023 05:39) (83 - 96)  BP: 101/52 (29 Aug 2023 05:39) (101/52 - 126/77)  BP(mean): --  RR: 18 (29 Aug 2023 05:39) (18 - 20)  SpO2: 91% (29 Aug 2023 05:39) (86% - 96%)    Parameters below as of 29 Aug 2023 05:39  Patient On (Oxygen Delivery Method): nasal cannula  O2 Flow (L/min): 1      GENERAL: NAD, lying in bed comfortably  HEAD: Atraumatic, normocephalic  EYES: EOMI, PERRLA  ENT: Moist mucous membranes  NECK: Supple, no JVD  HEART: S1, S2, Regular rate and rhythm, no murmurs, rubs, or gallops  LUNGS: Unlabored respirations, clear to auscultation bilaterally, no crackles, wheezing, or rhonchi  ABDOMEN: Soft, nontender, nondistended, +BS  EXTREMITIES: No clubbing, cyanosis, or edema  NERVOUS SYSTEM:  A&Ox3, no focal deficits   SKIN: No rashes or lesions    LABS:                        9.7    2.31  )-----------( 200      ( 28 Aug 2023 07:47 )             30.6     08-28    137  |  97  |  24<H>  ----------------------------<  105<H>  3.9   |  28  |  1.20    Ca    8.8      28 Aug 2023 07:45  Phos  3.0     08-28  Mg     2.4     08-28            Urinalysis Basic - ( 28 Aug 2023 07:45 )    Color: x / Appearance: x / SG: x / pH: x  Gluc: 105 mg/dL / Ketone: x  / Bili: x / Urobili: x   Blood: x / Protein: x / Nitrite: x   Leuk Esterase: x / RBC: x / WBC x   Sq Epi: x / Non Sq Epi: x / Bacteria: x          RADIOLOGY & ADDITIONAL TESTS:  Results Reviewed:   Imaging Personally Reviewed:  Electrocardiogram Personally Reviewed:  Tele: *******************************  Navi Hernandez MD (PGY-1)  Internal Medicine  Contact via [employer] TEAMS  *******************************    PROGRESS NOTE:     Patient is a 89y old  Female who presents with a chief complaint of Shortness of breath (28 Aug 2023 17:34)      INTERVAL EVENTS: No acute overnight events.     SUBJECTIVE: Patient seen and examined at bedside. This morning, the patient reports desire to ambulate around room. Endorsing some generalized weakness. Denies fevers, chills, N/V/D, chest pain, abdominal pain.    MEDICATIONS  (STANDING):  buPROPion XL (24-Hour) . 300 milliGRAM(s) Oral daily  enoxaparin Injectable 40 milliGRAM(s) SubCutaneous every 24 hours  escitalopram 10 milliGRAM(s) Oral daily  escitalopram 5 milliGRAM(s) Oral daily  exemestane 25 milliGRAM(s) Oral daily  mirtazapine 7.5 milliGRAM(s) Oral at bedtime  pantoprazole    Tablet 40 milliGRAM(s) Oral before breakfast  predniSONE   Tablet 30 milliGRAM(s) Oral two times a day  simvastatin 20 milliGRAM(s) Oral at bedtime  tamsulosin 0.4 milliGRAM(s) Oral at bedtime    MEDICATIONS  (PRN):  acetaminophen     Tablet .. 650 milliGRAM(s) Oral every 6 hours PRN Temp greater or equal to 38C (100.4F), Mild Pain (1 - 3)  aluminum hydroxide/magnesium hydroxide/simethicone Suspension 30 milliLiter(s) Oral every 4 hours PRN Dyspepsia  melatonin 3 milliGRAM(s) Oral at bedtime PRN Insomnia  ondansetron Injectable 4 milliGRAM(s) IV Push every 8 hours PRN Nausea and/or Vomiting      CAPILLARY BLOOD GLUCOSE        I&O's Summary    27 Aug 2023 07:01  -  28 Aug 2023 07:00  --------------------------------------------------------  IN: 240 mL / OUT: 1450 mL / NET: -1210 mL    28 Aug 2023 07:01  -  29 Aug 2023 06:30  --------------------------------------------------------  IN: 720 mL / OUT: 0 mL / NET: 720 mL        PHYSICAL EXAM:  Vital Signs Last 24 Hrs  T(C): 36.7 (29 Aug 2023 05:39), Max: 37.1 (28 Aug 2023 16:47)  T(F): 98 (29 Aug 2023 05:39), Max: 98.8 (28 Aug 2023 16:47)  HR: 90 (29 Aug 2023 05:39) (83 - 96)  BP: 101/52 (29 Aug 2023 05:39) (101/52 - 126/77)  BP(mean): --  RR: 18 (29 Aug 2023 05:39) (18 - 20)  SpO2: 91% (29 Aug 2023 05:39) (86% - 96%)    Parameters below as of 29 Aug 2023 05:39  Patient On (Oxygen Delivery Method): nasal cannula  O2 Flow (L/min): 1      GENERAL: NAD, lying in bed comfortably  HEAD: Atraumatic, normocephalic  EYES: EOMI, PERRLA  ENT: Moist mucous membranes  NECK: Supple, no JVD  HEART: Systolic murmur; Regular rate and rhythm  LUNGS: Unlabored respirations, faint crackles b/l  ABDOMEN: Soft, nontender, nondistended, +BS  EXTREMITIES: 1+ pitting edema b/l  NERVOUS SYSTEM:  A&Ox3, no focal deficits   SKIN: No rashes or lesions    LABS:                        9.7    2.31  )-----------( 200      ( 28 Aug 2023 07:47 )             30.6     08-28    137  |  97  |  24<H>  ----------------------------<  105<H>  3.9   |  28  |  1.20    Ca    8.8      28 Aug 2023 07:45  Phos  3.0     08-28  Mg     2.4     08-28            Urinalysis Basic - ( 28 Aug 2023 07:45 )    Color: x / Appearance: x / SG: x / pH: x  Gluc: 105 mg/dL / Ketone: x  / Bili: x / Urobili: x   Blood: x / Protein: x / Nitrite: x   Leuk Esterase: x / RBC: x / WBC x   Sq Epi: x / Non Sq Epi: x / Bacteria: x          RADIOLOGY & ADDITIONAL TESTS:  Results Reviewed:   Imaging Personally Reviewed:  Electrocardiogram Personally Reviewed:  Tele:

## 2023-08-29 NOTE — PROGRESS NOTE ADULT - ASSESSMENT
Patient is an 85-year-old female w/ PMH of HLD and breast cancer presenting with a one week history of SOB. Pt admitted for acute hypoxic respiratory failure, currently undergoing IV diuresis.  Patient is an 85-year-old female w/ PMH of HLD and breast cancer presenting with a one week history of SOB. Pt admitted for acute hypoxic respiratory failure, s/p IV diuresis, now receiving prednisone for pneumonitis.

## 2023-08-29 NOTE — PROGRESS NOTE ADULT - SUBJECTIVE AND OBJECTIVE BOX
CHIEF COMPLAINT: dyspnea    Interval Events:    CHESTER. Started on prednisone 30mg BID last night    REVIEW OF SYSTEMS:  Constitutional: [ x ] negative [ ] fevers [ ] chills [ ] weight loss [ ] weight gain  HEENT: [ x ] negative [ ] dry eyes [ ] eye irritation [ ] postnasal drip [ ] nasal congestion  CV: [ x ] negative  [ ] chest pain [ ] orthopnea [ ] palpitations [ ] murmur  Resp: [ ] negative [ ] cough [ ] shortness of breath [ x ] dyspnea [ ] wheezing [ ] sputum [ ] hemoptysis  GI: [ x ] negative [ ] nausea [ ] vomiting [ ] diarrhea [ ] constipation [ ] abd pain [ ] dysphagia   : [ x ] negative [ ] dysuria [ ] nocturia [ ] hematuria [ ] increased urinary frequency  Musculoskeletal: [ ] negative [ ] back pain [ ] myalgias [ ] arthralgias [ ] fracture  Skin: [ ] negative [ ] rash [ ] itch  Neurological: [ ] negative [ ] headache [ ] dizziness [ ] syncope [ ] weakness [ ] numbness  Psychiatric: [ ] negative [ ] anxiety [ ] depression  Endocrine: [ ] negative [ ] diabetes [ ] thyroid problem  Hematologic/Lymphatic: [ ] negative [ ] anemia [ ] bleeding problem  Allergic/Immunologic: [ ] negative [ ] itchy eyes [ ] nasal discharge [ ] hives [ ] angioedema  [ x ] All other systems negative      OBJECTIVE:  ICU Vital Signs Last 24 Hrs  T(C): 36.7 (29 Aug 2023 05:39), Max: 37.1 (28 Aug 2023 16:47)  T(F): 98 (29 Aug 2023 05:39), Max: 98.8 (28 Aug 2023 16:47)  HR: 90 (29 Aug 2023 05:39) (83 - 96)  BP: 101/52 (29 Aug 2023 05:39) (101/52 - 126/77)  BP(mean): --  ABP: --  ABP(mean): --  RR: 18 (29 Aug 2023 05:39) (18 - 20)  SpO2: 91% (29 Aug 2023 05:39) (86% - 96%)    O2 Parameters below as of 29 Aug 2023 05:39  Patient On (Oxygen Delivery Method): nasal cannula  O2 Flow (L/min): 1 08-28 @ 07:01  -  08-29 @ 07:00  --------------------------------------------------------  IN: 720 mL / OUT: 0 mL / NET: 720 mL      CAPILLARY BLOOD GLUCOSE        PHYSICAL EXAM:  General: Well appearing female sitting comfortably in bed  HEENT: Normal sclera  Lymph Nodes: No LAD  Respiratory: Faint crackles diffusely, worse at the bases  Cardiovascular: Regular rate and rhythm no m/r/g  Abdomen: Nontender nondistended  Extremities: No peripheral edema  Skin: No rashes  Neurological: No appreciable neurologic deficits  Psychiatry: Appropriate mood and affect      HOSPITAL MEDICATIONS:  MEDICATIONS  (STANDING):  buPROPion XL (24-Hour) . 300 milliGRAM(s) Oral daily  enoxaparin Injectable 40 milliGRAM(s) SubCutaneous every 24 hours  escitalopram 10 milliGRAM(s) Oral daily  escitalopram 5 milliGRAM(s) Oral daily  exemestane 25 milliGRAM(s) Oral daily  mirtazapine 7.5 milliGRAM(s) Oral at bedtime  pantoprazole    Tablet 40 milliGRAM(s) Oral before breakfast  predniSONE   Tablet 30 milliGRAM(s) Oral two times a day  simvastatin 20 milliGRAM(s) Oral at bedtime  tamsulosin 0.4 milliGRAM(s) Oral at bedtime    MEDICATIONS  (PRN):  acetaminophen     Tablet .. 650 milliGRAM(s) Oral every 6 hours PRN Temp greater or equal to 38C (100.4F), Mild Pain (1 - 3)  aluminum hydroxide/magnesium hydroxide/simethicone Suspension 30 milliLiter(s) Oral every 4 hours PRN Dyspepsia  melatonin 3 milliGRAM(s) Oral at bedtime PRN Insomnia  ondansetron Injectable 4 milliGRAM(s) IV Push every 8 hours PRN Nausea and/or Vomiting      LABS:                        10.1   2.58  )-----------( 191      ( 29 Aug 2023 07:21 )             32.7     Hgb Trend: 10.1<--, 9.7<--, 10.1<--, 9.6<--, 9.2<--  08-28    137  |  97  |  24<H>  ----------------------------<  105<H>  3.9   |  28  |  1.20    Ca    8.8      28 Aug 2023 07:45  Phos  3.0     08-28  Mg     2.4     08-28      Creatinine Trend: 1.20<--, 1.17<--, 1.23<--, 0.98<--, 0.96<--    Urinalysis Basic - ( 28 Aug 2023 07:45 )    Color: x / Appearance: x / SG: x / pH: x  Gluc: 105 mg/dL / Ketone: x  / Bili: x / Urobili: x   Blood: x / Protein: x / Nitrite: x   Leuk Esterase: x / RBC: x / WBC x   Sq Epi: x / Non Sq Epi: x / Bacteria: x            MICROBIOLOGY:       RADIOLOGY:  [ x ] Reviewed and interpreted by me    PULMONARY FUNCTION TESTS:    EKG: CHIEF COMPLAINT: dyspnea    Interval Events:    HENRRYON. Started on prednisone 30mg BID this morning. Symptoms overall unchanged at this time.    REVIEW OF SYSTEMS:  Constitutional: [ x ] negative [ ] fevers [ ] chills [ ] weight loss [ ] weight gain  HEENT: [ x ] negative [ ] dry eyes [ ] eye irritation [ ] postnasal drip [ ] nasal congestion  CV: [ x ] negative  [ ] chest pain [ ] orthopnea [ ] palpitations [ ] murmur  Resp: [ ] negative [ ] cough [ ] shortness of breath [ x ] dyspnea [ ] wheezing [ ] sputum [ ] hemoptysis  GI: [ x ] negative [ ] nausea [ ] vomiting [ ] diarrhea [ ] constipation [ ] abd pain [ ] dysphagia   : [ x ] negative [ ] dysuria [ ] nocturia [ ] hematuria [ ] increased urinary frequency  Musculoskeletal: [ ] negative [ ] back pain [ ] myalgias [ ] arthralgias [ ] fracture  Skin: [ ] negative [ ] rash [ ] itch  Neurological: [ ] negative [ ] headache [ ] dizziness [ ] syncope [ ] weakness [ ] numbness  Psychiatric: [ ] negative [ ] anxiety [ ] depression  Endocrine: [ ] negative [ ] diabetes [ ] thyroid problem  Hematologic/Lymphatic: [ ] negative [ ] anemia [ ] bleeding problem  Allergic/Immunologic: [ ] negative [ ] itchy eyes [ ] nasal discharge [ ] hives [ ] angioedema  [ x ] All other systems negative      OBJECTIVE:  ICU Vital Signs Last 24 Hrs  T(C): 36.7 (29 Aug 2023 05:39), Max: 37.1 (28 Aug 2023 16:47)  T(F): 98 (29 Aug 2023 05:39), Max: 98.8 (28 Aug 2023 16:47)  HR: 90 (29 Aug 2023 05:39) (83 - 96)  BP: 101/52 (29 Aug 2023 05:39) (101/52 - 126/77)  BP(mean): --  ABP: --  ABP(mean): --  RR: 18 (29 Aug 2023 05:39) (18 - 20)  SpO2: 91% (29 Aug 2023 05:39) (86% - 96%)    O2 Parameters below as of 29 Aug 2023 05:39  Patient On (Oxygen Delivery Method): nasal cannula  O2 Flow (L/min): 1 08-28 @ 07:01 - 08-29 @ 07:00  --------------------------------------------------------  IN: 720 mL / OUT: 0 mL / NET: 720 mL      CAPILLARY BLOOD GLUCOSE        PHYSICAL EXAM:  General: Well appearing female sitting comfortably in bed  HEENT: Normal sclera  Lymph Nodes: No LAD  Respiratory: Faint crackles diffusely, worse at the bases  Cardiovascular: Regular rate and rhythm no m/r/g  Abdomen: Nontender nondistended  Extremities: No peripheral edema  Skin: No rashes  Neurological: No appreciable neurologic deficits  Psychiatry: Appropriate mood and affect      HOSPITAL MEDICATIONS:  MEDICATIONS  (STANDING):  buPROPion XL (24-Hour) . 300 milliGRAM(s) Oral daily  enoxaparin Injectable 40 milliGRAM(s) SubCutaneous every 24 hours  escitalopram 10 milliGRAM(s) Oral daily  escitalopram 5 milliGRAM(s) Oral daily  exemestane 25 milliGRAM(s) Oral daily  mirtazapine 7.5 milliGRAM(s) Oral at bedtime  pantoprazole    Tablet 40 milliGRAM(s) Oral before breakfast  predniSONE   Tablet 30 milliGRAM(s) Oral two times a day  simvastatin 20 milliGRAM(s) Oral at bedtime  tamsulosin 0.4 milliGRAM(s) Oral at bedtime    MEDICATIONS  (PRN):  acetaminophen     Tablet .. 650 milliGRAM(s) Oral every 6 hours PRN Temp greater or equal to 38C (100.4F), Mild Pain (1 - 3)  aluminum hydroxide/magnesium hydroxide/simethicone Suspension 30 milliLiter(s) Oral every 4 hours PRN Dyspepsia  melatonin 3 milliGRAM(s) Oral at bedtime PRN Insomnia  ondansetron Injectable 4 milliGRAM(s) IV Push every 8 hours PRN Nausea and/or Vomiting      LABS:                        10.1   2.58  )-----------( 191      ( 29 Aug 2023 07:21 )             32.7     Hgb Trend: 10.1<--, 9.7<--, 10.1<--, 9.6<--, 9.2<--  08-28    137  |  97  |  24<H>  ----------------------------<  105<H>  3.9   |  28  |  1.20    Ca    8.8      28 Aug 2023 07:45  Phos  3.0     08-28  Mg     2.4     08-28      Creatinine Trend: 1.20<--, 1.17<--, 1.23<--, 0.98<--, 0.96<--    Urinalysis Basic - ( 28 Aug 2023 07:45 )    Color: x / Appearance: x / SG: x / pH: x  Gluc: 105 mg/dL / Ketone: x  / Bili: x / Urobili: x   Blood: x / Protein: x / Nitrite: x   Leuk Esterase: x / RBC: x / WBC x   Sq Epi: x / Non Sq Epi: x / Bacteria: x            MICROBIOLOGY:       RADIOLOGY:  [ x ] Reviewed and interpreted by me    PULMONARY FUNCTION TESTS:    EKG:

## 2023-08-30 LAB
ALBUMIN SERPL ELPH-MCNC: 3.7 G/DL — SIGNIFICANT CHANGE UP (ref 3.3–5)
ALP SERPL-CCNC: 61 U/L — SIGNIFICANT CHANGE UP (ref 40–120)
ALT FLD-CCNC: 24 U/L — SIGNIFICANT CHANGE UP (ref 10–45)
ANION GAP SERPL CALC-SCNC: 12 MMOL/L — SIGNIFICANT CHANGE UP (ref 5–17)
ANISOCYTOSIS BLD QL: SLIGHT — SIGNIFICANT CHANGE UP
AST SERPL-CCNC: 19 U/L — SIGNIFICANT CHANGE UP (ref 10–40)
BASOPHILS # BLD AUTO: 0 K/UL — SIGNIFICANT CHANGE UP (ref 0–0.2)
BASOPHILS NFR BLD AUTO: 0 % — SIGNIFICANT CHANGE UP (ref 0–2)
BILIRUB SERPL-MCNC: 0.3 MG/DL — SIGNIFICANT CHANGE UP (ref 0.2–1.2)
BUN SERPL-MCNC: 26 MG/DL — HIGH (ref 7–23)
CALCIUM SERPL-MCNC: 9.2 MG/DL — SIGNIFICANT CHANGE UP (ref 8.4–10.5)
CHLORIDE SERPL-SCNC: 99 MMOL/L — SIGNIFICANT CHANGE UP (ref 96–108)
CO2 SERPL-SCNC: 26 MMOL/L — SIGNIFICANT CHANGE UP (ref 22–31)
CREAT SERPL-MCNC: 1.07 MG/DL — SIGNIFICANT CHANGE UP (ref 0.5–1.3)
DACRYOCYTES BLD QL SMEAR: SLIGHT — SIGNIFICANT CHANGE UP
EGFR: 50 ML/MIN/1.73M2 — LOW
ELLIPTOCYTES BLD QL SMEAR: SIGNIFICANT CHANGE UP
EOSINOPHIL # BLD AUTO: 0 K/UL — SIGNIFICANT CHANGE UP (ref 0–0.5)
EOSINOPHIL NFR BLD AUTO: 0 % — SIGNIFICANT CHANGE UP (ref 0–6)
GIANT PLATELETS BLD QL SMEAR: PRESENT — SIGNIFICANT CHANGE UP
GLUCOSE SERPL-MCNC: 131 MG/DL — HIGH (ref 70–99)
HCT VFR BLD CALC: 31.4 % — LOW (ref 34.5–45)
HGB BLD-MCNC: 10.1 G/DL — LOW (ref 11.5–15.5)
LYMPHOCYTES # BLD AUTO: 0.22 K/UL — LOW (ref 1–3.3)
LYMPHOCYTES # BLD AUTO: 7 % — LOW (ref 13–44)
MACROCYTES BLD QL: SIGNIFICANT CHANGE UP
MAGNESIUM SERPL-MCNC: 2.9 MG/DL — HIGH (ref 1.6–2.6)
MANUAL SMEAR VERIFICATION: SIGNIFICANT CHANGE UP
MCHC RBC-ENTMCNC: 31.3 PG — SIGNIFICANT CHANGE UP (ref 27–34)
MCHC RBC-ENTMCNC: 32.2 GM/DL — SIGNIFICANT CHANGE UP (ref 32–36)
MCV RBC AUTO: 97.2 FL — SIGNIFICANT CHANGE UP (ref 80–100)
MONOCYTES # BLD AUTO: 0.35 K/UL — SIGNIFICANT CHANGE UP (ref 0–0.9)
MONOCYTES NFR BLD AUTO: 11.4 % — SIGNIFICANT CHANGE UP (ref 2–14)
NEUTROPHILS # BLD AUTO: 2.52 K/UL — SIGNIFICANT CHANGE UP (ref 1.8–7.4)
NEUTROPHILS NFR BLD AUTO: 81.6 % — HIGH (ref 43–77)
PHOSPHATE SERPL-MCNC: 3.1 MG/DL — SIGNIFICANT CHANGE UP (ref 2.5–4.5)
PLAT MORPH BLD: ABNORMAL
PLATELET # BLD AUTO: 205 K/UL — SIGNIFICANT CHANGE UP (ref 150–400)
POIKILOCYTOSIS BLD QL AUTO: SIGNIFICANT CHANGE UP
POTASSIUM SERPL-MCNC: 4.5 MMOL/L — SIGNIFICANT CHANGE UP (ref 3.5–5.3)
POTASSIUM SERPL-SCNC: 4.5 MMOL/L — SIGNIFICANT CHANGE UP (ref 3.5–5.3)
PROT SERPL-MCNC: 7.3 G/DL — SIGNIFICANT CHANGE UP (ref 6–8.3)
RBC # BLD: 3.23 M/UL — LOW (ref 3.8–5.2)
RBC # FLD: 14.1 % — SIGNIFICANT CHANGE UP (ref 10.3–14.5)
RBC BLD AUTO: ABNORMAL
SODIUM SERPL-SCNC: 137 MMOL/L — SIGNIFICANT CHANGE UP (ref 135–145)
WBC # BLD: 3.09 K/UL — LOW (ref 3.8–10.5)
WBC # FLD AUTO: 3.09 K/UL — LOW (ref 3.8–10.5)

## 2023-08-30 PROCEDURE — 99233 SBSQ HOSP IP/OBS HIGH 50: CPT | Mod: GC

## 2023-08-30 PROCEDURE — 99232 SBSQ HOSP IP/OBS MODERATE 35: CPT | Mod: GC

## 2023-08-30 RX ADMIN — ENOXAPARIN SODIUM 40 MILLIGRAM(S): 100 INJECTION SUBCUTANEOUS at 17:23

## 2023-08-30 RX ADMIN — PANTOPRAZOLE SODIUM 40 MILLIGRAM(S): 20 TABLET, DELAYED RELEASE ORAL at 05:37

## 2023-08-30 RX ADMIN — Medication 30 MILLIGRAM(S): at 05:37

## 2023-08-30 RX ADMIN — Medication 30 MILLIGRAM(S): at 17:23

## 2023-08-30 RX ADMIN — MIRTAZAPINE 7.5 MILLIGRAM(S): 45 TABLET, ORALLY DISINTEGRATING ORAL at 21:05

## 2023-08-30 RX ADMIN — ESCITALOPRAM OXALATE 10 MILLIGRAM(S): 10 TABLET, FILM COATED ORAL at 09:34

## 2023-08-30 RX ADMIN — ESCITALOPRAM OXALATE 5 MILLIGRAM(S): 10 TABLET, FILM COATED ORAL at 09:34

## 2023-08-30 RX ADMIN — SIMVASTATIN 20 MILLIGRAM(S): 20 TABLET, FILM COATED ORAL at 21:05

## 2023-08-30 RX ADMIN — TAMSULOSIN HYDROCHLORIDE 0.4 MILLIGRAM(S): 0.4 CAPSULE ORAL at 21:05

## 2023-08-30 RX ADMIN — BUPROPION HYDROCHLORIDE 300 MILLIGRAM(S): 150 TABLET, EXTENDED RELEASE ORAL at 09:33

## 2023-08-30 RX ADMIN — EXEMESTANE 25 MILLIGRAM(S): 25 TABLET, SUGAR COATED ORAL at 21:05

## 2023-08-30 NOTE — PROGRESS NOTE ADULT - SUBJECTIVE AND OBJECTIVE BOX
Cardiovascular Disease Progress Note  Date of service: 08-30-23 @ 18:31  Covering Dr. Gaspar  Overnight events: No acute events overnight.  Pt saturating well on RA  Otherwise review of systems negative    Objective Findings:  T(C): 36.4 (08-30-23 @ 11:17), Max: 36.7 (08-29-23 @ 21:36)  HR: 67 (08-30-23 @ 11:17) (67 - 71)  BP: 106/64 (08-30-23 @ 11:17) (106/64 - 122/66)  RR: 18 (08-30-23 @ 11:17) (18 - 22)  SpO2: 93% (08-30-23 @ 11:17) (82% - 95%)  Wt(kg): --  Daily     Daily       Physical Exam:  Gen: NAD; Patient resting comfortably  HEENT: EOMI, Normocephalic/ atraumatic  CV: RRR, normal S1 + S2, no m/r/g  Lungs:  Normal respiratory effort; clear to auscultation bilaterally  Abd: soft, non-tender; bowel sounds present  Ext: No edema; warm and well perfused    Telemetry:  Sinus     Laboratory Data:                        10.1   3.09  )-----------( 205      ( 30 Aug 2023 07:13 )             31.4     08-30    137  |  99  |  26<H>  ----------------------------<  131<H>  4.5   |  26  |  1.07    Ca    9.2      30 Aug 2023 07:17  Phos  3.1     08-30  Mg     2.9     08-30    TPro  7.3  /  Alb  3.7  /  TBili  0.3  /  DBili  x   /  AST  19  /  ALT  24  /  AlkPhos  61  08-30              Inpatient Medications:  MEDICATIONS  (STANDING):  buPROPion XL (24-Hour) . 300 milliGRAM(s) Oral daily  enoxaparin Injectable 40 milliGRAM(s) SubCutaneous every 24 hours  escitalopram 10 milliGRAM(s) Oral daily  escitalopram 5 milliGRAM(s) Oral daily  exemestane 25 milliGRAM(s) Oral daily  mirtazapine 7.5 milliGRAM(s) Oral at bedtime  pantoprazole    Tablet 40 milliGRAM(s) Oral before breakfast  predniSONE   Tablet 30 milliGRAM(s) Oral two times a day  simvastatin 20 milliGRAM(s) Oral at bedtime  tamsulosin 0.4 milliGRAM(s) Oral at bedtime      Assessment:  89-year-old female w/ PMH of HLD and breast cancer receiving oral chemotherapy who is presenting following a one week history of SOB. She states this is new for her and has been constant. She denies fever, chills, chest pain, cough, nausea, vomiting, and dysuria. No orthopnea or LE swelling. No sick contacts. She presented to the ED via EMS after being found hypoxic with O2 saturation in the low 80s at her outpatient oncologist office.    1. Shortness of Breath  - ECG NSR with no ischemia noted  - COVID/RVP/legionella/Strep PNA negative  - afebile, no leukocytosis  - CT negative for PE but reveals ? pulm edema and cardiomegaly vs pneumonitis.   - TTE shows preserved EF, no WMA and mild MS  - D-Dimer 836--> US of RLE negative for DVT, PE neg as noted above  - DDx includes ADHF vs. Atypical PNA vs. Pneumonitis (as per Heme/Onc, Ibrance can cause ILD/pneumonitis)  - Supplemental oxygen as needed  - Pulm following, steroids started with marked improvement in symptoms.   - Continue current management as per pulm    2. HLD  - c/w simvastatin 20mg PO daily    3. Breast CA  - Heme/Onc following    4. NSVT  - Pt asymptomatic  - Continue to monitor.         Plan of Care:          Over 25 minutes spent on total encounter; more than 50% of the visit was spent counseling and/or coordinating care by the attending physician.      Filipe Jin DO [facility]  Cardiovascular Disease  [phone]

## 2023-08-30 NOTE — PROGRESS NOTE ADULT - ASSESSMENT
# acute hypoxemic respiratory failure  # abnormal CT chest  Patient on ibrance and exemestane therapy as an outpatient. Ibrance stopped by heme/onc. Presents with 1 week history of dyspnea without cough, fevers or pleuritic pain.   with normal renal function. Echocardiogram with normal LV systolic function, no significant diastolic issues. Mild LVH and mild MS, no PH.  CT of the chest without signs of PE, DVT study negative. CT shows diffuse widespread GGOs, no honeycombing or bronchiectasis, possibly mild emphysematous changes. Apically predominant but present in all lobes, no peripheral or central sparing.  Patient on 2L O2 via NC currently, not acutely struggling to breathe.  Picture is concerning for palbociclib-associated acute pneumonitis, however other causes could be pulmonary edema or diffuse infection.  Her RVP was negative, no signs or symptoms of infection.  - hold ibrance indefinitely  - continue prednisone 30mg BID  - OOB as able  - incentive spirometry  - wean supplemental O2 for goal SpO2 >92%  - needs O2 walk test prior to discharge      Needs outpatient pulmonary follow up upon discharge at [address] [place] [address], [address] ([phone]).  Please e-mail [email or web address] to make an appointment for the patient.  Please include the name, , and a phone number for you and the patient.         INCOMPLETE NOTE # acute hypoxemic respiratory failure  # abnormal CT chest  Patient on ibrance and exemestane therapy as an outpatient. Ibrance stopped by heme/onc. Presents with 1 week history of dyspnea without cough, fevers or pleuritic pain.   with normal renal function. Echocardiogram with normal LV systolic function, no significant diastolic issues. Mild LVH and mild MS, no PH.  CT of the chest without signs of PE, DVT study negative. CT shows diffuse widespread GGOs, no honeycombing or bronchiectasis, possibly mild emphysematous changes. Apically predominant but present in all lobes, no peripheral or central sparing.  Patient on 2L O2 via NC currently, not acutely struggling to breathe.  Picture is concerning for palbociclib-associated acute pneumonitis, however other causes could be pulmonary edema or diffuse infection.  Her RVP was negative, no signs or symptoms of infection.  - hold ibrance indefinitely  - continue prednisone 30mg BID  - OOB as able  - incentive spirometry  - wean supplemental O2 for goal SpO2 >92%  - needs O2 walk test prior to discharge  - recommend maintenance Lasix 20mg PO      Needs outpatient pulmonary follow up upon discharge at [address] [place] [address], [address] ([phone]).  Please e-mail [email or web address] to make an appointment for the patient.  Please include the name, , and a phone number for you and the patient.         INCOMPLETE NOTE

## 2023-08-30 NOTE — PROGRESS NOTE ADULT - PROBLEM SELECTOR PLAN 1
- P/w one week h/o SOB, O2 in low 80s at outpatient office, transported to ED via EMS  - Afebrile, leukopenic on admission  - RVP, COVID negative  - Legionella, strep negative; MRSA pending  - Elevated D-dimer -> US of RLE unremarkable  - CTA -> Nonspecific ground-glass opacities, negative for PE  - Elevated BNP -> TTE revealed normal LVSF, mild MV stenosis, mild pulm HTN  -  IV Lasix 40mg daily stopped 8/28 as no improvement in resp status w/ diuresis  - c/w pred 30mg BID  - C/w supplemental O2 prn  - C/w strict I/Os  - Pulmonology consulted, f/u recs  - Cardiology consulted, agree with trial of diuresis - P/w one week h/o SOB, O2 in low 80s at outpatient office, transported to ED via EMS  - Afebrile, leukopenic on admission  - RVP, COVID negative  - Legionella, strep negative; MRSA pending  - Elevated D-dimer -> US of RLE unremarkable  - CTA -> Nonspecific ground-glass opacities, negative for PE  - Elevated BNP -> TTE revealed normal LVSF, mild MV stenosis, mild pulm HTN  -  IV Lasix 40mg daily stopped 8/28 as no improvement in resp status w/ diuresis  - c/w pred 30mg BID  - C/w supplemental O2 prn  - C/w strict I/Os  - Pulmonology consulted, f/u recs  - O2 sats in low-mid 90s on room air (8/30)

## 2023-08-30 NOTE — PROGRESS NOTE ADULT - SUBJECTIVE AND OBJECTIVE BOX
CHIEF COMPLAINT: dyspnea    Interval Events:    NAEON.     REVIEW OF SYSTEMS:  Constitutional: [ x ] negative [ ] fevers [ ] chills [ ] weight loss [ ] weight gain  HEENT: [ x ] negative [ ] dry eyes [ ] eye irritation [ ] postnasal drip [ ] nasal congestion  CV: [ x ] negative  [ ] chest pain [ ] orthopnea [ ] palpitations [ ] murmur  Resp: [ ] negative [ ] cough [ ] shortness of breath [ x ] dyspnea [ ] wheezing [ ] sputum [ ] hemoptysis  GI: [ x ] negative [ ] nausea [ ] vomiting [ ] diarrhea [ ] constipation [ ] abd pain [ ] dysphagia   : [ x ] negative [ ] dysuria [ ] nocturia [ ] hematuria [ ] increased urinary frequency  Musculoskeletal: [ ] negative [ ] back pain [ ] myalgias [ ] arthralgias [ ] fracture  Skin: [ ] negative [ ] rash [ ] itch  Neurological: [ ] negative [ ] headache [ ] dizziness [ ] syncope [ ] weakness [ ] numbness  Psychiatric: [ ] negative [ ] anxiety [ ] depression  Endocrine: [ ] negative [ ] diabetes [ ] thyroid problem  Hematologic/Lymphatic: [ ] negative [ ] anemia [ ] bleeding problem  Allergic/Immunologic: [ ] negative [ ] itchy eyes [ ] nasal discharge [ ] hives [ ] angioedema  [ x ] All other systems negative        OBJECTIVE:  ICU Vital Signs Last 24 Hrs  T(C): 36.5 (30 Aug 2023 05:32), Max: 36.7 (29 Aug 2023 12:08)  T(F): 97.7 (30 Aug 2023 05:32), Max: 98.1 (29 Aug 2023 12:08)  HR: 71 (30 Aug 2023 05:32) (71 - 90)  BP: 114/65 (30 Aug 2023 05:32) (97/63 - 127/75)  BP(mean): --  ABP: --  ABP(mean): --  RR: 18 (30 Aug 2023 05:32) (18 - 18)  SpO2: 93% (30 Aug 2023 05:32) (91% - 93%)    O2 Parameters below as of 30 Aug 2023 05:32  Patient On (Oxygen Delivery Method): nasal cannula  O2 Flow (L/min): 1            08-29 @ 07:01  -  08-30 @ 07:00  --------------------------------------------------------  IN: 240 mL / OUT: 0 mL / NET: 240 mL      CAPILLARY BLOOD GLUCOSE        PHYSICAL EXAM:  General: Well appearing female sitting comfortably in bed  HEENT: Normal sclera  Lymph Nodes: No LAD  Respiratory: Faint crackles diffusely, worse at the bases  Cardiovascular: Regular rate and rhythm no m/r/g  Abdomen: Nontender nondistended  Extremities: No peripheral edema  Skin: No rashes  Neurological: No appreciable neurologic deficits  Psychiatry: Appropriate mood and affect        HOSPITAL MEDICATIONS:  MEDICATIONS  (STANDING):  buPROPion XL (24-Hour) . 300 milliGRAM(s) Oral daily  enoxaparin Injectable 40 milliGRAM(s) SubCutaneous every 24 hours  escitalopram 10 milliGRAM(s) Oral daily  escitalopram 5 milliGRAM(s) Oral daily  exemestane 25 milliGRAM(s) Oral daily  mirtazapine 7.5 milliGRAM(s) Oral at bedtime  pantoprazole    Tablet 40 milliGRAM(s) Oral before breakfast  predniSONE   Tablet 30 milliGRAM(s) Oral two times a day  simvastatin 20 milliGRAM(s) Oral at bedtime  tamsulosin 0.4 milliGRAM(s) Oral at bedtime    MEDICATIONS  (PRN):  acetaminophen     Tablet .. 650 milliGRAM(s) Oral every 6 hours PRN Temp greater or equal to 38C (100.4F), Mild Pain (1 - 3)  aluminum hydroxide/magnesium hydroxide/simethicone Suspension 30 milliLiter(s) Oral every 4 hours PRN Dyspepsia  melatonin 3 milliGRAM(s) Oral at bedtime PRN Insomnia  ondansetron Injectable 4 milliGRAM(s) IV Push every 8 hours PRN Nausea and/or Vomiting      LABS:                        10.1   2.58  )-----------( 191      ( 29 Aug 2023 07:21 )             32.7     Hgb Trend: 10.1<--, 9.7<--, 10.1<--, 9.6<--, 9.2<--  08-29    135  |  97  |  23  ----------------------------<  98  4.1   |  27  |  1.19    Ca    8.9      29 Aug 2023 07:22  Phos  2.9     08-29  Mg     2.4     08-29    TPro  7.1  /  Alb  3.6  /  TBili  0.3  /  DBili  x   /  AST  22  /  ALT  29  /  AlkPhos  60  08-29    Creatinine Trend: 1.19<--, 1.20<--, 1.17<--, 1.23<--, 0.98<--, 0.96<--    Urinalysis Basic - ( 29 Aug 2023 07:22 )    Color: x / Appearance: x / SG: x / pH: x  Gluc: 98 mg/dL / Ketone: x  / Bili: x / Urobili: x   Blood: x / Protein: x / Nitrite: x   Leuk Esterase: x / RBC: x / WBC x   Sq Epi: x / Non Sq Epi: x / Bacteria: x            MICROBIOLOGY:       RADIOLOGY:  [ ] Reviewed and interpreted by me    PULMONARY FUNCTION TESTS:    EKG: CHIEF COMPLAINT: dyspnea    Interval Events:    NAEON. Patient feels better, SpO2 91-95% on RA while seated today. No new complaints.    REVIEW OF SYSTEMS:  Constitutional: [ x ] negative [ ] fevers [ ] chills [ ] weight loss [ ] weight gain  HEENT: [ x ] negative [ ] dry eyes [ ] eye irritation [ ] postnasal drip [ ] nasal congestion  CV: [ x ] negative  [ ] chest pain [ ] orthopnea [ ] palpitations [ ] murmur  Resp: [ ] negative [ ] cough [ ] shortness of breath [ x ] dyspnea [ ] wheezing [ ] sputum [ ] hemoptysis  GI: [ x ] negative [ ] nausea [ ] vomiting [ ] diarrhea [ ] constipation [ ] abd pain [ ] dysphagia   : [ x ] negative [ ] dysuria [ ] nocturia [ ] hematuria [ ] increased urinary frequency  Musculoskeletal: [ ] negative [ ] back pain [ ] myalgias [ ] arthralgias [ ] fracture  Skin: [ ] negative [ ] rash [ ] itch  Neurological: [ ] negative [ ] headache [ ] dizziness [ ] syncope [ ] weakness [ ] numbness  Psychiatric: [ ] negative [ ] anxiety [ ] depression  Endocrine: [ ] negative [ ] diabetes [ ] thyroid problem  Hematologic/Lymphatic: [ ] negative [ ] anemia [ ] bleeding problem  Allergic/Immunologic: [ ] negative [ ] itchy eyes [ ] nasal discharge [ ] hives [ ] angioedema  [ x ] All other systems negative        OBJECTIVE:  ICU Vital Signs Last 24 Hrs  T(C): 36.5 (30 Aug 2023 05:32), Max: 36.7 (29 Aug 2023 12:08)  T(F): 97.7 (30 Aug 2023 05:32), Max: 98.1 (29 Aug 2023 12:08)  HR: 71 (30 Aug 2023 05:32) (71 - 90)  BP: 114/65 (30 Aug 2023 05:32) (97/63 - 127/75)  BP(mean): --  ABP: --  ABP(mean): --  RR: 18 (30 Aug 2023 05:32) (18 - 18)  SpO2: 93% (30 Aug 2023 05:32) (91% - 93%)    O2 Parameters below as of 30 Aug 2023 05:32  Patient On (Oxygen Delivery Method): nasal cannula  O2 Flow (L/min): 1            08-29 @ 07:01  -  08-30 @ 07:00  --------------------------------------------------------  IN: 240 mL / OUT: 0 mL / NET: 240 mL      CAPILLARY BLOOD GLUCOSE        PHYSICAL EXAM:  General: Well appearing female sitting comfortably in bed  HEENT: Normal sclera  Lymph Nodes: No LAD  Respiratory: Faint crackles diffusely, worse at the bases  Cardiovascular: Regular rate and rhythm no m/r/g  Abdomen: Nontender nondistended  Extremities: No peripheral edema  Skin: No rashes  Neurological: No appreciable neurologic deficits  Psychiatry: Appropriate mood and affect        HOSPITAL MEDICATIONS:  MEDICATIONS  (STANDING):  buPROPion XL (24-Hour) . 300 milliGRAM(s) Oral daily  enoxaparin Injectable 40 milliGRAM(s) SubCutaneous every 24 hours  escitalopram 10 milliGRAM(s) Oral daily  escitalopram 5 milliGRAM(s) Oral daily  exemestane 25 milliGRAM(s) Oral daily  mirtazapine 7.5 milliGRAM(s) Oral at bedtime  pantoprazole    Tablet 40 milliGRAM(s) Oral before breakfast  predniSONE   Tablet 30 milliGRAM(s) Oral two times a day  simvastatin 20 milliGRAM(s) Oral at bedtime  tamsulosin 0.4 milliGRAM(s) Oral at bedtime    MEDICATIONS  (PRN):  acetaminophen     Tablet .. 650 milliGRAM(s) Oral every 6 hours PRN Temp greater or equal to 38C (100.4F), Mild Pain (1 - 3)  aluminum hydroxide/magnesium hydroxide/simethicone Suspension 30 milliLiter(s) Oral every 4 hours PRN Dyspepsia  melatonin 3 milliGRAM(s) Oral at bedtime PRN Insomnia  ondansetron Injectable 4 milliGRAM(s) IV Push every 8 hours PRN Nausea and/or Vomiting      LABS:                        10.1   2.58  )-----------( 191      ( 29 Aug 2023 07:21 )             32.7     Hgb Trend: 10.1<--, 9.7<--, 10.1<--, 9.6<--, 9.2<--  08-29    135  |  97  |  23  ----------------------------<  98  4.1   |  27  |  1.19    Ca    8.9      29 Aug 2023 07:22  Phos  2.9     08-29  Mg     2.4     08-29    TPro  7.1  /  Alb  3.6  /  TBili  0.3  /  DBili  x   /  AST  22  /  ALT  29  /  AlkPhos  60  08-29    Creatinine Trend: 1.19<--, 1.20<--, 1.17<--, 1.23<--, 0.98<--, 0.96<--    Urinalysis Basic - ( 29 Aug 2023 07:22 )    Color: x / Appearance: x / SG: x / pH: x  Gluc: 98 mg/dL / Ketone: x  / Bili: x / Urobili: x   Blood: x / Protein: x / Nitrite: x   Leuk Esterase: x / RBC: x / WBC x   Sq Epi: x / Non Sq Epi: x / Bacteria: x            MICROBIOLOGY:       RADIOLOGY:  [ ] Reviewed and interpreted by me    PULMONARY FUNCTION TESTS:    EKG:

## 2023-08-30 NOTE — PROGRESS NOTE ADULT - ASSESSMENT
Patient is an 85-year-old female w/ PMH of HLD and breast cancer presenting with a one week history of SOB. Pt admitted for acute hypoxic respiratory failure, s/p IV diuresis, now receiving prednisone for pneumonitis.  Patient is an 85-year-old female w/ PMH of HLD and breast cancer presenting with a one week history of SOB. Pt admitted for acute hypoxic respiratory failure, s/p IV diuresis, now receiving prednisone for pneumonitis w/ improvement in respiratory status.

## 2023-08-30 NOTE — PROGRESS NOTE ADULT - SUBJECTIVE AND OBJECTIVE BOX
*******************************  Navi Hernandez MD (PGY-1)  Internal Medicine  Contact via [employer] TEAMS  *******************************    PROGRESS NOTE:     Patient is a 89y old  Female who presents with a chief complaint of Shortness of breath (29 Aug 2023 16:57)      INTERVAL EVENTS: No acute overnight events.     SUBJECTIVE: Patient seen and examined at bedside. This morning, the patient is comfortable and doing well. No acute complaints. Denies fevers, chills, N/V/D, chest pain, SOB, abdominal pain.    MEDICATIONS  (STANDING):  buPROPion XL (24-Hour) . 300 milliGRAM(s) Oral daily  enoxaparin Injectable 40 milliGRAM(s) SubCutaneous every 24 hours  escitalopram 10 milliGRAM(s) Oral daily  escitalopram 5 milliGRAM(s) Oral daily  exemestane 25 milliGRAM(s) Oral daily  mirtazapine 7.5 milliGRAM(s) Oral at bedtime  pantoprazole    Tablet 40 milliGRAM(s) Oral before breakfast  predniSONE   Tablet 30 milliGRAM(s) Oral two times a day  simvastatin 20 milliGRAM(s) Oral at bedtime  tamsulosin 0.4 milliGRAM(s) Oral at bedtime    MEDICATIONS  (PRN):  acetaminophen     Tablet .. 650 milliGRAM(s) Oral every 6 hours PRN Temp greater or equal to 38C (100.4F), Mild Pain (1 - 3)  aluminum hydroxide/magnesium hydroxide/simethicone Suspension 30 milliLiter(s) Oral every 4 hours PRN Dyspepsia  melatonin 3 milliGRAM(s) Oral at bedtime PRN Insomnia  ondansetron Injectable 4 milliGRAM(s) IV Push every 8 hours PRN Nausea and/or Vomiting      CAPILLARY BLOOD GLUCOSE        I&O's Summary    28 Aug 2023 07:01  -  29 Aug 2023 07:00  --------------------------------------------------------  IN: 720 mL / OUT: 0 mL / NET: 720 mL    29 Aug 2023 07:01  -  30 Aug 2023 06:48  --------------------------------------------------------  IN: 240 mL / OUT: 0 mL / NET: 240 mL        PHYSICAL EXAM:  Vital Signs Last 24 Hrs  T(C): 36.5 (30 Aug 2023 05:32), Max: 36.7 (29 Aug 2023 12:08)  T(F): 97.7 (30 Aug 2023 05:32), Max: 98.1 (29 Aug 2023 12:08)  HR: 71 (30 Aug 2023 05:32) (71 - 90)  BP: 114/65 (30 Aug 2023 05:32) (97/63 - 127/75)  BP(mean): --  RR: 18 (30 Aug 2023 05:32) (18 - 18)  SpO2: 93% (30 Aug 2023 05:32) (91% - 93%)    Parameters below as of 30 Aug 2023 05:32  Patient On (Oxygen Delivery Method): nasal cannula  O2 Flow (L/min): 1      GENERAL: NAD, lying in bed comfortably  HEAD: Atraumatic, normocephalic  EYES: EOMI, PERRLA  ENT: Moist mucous membranes  NECK: Supple, no JVD  HEART: S1, S2, Regular rate and rhythm, no murmurs, rubs, or gallops  LUNGS: Unlabored respirations, clear to auscultation bilaterally, no crackles, wheezing, or rhonchi  ABDOMEN: Soft, nontender, nondistended, +BS  EXTREMITIES: No clubbing, cyanosis, or edema  NERVOUS SYSTEM:  A&Ox3, no focal deficits   SKIN: No rashes or lesions    LABS:                        10.1   2.58  )-----------( 191      ( 29 Aug 2023 07:21 )             32.7     08-29    135  |  97  |  23  ----------------------------<  98  4.1   |  27  |  1.19    Ca    8.9      29 Aug 2023 07:22  Phos  2.9     08-29  Mg     2.4     08-29    TPro  7.1  /  Alb  3.6  /  TBili  0.3  /  DBili  x   /  AST  22  /  ALT  29  /  AlkPhos  60  08-29          Urinalysis Basic - ( 29 Aug 2023 07:22 )    Color: x / Appearance: x / SG: x / pH: x  Gluc: 98 mg/dL / Ketone: x  / Bili: x / Urobili: x   Blood: x / Protein: x / Nitrite: x   Leuk Esterase: x / RBC: x / WBC x   Sq Epi: x / Non Sq Epi: x / Bacteria: x          RADIOLOGY & ADDITIONAL TESTS:  Results Reviewed:   Imaging Personally Reviewed:  Electrocardiogram Personally Reviewed:  Tele: *******************************  Navi Hernandez MD (PGY-1)  Internal Medicine  Contact via [employer] TEAMS  *******************************    PROGRESS NOTE:     Patient is a 89y old  Female who presents with a chief complaint of Shortness of breath (29 Aug 2023 16:57)      INTERVAL EVENTS: No acute overnight events.     SUBJECTIVE: Patient seen and examined at bedside. This morning, the patient is comfortable and doing well. Reports hot flash this AM. Improvements in SOB. Denies fevers, chills, N/V/D, chest pain.    MEDICATIONS  (STANDING):  buPROPion XL (24-Hour) . 300 milliGRAM(s) Oral daily  enoxaparin Injectable 40 milliGRAM(s) SubCutaneous every 24 hours  escitalopram 10 milliGRAM(s) Oral daily  escitalopram 5 milliGRAM(s) Oral daily  exemestane 25 milliGRAM(s) Oral daily  mirtazapine 7.5 milliGRAM(s) Oral at bedtime  pantoprazole    Tablet 40 milliGRAM(s) Oral before breakfast  predniSONE   Tablet 30 milliGRAM(s) Oral two times a day  simvastatin 20 milliGRAM(s) Oral at bedtime  tamsulosin 0.4 milliGRAM(s) Oral at bedtime    MEDICATIONS  (PRN):  acetaminophen     Tablet .. 650 milliGRAM(s) Oral every 6 hours PRN Temp greater or equal to 38C (100.4F), Mild Pain (1 - 3)  aluminum hydroxide/magnesium hydroxide/simethicone Suspension 30 milliLiter(s) Oral every 4 hours PRN Dyspepsia  melatonin 3 milliGRAM(s) Oral at bedtime PRN Insomnia  ondansetron Injectable 4 milliGRAM(s) IV Push every 8 hours PRN Nausea and/or Vomiting      CAPILLARY BLOOD GLUCOSE        I&O's Summary    28 Aug 2023 07:01  -  29 Aug 2023 07:00  --------------------------------------------------------  IN: 720 mL / OUT: 0 mL / NET: 720 mL    29 Aug 2023 07:01  -  30 Aug 2023 06:48  --------------------------------------------------------  IN: 240 mL / OUT: 0 mL / NET: 240 mL        PHYSICAL EXAM:  Vital Signs Last 24 Hrs  T(C): 36.5 (30 Aug 2023 05:32), Max: 36.7 (29 Aug 2023 12:08)  T(F): 97.7 (30 Aug 2023 05:32), Max: 98.1 (29 Aug 2023 12:08)  HR: 71 (30 Aug 2023 05:32) (71 - 90)  BP: 114/65 (30 Aug 2023 05:32) (97/63 - 127/75)  BP(mean): --  RR: 18 (30 Aug 2023 05:32) (18 - 18)  SpO2: 93% (30 Aug 2023 05:32) (91% - 93%)    Parameters below as of 30 Aug 2023 05:32  Patient On (Oxygen Delivery Method): nasal cannula  O2 Flow (L/min): 1      GENERAL: NAD, lying in bed comfortably  HEAD: Atraumatic, normocephalic  EYES: EOMI, PERRLA  ENT: Moist mucous membranes  NECK: Supple, no JVD  HEART: Systolic murmur. Regular rate and rhythm  LUNGS: Faint crackles in B/L lower lung fields   ABDOMEN: Soft, nontender, nondistended, +BS  EXTREMITIES: 1+ pitting edema b/l  NERVOUS SYSTEM:  A&Ox3, no focal deficits   SKIN: No rashes or lesions    LABS:                        10.1   2.58  )-----------( 191      ( 29 Aug 2023 07:21 )             32.7     08-29    135  |  97  |  23  ----------------------------<  98  4.1   |  27  |  1.19    Ca    8.9      29 Aug 2023 07:22  Phos  2.9     08-29  Mg     2.4     08-29    TPro  7.1  /  Alb  3.6  /  TBili  0.3  /  DBili  x   /  AST  22  /  ALT  29  /  AlkPhos  60  08-29          Urinalysis Basic - ( 29 Aug 2023 07:22 )    Color: x / Appearance: x / SG: x / pH: x  Gluc: 98 mg/dL / Ketone: x  / Bili: x / Urobili: x   Blood: x / Protein: x / Nitrite: x   Leuk Esterase: x / RBC: x / WBC x   Sq Epi: x / Non Sq Epi: x / Bacteria: x          RADIOLOGY & ADDITIONAL TESTS:  Results Reviewed:   Imaging Personally Reviewed:  Electrocardiogram Personally Reviewed:  Tele:

## 2023-08-31 LAB
ALBUMIN SERPL ELPH-MCNC: 3.5 G/DL — SIGNIFICANT CHANGE UP (ref 3.3–5)
ALP SERPL-CCNC: 60 U/L — SIGNIFICANT CHANGE UP (ref 40–120)
ALT FLD-CCNC: 23 U/L — SIGNIFICANT CHANGE UP (ref 10–45)
ANION GAP SERPL CALC-SCNC: 10 MMOL/L — SIGNIFICANT CHANGE UP (ref 5–17)
AST SERPL-CCNC: 13 U/L — SIGNIFICANT CHANGE UP (ref 10–40)
BASOPHILS # BLD AUTO: 0 K/UL — SIGNIFICANT CHANGE UP (ref 0–0.2)
BASOPHILS NFR BLD AUTO: 0 % — SIGNIFICANT CHANGE UP (ref 0–2)
BILIRUB SERPL-MCNC: 0.2 MG/DL — SIGNIFICANT CHANGE UP (ref 0.2–1.2)
BUN SERPL-MCNC: 28 MG/DL — HIGH (ref 7–23)
CALCIUM SERPL-MCNC: 9 MG/DL — SIGNIFICANT CHANGE UP (ref 8.4–10.5)
CHLORIDE SERPL-SCNC: 100 MMOL/L — SIGNIFICANT CHANGE UP (ref 96–108)
CO2 SERPL-SCNC: 26 MMOL/L — SIGNIFICANT CHANGE UP (ref 22–31)
CREAT SERPL-MCNC: 1.08 MG/DL — SIGNIFICANT CHANGE UP (ref 0.5–1.3)
EGFR: 49 ML/MIN/1.73M2 — LOW
EOSINOPHIL # BLD AUTO: 0 K/UL — SIGNIFICANT CHANGE UP (ref 0–0.5)
EOSINOPHIL NFR BLD AUTO: 0 % — SIGNIFICANT CHANGE UP (ref 0–6)
GAS PNL BLDA: SIGNIFICANT CHANGE UP
GLUCOSE SERPL-MCNC: 149 MG/DL — HIGH (ref 70–99)
HCT VFR BLD CALC: 30.1 % — LOW (ref 34.5–45)
HGB BLD-MCNC: 9.6 G/DL — LOW (ref 11.5–15.5)
IMM GRANULOCYTES NFR BLD AUTO: 0.7 % — SIGNIFICANT CHANGE UP (ref 0–0.9)
LYMPHOCYTES # BLD AUTO: 0.31 K/UL — LOW (ref 1–3.3)
LYMPHOCYTES # BLD AUTO: 6.9 % — LOW (ref 13–44)
MAGNESIUM SERPL-MCNC: 2.8 MG/DL — HIGH (ref 1.6–2.6)
MCHC RBC-ENTMCNC: 31.1 PG — SIGNIFICANT CHANGE UP (ref 27–34)
MCHC RBC-ENTMCNC: 31.9 GM/DL — LOW (ref 32–36)
MCV RBC AUTO: 97.4 FL — SIGNIFICANT CHANGE UP (ref 80–100)
MONOCYTES # BLD AUTO: 0.38 K/UL — SIGNIFICANT CHANGE UP (ref 0–0.9)
MONOCYTES NFR BLD AUTO: 8.5 % — SIGNIFICANT CHANGE UP (ref 2–14)
NEUTROPHILS # BLD AUTO: 3.77 K/UL — SIGNIFICANT CHANGE UP (ref 1.8–7.4)
NEUTROPHILS NFR BLD AUTO: 83.9 % — HIGH (ref 43–77)
NRBC # BLD: 0 /100 WBCS — SIGNIFICANT CHANGE UP (ref 0–0)
PHOSPHATE SERPL-MCNC: 3.1 MG/DL — SIGNIFICANT CHANGE UP (ref 2.5–4.5)
PLATELET # BLD AUTO: 198 K/UL — SIGNIFICANT CHANGE UP (ref 150–400)
POTASSIUM SERPL-MCNC: 4.8 MMOL/L — SIGNIFICANT CHANGE UP (ref 3.5–5.3)
POTASSIUM SERPL-SCNC: 4.8 MMOL/L — SIGNIFICANT CHANGE UP (ref 3.5–5.3)
PROT SERPL-MCNC: 6.8 G/DL — SIGNIFICANT CHANGE UP (ref 6–8.3)
RBC # BLD: 3.09 M/UL — LOW (ref 3.8–5.2)
RBC # FLD: 14.1 % — SIGNIFICANT CHANGE UP (ref 10.3–14.5)
SODIUM SERPL-SCNC: 136 MMOL/L — SIGNIFICANT CHANGE UP (ref 135–145)
WBC # BLD: 4.49 K/UL — SIGNIFICANT CHANGE UP (ref 3.8–10.5)
WBC # FLD AUTO: 4.49 K/UL — SIGNIFICANT CHANGE UP (ref 3.8–10.5)

## 2023-08-31 PROCEDURE — 71045 X-RAY EXAM CHEST 1 VIEW: CPT | Mod: 26

## 2023-08-31 PROCEDURE — 99233 SBSQ HOSP IP/OBS HIGH 50: CPT | Mod: GC

## 2023-08-31 PROCEDURE — 99232 SBSQ HOSP IP/OBS MODERATE 35: CPT | Mod: GC

## 2023-08-31 PROCEDURE — 93308 TTE F-UP OR LMTD: CPT | Mod: 26

## 2023-08-31 RX ORDER — SODIUM CHLORIDE 9 MG/ML
500 INJECTION INTRAMUSCULAR; INTRAVENOUS; SUBCUTANEOUS ONCE
Refills: 0 | Status: COMPLETED | OUTPATIENT
Start: 2023-08-31 | End: 2023-08-31

## 2023-08-31 RX ORDER — FUROSEMIDE 40 MG
20 TABLET ORAL DAILY
Refills: 0 | Status: DISCONTINUED | OUTPATIENT
Start: 2023-08-31 | End: 2023-09-01

## 2023-08-31 RX ADMIN — MIRTAZAPINE 7.5 MILLIGRAM(S): 45 TABLET, ORALLY DISINTEGRATING ORAL at 22:00

## 2023-08-31 RX ADMIN — PANTOPRAZOLE SODIUM 40 MILLIGRAM(S): 20 TABLET, DELAYED RELEASE ORAL at 05:12

## 2023-08-31 RX ADMIN — SODIUM CHLORIDE 500 MILLILITER(S): 9 INJECTION INTRAMUSCULAR; INTRAVENOUS; SUBCUTANEOUS at 17:56

## 2023-08-31 RX ADMIN — EXEMESTANE 25 MILLIGRAM(S): 25 TABLET, SUGAR COATED ORAL at 22:01

## 2023-08-31 RX ADMIN — ENOXAPARIN SODIUM 40 MILLIGRAM(S): 100 INJECTION SUBCUTANEOUS at 17:57

## 2023-08-31 RX ADMIN — Medication 30 MILLIGRAM(S): at 17:57

## 2023-08-31 RX ADMIN — SIMVASTATIN 20 MILLIGRAM(S): 20 TABLET, FILM COATED ORAL at 22:01

## 2023-08-31 RX ADMIN — ESCITALOPRAM OXALATE 5 MILLIGRAM(S): 10 TABLET, FILM COATED ORAL at 11:33

## 2023-08-31 RX ADMIN — Medication 1 TABLET(S): at 17:56

## 2023-08-31 RX ADMIN — Medication 30 MILLIGRAM(S): at 05:12

## 2023-08-31 RX ADMIN — ESCITALOPRAM OXALATE 10 MILLIGRAM(S): 10 TABLET, FILM COATED ORAL at 11:34

## 2023-08-31 RX ADMIN — Medication 20 MILLIGRAM(S): at 11:33

## 2023-08-31 RX ADMIN — TAMSULOSIN HYDROCHLORIDE 0.4 MILLIGRAM(S): 0.4 CAPSULE ORAL at 22:01

## 2023-08-31 RX ADMIN — BUPROPION HYDROCHLORIDE 300 MILLIGRAM(S): 150 TABLET, EXTENDED RELEASE ORAL at 11:33

## 2023-08-31 NOTE — PROVIDER CONTACT NOTE (CRITICAL VALUE NOTIFICATION) - BACKGROUND
Patient is a 89 year old female who was admitted for heart failure. Has a PMH of recurrent breat cancer, depression, s/p chemotherapy less than 4 weeks ago.

## 2023-08-31 NOTE — PROGRESS NOTE ADULT - ASSESSMENT
89F HLD and breast cancer receiving oral chemotherapy who is presenting following a one week history of SOB    #Breast cancer  - recurrent; follows with Dr. Batista  - on ibrance and exemestane since 2021 with stable disease; last PET scan 1/2023  - Ibrance held; continue exemestane    #hypoxia- recent onset  - CTA no PE, upper lobe gg opacity- ? pulmonary edema vs pneumonitis  - no fever or cough; BNP elevated  - Ibrance can cause ILD/pneumonitis- now held  - Echo with normal LV  - no significant clinical improvement with diuresis  - improvement with steroids, now off oxygen    #leukopenia and normocytic anemia- chronic, secondary to rx for breast cancer  - WBC at baseline  - Hg sl lower than prior but should not be cause of such severe symptoms  - no overt bleeding; bili normal, cr normal  - iron studies overall stable, b12/folate ok  - CBC overall stable    DC planning to rehab ongoing; will FU with Dr. Batista as outpatient

## 2023-08-31 NOTE — PROGRESS NOTE ADULT - ASSESSMENT
# acute hypoxemic respiratory failure  # abnormal CT chest  Patient on ibrance and exemestane therapy as an outpatient. Ibrance stopped by heme/onc. Presents with 1 week history of dyspnea without cough, fevers or pleuritic pain.   with normal renal function. Echocardiogram with normal LV systolic function, no significant diastolic issues. Mild LVH and mild MS, no PH.  CT of the chest without signs of PE, DVT study negative. CT shows diffuse widespread GGOs, no honeycombing or bronchiectasis, possibly mild emphysematous changes. Apically predominant but present in all lobes, no peripheral or central sparing.  Patient on 2L O2 via NC currently, not acutely struggling to breathe.  Picture is concerning for palbociclib-associated acute pneumonitis, however other causes could be pulmonary edema or diffuse infection.  Her RVP was negative, no signs or symptoms of infection.  - hold ibrance indefinitely  - OOB as able  - incentive spirometry  - wean supplemental O2 for goal SpO2 >92%  - recommend maintenance Lasix 20mg PO  - continue prednisone ***  - O2 walk ***    Needs outpatient pulmonary follow up upon discharge at [address] [place] [address], [address] ([phone]).  Please e-mail [email or web address] to make an appointment for the patient.  Please include the name, , and a phone number for you and the patient. Appointment with Dr Fraser.    INCOMPLETE NOTE # acute hypoxemic respiratory failure  # abnormal CT chest  Patient on ibrance and exemestane therapy as an outpatient. Ibrance stopped by heme/onc. Presents with 1 week history of dyspnea without cough, fevers or pleuritic pain.   with normal renal function. Echocardiogram with normal LV systolic function, no significant diastolic issues. Mild LVH and mild MS, no PH.  CT of the chest without signs of PE, DVT study negative. CT shows diffuse widespread GGOs, no honeycombing or bronchiectasis, possibly mild emphysematous changes. Apically predominant but present in all lobes, no peripheral or central sparing.  Patient on 2L O2 via NC currently, not acutely struggling to breathe.  Picture is concerning for palbociclib-associated acute pneumonitis, however other causes could be pulmonary edema or diffuse infection.  Her RVP was negative, no signs or symptoms of infection.  - hold ibrance indefinitely  - OOB as able  - incentive spirometry  - wean supplemental O2 for goal SpO2 >92%  - recommend maintenance Lasix 20mg PO  - continue prednisone ***  - Please perform formal ambulatory O2 study to assess degree of O2 requirement with activity.    Needs outpatient pulmonary follow up upon discharge at [address] [place] [address] [address] ([phone]).  Please e-mail [email or web address] to make an appointment for the patient.  Please include the name, , and a phone number for you and the patient. Appointment with Dr Fraser.    INCOMPLETE NOTE # acute hypoxemic respiratory failure  # abnormal CT chest  Patient on ibrance and exemestane therapy as an outpatient. Ibrance stopped by heme/onc. Presents with 1 week history of dyspnea without cough, fevers or pleuritic pain.   with normal renal function. Echocardiogram with normal LV systolic function, no significant diastolic issues. Mild LVH and mild MS, no PH.  CT of the chest without signs of PE, DVT study negative. CT shows diffuse widespread GGOs, no honeycombing or bronchiectasis, possibly mild emphysematous changes. Apically predominant but present in all lobes, no peripheral or central sparing.  Patient on 2L O2 via NC currently, not acutely struggling to breathe.  Picture is concerning for palbociclib-associated acute pneumonitis, however other causes could be pulmonary edema or diffuse infection.  Her RVP was negative, no signs or symptoms of infection.  - hold ibrance indefinitely  - OOB as able  - incentive spirometry  - wean supplemental O2 for goal SpO2 >92%  - recommend maintenance Lasix 20mg PO  - continue prednisone 30mg BID for one week total, then 20mg BID until pulmonary follow up  - please start Bactrim for PJP prophylaxis  - Please perform formal ambulatory O2 study to assess degree of O2 requirement with activity.  - patient needs TTE w/ bubble study given her desaturation episodes    Needs outpatient pulmonary follow up upon discharge at [address] [place] [address], [address] ([phone]).  Please e-mail [email or web address] to make an appointment for the patient.  Please include the name, , and a phone number for you and the patient. Appointment with Dr Fraser.    INCOMPLETE NOTE # acute hypoxemic respiratory failure  # abnormal CT chest  Patient on ibrance and exemestane therapy as an outpatient. Ibrance stopped by heme/onc. Presents with 1 week history of dyspnea without cough, fevers or pleuritic pain.   with normal renal function. Echocardiogram with normal LV systolic function, no significant diastolic issues. Mild LVH and mild MS, no PH.  CT of the chest without signs of PE, DVT study negative. CT shows diffuse widespread GGOs, no honeycombing or bronchiectasis, possibly mild emphysematous changes. Apically predominant but present in all lobes, no peripheral or central sparing.  Patient on 2L O2 via NC currently, not acutely struggling to breathe.  Picture is concerning for palbociclib-associated acute pneumonitis, however other causes could be pulmonary edema or diffuse infection.  Her RVP was negative, no signs or symptoms of infection.  - hold ibrance indefinitely  - OOB as able  - incentive spirometry  - wean supplemental O2 for goal SpO2 >92%  - recommend maintenance Lasix 20mg PO, give additional 40IV today  - continue prednisone 30mg BID until , then 20mg BID until pulmonary follow up  - please start Bactrim for PJP prophylaxis  - Please perform formal ambulatory O2 study to assess degree of O2 requirement with activity.  - patient needs TTE w/ bubble study given her desaturation episodes    Needs outpatient pulmonary follow up upon discharge at [address] [place] [address], [address] ([phone]).  Please e-mail [email or web address] to make an appointment for the patient.  Please include the name, , and a phone number for you and the patient. Appointment with Dr Fraser.    INCOMPLETE NOTE

## 2023-08-31 NOTE — PROGRESS NOTE ADULT - PROBLEM SELECTOR PLAN 1
- P/w one week h/o SOB, O2 in low 80s at outpatient office, transported to ED via EMS  - Afebrile, leukopenic on admission  - RVP, COVID negative  - Legionella, strep negative; MRSA pending  - Elevated D-dimer -> US of RLE unremarkable  - CTA -> Nonspecific ground-glass opacities, negative for PE  - Elevated BNP -> TTE revealed normal LVSF, mild MV stenosis, mild pulm HTN  -  IV Lasix 40mg daily stopped 8/28 as no improvement in resp status w/ diuresis  - c/w pred 30mg BID  - C/w supplemental O2 prn  - C/w strict I/Os  - Pulmonology consulted, f/u recs  - O2 sats in low-mid 90s on room air (8/30) - P/w one week h/o SOB, O2 in low 80s at outpatient office, transported to ED via EMS  - Afebrile, leukopenic on admission  - RVP, COVID negative  - Legionella, strep negative; MRSA pending  - Elevated D-dimer -> US of RLE unremarkable  - CTA -> Nonspecific ground-glass opacities, negative for PE  - Elevated BNP -> TTE revealed normal LVSF, mild MV stenosis, mild pulm HTN  -  IV Lasix 40mg daily stopped 8/28 as no improvement in resp status w/ diuresis  - c/w pred 30mg BID  - C/w supplemental O2 prn  - C/w strict I/Os  - Pulmonology consulted, f/u recs  - O2 sats in low-mid 90s on room air (8/30)  - Dessats to high 70s overnight.   - Walking O2 study today; pt w/ desats to 87%  - F/u TTE w/ bubble per pulm  - Start lasix 20 mg po qd per pulm

## 2023-08-31 NOTE — PROGRESS NOTE ADULT - SUBJECTIVE AND OBJECTIVE BOX
CHIEF COMPLAINT: dyspnea    Interval Events:    NAEON. Patient spent most of the day yesterday on room air w/ SpO2 in mid 90s.      REVIEW OF SYSTEMS:  Constitutional: [ x ] negative [ ] fevers [ ] chills [ ] weight loss [ ] weight gain  HEENT: [ x ] negative [ ] dry eyes [ ] eye irritation [ ] postnasal drip [ ] nasal congestion  CV: [ x ] negative  [ ] chest pain [ ] orthopnea [ ] palpitations [ ] murmur  Resp: [ ] negative [ ] cough [ ] shortness of breath [ x ] dyspnea [ ] wheezing [ ] sputum [ ] hemoptysis  GI: [ x ] negative [ ] nausea [ ] vomiting [ ] diarrhea [ ] constipation [ ] abd pain [ ] dysphagia   : [ x ] negative [ ] dysuria [ ] nocturia [ ] hematuria [ ] increased urinary frequency  Musculoskeletal: [ x ] negative [ ] back pain [ ] myalgias [ ] arthralgias [ ] fracture  Skin: [ x ] negative [ ] rash [ ] itch  Neurological: [ ] negative [ ] headache [ ] dizziness [ ] syncope [ ] weakness [ ] numbness  Psychiatric: [ ] negative [ ] anxiety [ ] depression  Endocrine: [ ] negative [ ] diabetes [ ] thyroid problem  Hematologic/Lymphatic: [ ] negative [ ] anemia [ ] bleeding problem  Allergic/Immunologic: [ ] negative [ ] itchy eyes [ ] nasal discharge [ ] hives [ ] angioedema  [ x ] All other systems negative  [ ] Unable to assess ROS because ________    OBJECTIVE:  ICU Vital Signs Last 24 Hrs  T(C): 36.5 (31 Aug 2023 04:58), Max: 36.5 (31 Aug 2023 04:58)  T(F): 97.7 (31 Aug 2023 04:58), Max: 97.7 (31 Aug 2023 04:58)  HR: 66 (31 Aug 2023 04:58) (60 - 67)  BP: 111/69 (31 Aug 2023 04:58) (106/64 - 119/64)  BP(mean): --  ABP: --  ABP(mean): --  RR: 18 (31 Aug 2023 04:58) (18 - 22)  SpO2: 94% (31 Aug 2023 04:58) (82% - 96%)    O2 Parameters below as of 31 Aug 2023 04:58  Patient On (Oxygen Delivery Method): nasal cannula  O2 Flow (L/min): 1            08-30 @ 07:01  -  08-31 @ 07:00  --------------------------------------------------------  IN: 720 mL / OUT: 0 mL / NET: 720 mL      CAPILLARY BLOOD GLUCOSE        PHYSICAL EXAM:  General: Well appearing female sitting comfortably in bed  HEENT: Normal sclera  Lymph Nodes: No LAD  Respiratory: Bilateral faint crackles, more pronounced at the bases, no wheezing  Cardiovascular: Regular rate and rhythm no m/r/g  Abdomen: Nontender nondistended  Extremities: No peripheral edema  Skin: No rashes  Neurological: No appreciable neurologic deficits  Psychiatry: Appropriate mood and affect      HOSPITAL MEDICATIONS:  MEDICATIONS  (STANDING):  buPROPion XL (24-Hour) . 300 milliGRAM(s) Oral daily  enoxaparin Injectable 40 milliGRAM(s) SubCutaneous every 24 hours  escitalopram 10 milliGRAM(s) Oral daily  escitalopram 5 milliGRAM(s) Oral daily  exemestane 25 milliGRAM(s) Oral daily  mirtazapine 7.5 milliGRAM(s) Oral at bedtime  pantoprazole    Tablet 40 milliGRAM(s) Oral before breakfast  predniSONE   Tablet 30 milliGRAM(s) Oral two times a day  simvastatin 20 milliGRAM(s) Oral at bedtime  tamsulosin 0.4 milliGRAM(s) Oral at bedtime    MEDICATIONS  (PRN):  acetaminophen     Tablet .. 650 milliGRAM(s) Oral every 6 hours PRN Temp greater or equal to 38C (100.4F), Mild Pain (1 - 3)  aluminum hydroxide/magnesium hydroxide/simethicone Suspension 30 milliLiter(s) Oral every 4 hours PRN Dyspepsia  melatonin 3 milliGRAM(s) Oral at bedtime PRN Insomnia  ondansetron Injectable 4 milliGRAM(s) IV Push every 8 hours PRN Nausea and/or Vomiting      LABS:                        10.1   3.09  )-----------( 205      ( 30 Aug 2023 07:13 )             31.4     Hgb Trend: 10.1<--, 10.1<--, 9.7<--, 10.1<--, 9.6<--  08-30    137  |  99  |  26<H>  ----------------------------<  131<H>  4.5   |  26  |  1.07    Ca    9.2      30 Aug 2023 07:17  Phos  3.1     08-30  Mg     2.9     08-30    TPro  7.3  /  Alb  3.7  /  TBili  0.3  /  DBili  x   /  AST  19  /  ALT  24  /  AlkPhos  61  08-30    Creatinine Trend: 1.07<--, 1.19<--, 1.20<--, 1.17<--, 1.23<--, 0.98<--    Urinalysis Basic - ( 30 Aug 2023 07:17 )    Color: x / Appearance: x / SG: x / pH: x  Gluc: 131 mg/dL / Ketone: x  / Bili: x / Urobili: x   Blood: x / Protein: x / Nitrite: x   Leuk Esterase: x / RBC: x / WBC x   Sq Epi: x / Non Sq Epi: x / Bacteria: x            MICROBIOLOGY:       RADIOLOGY:  [ x ] Reviewed and interpreted by me    PULMONARY FUNCTION TESTS:    EKG: CHIEF COMPLAINT: dyspnea    Interval Events:    NAEON. Patient spent most of the day yesterday on room air w/ SpO2 in mid 90s. Desaturation events overnight and this am reportedly.      REVIEW OF SYSTEMS:  Constitutional: [ x ] negative [ ] fevers [ ] chills [ ] weight loss [ ] weight gain  HEENT: [ x ] negative [ ] dry eyes [ ] eye irritation [ ] postnasal drip [ ] nasal congestion  CV: [ x ] negative  [ ] chest pain [ ] orthopnea [ ] palpitations [ ] murmur  Resp: [ ] negative [ ] cough [ ] shortness of breath [ x ] dyspnea [ ] wheezing [ ] sputum [ ] hemoptysis  GI: [ x ] negative [ ] nausea [ ] vomiting [ ] diarrhea [ ] constipation [ ] abd pain [ ] dysphagia   : [ x ] negative [ ] dysuria [ ] nocturia [ ] hematuria [ ] increased urinary frequency  Musculoskeletal: [ x ] negative [ ] back pain [ ] myalgias [ ] arthralgias [ ] fracture  Skin: [ x ] negative [ ] rash [ ] itch  Neurological: [ ] negative [ ] headache [ ] dizziness [ ] syncope [ ] weakness [ ] numbness  Psychiatric: [ ] negative [ ] anxiety [ ] depression  Endocrine: [ ] negative [ ] diabetes [ ] thyroid problem  Hematologic/Lymphatic: [ ] negative [ ] anemia [ ] bleeding problem  Allergic/Immunologic: [ ] negative [ ] itchy eyes [ ] nasal discharge [ ] hives [ ] angioedema  [ x ] All other systems negative  [ ] Unable to assess ROS because ________    OBJECTIVE:  ICU Vital Signs Last 24 Hrs  T(C): 36.5 (31 Aug 2023 04:58), Max: 36.5 (31 Aug 2023 04:58)  T(F): 97.7 (31 Aug 2023 04:58), Max: 97.7 (31 Aug 2023 04:58)  HR: 66 (31 Aug 2023 04:58) (60 - 67)  BP: 111/69 (31 Aug 2023 04:58) (106/64 - 119/64)  BP(mean): --  ABP: --  ABP(mean): --  RR: 18 (31 Aug 2023 04:58) (18 - 22)  SpO2: 94% (31 Aug 2023 04:58) (82% - 96%)    O2 Parameters below as of 31 Aug 2023 04:58  Patient On (Oxygen Delivery Method): nasal cannula  O2 Flow (L/min): 1            08-30 @ 07:01  -  08-31 @ 07:00  --------------------------------------------------------  IN: 720 mL / OUT: 0 mL / NET: 720 mL      CAPILLARY BLOOD GLUCOSE        PHYSICAL EXAM:  General: Well appearing female sitting comfortably in bed  HEENT: Normal sclera  Lymph Nodes: No LAD  Respiratory: Bilateral faint crackles, more pronounced at the bases, no wheezing  Cardiovascular: Regular rate and rhythm no m/r/g  Abdomen: Nontender nondistended  Extremities: No peripheral edema  Skin: No rashes  Neurological: No appreciable neurologic deficits  Psychiatry: Appropriate mood and affect      HOSPITAL MEDICATIONS:  MEDICATIONS  (STANDING):  buPROPion XL (24-Hour) . 300 milliGRAM(s) Oral daily  enoxaparin Injectable 40 milliGRAM(s) SubCutaneous every 24 hours  escitalopram 10 milliGRAM(s) Oral daily  escitalopram 5 milliGRAM(s) Oral daily  exemestane 25 milliGRAM(s) Oral daily  mirtazapine 7.5 milliGRAM(s) Oral at bedtime  pantoprazole    Tablet 40 milliGRAM(s) Oral before breakfast  predniSONE   Tablet 30 milliGRAM(s) Oral two times a day  simvastatin 20 milliGRAM(s) Oral at bedtime  tamsulosin 0.4 milliGRAM(s) Oral at bedtime    MEDICATIONS  (PRN):  acetaminophen     Tablet .. 650 milliGRAM(s) Oral every 6 hours PRN Temp greater or equal to 38C (100.4F), Mild Pain (1 - 3)  aluminum hydroxide/magnesium hydroxide/simethicone Suspension 30 milliLiter(s) Oral every 4 hours PRN Dyspepsia  melatonin 3 milliGRAM(s) Oral at bedtime PRN Insomnia  ondansetron Injectable 4 milliGRAM(s) IV Push every 8 hours PRN Nausea and/or Vomiting      LABS:                        10.1   3.09  )-----------( 205      ( 30 Aug 2023 07:13 )             31.4     Hgb Trend: 10.1<--, 10.1<--, 9.7<--, 10.1<--, 9.6<--  08-30    137  |  99  |  26<H>  ----------------------------<  131<H>  4.5   |  26  |  1.07    Ca    9.2      30 Aug 2023 07:17  Phos  3.1     08-30  Mg     2.9     08-30    TPro  7.3  /  Alb  3.7  /  TBili  0.3  /  DBili  x   /  AST  19  /  ALT  24  /  AlkPhos  61  08-30    Creatinine Trend: 1.07<--, 1.19<--, 1.20<--, 1.17<--, 1.23<--, 0.98<--    Urinalysis Basic - ( 30 Aug 2023 07:17 )    Color: x / Appearance: x / SG: x / pH: x  Gluc: 131 mg/dL / Ketone: x  / Bili: x / Urobili: x   Blood: x / Protein: x / Nitrite: x   Leuk Esterase: x / RBC: x / WBC x   Sq Epi: x / Non Sq Epi: x / Bacteria: x            MICROBIOLOGY:       RADIOLOGY:  [ x ] Reviewed and interpreted by me    PULMONARY FUNCTION TESTS:    EKG:

## 2023-08-31 NOTE — PROGRESS NOTE ADULT - SUBJECTIVE AND OBJECTIVE BOX
Chief Complaint:  FU breast ca    History of Present Illness:  seated in chair, off oxygen, states sat decreased with walking; no f/c, no cp, no cough, no n/v/abd pain, tolerating diet, no bleeding, hopeful to go to rehab soon      MEDICATIONS  (STANDING):  buPROPion XL (24-Hour) . 300 milliGRAM(s) Oral daily  enoxaparin Injectable 40 milliGRAM(s) SubCutaneous every 24 hours  escitalopram 5 milliGRAM(s) Oral daily  escitalopram 10 milliGRAM(s) Oral daily  exemestane 25 milliGRAM(s) Oral daily  furosemide    Tablet 20 milliGRAM(s) Oral daily  mirtazapine 7.5 milliGRAM(s) Oral at bedtime  pantoprazole    Tablet 40 milliGRAM(s) Oral before breakfast  predniSONE   Tablet 30 milliGRAM(s) Oral two times a day  simvastatin 20 milliGRAM(s) Oral at bedtime  tamsulosin 0.4 milliGRAM(s) Oral at bedtime  trimethoprim  160 mG/sulfamethoxazole 800 mG 1 Tablet(s) Oral daily    MEDICATIONS  (PRN):  acetaminophen     Tablet .. 650 milliGRAM(s) Oral every 6 hours PRN Temp greater or equal to 38C (100.4F), Mild Pain (1 - 3)  aluminum hydroxide/magnesium hydroxide/simethicone Suspension 30 milliLiter(s) Oral every 4 hours PRN Dyspepsia  melatonin 3 milliGRAM(s) Oral at bedtime PRN Insomnia  ondansetron Injectable 4 milliGRAM(s) IV Push every 8 hours PRN Nausea and/or Vomiting      Allergies    penicillin (Hives)    Intolerances        Vital Signs Last 24 Hrs  T(C): 36.8 (31 Aug 2023 11:19), Max: 36.8 (31 Aug 2023 11:19)  T(F): 98.3 (31 Aug 2023 11:19), Max: 98.3 (31 Aug 2023 11:19)  HR: 62 (31 Aug 2023 11:19) (60 - 66)  BP: 125/63 (31 Aug 2023 11:19) (111/69 - 125/63)  BP(mean): --  RR: 18 (31 Aug 2023 11:19) (18 - 18)  SpO2: 95% (31 Aug 2023 12:45) (87% - 98%)    Parameters below as of 31 Aug 2023 12:45  Patient On (Oxygen Delivery Method): room air        PHYSICAL EXAM  General: adult in NAD  HEENT: clear oropharynx, anicteric sclera, pink conjunctiva  Neck: supple  CV: normal S1/S2  Lungs: clear to auscultation, no wheezes, no rales  Abdomen: soft non-tender non-distended,  positive bowel sounds  Ext: no clubbing cyanosis or edema  Skin: no rashes and no petechiae  Lymph Nodes: No LAD in neck  Neuro: alert and oriented X 3, no focal deficits    LABS:                          9.6    4.49  )-----------( 198      ( 31 Aug 2023 07:07 )             30.1         Mean Cell Volume : 97.4 fl  Mean Cell Hemoglobin : 31.1 pg  Mean Cell Hemoglobin Concentration : 31.9 gm/dL  Auto Neutrophil # : 3.77 K/uL  Auto Lymphocyte # : 0.31 K/uL  Auto Monocyte # : 0.38 K/uL  Auto Eosinophil # : 0.00 K/uL  Auto Basophil # : 0.00 K/uL  Auto Neutrophil % : 83.9 %  Auto Lymphocyte % : 6.9 %  Auto Monocyte % : 8.5 %  Auto Eosinophil % : 0.0 %  Auto Basophil % : 0.0 %      Serial CBC's  08-31 @ 07:07  Hct-30.1 / Hgb-9.6 / Plat-198 / RBC-3.09 / WBC-4.49  Serial CBC's  08-30 @ 07:13  Hct-31.4 / Hgb-10.1 / Plat-205 / RBC-3.23 / WBC-3.09  Serial CBC's  08-29 @ 07:21  Hct-32.7 / Hgb-10.1 / Plat-191 / RBC-3.29 / WBC-2.58  Serial CBC's  08-28 @ 07:47  Hct-30.6 / Hgb-9.7 / Plat-200 / RBC-3.12 / WBC-2.31      08-31    136  |  100  |  28<H>  ----------------------------<  149<H>  4.8   |  26  |  1.08    Ca    9.0      31 Aug 2023 07:01  Phos  3.1     08-31  Mg     2.8     08-31    TPro  6.8  /  Alb  3.5  /  TBili  0.2  /  DBili  x   /  AST  13  /  ALT  23  /  AlkPhos  60  08-31          Iron - Total Binding Capacity.: 241 ug/dL (08-25 @ 06:46)  Ferritin: 304 ng/mL (08-25 @ 06:46)  Vitamin B12, Serum: 684 pg/mL (08-25 @ 06:46)  Folate, Serum: >20.0 ng/mL (08-25 @ 06:46)              Radiology:

## 2023-08-31 NOTE — PROGRESS NOTE ADULT - SUBJECTIVE AND OBJECTIVE BOX
*******************************  Navi Hernandez MD (PGY-1)  Internal Medicine  Contact via [employer] TEAMS  *******************************    PROGRESS NOTE:     Patient is a 89y old  Female who presents with a chief complaint of Shortness of breath (30 Aug 2023 18:30)      INTERVAL EVENTS: No acute overnight events.     SUBJECTIVE: Patient seen and examined at bedside. This morning, the patient is comfortable and doing well. No acute complaints. Denies fevers, chills, N/V/D, chest pain, SOB, abdominal pain.    MEDICATIONS  (STANDING):  buPROPion XL (24-Hour) . 300 milliGRAM(s) Oral daily  enoxaparin Injectable 40 milliGRAM(s) SubCutaneous every 24 hours  escitalopram 10 milliGRAM(s) Oral daily  escitalopram 5 milliGRAM(s) Oral daily  exemestane 25 milliGRAM(s) Oral daily  mirtazapine 7.5 milliGRAM(s) Oral at bedtime  pantoprazole    Tablet 40 milliGRAM(s) Oral before breakfast  predniSONE   Tablet 30 milliGRAM(s) Oral two times a day  simvastatin 20 milliGRAM(s) Oral at bedtime  tamsulosin 0.4 milliGRAM(s) Oral at bedtime    MEDICATIONS  (PRN):  acetaminophen     Tablet .. 650 milliGRAM(s) Oral every 6 hours PRN Temp greater or equal to 38C (100.4F), Mild Pain (1 - 3)  aluminum hydroxide/magnesium hydroxide/simethicone Suspension 30 milliLiter(s) Oral every 4 hours PRN Dyspepsia  melatonin 3 milliGRAM(s) Oral at bedtime PRN Insomnia  ondansetron Injectable 4 milliGRAM(s) IV Push every 8 hours PRN Nausea and/or Vomiting      CAPILLARY BLOOD GLUCOSE        I&O's Summary    29 Aug 2023 07:01  -  30 Aug 2023 07:00  --------------------------------------------------------  IN: 240 mL / OUT: 0 mL / NET: 240 mL    30 Aug 2023 07:01  -  31 Aug 2023 06:43  --------------------------------------------------------  IN: 720 mL / OUT: 0 mL / NET: 720 mL        PHYSICAL EXAM:  Vital Signs Last 24 Hrs  T(C): 36.5 (31 Aug 2023 04:58), Max: 36.5 (31 Aug 2023 04:58)  T(F): 97.7 (31 Aug 2023 04:58), Max: 97.7 (31 Aug 2023 04:58)  HR: 66 (31 Aug 2023 04:58) (60 - 67)  BP: 111/69 (31 Aug 2023 04:58) (106/64 - 119/64)  BP(mean): --  RR: 18 (31 Aug 2023 04:58) (18 - 22)  SpO2: 94% (31 Aug 2023 04:58) (82% - 96%)    Parameters below as of 31 Aug 2023 04:58  Patient On (Oxygen Delivery Method): nasal cannula  O2 Flow (L/min): 1      GENERAL: NAD, lying in bed comfortably  HEAD: Atraumatic, normocephalic  EYES: EOMI, PERRLA  ENT: Moist mucous membranes  NECK: Supple, no JVD  HEART: S1, S2, Regular rate and rhythm, no murmurs, rubs, or gallops  LUNGS: Unlabored respirations, clear to auscultation bilaterally, no crackles, wheezing, or rhonchi  ABDOMEN: Soft, nontender, nondistended, +BS  EXTREMITIES: No clubbing, cyanosis, or edema  NERVOUS SYSTEM:  A&Ox3, no focal deficits   SKIN: No rashes or lesions    LABS:                        10.1   3.09  )-----------( 205      ( 30 Aug 2023 07:13 )             31.4     08-30    137  |  99  |  26<H>  ----------------------------<  131<H>  4.5   |  26  |  1.07    Ca    9.2      30 Aug 2023 07:17  Phos  3.1     08-30  Mg     2.9     08-30    TPro  7.3  /  Alb  3.7  /  TBili  0.3  /  DBili  x   /  AST  19  /  ALT  24  /  AlkPhos  61  08-30          Urinalysis Basic - ( 30 Aug 2023 07:17 )    Color: x / Appearance: x / SG: x / pH: x  Gluc: 131 mg/dL / Ketone: x  / Bili: x / Urobili: x   Blood: x / Protein: x / Nitrite: x   Leuk Esterase: x / RBC: x / WBC x   Sq Epi: x / Non Sq Epi: x / Bacteria: x          RADIOLOGY & ADDITIONAL TESTS:  Results Reviewed:   Imaging Personally Reviewed:  Electrocardiogram Personally Reviewed:  Tele: *******************************  Navi Hernandez MD (PGY-1)  Internal Medicine  Contact via [employer] TEAMS  *******************************    PROGRESS NOTE:     Patient is a 89y old  Female who presents with a chief complaint of Shortness of breath (30 Aug 2023 18:30)      INTERVAL EVENTS: Desat to high 70s overnight    SUBJECTIVE: Patient seen and examined at bedside. This morning, the patient is comfortable and doing well. Reports L sided rib pain. Denies fevers, chills, N/V/D, chest pain, SOB, abdominal pain.    MEDICATIONS  (STANDING):  buPROPion XL (24-Hour) . 300 milliGRAM(s) Oral daily  enoxaparin Injectable 40 milliGRAM(s) SubCutaneous every 24 hours  escitalopram 10 milliGRAM(s) Oral daily  escitalopram 5 milliGRAM(s) Oral daily  exemestane 25 milliGRAM(s) Oral daily  mirtazapine 7.5 milliGRAM(s) Oral at bedtime  pantoprazole    Tablet 40 milliGRAM(s) Oral before breakfast  predniSONE   Tablet 30 milliGRAM(s) Oral two times a day  simvastatin 20 milliGRAM(s) Oral at bedtime  tamsulosin 0.4 milliGRAM(s) Oral at bedtime    MEDICATIONS  (PRN):  acetaminophen     Tablet .. 650 milliGRAM(s) Oral every 6 hours PRN Temp greater or equal to 38C (100.4F), Mild Pain (1 - 3)  aluminum hydroxide/magnesium hydroxide/simethicone Suspension 30 milliLiter(s) Oral every 4 hours PRN Dyspepsia  melatonin 3 milliGRAM(s) Oral at bedtime PRN Insomnia  ondansetron Injectable 4 milliGRAM(s) IV Push every 8 hours PRN Nausea and/or Vomiting      CAPILLARY BLOOD GLUCOSE        I&O's Summary    29 Aug 2023 07:01  -  30 Aug 2023 07:00  --------------------------------------------------------  IN: 240 mL / OUT: 0 mL / NET: 240 mL    30 Aug 2023 07:01  -  31 Aug 2023 06:43  --------------------------------------------------------  IN: 720 mL / OUT: 0 mL / NET: 720 mL        PHYSICAL EXAM:  Vital Signs Last 24 Hrs  T(C): 36.5 (31 Aug 2023 04:58), Max: 36.5 (31 Aug 2023 04:58)  T(F): 97.7 (31 Aug 2023 04:58), Max: 97.7 (31 Aug 2023 04:58)  HR: 66 (31 Aug 2023 04:58) (60 - 67)  BP: 111/69 (31 Aug 2023 04:58) (106/64 - 119/64)  BP(mean): --  RR: 18 (31 Aug 2023 04:58) (18 - 22)  SpO2: 94% (31 Aug 2023 04:58) (82% - 96%)    Parameters below as of 31 Aug 2023 04:58  Patient On (Oxygen Delivery Method): nasal cannula  O2 Flow (L/min): 1      GENERAL: NAD, lying in bed comfortably  HEAD: Atraumatic, normocephalic  EYES: EOMI, PERRLA  ENT: Moist mucous membranes  NECK: Supple, no JVD  HEART: Systolic murmur  CHEST: Tenderness to palpation on L lateral rib area; no overlying skin changes  LUNGS: Unlabored respirations, clear to auscultation bilaterally, no crackles, wheezing, or rhonchi  ABDOMEN: Soft, nontender, nondistended, +BS  EXTREMITIES: 1+ pitting edema b/l   NERVOUS SYSTEM:  A&Ox3, no focal deficits       LABS:                        10.1   3.09  )-----------( 205      ( 30 Aug 2023 07:13 )             31.4     08-30    137  |  99  |  26<H>  ----------------------------<  131<H>  4.5   |  26  |  1.07    Ca    9.2      30 Aug 2023 07:17  Phos  3.1     08-30  Mg     2.9     08-30    TPro  7.3  /  Alb  3.7  /  TBili  0.3  /  DBili  x   /  AST  19  /  ALT  24  /  AlkPhos  61  08-30          Urinalysis Basic - ( 30 Aug 2023 07:17 )    Color: x / Appearance: x / SG: x / pH: x  Gluc: 131 mg/dL / Ketone: x  / Bili: x / Urobili: x   Blood: x / Protein: x / Nitrite: x   Leuk Esterase: x / RBC: x / WBC x   Sq Epi: x / Non Sq Epi: x / Bacteria: x          RADIOLOGY & ADDITIONAL TESTS:  Results Reviewed:   Imaging Personally Reviewed:  Electrocardiogram Personally Reviewed:  Tele:

## 2023-08-31 NOTE — PROGRESS NOTE ADULT - SUBJECTIVE AND OBJECTIVE BOX
Cardiovascular Disease Progress Note  Date of service: 08-31-23 @ 15:39  Covering Dr. Gaspar  Overnight events: No acute events overnight.  Pt denies chest pain. Currently saturating well on RA.   Otherwise review of systems negative    Objective Findings:  T(C): 36.8 (08-31-23 @ 11:19), Max: 36.8 (08-31-23 @ 11:19)  HR: 62 (08-31-23 @ 11:19) (60 - 66)  BP: 125/63 (08-31-23 @ 11:19) (111/69 - 125/63)  RR: 18 (08-31-23 @ 11:19) (18 - 18)  SpO2: 95% (08-31-23 @ 12:45) (87% - 98%)  Wt(kg): --  Daily     Daily       Physical Exam:  Gen: NAD; Patient resting comfortably  HEENT: EOMI, Normocephalic/ atraumatic  CV: RRR, normal S1 + S2, no m/r/g  Lungs:  Normal respiratory effort; clear to auscultation bilaterally  Abd: soft, non-tender; bowel sounds present  Ext: No edema; warm and well perfused    Telemetry: Sinus     Laboratory Data:                        9.6    4.49  )-----------( 198      ( 31 Aug 2023 07:07 )             30.1     08-31    136  |  100  |  28<H>  ----------------------------<  149<H>  4.8   |  26  |  1.08    Ca    9.0      31 Aug 2023 07:01  Phos  3.1     08-31  Mg     2.8     08-31    TPro  6.8  /  Alb  3.5  /  TBili  0.2  /  DBili  x   /  AST  13  /  ALT  23  /  AlkPhos  60  08-31              Inpatient Medications:  MEDICATIONS  (STANDING):  buPROPion XL (24-Hour) . 300 milliGRAM(s) Oral daily  enoxaparin Injectable 40 milliGRAM(s) SubCutaneous every 24 hours  escitalopram 5 milliGRAM(s) Oral daily  escitalopram 10 milliGRAM(s) Oral daily  exemestane 25 milliGRAM(s) Oral daily  furosemide    Tablet 20 milliGRAM(s) Oral daily  mirtazapine 7.5 milliGRAM(s) Oral at bedtime  pantoprazole    Tablet 40 milliGRAM(s) Oral before breakfast  predniSONE   Tablet 30 milliGRAM(s) Oral two times a day  simvastatin 20 milliGRAM(s) Oral at bedtime  tamsulosin 0.4 milliGRAM(s) Oral at bedtime  trimethoprim  160 mG/sulfamethoxazole 800 mG 1 Tablet(s) Oral daily      Assessment:  89-year-old female w/ PMH of HLD and breast cancer receiving oral chemotherapy who is presenting following a one week history of SOB. She states this is new for her and has been constant. She denies fever, chills, chest pain, cough, nausea, vomiting, and dysuria. No orthopnea or LE swelling. No sick contacts. She presented to the ED via EMS after being found hypoxic with O2 saturation in the low 80s at her outpatient oncologist office.    1. Shortness of Breath  - ECG NSR with no ischemia noted  - COVID/RVP/legionella/Strep PNA negative  - This is a primary pulmonary issue  - TTE shows preserved EF, no WMA and mild MS  - D-Dimer 836--> US of RLE negative for DVT, PE neg as noted above  - DDx includes ADHF vs. Atypical PNA vs. Pneumonitis (as per Heme/Onc, Ibrance can cause ILD/pneumonitis)  - Supplemental oxygen as needed  - Pulm following, steroids started with marked improvement in symptoms.   - Continue current management as per pulm    2. HLD  - c/w simvastatin 20mg PO daily    3. Breast CA  - Heme/Onc following    4. NSVT  - Pt asymptomatic  - Continue to monitor.         Over 25 minutes spent on total encounter; more than 50% of the visit was spent counseling and/or coordinating care by the attending physician.      Filipe Jin,  [facility]  Cardiovascular Disease  [phone]

## 2023-08-31 NOTE — PROGRESS NOTE ADULT - ASSESSMENT
Patient is an 85-year-old female w/ PMH of HLD and breast cancer presenting with a one week history of SOB. Pt admitted for acute hypoxic respiratory failure, s/p IV diuresis, now receiving prednisone for pneumonitis w/ improvement in respiratory status.

## 2023-08-31 NOTE — PROGRESS NOTE ADULT - TIME BILLING
Review of medical records, labs, imaging and coordination of care.
Review of medical record, labs, imaging and coordination of care.
Review of medical records, labs, imaging and coordination of care that did not include time spent teaching.
Review of medical records, labs, imaging, coordination of care and did not include time spent teaching.

## 2023-09-01 ENCOUNTER — TRANSCRIPTION ENCOUNTER (OUTPATIENT)
Age: 88
End: 2023-09-01

## 2023-09-01 VITALS
OXYGEN SATURATION: 93 % | RESPIRATION RATE: 18 BRPM | DIASTOLIC BLOOD PRESSURE: 68 MMHG | SYSTOLIC BLOOD PRESSURE: 124 MMHG | TEMPERATURE: 98 F | HEART RATE: 76 BPM

## 2023-09-01 LAB — LACTATE BLDV-MCNC: 2 MMOL/L — SIGNIFICANT CHANGE UP (ref 0.5–2)

## 2023-09-01 PROCEDURE — 97116 GAIT TRAINING THERAPY: CPT

## 2023-09-01 PROCEDURE — 83550 IRON BINDING TEST: CPT

## 2023-09-01 PROCEDURE — 0225U NFCT DS DNA&RNA 21 SARSCOV2: CPT

## 2023-09-01 PROCEDURE — 85379 FIBRIN DEGRADATION QUANT: CPT

## 2023-09-01 PROCEDURE — 71045 X-RAY EXAM CHEST 1 VIEW: CPT

## 2023-09-01 PROCEDURE — 82746 ASSAY OF FOLIC ACID SERUM: CPT

## 2023-09-01 PROCEDURE — 85027 COMPLETE CBC AUTOMATED: CPT

## 2023-09-01 PROCEDURE — 80048 BASIC METABOLIC PNL TOTAL CA: CPT

## 2023-09-01 PROCEDURE — 82947 ASSAY GLUCOSE BLOOD QUANT: CPT

## 2023-09-01 PROCEDURE — 87899 AGENT NOS ASSAY W/OPTIC: CPT

## 2023-09-01 PROCEDURE — 97530 THERAPEUTIC ACTIVITIES: CPT

## 2023-09-01 PROCEDURE — 93005 ELECTROCARDIOGRAM TRACING: CPT

## 2023-09-01 PROCEDURE — 82607 VITAMIN B-12: CPT

## 2023-09-01 PROCEDURE — 87449 NOS EACH ORGANISM AG IA: CPT

## 2023-09-01 PROCEDURE — 87641 MR-STAPH DNA AMP PROBE: CPT

## 2023-09-01 PROCEDURE — 87640 STAPH A DNA AMP PROBE: CPT

## 2023-09-01 PROCEDURE — 87070 CULTURE OTHR SPECIMN AEROBIC: CPT

## 2023-09-01 PROCEDURE — 97110 THERAPEUTIC EXERCISES: CPT

## 2023-09-01 PROCEDURE — 84100 ASSAY OF PHOSPHORUS: CPT

## 2023-09-01 PROCEDURE — 82435 ASSAY OF BLOOD CHLORIDE: CPT

## 2023-09-01 PROCEDURE — 99239 HOSP IP/OBS DSCHRG MGMT >30: CPT

## 2023-09-01 PROCEDURE — 82330 ASSAY OF CALCIUM: CPT

## 2023-09-01 PROCEDURE — 84145 PROCALCITONIN (PCT): CPT

## 2023-09-01 PROCEDURE — 36600 WITHDRAWAL OF ARTERIAL BLOOD: CPT

## 2023-09-01 PROCEDURE — 80053 COMPREHEN METABOLIC PANEL: CPT

## 2023-09-01 PROCEDURE — 36415 COLL VENOUS BLD VENIPUNCTURE: CPT

## 2023-09-01 PROCEDURE — 85014 HEMATOCRIT: CPT

## 2023-09-01 PROCEDURE — 82803 BLOOD GASES ANY COMBINATION: CPT

## 2023-09-01 PROCEDURE — 99285 EMERGENCY DEPT VISIT HI MDM: CPT

## 2023-09-01 PROCEDURE — 85018 HEMOGLOBIN: CPT

## 2023-09-01 PROCEDURE — 83880 ASSAY OF NATRIURETIC PEPTIDE: CPT

## 2023-09-01 PROCEDURE — 85025 COMPLETE CBC W/AUTO DIFF WBC: CPT

## 2023-09-01 PROCEDURE — 93306 TTE W/DOPPLER COMPLETE: CPT

## 2023-09-01 PROCEDURE — 83605 ASSAY OF LACTIC ACID: CPT

## 2023-09-01 PROCEDURE — 83735 ASSAY OF MAGNESIUM: CPT

## 2023-09-01 PROCEDURE — 83540 ASSAY OF IRON: CPT

## 2023-09-01 PROCEDURE — 84132 ASSAY OF SERUM POTASSIUM: CPT

## 2023-09-01 PROCEDURE — 82728 ASSAY OF FERRITIN: CPT

## 2023-09-01 PROCEDURE — 93971 EXTREMITY STUDY: CPT

## 2023-09-01 PROCEDURE — 71275 CT ANGIOGRAPHY CHEST: CPT | Mod: MA

## 2023-09-01 PROCEDURE — 84295 ASSAY OF SERUM SODIUM: CPT

## 2023-09-01 PROCEDURE — 93308 TTE F-UP OR LMTD: CPT

## 2023-09-01 PROCEDURE — 97161 PT EVAL LOW COMPLEX 20 MIN: CPT

## 2023-09-01 RX ORDER — EXEMESTANE 25 MG/1
1 TABLET, SUGAR COATED ORAL
Qty: 0 | Refills: 0 | DISCHARGE

## 2023-09-01 RX ORDER — SIMVASTATIN 20 MG/1
1 TABLET, FILM COATED ORAL
Qty: 0 | Refills: 0 | DISCHARGE
Start: 2023-09-01

## 2023-09-01 RX ORDER — BUPROPION HYDROCHLORIDE 150 MG/1
1 TABLET, EXTENDED RELEASE ORAL
Qty: 0 | Refills: 0 | DISCHARGE
Start: 2023-09-01

## 2023-09-01 RX ORDER — ESCITALOPRAM OXALATE 10 MG/1
1 TABLET, FILM COATED ORAL
Refills: 0 | DISCHARGE

## 2023-09-01 RX ORDER — ESCITALOPRAM OXALATE 10 MG/1
1 TABLET, FILM COATED ORAL
Qty: 0 | Refills: 0 | DISCHARGE
Start: 2023-09-01

## 2023-09-01 RX ORDER — EXEMESTANE 25 MG/1
1 TABLET, SUGAR COATED ORAL
Qty: 0 | Refills: 0 | DISCHARGE
Start: 2023-09-01

## 2023-09-01 RX ORDER — BUPROPION HYDROCHLORIDE 150 MG/1
1 TABLET, EXTENDED RELEASE ORAL
Qty: 0 | Refills: 0 | DISCHARGE

## 2023-09-01 RX ORDER — PALBOCICLIB 100 MG/1
1 CAPSULE ORAL
Refills: 0 | DISCHARGE

## 2023-09-01 RX ORDER — TAMSULOSIN HYDROCHLORIDE 0.4 MG/1
1 CAPSULE ORAL
Qty: 0 | Refills: 0 | DISCHARGE
Start: 2023-09-01

## 2023-09-01 RX ORDER — SIMVASTATIN 20 MG/1
1 TABLET, FILM COATED ORAL
Qty: 0 | Refills: 0 | DISCHARGE

## 2023-09-01 RX ORDER — PANTOPRAZOLE SODIUM 20 MG/1
1 TABLET, DELAYED RELEASE ORAL
Qty: 0 | Refills: 0 | DISCHARGE
Start: 2023-09-01

## 2023-09-01 RX ORDER — FUROSEMIDE 40 MG
1 TABLET ORAL
Qty: 0 | Refills: 0 | DISCHARGE
Start: 2023-09-01

## 2023-09-01 RX ORDER — MIRTAZAPINE 45 MG/1
1 TABLET, ORALLY DISINTEGRATING ORAL
Qty: 0 | Refills: 0 | DISCHARGE
Start: 2023-09-01

## 2023-09-01 RX ADMIN — Medication 20 MILLIGRAM(S): at 06:18

## 2023-09-01 RX ADMIN — Medication 30 MILLIGRAM(S): at 06:18

## 2023-09-01 RX ADMIN — BUPROPION HYDROCHLORIDE 300 MILLIGRAM(S): 150 TABLET, EXTENDED RELEASE ORAL at 11:31

## 2023-09-01 RX ADMIN — Medication 1 TABLET(S): at 11:31

## 2023-09-01 RX ADMIN — PANTOPRAZOLE SODIUM 40 MILLIGRAM(S): 20 TABLET, DELAYED RELEASE ORAL at 06:18

## 2023-09-01 RX ADMIN — ESCITALOPRAM OXALATE 5 MILLIGRAM(S): 10 TABLET, FILM COATED ORAL at 11:31

## 2023-09-01 RX ADMIN — ESCITALOPRAM OXALATE 10 MILLIGRAM(S): 10 TABLET, FILM COATED ORAL at 11:31

## 2023-09-01 NOTE — PROGRESS NOTE ADULT - SUBJECTIVE AND OBJECTIVE BOX
*******************************  Navi Hernandez MD (PGY-1)  Internal Medicine  Contact via [employer] TEAMS  *******************************    PROGRESS NOTE:     Patient is a 89y old  Female who presents with a chief complaint of Shortness of breath (31 Aug 2023 16:36)      INTERVAL EVENTS: No acute overnight events.     SUBJECTIVE: Patient seen and examined at bedside. This morning, the patient is comfortable and doing well. No acute complaints. Denies fevers, chills, N/V/D, chest pain, SOB, abdominal pain.    MEDICATIONS  (STANDING):  buPROPion XL (24-Hour) . 300 milliGRAM(s) Oral daily  enoxaparin Injectable 40 milliGRAM(s) SubCutaneous every 24 hours  escitalopram 10 milliGRAM(s) Oral daily  escitalopram 5 milliGRAM(s) Oral daily  exemestane 25 milliGRAM(s) Oral daily  furosemide    Tablet 20 milliGRAM(s) Oral daily  mirtazapine 7.5 milliGRAM(s) Oral at bedtime  pantoprazole    Tablet 40 milliGRAM(s) Oral before breakfast  predniSONE   Tablet 30 milliGRAM(s) Oral two times a day  simvastatin 20 milliGRAM(s) Oral at bedtime  tamsulosin 0.4 milliGRAM(s) Oral at bedtime  trimethoprim  160 mG/sulfamethoxazole 800 mG 1 Tablet(s) Oral daily    MEDICATIONS  (PRN):  acetaminophen     Tablet .. 650 milliGRAM(s) Oral every 6 hours PRN Temp greater or equal to 38C (100.4F), Mild Pain (1 - 3)  aluminum hydroxide/magnesium hydroxide/simethicone Suspension 30 milliLiter(s) Oral every 4 hours PRN Dyspepsia  melatonin 3 milliGRAM(s) Oral at bedtime PRN Insomnia  ondansetron Injectable 4 milliGRAM(s) IV Push every 8 hours PRN Nausea and/or Vomiting      CAPILLARY BLOOD GLUCOSE        I&O's Summary    30 Aug 2023 07:01  -  31 Aug 2023 07:00  --------------------------------------------------------  IN: 720 mL / OUT: 0 mL / NET: 720 mL    31 Aug 2023 07:01  -  01 Sep 2023 06:33  --------------------------------------------------------  IN: 720 mL / OUT: 0 mL / NET: 720 mL        PHYSICAL EXAM:  Vital Signs Last 24 Hrs  T(C): 36.3 (01 Sep 2023 05:25), Max: 36.8 (31 Aug 2023 11:19)  T(F): 97.3 (01 Sep 2023 05:25), Max: 98.3 (31 Aug 2023 11:19)  HR: 55 (01 Sep 2023 05:25) (55 - 80)  BP: 103/56 (01 Sep 2023 05:25) (100/61 - 129/68)  BP(mean): --  RR: 18 (01 Sep 2023 05:25) (18 - 18)  SpO2: 96% (01 Sep 2023 05:25) (87% - 98%)    Parameters below as of 01 Sep 2023 05:25  Patient On (Oxygen Delivery Method): room air        GENERAL: NAD, lying in bed comfortably  HEAD: Atraumatic, normocephalic  EYES: EOMI, PERRLA  ENT: Moist mucous membranes  NECK: Supple, no JVD  HEART: S1, S2, Regular rate and rhythm, no murmurs, rubs, or gallops  LUNGS: Unlabored respirations, clear to auscultation bilaterally, no crackles, wheezing, or rhonchi  ABDOMEN: Soft, nontender, nondistended, +BS  EXTREMITIES: No clubbing, cyanosis, or edema  NERVOUS SYSTEM:  A&Ox3, no focal deficits   SKIN: No rashes or lesions    LABS:                        9.6    4.49  )-----------( 198      ( 31 Aug 2023 07:07 )             30.1     08-31    136  |  100  |  28<H>  ----------------------------<  149<H>  4.8   |  26  |  1.08    Ca    9.0      31 Aug 2023 07:01  Phos  3.1     08-31  Mg     2.8     08-31    TPro  6.8  /  Alb  3.5  /  TBili  0.2  /  DBili  x   /  AST  13  /  ALT  23  /  AlkPhos  60  08-31          Urinalysis Basic - ( 31 Aug 2023 07:01 )    Color: x / Appearance: x / SG: x / pH: x  Gluc: 149 mg/dL / Ketone: x  / Bili: x / Urobili: x   Blood: x / Protein: x / Nitrite: x   Leuk Esterase: x / RBC: x / WBC x   Sq Epi: x / Non Sq Epi: x / Bacteria: x          RADIOLOGY & ADDITIONAL TESTS:  Results Reviewed:   Imaging Personally Reviewed:  Electrocardiogram Personally Reviewed:  Tele: *******************************  Navi Hernandez MD (PGY-1)  Internal Medicine  Contact via [employer] TEAMS  *******************************    PROGRESS NOTE:     Patient is a 89y old  Female who presents with a chief complaint of Shortness of breath (31 Aug 2023 16:36)      INTERVAL EVENTS: No acute overnight events.     SUBJECTIVE: Patient seen and examined at bedside. This morning, the patient is comfortable and doing well. improvements in SOB. Denies fevers, chills, N/V/D, chest pain, abdominal pain.    MEDICATIONS  (STANDING):  buPROPion XL (24-Hour) . 300 milliGRAM(s) Oral daily  enoxaparin Injectable 40 milliGRAM(s) SubCutaneous every 24 hours  escitalopram 10 milliGRAM(s) Oral daily  escitalopram 5 milliGRAM(s) Oral daily  exemestane 25 milliGRAM(s) Oral daily  furosemide    Tablet 20 milliGRAM(s) Oral daily  mirtazapine 7.5 milliGRAM(s) Oral at bedtime  pantoprazole    Tablet 40 milliGRAM(s) Oral before breakfast  predniSONE   Tablet 30 milliGRAM(s) Oral two times a day  simvastatin 20 milliGRAM(s) Oral at bedtime  tamsulosin 0.4 milliGRAM(s) Oral at bedtime  trimethoprim  160 mG/sulfamethoxazole 800 mG 1 Tablet(s) Oral daily    MEDICATIONS  (PRN):  acetaminophen     Tablet .. 650 milliGRAM(s) Oral every 6 hours PRN Temp greater or equal to 38C (100.4F), Mild Pain (1 - 3)  aluminum hydroxide/magnesium hydroxide/simethicone Suspension 30 milliLiter(s) Oral every 4 hours PRN Dyspepsia  melatonin 3 milliGRAM(s) Oral at bedtime PRN Insomnia  ondansetron Injectable 4 milliGRAM(s) IV Push every 8 hours PRN Nausea and/or Vomiting      CAPILLARY BLOOD GLUCOSE        I&O's Summary    30 Aug 2023 07:01  -  31 Aug 2023 07:00  --------------------------------------------------------  IN: 720 mL / OUT: 0 mL / NET: 720 mL    31 Aug 2023 07:01  -  01 Sep 2023 06:33  --------------------------------------------------------  IN: 720 mL / OUT: 0 mL / NET: 720 mL        PHYSICAL EXAM:  Vital Signs Last 24 Hrs  T(C): 36.3 (01 Sep 2023 05:25), Max: 36.8 (31 Aug 2023 11:19)  T(F): 97.3 (01 Sep 2023 05:25), Max: 98.3 (31 Aug 2023 11:19)  HR: 55 (01 Sep 2023 05:25) (55 - 80)  BP: 103/56 (01 Sep 2023 05:25) (100/61 - 129/68)  BP(mean): --  RR: 18 (01 Sep 2023 05:25) (18 - 18)  SpO2: 96% (01 Sep 2023 05:25) (87% - 98%)    Parameters below as of 01 Sep 2023 05:25  Patient On (Oxygen Delivery Method): room air        GENERAL: NAD, lying in bed comfortably  HEAD: Atraumatic, normocephalic  EYES: EOMI, PERRLA  ENT: Moist mucous membranes  NECK: Supple, no JVD  HEART: S1, S2, Regular rate and rhythm, no murmurs, rubs, or gallops  LUNGS: Unlabored respirations, clear to auscultation bilaterally, no crackles, wheezing, or rhonchi  ABDOMEN: Soft, nontender, nondistended, +BS  EXTREMITIES: +1 b/l pitting edema  NERVOUS SYSTEM:  A&Ox3, no focal deficits   SKIN: No rashes or lesions    LABS:                        9.6    4.49  )-----------( 198      ( 31 Aug 2023 07:07 )             30.1     08-31    136  |  100  |  28<H>  ----------------------------<  149<H>  4.8   |  26  |  1.08    Ca    9.0      31 Aug 2023 07:01  Phos  3.1     08-31  Mg     2.8     08-31    TPro  6.8  /  Alb  3.5  /  TBili  0.2  /  DBili  x   /  AST  13  /  ALT  23  /  AlkPhos  60  08-31          Urinalysis Basic - ( 31 Aug 2023 07:01 )    Color: x / Appearance: x / SG: x / pH: x  Gluc: 149 mg/dL / Ketone: x  / Bili: x / Urobili: x   Blood: x / Protein: x / Nitrite: x   Leuk Esterase: x / RBC: x / WBC x   Sq Epi: x / Non Sq Epi: x / Bacteria: x          RADIOLOGY & ADDITIONAL TESTS:  Results Reviewed:   Imaging Personally Reviewed:  Electrocardiogram Personally Reviewed:  Tele:

## 2023-09-01 NOTE — PROGRESS NOTE ADULT - PROBLEM SELECTOR PLAN 3
- C/w simvastatin

## 2023-09-01 NOTE — PROGRESS NOTE ADULT - PROVIDER SPECIALTY LIST ADULT
Cardiology
Cardiology
Heme/Onc
Internal Medicine
Pulmonology
Cardiology
Heme/Onc
Internal Medicine
Internal Medicine
Pulmonology
Pulmonology
Cardiology
Heme/Onc
Internal Medicine
Pulmonology
Pulmonology
Internal Medicine

## 2023-09-01 NOTE — PROGRESS NOTE ADULT - PROBLEM SELECTOR PLAN 2
- On exemestane, Ibrance at home  - Pt holding Ibrance since last week due to possible lung toxicity, as recommended by her outpatient oncologist  - C/w exemestane

## 2023-09-01 NOTE — PROGRESS NOTE ADULT - PROBLEM SELECTOR PLAN 1
- P/w one week h/o SOB, O2 in low 80s at outpatient office, transported to ED via EMS  - Afebrile, leukopenic on admission  - RVP, COVID negative  - Legionella, strep negative; MRSA pending  - Elevated D-dimer -> US of RLE unremarkable  - CTA -> Nonspecific ground-glass opacities, negative for PE  - Elevated BNP -> TTE revealed normal LVSF, mild MV stenosis, mild pulm HTN  -  IV Lasix 40mg daily stopped 8/28 as no improvement in resp status w/ diuresis  - c/w pred 30mg BID  - C/w supplemental O2 prn  - C/w strict I/Os  - Pulmonology consulted, f/u recs  - O2 sats in low-mid 90s on room air (8/30)  - Dessats to high 70s overnight.   - Walking O2 study today; pt w/ desats to 87%  - F/u TTE w/ bubble per pulm  - Start lasix 20 mg po qd per pulm - P/w one week h/o SOB, O2 in low 80s at outpatient office, transported to ED via EMS  - Afebrile, leukopenic on admission  - RVP, COVID negative  - Legionella, strep negative; MRSA pending  - Elevated D-dimer -> US of RLE unremarkable  - CTA -> Nonspecific ground-glass opacities, negative for PE  - Elevated BNP -> TTE revealed normal LVSF, mild MV stenosis, mild pulm HTN  -  IV Lasix 40mg daily stopped 8/28 as no improvement in resp status w/ diuresis  - c/w pred 30mg BID; pt to be dced w/ bactim PCP prophylaxis  - C/w supplemental O2 prn  - C/w strict I/Os  - Pulmonology consulted, f/u recs  - O2 sats in low-mid 90s on room air (8/30)  - Dessats to high 70s overnight.   - Walking O2 study today; pt w/ desats to 87%  - TTE w/ bubble w/o intracardiac shunt  - Start lasix 20 mg po qd per pulm

## 2023-09-01 NOTE — PROGRESS NOTE ADULT - SUBJECTIVE AND OBJECTIVE BOX
CHIEF COMPLAINT:    Interval Events:    NAEON. TTE yesterday without signs of shunting.    REVIEW OF SYSTEMS:  Constitutional: [ x ] negative [ ] fevers [ ] chills [ ] weight loss [ ] weight gain  HEENT: [ x ] negative [ ] dry eyes [ ] eye irritation [ ] postnasal drip [ ] nasal congestion  CV: [ x ] negative  [ ] chest pain [ ] orthopnea [ ] palpitations [ ] murmur  Resp: [ ] negative [ ] cough [ ] shortness of breath [ x ] dyspnea [ ] wheezing [ ] sputum [ ] hemoptysis  GI: [ x ] negative [ ] nausea [ ] vomiting [ ] diarrhea [ ] constipation [ ] abd pain [ ] dysphagia   : [ x ] negative [ ] dysuria [ ] nocturia [ ] hematuria [ ] increased urinary frequency  Musculoskeletal: [ x ] negative [ ] back pain [ ] myalgias [ ] arthralgias [ ] fracture  Skin: [ x ] negative [ ] rash [ ] itch  Neurological: [ ] negative [ ] headache [ ] dizziness [ ] syncope [ ] weakness [ ] numbness  Psychiatric: [ ] negative [ ] anxiety [ ] depression  Endocrine: [ ] negative [ ] diabetes [ ] thyroid problem  Hematologic/Lymphatic: [ ] negative [ ] anemia [ ] bleeding problem  Allergic/Immunologic: [ ] negative [ ] itchy eyes [ ] nasal discharge [ ] hives [ ] angioedema  [ x ] All other systems negative  [ ] Unable to assess ROS because ________      OBJECTIVE:  ICU Vital Signs Last 24 Hrs  T(C): 36.3 (01 Sep 2023 05:25), Max: 36.8 (31 Aug 2023 11:19)  T(F): 97.3 (01 Sep 2023 05:25), Max: 98.3 (31 Aug 2023 11:19)  HR: 55 (01 Sep 2023 05:25) (55 - 80)  BP: 103/56 (01 Sep 2023 05:25) (100/61 - 129/68)  BP(mean): --  ABP: --  ABP(mean): --  RR: 18 (01 Sep 2023 07:25) (18 - 18)  SpO2: 97% (01 Sep 2023 07:25) (87% - 98%)    O2 Parameters below as of 01 Sep 2023 07:25  Patient On (Oxygen Delivery Method): nasal cannula  O2 Flow (L/min): 1            08-31 @ 07:01  -  09-01 @ 07:00  --------------------------------------------------------  IN: 720 mL / OUT: 0 mL / NET: 720 mL      CAPILLARY BLOOD GLUCOSE          PHYSICAL EXAM:  General: Well appearing female sitting comfortably in bed  HEENT: Normal sclera  Lymph Nodes: No LAD  Respiratory: Bilateral faint crackles, more pronounced at the bases, no wheezing  Cardiovascular: Regular rate and rhythm no m/r/g  Abdomen: Nontender nondistended  Extremities: No peripheral edema  Skin: No rashes  Neurological: No appreciable neurologic deficits  Psychiatry: Appropriate mood and affect        HOSPITAL MEDICATIONS:  MEDICATIONS  (STANDING):  buPROPion XL (24-Hour) . 300 milliGRAM(s) Oral daily  enoxaparin Injectable 40 milliGRAM(s) SubCutaneous every 24 hours  escitalopram 10 milliGRAM(s) Oral daily  escitalopram 5 milliGRAM(s) Oral daily  exemestane 25 milliGRAM(s) Oral daily  furosemide    Tablet 20 milliGRAM(s) Oral daily  mirtazapine 7.5 milliGRAM(s) Oral at bedtime  pantoprazole    Tablet 40 milliGRAM(s) Oral before breakfast  predniSONE   Tablet 30 milliGRAM(s) Oral two times a day  simvastatin 20 milliGRAM(s) Oral at bedtime  tamsulosin 0.4 milliGRAM(s) Oral at bedtime  trimethoprim  160 mG/sulfamethoxazole 800 mG 1 Tablet(s) Oral daily    MEDICATIONS  (PRN):  acetaminophen     Tablet .. 650 milliGRAM(s) Oral every 6 hours PRN Temp greater or equal to 38C (100.4F), Mild Pain (1 - 3)  aluminum hydroxide/magnesium hydroxide/simethicone Suspension 30 milliLiter(s) Oral every 4 hours PRN Dyspepsia  melatonin 3 milliGRAM(s) Oral at bedtime PRN Insomnia  ondansetron Injectable 4 milliGRAM(s) IV Push every 8 hours PRN Nausea and/or Vomiting      LABS:                        9.6    4.49  )-----------( 198      ( 31 Aug 2023 07:07 )             30.1     Hgb Trend: 9.6<--, 10.1<--, 10.1<--, 9.7<--, 10.1<--  08-31    136  |  100  |  28<H>  ----------------------------<  149<H>  4.8   |  26  |  1.08    Ca    9.0      31 Aug 2023 07:01  Phos  3.1     08-31  Mg     2.8     08-31    TPro  6.8  /  Alb  3.5  /  TBili  0.2  /  DBili  x   /  AST  13  /  ALT  23  /  AlkPhos  60  08-31    Creatinine Trend: 1.08<--, 1.07<--, 1.19<--, 1.20<--, 1.17<--, 1.23<--    Urinalysis Basic - ( 31 Aug 2023 07:01 )    Color: x / Appearance: x / SG: x / pH: x  Gluc: 149 mg/dL / Ketone: x  / Bili: x / Urobili: x   Blood: x / Protein: x / Nitrite: x   Leuk Esterase: x / RBC: x / WBC x   Sq Epi: x / Non Sq Epi: x / Bacteria: x      Arterial Blood Gas:  08-31 @ 15:13  7.46/36/85/26/97.9/1.9  ABG lactate: --    Venous Blood Gas:  09-01 @ 07:18  --/--/--/--/--  VBG Lactate: 2.0      MICROBIOLOGY:       RADIOLOGY:  [ ] Reviewed and interpreted by me    PULMONARY FUNCTION TESTS:    EKG: CHIEF COMPLAINT:    Interval Events:    NAEON. TTE yesterday without signs of shunting. Feels good today, resting comfortably in chair, eating breakfast.    REVIEW OF SYSTEMS:  Constitutional: [ x ] negative [ ] fevers [ ] chills [ ] weight loss [ ] weight gain  HEENT: [ x ] negative [ ] dry eyes [ ] eye irritation [ ] postnasal drip [ ] nasal congestion  CV: [ x ] negative  [ ] chest pain [ ] orthopnea [ ] palpitations [ ] murmur  Resp: [ ] negative [ ] cough [ ] shortness of breath [ x ] dyspnea [ ] wheezing [ ] sputum [ ] hemoptysis  GI: [ x ] negative [ ] nausea [ ] vomiting [ ] diarrhea [ ] constipation [ ] abd pain [ ] dysphagia   : [ x ] negative [ ] dysuria [ ] nocturia [ ] hematuria [ ] increased urinary frequency  Musculoskeletal: [ x ] negative [ ] back pain [ ] myalgias [ ] arthralgias [ ] fracture  Skin: [ x ] negative [ ] rash [ ] itch  Neurological: [ ] negative [ ] headache [ ] dizziness [ ] syncope [ ] weakness [ ] numbness  Psychiatric: [ ] negative [ ] anxiety [ ] depression  Endocrine: [ ] negative [ ] diabetes [ ] thyroid problem  Hematologic/Lymphatic: [ ] negative [ ] anemia [ ] bleeding problem  Allergic/Immunologic: [ ] negative [ ] itchy eyes [ ] nasal discharge [ ] hives [ ] angioedema  [ x ] All other systems negative  [ ] Unable to assess ROS because ________      OBJECTIVE:  ICU Vital Signs Last 24 Hrs  T(C): 36.3 (01 Sep 2023 05:25), Max: 36.8 (31 Aug 2023 11:19)  T(F): 97.3 (01 Sep 2023 05:25), Max: 98.3 (31 Aug 2023 11:19)  HR: 55 (01 Sep 2023 05:25) (55 - 80)  BP: 103/56 (01 Sep 2023 05:25) (100/61 - 129/68)  BP(mean): --  ABP: --  ABP(mean): --  RR: 18 (01 Sep 2023 07:25) (18 - 18)  SpO2: 97% (01 Sep 2023 07:25) (87% - 98%)    O2 Parameters below as of 01 Sep 2023 07:25  Patient On (Oxygen Delivery Method): nasal cannula  O2 Flow (L/min): 1            08-31 @ 07:01  -  09-01 @ 07:00  --------------------------------------------------------  IN: 720 mL / OUT: 0 mL / NET: 720 mL      CAPILLARY BLOOD GLUCOSE          PHYSICAL EXAM:  General: Well appearing female sitting comfortably in bed  HEENT: Normal sclera  Lymph Nodes: No LAD  Respiratory: Bilateral faint crackles, more pronounced at the bases, no wheezing  Cardiovascular: Regular rate and rhythm no m/r/g  Abdomen: Nontender nondistended  Extremities: No peripheral edema  Skin: No rashes  Neurological: No appreciable neurologic deficits  Psychiatry: Appropriate mood and affect        HOSPITAL MEDICATIONS:  MEDICATIONS  (STANDING):  buPROPion XL (24-Hour) . 300 milliGRAM(s) Oral daily  enoxaparin Injectable 40 milliGRAM(s) SubCutaneous every 24 hours  escitalopram 10 milliGRAM(s) Oral daily  escitalopram 5 milliGRAM(s) Oral daily  exemestane 25 milliGRAM(s) Oral daily  furosemide    Tablet 20 milliGRAM(s) Oral daily  mirtazapine 7.5 milliGRAM(s) Oral at bedtime  pantoprazole    Tablet 40 milliGRAM(s) Oral before breakfast  predniSONE   Tablet 30 milliGRAM(s) Oral two times a day  simvastatin 20 milliGRAM(s) Oral at bedtime  tamsulosin 0.4 milliGRAM(s) Oral at bedtime  trimethoprim  160 mG/sulfamethoxazole 800 mG 1 Tablet(s) Oral daily    MEDICATIONS  (PRN):  acetaminophen     Tablet .. 650 milliGRAM(s) Oral every 6 hours PRN Temp greater or equal to 38C (100.4F), Mild Pain (1 - 3)  aluminum hydroxide/magnesium hydroxide/simethicone Suspension 30 milliLiter(s) Oral every 4 hours PRN Dyspepsia  melatonin 3 milliGRAM(s) Oral at bedtime PRN Insomnia  ondansetron Injectable 4 milliGRAM(s) IV Push every 8 hours PRN Nausea and/or Vomiting      LABS:                        9.6    4.49  )-----------( 198      ( 31 Aug 2023 07:07 )             30.1     Hgb Trend: 9.6<--, 10.1<--, 10.1<--, 9.7<--, 10.1<--  08-31    136  |  100  |  28<H>  ----------------------------<  149<H>  4.8   |  26  |  1.08    Ca    9.0      31 Aug 2023 07:01  Phos  3.1     08-31  Mg     2.8     08-31    TPro  6.8  /  Alb  3.5  /  TBili  0.2  /  DBili  x   /  AST  13  /  ALT  23  /  AlkPhos  60  08-31    Creatinine Trend: 1.08<--, 1.07<--, 1.19<--, 1.20<--, 1.17<--, 1.23<--    Urinalysis Basic - ( 31 Aug 2023 07:01 )    Color: x / Appearance: x / SG: x / pH: x  Gluc: 149 mg/dL / Ketone: x  / Bili: x / Urobili: x   Blood: x / Protein: x / Nitrite: x   Leuk Esterase: x / RBC: x / WBC x   Sq Epi: x / Non Sq Epi: x / Bacteria: x      Arterial Blood Gas:  08-31 @ 15:13  7.46/36/85/26/97.9/1.9  ABG lactate: --    Venous Blood Gas:  09-01 @ 07:18  --/--/--/--/--  VBG Lactate: 2.0      MICROBIOLOGY:       RADIOLOGY:  [ ] Reviewed and interpreted by me    PULMONARY FUNCTION TESTS:    EKG:

## 2023-09-01 NOTE — PROGRESS NOTE ADULT - ASSESSMENT
# acute hypoxemic respiratory failure  # abnormal CT chest  Patient on ibrance and exemestane therapy as an outpatient. Ibrance stopped by heme/onc. Presents with 1 week history of dyspnea without cough, fevers or pleuritic pain.   with normal renal function. Echocardiogram with normal LV systolic function, no significant diastolic issues. Mild LVH and mild MS, no PH.  CT of the chest without signs of PE, DVT study negative. CT shows diffuse widespread GGOs, no honeycombing or bronchiectasis, possibly mild emphysematous changes. Apically predominant but present in all lobes, no peripheral or central sparing.  Patient on 2L O2 via NC currently, not acutely struggling to breathe.  Picture is concerning for palbociclib-associated acute pneumonitis, however other causes could be pulmonary edema or diffuse infection.  Her RVP was negative, no signs or symptoms of infection.  - hold ibrance indefinitely  - OOB as able  - incentive spirometry  - wean supplemental O2 for goal SpO2 >92%  - recommend maintenance Lasix 20mg PO  - continue prednisone 30mg BID until , then 20mg BID until pulmonary follow up  - continue Bactrim for PJP ppx while patient is still on high doses of steroids  - patient to be on O2 while ambulating for now. This will need to be reevaluated in the outpatient setting  - TTE without signs of intracardiac or intrapulmonary shunt    Needs outpatient pulmonary follow up upon discharge at [address] [place] [address], [address] ([phone]).  Please e-mail [email or web address] to make an appointment for the patient.  Please include the name, , and a phone number for you and the patient. Appointment with Dr Fraser.    INCOMPLETE NOTE # acute hypoxemic respiratory failure  # abnormal CT chest  Patient on ibrance and exemestane therapy as an outpatient. Ibrance stopped by heme/onc. Presents with 1 week history of dyspnea without cough, fevers or pleuritic pain.   with normal renal function. Echocardiogram with normal LV systolic function, no significant diastolic issues. Mild LVH and mild MS, no PH.  CT of the chest without signs of PE, DVT study negative. CT shows diffuse widespread GGOs, no honeycombing or bronchiectasis, possibly mild emphysematous changes. Apically predominant but present in all lobes, no peripheral or central sparing.  Patient on 2L O2 via NC currently, not acutely struggling to breathe.  Picture is concerning for palbociclib-associated acute pneumonitis, however other causes could be pulmonary edema or diffuse infection.  Her RVP was negative, no signs or symptoms of infection.  - hold ibrance indefinitely  - OOB as able  - incentive spirometry  - wean supplemental O2 for goal SpO2 >92%  - recommend maintenance Lasix 20mg PO  - continue prednisone 30mg BID until , then 20mg BID until pulmonary follow up  - continue Bactrim for PJP ppx while patient is still on high doses of steroids  - patient to be on O2 while ambulating for now. This will need to be reevaluated in the outpatient setting  - TTE without signs of intracardiac or intrapulmonary shunt    Needs outpatient pulmonary follow up upon discharge at [address] [place] [address], [address] ([phone]).  Please e-mail [email or web address] to make an appointment for the patient.  Please include the name, , and a phone number for you and the patient. Appointment with Dr Fraser.

## 2023-09-01 NOTE — DISCHARGE NOTE NURSING/CASE MANAGEMENT/SOCIAL WORK - PATIENT PORTAL LINK FT
You can access the FollowMyHealth Patient Portal offered by [facility] by registering at the following website: [email or web address]. By joining [employer]’s FollowMyHealth portal, you will also be able to view your health information using other applications (apps) compatible with our system.

## 2023-09-01 NOTE — PROGRESS NOTE ADULT - ATTENDING COMMENTS
89 yo F w/ PMH of breast cancer on Ibrance (Palbociclib) and exemestane (x2yrs)who was sent from oncology when found to be hypoxemic. She states over the last few weeks she has had progressive fatigue with exertion which progressed to dyspnea in the week prior. She has a home pulse oximeter and found her SpO2 to be in the 80s so she went to her doctor for evaluation. She has some fatigue but no cough, no wheezing, no fevers, no other complaints.     She has diffuse fine crackles on exam, but minimal LE edema. She is awake, alert, answering questions appropriately. She saturates 89% on room air at rest while recumbent. SpO2 improves to 94% on 2L NC. Labs sig for elevated BNP to 599. TTE w/ normal LV systolic function, no significant diastolic issues. Mild LVH and mild MS, no PH. CT of the chest with widespread GGOs.  Former smoker x 10yrs (0.5pk) quit >20yrs ago.    Her presentation is concerning for possible drug induced pneumonitis (known side effect of Ibrance).  Ibrance was held week prior by Oncologist due to concern for the same. She has been diuresed without improvement - O2Sat 87% upon entering room on 1L NC at rest prior to steroids - improved today to 96-98% on RA.    - cont Prednisone 30mg BID  - to remain off Ibrance  - plan as well as risks/benefits discussed with patient and her son at length at bedside  - OOB to chair/ambulate/PT/OT  - incentive spirometry while hospitalized  Planning discharge to [facility] Vizcarra MD .
89 yo F w/ PMH of breast cancer on Ibrance (Palbociclib) and exemestane (x2yrs)who was sent from oncology when found to be hypoxemic. She states over the last few weeks she has had progressive fatigue with exertion which progressed to dyspnea in the week prior. She has a home pulse oximeter and found her SpO2 to be in the 80s so she went to her doctor for evaluation. She has some fatigue but no cough, no wheezing, no fevers, no other complaints.     She has diffuse fine crackles on exam, but minimal LE edema. She is awake, alert, answering questions appropriately. She saturates 89% on room air at rest while recumbent. SpO2 improves to 94% on 2L NC. Labs sig for elevated BNP to 599. TTE w/ normal LV systolic function, no significant diastolic issues. Mild LVH and mild MS, no PH. CT of the chest with widespread GGOs.  Former smoker x 10yrs (0.5pk) quit >20yrs ago.    Her presentation is concerning for possible drug induced pneumonitis (known side effect of Ibrance).  Ibrance was held week prior by Oncologist due to concern for the same. She has been diuresed without improvement - O2Sat 87% upon entering room on 1L NC at rest.    - start Prednisone 30mg BID  - off Ibrance  - plan as well as risks/benefits discussed with patient and her son at length at bedside  - wean O2 to maintain O2Sat > 92%  - OOB to chair/ambulate/PT/OT  - incentive spirometry while hospitalized    Vijaya Vizcarra MD .
88 yo F w/ PMH of breast cancer on Ibrance (Palbociclib) and exemestane (x2yrs)who was sent from oncology when found to be hypoxemic. She states over the last few weeks she has had progressive fatigue with exertion which progressed to dyspnea in the week prior. She has a home pulse oximeter and found her SpO2 to be in the 80s so she went to her doctor for evaluation. She has some fatigue but no cough, no wheezing, no fevers, no other complaints.  Labs sig for elevated BNP to 599 initially. TTE w/ normal LV systolic function, no significant diastolic issues. Mild LVH and mild MS, PASP 39. CT of the chest with widespread GGOs.  Former smoker x 10yrs (0.5pk) quit >20yrs ago. Her presentation is concerning for possible drug induced pneumonitis (known side effect of Ibrance).  Ibrance was held week prior by Oncologist due to concern for the same. She has been diuresed without improvement. Steroids started on 8/29 and has improved her resting O2Sat and no longer requires suppl O2 at rest however continues to desat to low 80s with ambulation.     - cont Prednisone 30mg BID for total of 7 days and then decrease to 20mg BID until she follows with her Pulmonologist Dr. Fraser  - should be scheduled for outpatient follow-up with Dr. Fraser (alternatively one time appt with myself if he is not available)  - start Bactrim for PJP ppx  - give Lasix 40mg IV x 1 for today and start Lasix 20mg PO daily  - echo today negative for intracardiac shunt  - to remain off Ibrance  - OOB to chair/ambulate/PT/OT  - continue incentive spirometry  Planning discharge to [facility] Vizcarra MD .
88 yo F w/ PMH of breast cancer on Ibrance (Palbociclib) and exemestane (x2yrs)who was sent from oncology when found to be hypoxemic. She states over the last few weeks she has had progressive fatigue with exertion which progressed to dyspnea in the week prior. She has a home pulse oximeter and found her SpO2 to be in the 80s so she went to her doctor for evaluation. She has some fatigue but no cough, no wheezing, no fevers, no other complaints.     She has diffuse fine crackles on exam, but minimal LE edema. She is awake, alert, answering questions appropriately. She saturates 89% on room air at rest while recumbent. SpO2 improves to 94% on 2L NC. Labs sig for elevated BNP to 599. TTE w/ normal LV systolic function, no significant diastolic issues. Mild LVH and mild MS, no PH. CT of the chest with widespread GGOs.  Former smoker x 10yrs (0.5pk) quit >20yrs ago.    Her presentation is concerning for possible drug induced pneumonitis (known side effect of Ibrance).  Ibrance was held week prior by Oncologist due to concern for the same. She has been diuresed without improvement - O2Sat 87% upon entering room on 1L NC at rest.    - start Prednisone 30mg BID  - off Ibrance  - plan as well as risks/benefits discussed with patient and her son at length at bedside  - wean O2 to maintain O2Sat > 92%  - OOB to chair/ambulate/PT/OT  - incentive spirometry while hospitalized    Vijaya Vizcarra MD
Patient seen and evaluated with son at bedside. Patient states feels same as admission, no better, no worse. No cough, no LE edema.   Brought down supplemental O2 to 1L, was 92%, brought down to RA and after 4-5 minutes desaturated to 83-85%, went back up to 1L NC.  Exam: +crackle RLL.   Unofficial POCUS lung: B-line, with to my eye irregular pleural surface on vascular probe.     Labs reviewed: Rising BUN    #Hypoxic respiratory failure  -interstitial edema vs. pneumonitis.   -given lack of meaningful improvement on diuretics (net negative 800cc yesterday, unclear if accurate I/O prior) I suspect moreso pneumonitis  -appreciate pulm, will decide re: 24-48 hours more of diuretic challenge vs. steroids.    rest as above
-Pulm, cards, heme/onc recs appreciated  -C/w IV lasix 40mg daily for now. Monitor response. If no/minimal improvement and infectious w/u remains negative, then will consider steroids for possible Ibrance associated pneumonitis    Rest of care per plan above  D/W Dr Mcnulty HS2
Patient seen and evaluated. No complaints. low to mid 90's at rest. TTE reviewed, no intracardiac shunt noted on bubble study. To be discharged on steroid taper as per pulmonology note. To go to [facility]. Will follow up with pulmonology as outpatient for titration of meds. To continue PCP proph with bactrim.   d/c off imbranz.  d/c planning to [facility] 33 minutes.
-Pulm, cards, heme/onc recs appreciated. -D/w patient and her daughter in law at bedside.   -C/w IV lasix increase to 40mg daily for now. Monitor response. If no/minimal improvement and infectious w/u remains negative, then we may need to consider steroids for possible Ibranc associated pneumonitis.   -Patient reports intermittent urinary retention episodes at home and in the hospital. She is amenable to trying Flomax. Started.
Patient seen and evaluated. Overnight noted desaturations per patient and RN. After ambulation from bathroom, was saturating 83% on RA, quickly kaylyn to 96% with deep breaths at rest. States Continues to feel better. Today is 96% on RA sitting in bed at rest. No other complaints.     Labs personally reviewed: Stable creatinine. Stable leukopenia.    #Hypoxic respiratory failure  -in setting of chemo-induced pneumonitis.   -improving on steroids, continue current dose until 9/5, then transiiton to 20mg bid until sees pulmonary as outpatient.  -per pulm TTE with bubble to look for shunting.     D/C to [facility] when accepted.      rest as above
Patient seen and evaluated. States Continues to feel better. Today is 96% on RA sitting in bed at rest. No other complaints.     Labs personally reviewed: Stable creatinine. STable leukopenia.    #Hypoxic respiratory failure  -in setting of chemo-induced pneumonitis.   -improving on steroids, continue current dose.   -d/c when able to ambulate off of O2, decision to be made by patient re: :[facility] vs. home (lives alone). CM aware        rest as above
Patient seen and evaluated. States symptomstically feeling improved 8/29 than prior. 92% on 1L NC. Worekd with PT    Labs personally reviewed: Stable creatinine. STable leukopenia.    #Hypoxic respiratory failure  -interstitial edema vs. pneumonitis.   -given lack of meaningful improvement on diuretics (net negative 800cc yesterday, unclear if accurate I/O prior) I suspect moreso pneumonitis  -appreciate pulm prednisone 30mg bid started 8/28. Monitor for improvement.       rest as above
89F w/ PMH of HLD and breast cancer on oral chemotherapy presents w/ shortness of breath, admitted for AHRF possibly 2/2 ADHF vs. Atypical PNA vs. Pneumonitis 2/2 Ibrance     - pulmonology and cardiology following  - plan for trial of diuresis for pulmonary edema  - if no improvement, will consider course of steroids Ibrance pneumonitis. F/u pulm recs  - Supplemental oxygen as needed, wean down as tolerated    Rest of care per plan above  D/W Dr Arredondo

## 2023-09-01 NOTE — DISCHARGE NOTE NURSING/CASE MANAGEMENT/SOCIAL WORK - NSDCFUADDAPPT_GEN_ALL_CORE_FT
APPTS ARE READY TO BE MADE: [x ] YES  Best Family or Patient Contact (if needed): [phone]    Additional Information about above appointments (if needed):     1: PCP  2: Pulm  3: Oncology     Other comments or requests:

## 2023-09-05 ENCOUNTER — APPOINTMENT (OUTPATIENT)
Dept: PULMONOLOGY | Facility: CLINIC | Age: 88
End: 2023-09-05

## 2023-09-24 ENCOUNTER — EMERGENCY (EMERGENCY)
Facility: HOSPITAL | Age: 88
LOS: 1 days | Discharge: NURSING, REHAB OR LONG-TERM CARE | End: 2023-09-24
Attending: EMERGENCY MEDICINE
Payer: MEDICARE

## 2023-09-24 VITALS
WEIGHT: 195.11 LBS | TEMPERATURE: 97 F | OXYGEN SATURATION: 95 % | DIASTOLIC BLOOD PRESSURE: 72 MMHG | SYSTOLIC BLOOD PRESSURE: 126 MMHG | HEIGHT: 65 IN | HEART RATE: 73 BPM | RESPIRATION RATE: 22 BRPM

## 2023-09-24 VITALS
DIASTOLIC BLOOD PRESSURE: 69 MMHG | OXYGEN SATURATION: 94 % | TEMPERATURE: 98 F | SYSTOLIC BLOOD PRESSURE: 116 MMHG | RESPIRATION RATE: 16 BRPM | HEART RATE: 76 BPM

## 2023-09-24 DIAGNOSIS — Z98.89 OTHER SPECIFIED POSTPROCEDURAL STATES: Chronic | ICD-10-CM

## 2023-09-24 DIAGNOSIS — Z98.890 OTHER SPECIFIED POSTPROCEDURAL STATES: Chronic | ICD-10-CM

## 2023-09-24 DIAGNOSIS — Z96.649 PRESENCE OF UNSPECIFIED ARTIFICIAL HIP JOINT: Chronic | ICD-10-CM

## 2023-09-24 PROCEDURE — 73140 X-RAY EXAM OF FINGER(S): CPT

## 2023-09-24 PROCEDURE — 99283 EMERGENCY DEPT VISIT LOW MDM: CPT | Mod: 25

## 2023-09-24 PROCEDURE — 99285 EMERGENCY DEPT VISIT HI MDM: CPT | Mod: 57

## 2023-09-24 PROCEDURE — 73140 X-RAY EXAM OF FINGER(S): CPT | Mod: 26,RT

## 2023-09-24 NOTE — ED PROVIDER NOTE - CARE PROVIDERS DIRECT ADDRESSES
[email or web address]

## 2023-09-24 NOTE — ED ADULT NURSE REASSESSMENT NOTE - NS ED NURSE REASSESS COMMENT FT1
Rt 4th finger splinted by Dr Disla. Patient reports improvement, pending transport to go back to [facility] rehab. Patient instructed to follow up with hand specialist in 1-2 days and verbalized understanding.
Dislocation reduction performed by Dr Disla at bedside and procedure tolerated well. Ice pack placed in the rt hand/fingers, repeat xray performed and fingers look aligned.

## 2023-09-24 NOTE — ED PROVIDER NOTE - NSFOLLOWUPINSTRUCTIONS_ED_ALL_ED_FT
Follow up with hand surgeon in 1-2 weeks.    Keep the splint on as much as possible.     Return to the ED for worse pain or other emergent concerns.    Take acetaminophen 975mg every 6 hours as needed for pain.

## 2023-09-24 NOTE — ED PROVIDER NOTE - PATIENT PORTAL LINK FT
You can access the FollowMyHealth Patient Portal offered by [facility] by registering at the following website: [email or web address]. By joining [employer]’s FollowMyHealth portal, you will also be able to view your health information using other applications (apps) compatible with our system.

## 2023-09-24 NOTE — ED ADULT NURSE NOTE - NS ED NRS NURSING HOMES
[Healthy eating counseling provided] : healthy eating [Activity counseling provided] : activity [Engage in a relaxing activity] : Engage in a relaxing activity [facility]

## 2023-09-24 NOTE — ED ADULT TRIAGE NOTE - BP NONINVASIVE SYSTOLIC (MM HG)
Spoke to father with appointment reminder for 4/8 at 1:30pm with Dr. Moctezuma in cleft clinic.    126

## 2023-09-24 NOTE — ED ADULT NURSE NOTE - NSFALLHARMRISKINTERV_ED_ALL_ED
Assistance OOB with selected safe patient handling equipment if applicable/Assistance with ambulation/Communicate risk of Fall with Harm to all staff, patient, and family/Encourage patient to sit up slowly, dangle for a short time, stand at bedside before walking/Monitor gait and stability/Orthostatic vital signs/Provide patient with walking aids/Provide visual cue: red socks, yellow wristband, yellow gown, etc/Reinforce activity limits and safety measures with patient and family/Bed in lowest position, wheels locked, appropriate side rails in place/Call bell, personal items and telephone in reach/Instruct patient to call for assistance before getting out of bed/chair/stretcher/Non-slip footwear applied when patient is off stretcher/[place] to call system/Physically safe environment - no spills, clutter or unnecessary equipment/Purposeful Proactive Rounding/Room/bathroom lighting operational, light cord in reach

## 2023-09-24 NOTE — ED PROVIDER NOTE - PROGRESS NOTE DETAILS
Acerra:  reduced the dislocated finger of the 4th digit. Acerra:  pt feels better after reduction.  still with limited range of motion of the finger.  likely due to inflammation due to dislocation lasted over 24 hours.  discussed with orthopedic resident.  pt can be placed in finger splint and follow up with Dr. Angel.

## 2023-09-24 NOTE — ED PROVIDER NOTE - CLINICAL SUMMARY MEDICAL DECISION MAKING FREE TEXT BOX
Finger dislocation.  no evidence of other injury from the fall.  will reduce the fracture, rexray and reassess.

## 2023-09-24 NOTE — ED PROVIDER NOTE - CARE PROVIDER_API CALL
Ariane Angel  Surgery of [facility] Hand  [address], [address]  [place] [address]  Phone: [phone]  Fax: [phone]  Follow Up Time: 7-10 Days

## 2023-09-24 NOTE — ED PROVIDER NOTE - OBJECTIVE STATEMENT
Attendin-year-old female presents with right fourth finger pain and swelling status post fall yesterday.  Patient states that this is the only injury from the fall.  There was no head trauma.  She has no pain at this time.  She took acetaminophen for the pain yesterday but took no medication today for the pain or discomfort.  She has swelling to the proximal interphalangeal joint of the fourth digit on the right hand.  X-ray done at [facility] prior to patient's arrival this morning shows a dislocation at the PIP joint.

## 2023-10-14 ENCOUNTER — INPATIENT (INPATIENT)
Facility: HOSPITAL | Age: 88
LOS: 6 days | Discharge: NURSING, REHAB OR LONG-TERM CARE | DRG: 189 | End: 2023-10-21
Attending: INTERNAL MEDICINE | Admitting: INTERNAL MEDICINE
Payer: MEDICARE

## 2023-10-14 VITALS
OXYGEN SATURATION: 76 % | SYSTOLIC BLOOD PRESSURE: 100 MMHG | DIASTOLIC BLOOD PRESSURE: 60 MMHG | RESPIRATION RATE: 22 BRPM | WEIGHT: 149.91 LBS | HEIGHT: 65 IN | HEART RATE: 121 BPM

## 2023-10-14 DIAGNOSIS — Z98.89 OTHER SPECIFIED POSTPROCEDURAL STATES: Chronic | ICD-10-CM

## 2023-10-14 DIAGNOSIS — Z98.890 OTHER SPECIFIED POSTPROCEDURAL STATES: Chronic | ICD-10-CM

## 2023-10-14 DIAGNOSIS — J96.01 ACUTE RESPIRATORY FAILURE WITH HYPOXIA: ICD-10-CM

## 2023-10-14 DIAGNOSIS — Z96.649 PRESENCE OF UNSPECIFIED ARTIFICIAL HIP JOINT: Chronic | ICD-10-CM

## 2023-10-14 LAB
ALBUMIN SERPL ELPH-MCNC: 4.1 G/DL — SIGNIFICANT CHANGE UP (ref 3.3–5)
ALP SERPL-CCNC: 114 U/L — SIGNIFICANT CHANGE UP (ref 40–120)
ALT FLD-CCNC: 65 U/L — HIGH (ref 10–45)
ANION GAP SERPL CALC-SCNC: 15 MMOL/L — SIGNIFICANT CHANGE UP (ref 5–17)
ANISOCYTOSIS BLD QL: SLIGHT — SIGNIFICANT CHANGE UP
APPEARANCE UR: CLEAR — SIGNIFICANT CHANGE UP
AST SERPL-CCNC: 45 U/L — HIGH (ref 10–40)
BACTERIA # UR AUTO: ABNORMAL
BASE EXCESS BLDV CALC-SCNC: 5.1 MMOL/L — HIGH (ref -2–3)
BASOPHILS # BLD AUTO: 0 K/UL — SIGNIFICANT CHANGE UP (ref 0–0.2)
BASOPHILS NFR BLD AUTO: 0 % — SIGNIFICANT CHANGE UP (ref 0–2)
BILIRUB SERPL-MCNC: 0.5 MG/DL — SIGNIFICANT CHANGE UP (ref 0.2–1.2)
BILIRUB UR-MCNC: NEGATIVE — SIGNIFICANT CHANGE UP
BUN SERPL-MCNC: 15 MG/DL — SIGNIFICANT CHANGE UP (ref 7–23)
CA-I SERPL-SCNC: 1.13 MMOL/L — LOW (ref 1.15–1.33)
CALCIUM SERPL-MCNC: 9.1 MG/DL — SIGNIFICANT CHANGE UP (ref 8.4–10.5)
CHLORIDE BLDV-SCNC: 99 MMOL/L — SIGNIFICANT CHANGE UP (ref 96–108)
CHLORIDE SERPL-SCNC: 98 MMOL/L — SIGNIFICANT CHANGE UP (ref 96–108)
CO2 BLDV-SCNC: 32 MMOL/L — HIGH (ref 22–26)
CO2 SERPL-SCNC: 26 MMOL/L — SIGNIFICANT CHANGE UP (ref 22–31)
COLOR SPEC: COLORLESS — SIGNIFICANT CHANGE UP
CREAT SERPL-MCNC: 0.91 MG/DL — SIGNIFICANT CHANGE UP (ref 0.5–1.3)
DIFF PNL FLD: NEGATIVE — SIGNIFICANT CHANGE UP
EGFR: 60 ML/MIN/1.73M2 — SIGNIFICANT CHANGE UP
ELLIPTOCYTES BLD QL SMEAR: SLIGHT — SIGNIFICANT CHANGE UP
EOSINOPHIL # BLD AUTO: 0 K/UL — SIGNIFICANT CHANGE UP (ref 0–0.5)
EOSINOPHIL NFR BLD AUTO: 0 % — SIGNIFICANT CHANGE UP (ref 0–6)
EPI CELLS # UR: 0 /HPF — SIGNIFICANT CHANGE UP
GAS PNL BLDV: 135 MMOL/L — LOW (ref 136–145)
GAS PNL BLDV: SIGNIFICANT CHANGE UP
GAS PNL BLDV: SIGNIFICANT CHANGE UP
GLUCOSE BLDV-MCNC: 132 MG/DL — HIGH (ref 70–99)
GLUCOSE SERPL-MCNC: 127 MG/DL — HIGH (ref 70–99)
GLUCOSE UR QL: NEGATIVE — SIGNIFICANT CHANGE UP
HCO3 BLDV-SCNC: 31 MMOL/L — HIGH (ref 22–29)
HCT VFR BLD CALC: 36.5 % — SIGNIFICANT CHANGE UP (ref 34.5–45)
HCT VFR BLDA CALC: 37 % — SIGNIFICANT CHANGE UP (ref 34.5–46.5)
HGB BLD CALC-MCNC: 12.4 G/DL — SIGNIFICANT CHANGE UP (ref 11.7–16.1)
HGB BLD-MCNC: 11.6 G/DL — SIGNIFICANT CHANGE UP (ref 11.5–15.5)
KETONES UR-MCNC: NEGATIVE — SIGNIFICANT CHANGE UP
LACTATE BLDV-MCNC: 3.1 MMOL/L — HIGH (ref 0.5–2)
LEUKOCYTE ESTERASE UR-ACNC: ABNORMAL
LYMPHOCYTES # BLD AUTO: 0.77 K/UL — LOW (ref 1–3.3)
LYMPHOCYTES # BLD AUTO: 4.4 % — LOW (ref 13–44)
MACROCYTES BLD QL: SLIGHT — SIGNIFICANT CHANGE UP
MANUAL SMEAR VERIFICATION: SIGNIFICANT CHANGE UP
MCHC RBC-ENTMCNC: 30.2 PG — SIGNIFICANT CHANGE UP (ref 27–34)
MCHC RBC-ENTMCNC: 31.8 GM/DL — LOW (ref 32–36)
MCV RBC AUTO: 95.1 FL — SIGNIFICANT CHANGE UP (ref 80–100)
METAMYELOCYTES # FLD: 1.7 % — HIGH (ref 0–0)
MONOCYTES # BLD AUTO: 0.45 K/UL — SIGNIFICANT CHANGE UP (ref 0–0.9)
MONOCYTES NFR BLD AUTO: 2.6 % — SIGNIFICANT CHANGE UP (ref 2–14)
NEUTROPHILS # BLD AUTO: 15.92 K/UL — HIGH (ref 1.8–7.4)
NEUTROPHILS NFR BLD AUTO: 90.4 % — HIGH (ref 43–77)
NEUTS BAND # BLD: 0.9 % — SIGNIFICANT CHANGE UP (ref 0–8)
NITRITE UR-MCNC: POSITIVE
NT-PROBNP SERPL-SCNC: 656 PG/ML — HIGH (ref 0–300)
OVALOCYTES BLD QL SMEAR: SLIGHT — SIGNIFICANT CHANGE UP
PCO2 BLDV: 50 MMHG — HIGH (ref 39–42)
PH BLDV: 7.4 — SIGNIFICANT CHANGE UP (ref 7.32–7.43)
PH UR: 8 — SIGNIFICANT CHANGE UP (ref 5–8)
PLAT MORPH BLD: ABNORMAL
PLATELET # BLD AUTO: 188 K/UL — SIGNIFICANT CHANGE UP (ref 150–400)
PO2 BLDV: 29 MMHG — SIGNIFICANT CHANGE UP (ref 25–45)
POIKILOCYTOSIS BLD QL AUTO: SLIGHT — SIGNIFICANT CHANGE UP
POLYCHROMASIA BLD QL SMEAR: SLIGHT — SIGNIFICANT CHANGE UP
POTASSIUM BLDV-SCNC: 4.8 MMOL/L — SIGNIFICANT CHANGE UP (ref 3.5–5.1)
POTASSIUM SERPL-MCNC: 4.4 MMOL/L — SIGNIFICANT CHANGE UP (ref 3.5–5.3)
POTASSIUM SERPL-SCNC: 4.4 MMOL/L — SIGNIFICANT CHANGE UP (ref 3.5–5.3)
PROT SERPL-MCNC: 6.6 G/DL — SIGNIFICANT CHANGE UP (ref 6–8.3)
PROT UR-MCNC: NEGATIVE — SIGNIFICANT CHANGE UP
RBC # BLD: 3.84 M/UL — SIGNIFICANT CHANGE UP (ref 3.8–5.2)
RBC # FLD: 15.9 % — HIGH (ref 10.3–14.5)
RBC BLD AUTO: ABNORMAL
RBC CASTS # UR COMP ASSIST: 1 /HPF — SIGNIFICANT CHANGE UP (ref 0–4)
SAO2 % BLDV: 37.6 % — LOW (ref 67–88)
SCHISTOCYTES BLD QL AUTO: SLIGHT — SIGNIFICANT CHANGE UP
SODIUM SERPL-SCNC: 139 MMOL/L — SIGNIFICANT CHANGE UP (ref 135–145)
SP GR SPEC: 1.02 — SIGNIFICANT CHANGE UP (ref 1.01–1.02)
TROPONIN T, HIGH SENSITIVITY RESULT: 37 NG/L — SIGNIFICANT CHANGE UP (ref 0–51)
UROBILINOGEN FLD QL: NEGATIVE — SIGNIFICANT CHANGE UP
WBC # BLD: 17.44 K/UL — HIGH (ref 3.8–10.5)
WBC # FLD AUTO: 17.44 K/UL — HIGH (ref 3.8–10.5)
WBC UR QL: 4 /HPF — SIGNIFICANT CHANGE UP (ref 0–5)

## 2023-10-14 PROCEDURE — 71045 X-RAY EXAM CHEST 1 VIEW: CPT | Mod: 26

## 2023-10-14 PROCEDURE — 71275 CT ANGIOGRAPHY CHEST: CPT | Mod: 26,MA

## 2023-10-14 PROCEDURE — 99291 CRITICAL CARE FIRST HOUR: CPT | Mod: GC

## 2023-10-14 PROCEDURE — 70450 CT HEAD/BRAIN W/O DYE: CPT | Mod: 26,MA

## 2023-10-14 RX ORDER — CEFTRIAXONE 500 MG/1
1000 INJECTION, POWDER, FOR SOLUTION INTRAMUSCULAR; INTRAVENOUS ONCE
Refills: 0 | Status: COMPLETED | OUTPATIENT
Start: 2023-10-14 | End: 2023-10-14

## 2023-10-14 RX ORDER — FUROSEMIDE 40 MG
40 TABLET ORAL ONCE
Refills: 0 | Status: COMPLETED | OUTPATIENT
Start: 2023-10-14 | End: 2023-10-14

## 2023-10-14 RX ADMIN — Medication 40 MILLIGRAM(S): at 20:18

## 2023-10-14 RX ADMIN — CEFTRIAXONE 100 MILLIGRAM(S): 500 INJECTION, POWDER, FOR SOLUTION INTRAMUSCULAR; INTRAVENOUS at 22:12

## 2023-10-14 NOTE — ED ADULT TRIAGE NOTE - GLASGOW COMA SCALE: BEST MOTOR RESPONSE, MLM
Problem: Skin Integrity:  Goal: Will show no infection signs and symptoms  Description: Will show no infection signs and symptoms  9/19/2020 0930 by Kala Hatchet, RN  Outcome: Ongoing  9/18/2020 2316 by Ángel Bacon RN  Outcome: Ongoing  Goal: Absence of new skin breakdown  Description: Absence of new skin breakdown  9/19/2020 0930 by Kala Hatchet, RN  Outcome: Ongoing  9/18/2020 2316 by Ángel Bacon RN  Outcome: Ongoing     Problem: Falls - Risk of:  Goal: Will remain free from falls  Description: Will remain free from falls  9/19/2020 0930 by Kala Hatchet, RN  Outcome: Ongoing  9/18/2020 2316 by Ángel Bacon RN  Outcome: Ongoing  Goal: Absence of physical injury  Description: Absence of physical injury  9/19/2020 0930 by Kala Hatchet, RN  Outcome: Ongoing  9/18/2020 2316 by Ángle Bacon RN  Outcome: Ongoing     Problem: Bleeding:  Goal: Will show no signs and symptoms of excessive bleeding  Description: Will show no signs and symptoms of excessive bleeding  9/19/2020 0930 by Kala Hatchet, RN  Outcome: Ongoing  9/18/2020 2316 by Ángel Bacon RN  Outcome: Ongoing     Problem: Pain:  Goal: Pain level will decrease  Description: Pain level will decrease  9/19/2020 0930 by Kala Hatchet, RN  Outcome: Ongoing  9/18/2020 2316 by Ángel Bacon RN  Outcome: Ongoing  Goal: Control of acute pain  Description: Control of acute pain  9/19/2020 0930 by Kala Hatchet, RN  Outcome: Ongoing  9/18/2020 2316 by Ángel Bacon RN  Outcome: Ongoing  Goal: Control of chronic pain  Description: Control of chronic pain  9/19/2020 0930 by Kala Hatchet, RN  Outcome: Ongoing  9/18/2020 2316 by Ángel Bacon RN  Outcome: Ongoing     Problem: Gas Exchange - Impaired:  Goal: Levels of oxygenation will improve  Description: Levels of oxygenation will improve  9/19/2020 0930 by Kala Hatchet, RN  Outcome: Ongoing  9/18/2020 2316 by Ángel Bacon RN  Outcome: Ongoing     Problem: Musculor/Skeletal Functional Status  Goal: Highest potential functional level  9/19/2020 0930 by Barbi Garcia RN  Outcome: Ongoing  9/18/2020 2316 by Yoandy Obrien RN  Outcome: Ongoing  Goal: Absence of falls  9/19/2020 0930 by Barbi Garcia RN  Outcome: Ongoing  9/18/2020 2316 by Yoandy Obrien RN  Outcome: Ongoing (M6) obeys commands

## 2023-10-14 NOTE — ED PROVIDER NOTE - ATTENDING CONTRIBUTION TO CARE
Hx: pt with breast cancer, HTN, HLD recent dc from rehab presenting with dyspnea, malaise, fatigue.  Noted pulse ox 60%s on RA.  Not on oxygen at home.  Denies any chest pain, fever, vomiting.      PE: well appearing, pale, mild respiratory distress improved with oxygen, minimal JVD, diminished bs at bases b/l, ab soft, nt, nonpitting edema b/l LE.    MDM: acute respiratory failure with hypoxia, consider acute pulmonary edema vs pneumothorax vs pneumonia vs anemia vs PE.  check cbc r/o anemia or leukocytosis, check bmp to r/o metabolic derangement and lyte imbalance, trop, probnp, ct head r/o ich, cta chest r/o PE, oxygen therapy, strong suspicion for HF, congested state, will start diuretics.  WIll require inpatient.    Progress Note 2230: reviewed labs and ct report.  Based on clinical picture, pt likely with congestive state HF, will admit for further workup and treatment. Stable for admission.

## 2023-10-14 NOTE — ED PROVIDER NOTE - OBJECTIVE STATEMENT
89-year-old female recently discharged from rehab facility chief complaint status post fall in the setting of shortness of breath acute increasing over the past day.  Patient denies any current chest pain.  Patient otherwise otherwise history of breast cancer hyperlipidemia

## 2023-10-14 NOTE — ED PROVIDER NOTE - PHYSICAL EXAMINATION
GENERAL: Awake, alert, NAD  LUNGS: CTAB, no wheezes or crackles   CARDIAC: tachy no m/r/g  ABDOMEN: Soft, , non tender, non distended, no rebound, no guarding  BACK: No midline spinal tenderness, no CVA tenderness  EXT: b/l pitting edema, no calf tenderness, 2+ DP pulses bilaterally, no deformities.  NEURO: A&Ox3. Moving all extremities.  SKIN: Warm and dry. No rash.  PSYCH: Normal affect.

## 2023-10-14 NOTE — ED ADULT NURSE NOTE - OBJECTIVE STATEMENT
The patient is an 89y female [facility]B. Patient has a PMH of Breast CA, HLD, and recent lung infection per EMS. EMS reports earlier today about 1HR before arrival the patient had a fall @ home. She reports living @ home alone and normally ambulating without assist or assistive device. PT states she fell and landed onto her sacrum, denying head trauma as well as denying LOC. Upon assessment PT is breathing spontaneously and labored on NC 6L. EMS reports @ home the patient O2 SAT was 66% on room air. No signs of respiratory distress @ this time. The PT is alert and orientated x4 and responding to questions in complete sentences. She reports pain 5/10 on the pain scale to the left hip area with a history or hip replacement to the left side. Pt denies chest pain, palpitations, headache, visual disturbances, numbness/tingling, fever, chills, diaphoresis,  nausea, vomiting, constipation, diarrhea, or urinary symptoms. Safety and comfort measures provided, bed locked and in lowest position, side rails up for safety. Call bell within reach. Awaiting  results and transport to CT Scan.

## 2023-10-14 NOTE — ED ADULT NURSE REASSESSMENT NOTE - NS ED NURSE REASSESS COMMENT FT1
Patient reported pain and discomfort to her lower abdomen, MD Lawton made aware and will reassess patient @ bedside.

## 2023-10-14 NOTE — ED PROVIDER NOTE - CLINICAL SUMMARY MEDICAL DECISION MAKING FREE TEXT BOX
Given history and physical high clinical suspicions for CHF exacerbation will assess with labs imaging given the patient has history of cancer and is on chemo.  Patient will receive Lasix to be admitted

## 2023-10-14 NOTE — ED PROVIDER NOTE - CHIEF COMPLAINT
I received a referral from   Emeli Romero ANP to schedule an appointment with a gastroenterologist.   Due to Blow to stomach 2 weeks ago.  Sloan Vegas continues to experience pain, bloating, diarrhea an eating, nausea, URQ.  I spoke with Mr. [phone].  He stated he called [facility] a nd the next available appointment is in October.  He stated he cannot wait that long. He stated he will call his doctor to be referred someplace else.  I asked if there was something else I could help him with he said no.  I voiced understanding.    The patient is a 89y Female complaining of shortness of breath.

## 2023-10-14 NOTE — ED ADULT NURSE NOTE - NSFALLHARMRISKINTERV_ED_ALL_ED
Assistance OOB with selected safe patient handling equipment if applicable/Communicate risk of Fall with Harm to all staff, patient, and family/Provide patient with walking aids/Provide visual cue: red socks, yellow wristband, yellow gown, etc/Reinforce activity limits and safety measures with patient and family/Bed in lowest position, wheels locked, appropriate side rails in place/Call bell, personal items and telephone in reach/Instruct patient to call for assistance before getting out of bed/chair/stretcher/Non-slip footwear applied when patient is off stretcher/[place] to call system/Physically safe environment - no spills, clutter or unnecessary equipment/Purposeful Proactive Rounding/Room/bathroom lighting operational, light cord in reach

## 2023-10-15 LAB
ANION GAP SERPL CALC-SCNC: 13 MMOL/L — SIGNIFICANT CHANGE UP (ref 5–17)
ANION GAP SERPL CALC-SCNC: 14 MMOL/L — SIGNIFICANT CHANGE UP (ref 5–17)
BUN SERPL-MCNC: 18 MG/DL — SIGNIFICANT CHANGE UP (ref 7–23)
BUN SERPL-MCNC: 19 MG/DL — SIGNIFICANT CHANGE UP (ref 7–23)
CALCIUM SERPL-MCNC: 8.2 MG/DL — LOW (ref 8.4–10.5)
CALCIUM SERPL-MCNC: 8.4 MG/DL — SIGNIFICANT CHANGE UP (ref 8.4–10.5)
CHLORIDE SERPL-SCNC: 97 MMOL/L — SIGNIFICANT CHANGE UP (ref 96–108)
CHLORIDE SERPL-SCNC: 99 MMOL/L — SIGNIFICANT CHANGE UP (ref 96–108)
CO2 SERPL-SCNC: 22 MMOL/L — SIGNIFICANT CHANGE UP (ref 22–31)
CO2 SERPL-SCNC: 27 MMOL/L — SIGNIFICANT CHANGE UP (ref 22–31)
CREAT SERPL-MCNC: 1.12 MG/DL — SIGNIFICANT CHANGE UP (ref 0.5–1.3)
CREAT SERPL-MCNC: 1.15 MG/DL — SIGNIFICANT CHANGE UP (ref 0.5–1.3)
EGFR: 46 ML/MIN/1.73M2 — LOW
EGFR: 47 ML/MIN/1.73M2 — LOW
GLUCOSE SERPL-MCNC: 140 MG/DL — HIGH (ref 70–99)
GLUCOSE SERPL-MCNC: 154 MG/DL — HIGH (ref 70–99)
HCT VFR BLD CALC: 32.9 % — LOW (ref 34.5–45)
HCT VFR BLD CALC: 33.9 % — LOW (ref 34.5–45)
HGB BLD-MCNC: 10.1 G/DL — LOW (ref 11.5–15.5)
HGB BLD-MCNC: 10.4 G/DL — LOW (ref 11.5–15.5)
MAGNESIUM SERPL-MCNC: 2 MG/DL — SIGNIFICANT CHANGE UP (ref 1.6–2.6)
MAGNESIUM SERPL-MCNC: 2.1 MG/DL — SIGNIFICANT CHANGE UP (ref 1.6–2.6)
MCHC RBC-ENTMCNC: 29.6 PG — SIGNIFICANT CHANGE UP (ref 27–34)
MCHC RBC-ENTMCNC: 30.1 PG — SIGNIFICANT CHANGE UP (ref 27–34)
MCHC RBC-ENTMCNC: 30.7 GM/DL — LOW (ref 32–36)
MCHC RBC-ENTMCNC: 30.7 GM/DL — LOW (ref 32–36)
MCV RBC AUTO: 96.5 FL — SIGNIFICANT CHANGE UP (ref 80–100)
MCV RBC AUTO: 98 FL — SIGNIFICANT CHANGE UP (ref 80–100)
NRBC # BLD: 0 /100 WBCS — SIGNIFICANT CHANGE UP (ref 0–0)
NRBC # BLD: 0 /100 WBCS — SIGNIFICANT CHANGE UP (ref 0–0)
PHOSPHATE SERPL-MCNC: 3.2 MG/DL — SIGNIFICANT CHANGE UP (ref 2.5–4.5)
PHOSPHATE SERPL-MCNC: 4 MG/DL — SIGNIFICANT CHANGE UP (ref 2.5–4.5)
PLATELET # BLD AUTO: 144 K/UL — LOW (ref 150–400)
PLATELET # BLD AUTO: 157 K/UL — SIGNIFICANT CHANGE UP (ref 150–400)
POTASSIUM SERPL-MCNC: 3.7 MMOL/L — SIGNIFICANT CHANGE UP (ref 3.5–5.3)
POTASSIUM SERPL-MCNC: 4.2 MMOL/L — SIGNIFICANT CHANGE UP (ref 3.5–5.3)
POTASSIUM SERPL-SCNC: 3.7 MMOL/L — SIGNIFICANT CHANGE UP (ref 3.5–5.3)
POTASSIUM SERPL-SCNC: 4.2 MMOL/L — SIGNIFICANT CHANGE UP (ref 3.5–5.3)
RAPID RVP RESULT: SIGNIFICANT CHANGE UP
RBC # BLD: 3.41 M/UL — LOW (ref 3.8–5.2)
RBC # BLD: 3.46 M/UL — LOW (ref 3.8–5.2)
RBC # FLD: 16.1 % — HIGH (ref 10.3–14.5)
RBC # FLD: 16.2 % — HIGH (ref 10.3–14.5)
SARS-COV-2 RNA SPEC QL NAA+PROBE: SIGNIFICANT CHANGE UP
SODIUM SERPL-SCNC: 135 MMOL/L — SIGNIFICANT CHANGE UP (ref 135–145)
SODIUM SERPL-SCNC: 137 MMOL/L — SIGNIFICANT CHANGE UP (ref 135–145)
WBC # BLD: 10.12 K/UL — SIGNIFICANT CHANGE UP (ref 3.8–10.5)
WBC # BLD: 14.22 K/UL — HIGH (ref 3.8–10.5)
WBC # FLD AUTO: 10.12 K/UL — SIGNIFICANT CHANGE UP (ref 3.8–10.5)
WBC # FLD AUTO: 14.22 K/UL — HIGH (ref 3.8–10.5)

## 2023-10-15 PROCEDURE — 93306 TTE W/DOPPLER COMPLETE: CPT | Mod: 26

## 2023-10-15 PROCEDURE — 74177 CT ABD & PELVIS W/CONTRAST: CPT | Mod: 26

## 2023-10-15 RX ORDER — SODIUM CHLORIDE 9 MG/ML
250 INJECTION INTRAMUSCULAR; INTRAVENOUS; SUBCUTANEOUS ONCE
Refills: 0 | Status: COMPLETED | OUTPATIENT
Start: 2023-10-15 | End: 2023-10-15

## 2023-10-15 RX ORDER — ACETAMINOPHEN 500 MG
650 TABLET ORAL ONCE
Refills: 0 | Status: COMPLETED | OUTPATIENT
Start: 2023-10-15 | End: 2023-10-15

## 2023-10-15 RX ORDER — TRAMADOL HYDROCHLORIDE 50 MG/1
50 TABLET ORAL DAILY
Refills: 0 | Status: DISCONTINUED | OUTPATIENT
Start: 2023-10-15 | End: 2023-10-17

## 2023-10-15 RX ORDER — LIDOCAINE 4 G/100G
1 CREAM TOPICAL DAILY
Refills: 0 | Status: DISCONTINUED | OUTPATIENT
Start: 2023-10-15 | End: 2023-10-17

## 2023-10-15 RX ORDER — ESCITALOPRAM OXALATE 10 MG/1
10 TABLET, FILM COATED ORAL DAILY
Refills: 0 | Status: DISCONTINUED | OUTPATIENT
Start: 2023-10-15 | End: 2023-10-21

## 2023-10-15 RX ORDER — BUPROPION HYDROCHLORIDE 150 MG/1
150 TABLET, EXTENDED RELEASE ORAL DAILY
Refills: 0 | Status: DISCONTINUED | OUTPATIENT
Start: 2023-10-15 | End: 2023-10-21

## 2023-10-15 RX ORDER — TAMSULOSIN HYDROCHLORIDE 0.4 MG/1
0.4 CAPSULE ORAL AT BEDTIME
Refills: 0 | Status: DISCONTINUED | OUTPATIENT
Start: 2023-10-15 | End: 2023-10-21

## 2023-10-15 RX ORDER — PANTOPRAZOLE SODIUM 20 MG/1
40 TABLET, DELAYED RELEASE ORAL
Refills: 0 | Status: DISCONTINUED | OUTPATIENT
Start: 2023-10-15 | End: 2023-10-21

## 2023-10-15 RX ORDER — HYDROCORTISONE 20 MG
20 TABLET ORAL THREE TIMES A DAY
Refills: 0 | Status: DISCONTINUED | OUTPATIENT
Start: 2023-10-15 | End: 2023-10-15

## 2023-10-15 RX ORDER — CEFTRIAXONE 500 MG/1
1000 INJECTION, POWDER, FOR SOLUTION INTRAMUSCULAR; INTRAVENOUS EVERY 24 HOURS
Refills: 0 | Status: DISCONTINUED | OUTPATIENT
Start: 2023-10-15 | End: 2023-10-17

## 2023-10-15 RX ORDER — FUROSEMIDE 40 MG
20 TABLET ORAL DAILY
Refills: 0 | Status: DISCONTINUED | OUTPATIENT
Start: 2023-10-15 | End: 2023-10-16

## 2023-10-15 RX ORDER — MIRTAZAPINE 45 MG/1
7.5 TABLET, ORALLY DISINTEGRATING ORAL AT BEDTIME
Refills: 0 | Status: DISCONTINUED | OUTPATIENT
Start: 2023-10-15 | End: 2023-10-21

## 2023-10-15 RX ORDER — ALPRAZOLAM 0.25 MG
0.25 TABLET ORAL
Refills: 0 | Status: DISCONTINUED | OUTPATIENT
Start: 2023-10-15 | End: 2023-10-21

## 2023-10-15 RX ORDER — SIMVASTATIN 20 MG/1
20 TABLET, FILM COATED ORAL AT BEDTIME
Refills: 0 | Status: DISCONTINUED | OUTPATIENT
Start: 2023-10-15 | End: 2023-10-21

## 2023-10-15 RX ORDER — BUPROPION HYDROCHLORIDE 150 MG/1
300 TABLET, EXTENDED RELEASE ORAL DAILY
Refills: 0 | Status: DISCONTINUED | OUTPATIENT
Start: 2023-10-15 | End: 2023-10-15

## 2023-10-15 RX ORDER — HEPARIN SODIUM 5000 [USP'U]/ML
5000 INJECTION INTRAVENOUS; SUBCUTANEOUS EVERY 12 HOURS
Refills: 0 | Status: DISCONTINUED | OUTPATIENT
Start: 2023-10-15 | End: 2023-10-21

## 2023-10-15 RX ORDER — IPRATROPIUM/ALBUTEROL SULFATE 18-103MCG
3 AEROSOL WITH ADAPTER (GRAM) INHALATION EVERY 6 HOURS
Refills: 0 | Status: DISCONTINUED | OUTPATIENT
Start: 2023-10-15 | End: 2023-10-17

## 2023-10-15 RX ORDER — INFLUENZA VIRUS VACCINE 15; 15; 15; 15 UG/.5ML; UG/.5ML; UG/.5ML; UG/.5ML
0.7 SUSPENSION INTRAMUSCULAR ONCE
Refills: 0 | Status: DISCONTINUED | OUTPATIENT
Start: 2023-10-15 | End: 2023-10-21

## 2023-10-15 RX ORDER — LIDOCAINE 4 G/100G
1 CREAM TOPICAL ONCE
Refills: 0 | Status: COMPLETED | OUTPATIENT
Start: 2023-10-15 | End: 2023-10-15

## 2023-10-15 RX ORDER — TRAMADOL HYDROCHLORIDE 50 MG/1
25 TABLET ORAL
Refills: 0 | Status: DISCONTINUED | OUTPATIENT
Start: 2023-10-15 | End: 2023-10-21

## 2023-10-15 RX ADMIN — TAMSULOSIN HYDROCHLORIDE 0.4 MILLIGRAM(S): 0.4 CAPSULE ORAL at 21:11

## 2023-10-15 RX ADMIN — LIDOCAINE 1 PATCH: 4 CREAM TOPICAL at 16:08

## 2023-10-15 RX ADMIN — Medication 650 MILLIGRAM(S): at 12:28

## 2023-10-15 RX ADMIN — Medication 20 MILLIGRAM(S): at 14:47

## 2023-10-15 RX ADMIN — MIRTAZAPINE 7.5 MILLIGRAM(S): 45 TABLET, ORALLY DISINTEGRATING ORAL at 21:11

## 2023-10-15 RX ADMIN — TRAMADOL HYDROCHLORIDE 50 MILLIGRAM(S): 50 TABLET ORAL at 14:47

## 2023-10-15 RX ADMIN — BUPROPION HYDROCHLORIDE 150 MILLIGRAM(S): 150 TABLET, EXTENDED RELEASE ORAL at 12:29

## 2023-10-15 RX ADMIN — Medication 650 MILLIGRAM(S): at 00:38

## 2023-10-15 RX ADMIN — LIDOCAINE 1 PATCH: 4 CREAM TOPICAL at 22:40

## 2023-10-15 RX ADMIN — TRAMADOL HYDROCHLORIDE 50 MILLIGRAM(S): 50 TABLET ORAL at 15:45

## 2023-10-15 RX ADMIN — LIDOCAINE 1 PATCH: 4 CREAM TOPICAL at 00:39

## 2023-10-15 RX ADMIN — CEFTRIAXONE 100 MILLIGRAM(S): 500 INJECTION, POWDER, FOR SOLUTION INTRAMUSCULAR; INTRAVENOUS at 22:48

## 2023-10-15 RX ADMIN — PANTOPRAZOLE SODIUM 40 MILLIGRAM(S): 20 TABLET, DELAYED RELEASE ORAL at 12:26

## 2023-10-15 RX ADMIN — Medication 0.25 MILLIGRAM(S): at 12:26

## 2023-10-15 RX ADMIN — Medication 3 MILLILITER(S): at 18:41

## 2023-10-15 RX ADMIN — HEPARIN SODIUM 5000 UNIT(S): 5000 INJECTION INTRAVENOUS; SUBCUTANEOUS at 18:42

## 2023-10-15 RX ADMIN — Medication 650 MILLIGRAM(S): at 13:20

## 2023-10-15 RX ADMIN — ESCITALOPRAM OXALATE 10 MILLIGRAM(S): 10 TABLET, FILM COATED ORAL at 12:27

## 2023-10-15 RX ADMIN — SODIUM CHLORIDE 250 MILLILITER(S): 9 INJECTION INTRAMUSCULAR; INTRAVENOUS; SUBCUTANEOUS at 01:33

## 2023-10-15 RX ADMIN — TRAMADOL HYDROCHLORIDE 25 MILLIGRAM(S): 50 TABLET ORAL at 21:11

## 2023-10-15 RX ADMIN — Medication 3 MILLILITER(S): at 12:29

## 2023-10-15 RX ADMIN — TRAMADOL HYDROCHLORIDE 25 MILLIGRAM(S): 50 TABLET ORAL at 22:11

## 2023-10-15 RX ADMIN — SIMVASTATIN 20 MILLIGRAM(S): 20 TABLET, FILM COATED ORAL at 21:11

## 2023-10-15 RX ADMIN — Medication 20 MILLIGRAM(S): at 21:12

## 2023-10-15 NOTE — PATIENT PROFILE ADULT - FALL HARM RISK - HARM RISK INTERVENTIONS

## 2023-10-15 NOTE — CONSULT NOTE ADULT - ASSESSMENT
88 y/o woman with h/o metastatic breast cancer.  1) progressive sob.  Unclear source, but thought to possibly be related to pneumonitis related to ibrance.  Seen by pulmonary as outpt, and has been on steroids.  Ct scans not remarkable.  Would have pulm see.  Cv following.     Anemia. NCNC.   Likely related to malig. will send off w/u.  monitor cbc and provide transfusional support as needed

## 2023-10-15 NOTE — ED ADULT NURSE REASSESSMENT NOTE - NS ED NURSE REASSESS COMMENT FT1
Perez catheter placed using sterile technique. Second RN at bedside to confirm sterility. Draining to gravity. Secured with stat lock. Initial output off approximately 1100 cc's of cloudy yellow urine. Patient tolerated procedure well. Successful after one attempt. Perez catheter placed using sterile technique. Second RN at bedside to confirm sterility. Draining to gravity. Secured with stat lock. Initial output off approximately 1000 cc's of cloudy yellow urine. Patient tolerated procedure well. Successful after one attempt. Primafit that was in place yielded about 450cc of urine. Total urine output about 1450ccs.

## 2023-10-15 NOTE — H&P ADULT - HISTORY OF PRESENT ILLNESS
Date of Service, 10-15-23 @ 07:00  CHIEF COMPLAINT:Patient is a 89y old  Female who presents with a chief complaint of     HPI:  89-year-old female recently discharged from rehab facility chief complaint status post fall in the setting of shortness of breath acute increasing over the past day.  Patient denies any current chest pain.  Patient otherwise otherwise history of breast cancer hyperlipidemia.    PAST MEDICAL & SURGICAL HISTORY:  Anxiety  Hyperlipidemia  UTI (lower urinary tract infection)  Depression  Breast cancer  HLD (hyperlipidemia)  Gallbladder stone without cholecystitis or obstruction  Cholecystitis  2019 novel coronavirus disease (COVID-19)  1/7/2021  Recurrent breast cancer  opposite side, left-3/2020  History of chemotherapy  and radiation-1994  S/P chemotherapy, time since less than 4 weeks  Cholelithiasis  H/O lumpectomy  left-11/1994  History of hip replacement  left-1994  S/P Mohs surgery for basal cell carcinoma  right side of nose-2020          MEDICATIONS  (STANDING):    MEDICATIONS  (PRN):      FAMILY HISTORY:  FH: breast cancer  mother    FH: lung cancer (Mother)        SOCIAL HISTORY:    [ x] Non-smoker  [ ] Smoker  [ ] Alcohol    Allergies    penicillin (Hives)    Intolerances    	    REVIEW OF SYSTEMS:  CONSTITUTIONAL: No fever, weight loss, or fatigue  EYES: No eye pain, visual disturbances, or discharge  ENT:  No difficulty hearing, tinnitus, vertigo; No sinus or throat pain  NECK: No pain or stiffness  RESPIRATORY: No cough, wheezing, chills or hemoptysis; + Shortness of Breath  CARDIOVASCULAR: No chest pain, palpitations, passing out, dizziness, or leg swelling  GASTROINTESTINAL: No abdominal or epigastric pain. No nausea, vomiting, or hematemesis; No diarrhea or constipation. No melena or hematochezia.  GENITOURINARY: No dysuria, frequency, hematuria, or incontinence  NEUROLOGICAL: No headaches, memory loss, loss of strength, numbness, or tremors  SKIN: No itching, burning, rashes, or lesions   LYMPH Nodes: No enlarged glands  ENDOCRINE: No heat or cold intolerance; No hair loss  MUSCULOSKELETAL: No joint pain or swelling; No muscle, back, or extremity pain  PSYCHIATRIC: No depression, anxiety, mood swings, or difficulty sleeping  HEME/LYMPH: No easy bruising, or bleeding gums  ALLERGY AND IMMUNOLOGIC: No hives or eczema	  x  [ ] All others negative	  [ ] Unable to obtain    PHYSICAL EXAM:  T(C): 36.7 (10-15-23 @ 04:41), Max: 37.1 (10-14-23 @ 20:03)  HR: 104 (10-15-23 @ 04:41) (102 - 121)  BP: 93/60 (10-15-23 @ 04:41) (75/44 - 131/77)  RR: 18 (10-15-23 @ 04:41) (18 - 23)  SpO2: 94% (10-15-23 @ 04:41) (76% - 96%)  Wt(kg): --  I&O's Summary      Appearance: Normal	  HEENT:   Normal oral mucosa, PERRL, EOMI	  Lymphatic: No lymphadenopathy  Cardiovascular: Normal S1 S2, No JVD, + murmurs, + edema  Respiratory: decrease bs  Gastrointestinal:  Soft, Non-tender, + BS	  Skin: No rashes, No ecchymoses, No cyanosis	  Neurologic: Non-focal  Extremities: Normal range of motion, No clubbing, cyanosis + edema  Vascular: Peripheral pulses palpable 2+ bilaterally    TELEMETRY: 	    ECG:  	  RADIOLOGY:  OTHER: 	  	  LABS:	 	    CARDIAC MARKERS:                              11.6   17.44 )-----------( 188      ( 14 Oct 2023 20:16 )             36.5     10-15    137  |  97  |  18  ----------------------------<  154<H>  3.7   |  27  |  1.12    Ca    8.4      15 Oct 2023 02:24  Phos  3.2     10-15  Mg     2.0     10-15    TPro  6.6  /  Alb  4.1  /  TBili  0.5  /  DBili  x   /  AST  45<H>  /  ALT  65<H>  /  AlkPhos  114  10-14    proBNP:   Lipid Profile:   HgA1c:   TSH:       PREVIOUS DIAGNOSTIC TESTING:      < from: Xray Chest 1 View- PORTABLE-Urgent (Xray Chest 1 View- PORTABLE-Urgent .) (10.14.23 @ 21:47) >  Reticular opacities in the periphery of the left midlung. Please refer to   same day dedicated CT chest examination for relevant findings.    < from: 12 Lead ECG (08.28.23 @ 02:09) >  Diagnosis Line NORMAL SINUS RHYTHM  NONSPECIFIC ST ABNORMALITY  ABNORMAL ECG  WHEN COMPARED WITH ECG OF 8.23.23   NO SIGNIFICANT CHANGE WAS FOUND    < from: CT Angio Chest PE Protocol w/ IV Cont (10.14.23 @ 21:08) >  FINDINGS:  PULMONARY ANGIOGRAM: No evidence of pulmonary embolism.  LUNGS AND AIRWAYS: Patent central airways.  Centrilobular and paraseptal   emphysema. No parenchymal consolidation. Minimal peripheral reticular   markings which are nonspecific but could be related to edema or an early   fibrotic process.  PLEURA: No pleural effusion.  MEDIASTINUM AND EMMA: No lymphadenopathy. Mildly patulous esophagus with   small hiatal hernia.  VESSELS: Normal sized thoracic aorta and main pulmonary artery.  HEART: Heart size is normal. No pericardial effusion.  CHEST WALL AND LOWER NECK: Within normal limits.  VISUALIZED UPPER ABDOMEN: Cholecystectomy  BONES: Degenerative changes. Marked height loss of T7 vertebral body.   This has progressed since 8/24/2023.    IMPRESSION:  No evidence of pulmonary embolism.  Mild emphysema.

## 2023-10-15 NOTE — H&P ADULT - ASSESSMENT
89-year-old female recently discharged from rehab facility chief complaint status post fall in the setting of shortness of breath acute increasing over the past day.  Patient denies any current chest pain.  Patient otherwise otherwise history of breast cancer hyperlipidemia  sob ?sec to ?pneumoniae, copd exacerbation  ?chf  check tte  abx  steroid  continue all meds for anxiety  tsh  dvt proiphylaxis

## 2023-10-15 NOTE — CONSULT NOTE ADULT - ASSESSMENT
89-year-old female    h/o  HLD,  Ca  breast,  on  chemo . prior  h/o  hip  surg     admitted  with  worsening  sob.  and  also  had  a  fall     pt  was   recently discharged from rehab facility /  pt  had a  similar  admission  for  sob, in 8/23      *  sob.  from  ?  chf/  acute  copd  exacerbation         afberile,  wbc  17,000  on  arrival         on  iv  solumedrol,  Proventil. and  rocephin/  iv lasix         CT  chest   angio,  no  pe,  centrilobular  emphysema, reticular  markings, /  chf vs  early  fibrosis.  compression T 7      h/o  Ca breast. onc  dr agrcia         was  on ibrance and exemestane since 2021 with stable disease; last PET scan 1/2023        and, Ibrance held, as  Ibrance can cause ILD/pneumonitis        Echo   8/23, with normal LV.  pulm  htn mild, was  38  mm  hg         oncology  d r ohri        on dvt  ppx /  fall  risk/PT  eval       rad< from: CT Angio Chest PE Protocol w/ IV Cont (10.14.23 @ 21:08) >  PULMONARY ANGIOGRAM: No evidence of pulmonary embolism.  LUNGS AND AIRWAYS: Patent central airways.  Centrilobular and paraseptal   emphysema. No parenchymal consolidation. Minimal peripheral reticular   markings which are nonspecific but could be related to edema or an early   fibrotic process.  PLEURA: No pleural effusion.  MEDIASTINUM AND EMMA: No lymphadenopathy. Mildly patulous esophagus with   small hiatal hernia.  VESSELS: Normal sized thoracic aorta and main pulmonary artery.  HEART: Heart size is normal. No pericardial effusion.  CHEST WALL AND LOWER NECK: Within normal limits.  VISUALIZED UPPER ABDOMEN: Cholecystectomy  BONES: Degenerative changes. Marked height loss of T7 vertebral body.   This has progressed since 8/24/2023.  IMPRESSION:  No evidence of pulmonary embolism.  Mild emphysema.  Marked height loss of T7 vertebral body. This has progressed since   8/24/2023. Correlate clinically for pain at this site.  --- End of Report ---  < end of copied text >     89-year-old female    h/o  HLD,  Ca  breast,  on  chemo . prior  h/o  hip  surg     admitted  with  worsening  sob.  and  also  had  a  fall     pt  was   recently discharged from rehab facility /  pt  had a  similar  admission  for  sob, in 8/23      *  sob.  from  ?  chf/  acute  copd  exacerbation         afberile,  wbc  17,000  on  arrival         on  iv  solumedrol,  Proventil. and  rocephin/  iv lasix         CT  chest   angio,  no  pe,  centrilobular  emphysema, reticular  markings, /  chf vs  early  fibrosis.  compression T 7      h/o  Ca breast. onc  dr garcia         was  on ibrance and exemestane since 2021 with stable disease; last PET scan 1/2023        and, Ibrance held, as  Ibrance can cause ILD/pneumonitis        Echo   8/23, with normal LV.  pulm  htn mild, was  38  mm  hg  /  no  shunt       oncology  dr crews/  pt  was  supposed  to  f/p  with dr lisker/ brunilda  pulm         uro  note  seen.  keep  hernandez  for  now        on dvt  ppx /  fall  risk/PT  eval       rad< from: CT Angio Chest PE Protocol w/ IV Cont (10.14.23 @ 21:08) >  PULMONARY ANGIOGRAM: No evidence of pulmonary embolism.  LUNGS AND AIRWAYS: Patent central airways.  Centrilobular and paraseptal   emphysema. No parenchymal consolidation. Minimal peripheral reticular   markings which are nonspecific but could be related to edema or an early   fibrotic process.  PLEURA: No pleural effusion.  MEDIASTINUM AND EMMA: No lymphadenopathy. Mildly patulous esophagus with   small hiatal hernia.  VESSELS: Normal sized thoracic aorta and main pulmonary artery.  HEART: Heart size is normal. No pericardial effusion.  CHEST WALL AND LOWER NECK: Within normal limits.  VISUALIZED UPPER ABDOMEN: Cholecystectomy  BONES: Degenerative changes. Marked height loss of T7 vertebral body.   This has progressed since 8/24/2023.  IMPRESSION:  No evidence of pulmonary embolism.  Mild emphysema.  Marked height loss of T7 vertebral body. This has progressed since   8/24/2023. Correlate clinically for pain at this site.  --- End of Report ---  < end of copied text >     89-year-old female    h/o  HLD,  Ca  breast,  on  chemo . prior  h/o  hip  surg     admitted  with  worsening  sob.  and  also  had  a  fall     pt  was   recently discharged from rehab facility /  pt  had a  similar  admission  for  sob, in 8/23      *  sob.  from  ?  chf/  acute  copd  exacerbation         afberile,  wbc  17,000  on  arrival         on  iv  solumedrol,  Proventil. and  rocephin/  iv lasix         CT  chest   angio,  no  pe,  centrilobular  emphysema, reticular  markings, /  chf vs  early  fibrosis.  compression T 7      h/o  Ca breast. onc  dr garcia         was  on Ibrance  and exemestane since 2021 with stable disease; last PET scan 1/2023        and, Ibrance held, as  Ibrance can cause ILD/pneumonitis        Echo   8/23, with normal LV.  pulm  htn mild, was  38  mm  hg  /  no  shunt       oncology  dr crews/  pt  was  supposed  to  f/p  with dr lisker/ brunilda  pulm         uro  note  seen.  keep  hernandez  for  now        on dvt  ppx /  fall  risk/PT  eval       rad< from: CT Angio Chest PE Protocol w/ IV Cont (10.14.23 @ 21:08) >  PULMONARY ANGIOGRAM: No evidence of pulmonary embolism.  LUNGS AND AIRWAYS: Patent central airways.  Centrilobular and paraseptal   emphysema. No parenchymal consolidation. Minimal peripheral reticular   markings which are nonspecific but could be related to edema or an early   fibrotic process.  PLEURA: No pleural effusion.  MEDIASTINUM AND EMMA: No lymphadenopathy. Mildly patulous esophagus with   small hiatal hernia.  VESSELS: Normal sized thoracic aorta and main pulmonary artery.  HEART: Heart size is normal. No pericardial effusion.  CHEST WALL AND LOWER NECK: Within normal limits.  VISUALIZED UPPER ABDOMEN: Cholecystectomy  BONES: Degenerative changes. Marked height loss of T7 vertebral body.   This has progressed since 8/24/2023.  IMPRESSION:  No evidence of pulmonary embolism.  Mild emphysema.  Marked height loss of T7 vertebral body. This has progressed since   8/24/2023. Correlate clinically for pain at this site.  --- End of Report ---  < end of copied text >     89-year-old female    h/o  HLD,  Ca  breast,  on  chemo . prior  h/o  hip  surg     admitted  with  worsening  sob.  and  also  had  a  fall     pt  was   recently discharged from rehab facility /  pt  had a  similar  admission  for  sob, in 8/23      *  sob.  from  ?  chf/  acute  copd  exacerbation         afberile,  wbc  17,000  on  arrival         on  iv  solumedrol,  Proventil. and  rocephin/  iv lasix         CT  chest   angio,  no  pe,  centrilobular  emphysema, reticular  markings, /  chf vs  early  fibrosis.  compression T 7      h/o  Ca breast. onc  dr garcia         was  on Ibrance  and exemestane since 2021 with stable disease; last PET scan 1/2023        and, Ibrance held, as  Ibrance can cause ILD/pneumonitis        Echo   8/23, with normal LV.  pulm  htn mild, was  38  mm  hg  /  no  shunt       oncology  dr crews/  pt  was  supposed  to  f/p  with dr lisker/ brunilda  pulm         uro  note  seen.  keep  hernandez  for  now   abd  distension,  suspect is baseline. ?  discomfort.  ct a/p. ordered        on dvt  ppx /  fall  risk/PT  eval       rad< from: CT Angio Chest PE Protocol w/ IV Cont (10.14.23 @ 21:08) >  PULMONARY ANGIOGRAM: No evidence of pulmonary embolism.  LUNGS AND AIRWAYS: Patent central airways.  Centrilobular and paraseptal   emphysema. No parenchymal consolidation. Minimal peripheral reticular   markings which are nonspecific but could be related to edema or an early   fibrotic process.  PLEURA: No pleural effusion.  MEDIASTINUM AND EMMA: No lymphadenopathy. Mildly patulous esophagus with   small hiatal hernia.  VESSELS: Normal sized thoracic aorta and main pulmonary artery.  HEART: Heart size is normal. No pericardial effusion.  CHEST WALL AND LOWER NECK: Within normal limits.  VISUALIZED UPPER ABDOMEN: Cholecystectomy  BONES: Degenerative changes. Marked height loss of T7 vertebral body.   This has progressed since 8/24/2023.  IMPRESSION:  No evidence of pulmonary embolism.  Mild emphysema.  Marked height loss of T7 vertebral body. This has progressed since   8/24/2023. Correlate clinically for pain at this site.  --- End of Report ---  < end of copied text >

## 2023-10-15 NOTE — ED ADULT NURSE REASSESSMENT NOTE - NS ED NURSE REASSESS COMMENT FT1
RN @ bedside. Patient reported lower abdominal pain and pressure. Upon assessment abdomen is firm and distended in the RLQ and LLQ regions. Patient bladder scanned and yielded about 1055cc of retained urine, MD Lawton made aware and instructed a Perez Cath to be placed upon patient.

## 2023-10-15 NOTE — ED ADULT NURSE REASSESSMENT NOTE - NS ED NURSE REASSESS COMMENT FT1
Post Perez Cath placement patient BP began to run soft. Admitting MD Watkins made aware @ [id number] and per MD patient is to be given a 250cc NS Bolus.

## 2023-10-15 NOTE — CONSULT NOTE ADULT - SUBJECTIVE AND OBJECTIVE BOX
LAUREN JENKINS  MRN-[id number]    Patient is a 89y old  Female who presents with a chief complaint of SOB (15 Oct 2023 08:30)      HPI  HPI:  Date of Service, 10-15-23 @ 07:00  CHIEF COMPLAINT:Patient is a 89y old  Female who presents with a chief complaint of     HPI:  89-year-old female recently discharged from rehab facility chief complaint status post fall in the setting of shortness of breath acute increasing over the past day.  Patient denies any current chest pain.  Patient otherwise otherwise history of breast cancer hyperlipidemia.    PAST MEDICAL & SURGICAL HISTORY:  Anxiety  Hyperlipidemia  UTI (lower urinary tract infection)  Depression  Breast cancer  HLD (hyperlipidemia)  Gallbladder stone without cholecystitis or obstruction  Cholecystitis  2019 novel coronavirus disease (COVID-19)  1/7/2021  Recurrent breast cancer  opposite side, left-3/2020  History of chemotherapy  and radiation-1994  S/P chemotherapy, time since less than 4 weeks  Cholelithiasis  H/O lumpectomy  left-11/1994  History of hip replacement  left-1994  S/P Mohs surgery for basal cell carcinoma  right side of nose-2020          MEDICATIONS  (STANDING):    MEDICATIONS  (PRN):      FAMILY HISTORY:  FH: breast cancer  mother    FH: lung cancer (Mother)        SOCIAL HISTORY:    [ x] Non-smoker  [ ] Smoker  [ ] Alcohol    Allergies    penicillin (Hives)    Intolerances    	    REVIEW OF SYSTEMS:  CONSTITUTIONAL: No fever, weight loss, or fatigue  EYES: No eye pain, visual disturbances, or discharge  ENT:  No difficulty hearing, tinnitus, vertigo; No sinus or throat pain  NECK: No pain or stiffness  RESPIRATORY: No cough, wheezing, chills or hemoptysis; + Shortness of Breath  CARDIOVASCULAR: No chest pain, palpitations, passing out, dizziness, or leg swelling  GASTROINTESTINAL: No abdominal or epigastric pain. No nausea, vomiting, or hematemesis; No diarrhea or constipation. No melena or hematochezia.  GENITOURINARY: No dysuria, frequency, hematuria, or incontinence  NEUROLOGICAL: No headaches, memory loss, loss of strength, numbness, or tremors  SKIN: No itching, burning, rashes, or lesions   LYMPH Nodes: No enlarged glands  ENDOCRINE: No heat or cold intolerance; No hair loss  MUSCULOSKELETAL: No joint pain or swelling; No muscle, back, or extremity pain  PSYCHIATRIC: No depression, anxiety, mood swings, or difficulty sleeping  HEME/LYMPH: No easy bruising, or bleeding gums  ALLERGY AND IMMUNOLOGIC: No hives or eczema	  x  [ ] All others negative	  [ ] Unable to obtain    PHYSICAL EXAM:  T(C): 36.7 (10-15-23 @ 04:41), Max: 37.1 (10-14-23 @ 20:03)  HR: 104 (10-15-23 @ 04:41) (102 - 121)  BP: 93/60 (10-15-23 @ 04:41) (75/44 - 131/77)  RR: 18 (10-15-23 @ 04:41) (18 - 23)  SpO2: 94% (10-15-23 @ 04:41) (76% - 96%)  Wt(kg): --  I&O's Summary      Appearance: Normal	  HEENT:   Normal oral mucosa, PERRL, EOMI	  Lymphatic: No lymphadenopathy  Cardiovascular: Normal S1 S2, No JVD, + murmurs, + edema  Respiratory: decrease bs  Gastrointestinal:  Soft, Non-tender, + BS	  Skin: No rashes, No ecchymoses, No cyanosis	  Neurologic: Non-focal  Extremities: Normal range of motion, No clubbing, cyanosis + edema  Vascular: Peripheral pulses palpable 2+ bilaterally    TELEMETRY: 	    ECG:  	  RADIOLOGY:  OTHER: 	  	  LABS:	 	    CARDIAC MARKERS:                              11.6   17.44 )-----------( 188      ( 14 Oct 2023 20:16 )             36.5     10-15    137  |  97  |  18  ----------------------------<  154<H>  3.7   |  27  |  1.12    Ca    8.4      15 Oct 2023 02:24  Phos  3.2     10-15  Mg     2.0     10-15    TPro  6.6  /  Alb  4.1  /  TBili  0.5  /  DBili  x   /  AST  45<H>  /  ALT  65<H>  /  AlkPhos  114  10-14    proBNP:   Lipid Profile:   HgA1c:   TSH:       PREVIOUS DIAGNOSTIC TESTING:      < from: Xray Chest 1 View- PORTABLE-Urgent (Xray Chest 1 View- PORTABLE-Urgent .) (10.14.23 @ 21:47) >  Reticular opacities in the periphery of the left midlung. Please refer to   same day dedicated CT chest examination for relevant findings.    < from: 12 Lead ECG (08.28.23 @ 02:09) >  Diagnosis Line NORMAL SINUS RHYTHM  NONSPECIFIC ST ABNORMALITY  ABNORMAL ECG  WHEN COMPARED WITH ECG OF 8.23.23   NO SIGNIFICANT CHANGE WAS FOUND    < from: CT Angio Chest PE Protocol w/ IV Cont (10.14.23 @ 21:08) >  FINDINGS:  PULMONARY ANGIOGRAM: No evidence of pulmonary embolism.  LUNGS AND AIRWAYS: Patent central airways.  Centrilobular and paraseptal   emphysema. No parenchymal consolidation. Minimal peripheral reticular   markings which are nonspecific but could be related to edema or an early   fibrotic process.  PLEURA: No pleural effusion.  MEDIASTINUM AND EMMA: No lymphadenopathy. Mildly patulous esophagus with   small hiatal hernia.  VESSELS: Normal sized thoracic aorta and main pulmonary artery.  HEART: Heart size is normal. No pericardial effusion.  CHEST WALL AND LOWER NECK: Within normal limits.  VISUALIZED UPPER ABDOMEN: Cholecystectomy  BONES: Degenerative changes. Marked height loss of T7 vertebral body.   This has progressed since 8/24/2023.    IMPRESSION:  No evidence of pulmonary embolism.  Mild emphysema.               (15 Oct 2023 06:59)      Past Medical History  PAST MEDICAL & SURGICAL HISTORY:  Anxiety      Hyperlipidemia      UTI (lower urinary tract infection)      Depression      Breast cancer      HLD (hyperlipidemia)      Gallbladder stone without cholecystitis or obstruction      Cholecystitis      2019 novel coronavirus disease (COVID-19)  1/7/2021      Recurrent breast cancer  opposite side, left-3/2020      History of chemotherapy  and radiation-1994      S/P chemotherapy, time since less than 4 weeks      Cholelithiasis      H/O lumpectomy  left-11/1994      History of hip replacement  left-1994      S/P Mohs surgery for basal cell carcinoma  right side of nose-2020          Current Meds  MEDICATIONS  (STANDING):  albuterol/ipratropium for Nebulization 3 milliLiter(s) Nebulizer every 6 hours  buPROPion XL (24-Hour) . 150 milliGRAM(s) Oral daily  cefTRIAXone   IVPB 1000 milliGRAM(s) IV Intermittent every 24 hours  escitalopram 10 milliGRAM(s) Oral daily  furosemide   Injectable 20 milliGRAM(s) IV Push daily  heparin   Injectable 5000 Unit(s) SubCutaneous every 12 hours  methylPREDNISolone sodium succinate Injectable 20 milliGRAM(s) IV Push three times a day  mirtazapine 7.5 milliGRAM(s) Oral at bedtime  pantoprazole    Tablet 40 milliGRAM(s) Oral before breakfast  simvastatin 20 milliGRAM(s) Oral at bedtime  tamsulosin 0.4 milliGRAM(s) Oral at bedtime    MEDICATIONS  (PRN):  ALPRAZolam 0.25 milliGRAM(s) Oral two times a day PRN anxiety      Allergies  Allergies    penicillin (Hives)    Intolerances        Social History  , Retired. No tob.  No etoh    Family History  FAMILY HISTORY:  FH: breast cancer  mother    FH: lung cancer (Mother)        Review of System  REVIEW OF SYSTEMS      General:	Denies fatigue, fevers, chills, sweats, decreased appetite.    Skin/Breast: denies pruritis, rash  	  Ophthalmologic: no change in vision or blurring  	  HEENT	Denies dry mouth, oral sores, dysphagia,  change in hearing.    Respiratory and Thorax:  cough, sob, wheeze, hemoptysis  	  Cardiovascular:	no cp , palp, orthopnea    Gastrointestinal:	no n/v/d constipation    Genitourinary:	no dysuria of frequency, no hematuria, no flank pain    Musculoskeletal:	no bone or joint pain. no muscle aches.     Neurological:	no change in sensory or motor function. no headache. no weakness.     Psychiatric:	no depression, no anxiety, insomnia.     Hematology/Lymphatics:	no bleeding or bruising        Vitals  Vital Signs Last 24 Hrs  T(C): 36.7 (15 Oct 2023 04:41), Max: 37.1 (14 Oct 2023 20:03)  T(F): 98.1 (15 Oct 2023 04:41), Max: 98.8 (14 Oct 2023 20:03)  HR: 102 (15 Oct 2023 08:19) (102 - 121)  BP: 101/65 (15 Oct 2023 08:19) (75/44 - 131/77)  BP(mean): 68 (15 Oct 2023 03:54) (53 - 89)  RR: 18 (15 Oct 2023 04:41) (18 - 23)  SpO2: 94% (15 Oct 2023 04:41) (76% - 96%)    Parameters below as of 15 Oct 2023 04:41  Patient On (Oxygen Delivery Method): nasal cannula  O2 Flow (L/min): 5      Physical Exam  PHYSICAL EXAM:      Constitutional: NAD    Eyes: PERRLA EOMI, anicteric sclera    Heent :No oral sores, no pharyngeal injection. moist mucosa.    Neck: supple, no jvd, no LAD    Respiratory: CTA b/l     Cardiovascular: s1s2, no m/g/r    Gastrointestinal: soft, nt, nd, + BS    Extremities: no c/c/e    Neurological:A&O x 3 moves all ext.    Skin: no rash on exposed skin    Lymph Nodes: no lymphadenopathy.              Lab  CBC Full  -  ( 15 Oct 2023 08:38 )  WBC Count : 10.12 K/uL  RBC Count : 3.41 M/uL  Hemoglobin : 10.1 g/dL  Hematocrit : 32.9 %  Platelet Count - Automated : 144 K/uL  Mean Cell Volume : 96.5 fl  Mean Cell Hemoglobin : 29.6 pg  Mean Cell Hemoglobin Concentration : 30.7 gm/dL  Auto Neutrophil # : x  Auto Lymphocyte # : x  Auto Monocyte # : x  Auto Eosinophil # : x  Auto Basophil # : x  Auto Neutrophil % : x  Auto Lymphocyte % : x  Auto Monocyte % : x  Auto Eosinophil % : x  Auto Basophil % : x    10-15    135  |  99  |  19  ----------------------------<  140<H>  4.2   |  22  |  1.15    Ca    8.2<L>      15 Oct 2023 08:38  Phos  4.0     10-15  Mg     2.1     10-15    TPro  6.6  /  Alb  4.1  /  TBili  0.5  /  DBili  x   /  AST  45<H>  /  ALT  65<H>  /  AlkPhos  114  10-14        Rad:    Assessment/Plan         LAUREN JENKINS  MRN-[id number]    Patient is a 89y old  Female who presents with a chief complaint of SOB (15 Oct 2023 08:30)    HPI:  Date of Service, 10-15-23 @ 07:00  CHIEF COMPLAINT:Patient is a 89y old  Female who presents with a chief complaint of     HPI:  89-year-old female recently discharged from rehab facility chief complaint status post fall in the setting of shortness of breath acute increasing over the past day.  Patient denies any current chest pain.  Patient otherwise otherwise history of breast cancer hyperlipidemia.    Hx- as per Dr. Batista record.   Presented in November 2020 with a new second primary right-sided breast cancer, a painful and palpable right upper breast lesion measuring 4 cm, with nipple retraction.  She has ER positive and HER2/chucho 1+ disease.  She had a lesion in the muscular aspect of her chest wall which appeared positive on MRI, had an equivocal finding on a biopsy, but since the biopsy was poorly tolerated, further attempts at tissue were aborted and she is thought to have metastatic disease.     Since early 2021 she has been on Ibrance and exemestane.  She has had an excellent response and was most recently imaged in January 2023 with a stable, weakly PET avid right breast mass that is significantly improved on exam.  She looked quite well when Dr. Batista saw her in  June 21.    Recent difficulty with progressive SOB, thought to be related to prior rx with ibrance, but not clear.       PAST MEDICAL & SURGICAL HISTORY:  Anxiety  Hyperlipidemia  UTI (lower urinary tract infection)  Depression  Breast cancer  HLD (hyperlipidemia)  Gallbladder stone without cholecystitis or obstruction  Cholecystitis  2019 novel coronavirus disease (COVID-19)  1/7/2021  Recurrent breast cancer  opposite side, left-3/2020  History of chemotherapy  and radiation-1994  S/P chemotherapy, time since less than 4 weeks  Cholelithiasis  H/O lumpectomy  left-11/1994  History of hip replacement  left-1994  S/P Mohs surgery for basal cell carcinoma  right side of nose-2020    FAMILY HISTORY:  FH: breast cancer  mother    FH: lung cancer (Mother)    SOCIAL HISTORY:    [ x] Non-smoker  [ ] Smoker  [ ] Alcohol    Allergies    penicillin (Hives)    	  Current Meds  MEDICATIONS  (STANDING):  albuterol/ipratropium for Nebulization 3 milliLiter(s) Nebulizer every 6 hours  buPROPion XL (24-Hour) . 150 milliGRAM(s) Oral daily  cefTRIAXone   IVPB 1000 milliGRAM(s) IV Intermittent every 24 hours  escitalopram 10 milliGRAM(s) Oral daily  furosemide   Injectable 20 milliGRAM(s) IV Push daily  heparin   Injectable 5000 Unit(s) SubCutaneous every 12 hours  methylPREDNISolone sodium succinate Injectable 20 milliGRAM(s) IV Push three times a day  mirtazapine 7.5 milliGRAM(s) Oral at bedtime  pantoprazole    Tablet 40 milliGRAM(s) Oral before breakfast  simvastatin 20 milliGRAM(s) Oral at bedtime  tamsulosin 0.4 milliGRAM(s) Oral at bedtime    MEDICATIONS  (PRN):  ALPRAZolam 0.25 milliGRAM(s) Oral two times a day PRN anxiety      REVIEW OF SYSTEMS      General:	Denies fatigue, fevers, chills, sweats, decreased appetite.    Skin/Breast: denies pruritis, rash  	  Ophthalmologic: no change in vision or blurring  	  HEENT	Denies dry mouth, oral sores, dysphagia,  change in hearing.    Respiratory and Thorax:    + sob.  	  Cardiovascular:	no cp , palp, orthopnea    Gastrointestinal:	no n/v/d constipation    Genitourinary:	no dysuria of frequency, no hematuria, no flank pain    Musculoskeletal:	no bone or joint pain. no muscle aches.     Neurological:	no change in sensory or motor function. no headache. no weakness.     Psychiatric:	no depression, no anxiety, insomnia.     Hematology/Lymphatics:	no bleeding or bruising      Vital Signs Last 24 Hrs  T(C): 36.7 (15 Oct 2023 04:41), Max: 37.1 (14 Oct 2023 20:03)  T(F): 98.1 (15 Oct 2023 04:41), Max: 98.8 (14 Oct 2023 20:03)  HR: 102 (15 Oct 2023 08:19) (102 - 121)  BP: 101/65 (15 Oct 2023 08:19) (75/44 - 131/77)  BP(mean): 68 (15 Oct 2023 03:54) (53 - 89)  RR: 18 (15 Oct 2023 04:41) (18 - 23)  SpO2: 94% (15 Oct 2023 04:41) (76% - 96%)    Parameters below as of 15 Oct 2023 04:41  Patient On (Oxygen Delivery Method): nasal cannula  O2 Flow (L/min): 5    PHYSICAL EXAM:      Constitutional: NAD    Eyes: PERRLA EOMI, anicteric sclera    Heent :No oral sores, no pharyngeal injection. moist mucosa.    Neck: supple, no jvd, no LAD    Respiratory: CTA b/l     Cardiovascular: s1s2, no m/g/r    Gastrointestinal: soft, nt, nd, + BS    Extremities: no c/c/e    Neurological:A&O x 3 moves all ext.    Skin: no rash on exposed skin    Lymph Nodes: no lymphadenopathy.      Lab  CBC Full  -  ( 15 Oct 2023 08:38 )  WBC Count : 10.12 K/uL  RBC Count : 3.41 M/uL  Hemoglobin : 10.1 g/dL  Hematocrit : 32.9 %  Platelet Count - Automated : 144 K/uL  Mean Cell Volume : 96.5 fl  Mean Cell Hemoglobin : 29.6 pg  Mean Cell Hemoglobin Concentration : 30.7 gm/dL  Auto Neutrophil # : x  Auto Lymphocyte # : x  Auto Monocyte # : x  Auto Eosinophil # : x  Auto Basophil # : x  Auto Neutrophil % : x  Auto Lymphocyte % : x  Auto Monocyte % : x  Auto Eosinophil % : x  Auto Basophil % : x    10-15    135  |  99  |  19  ----------------------------<  140<H>  4.2   |  22  |  1.15    Ca    8.2<L>      15 Oct 2023 08:38  Phos  4.0     10-15  Mg     2.1     10-15    TPro  6.6  /  Alb  4.1  /  TBili  0.5  /  DBili  x   /  AST  45<H>  /  ALT  65<H>  /  AlkPhos  114  10-14        Rad:    Assessment/Plan

## 2023-10-15 NOTE — CONSULT NOTE ADULT - SUBJECTIVE AND OBJECTIVE BOX
HPI:  Date of Service, 10-15-23 @ 07:00  CHIEF COMPLAINT:Patient is a 89y old  Female     HPI:  89-year-old female     recently discharged from rehab facility    chief complaint status post fall in the setting of shortness of breath ,  wgich has  been  increasing over the past day.       debies, fevers, abd  pain,  chest pain.       history of breast cancer hyperlipidemia..  hip  surg    PAST MEDICAL & SURGICAL HISTORY:  Anxiety  Hyperlipidemia  UTI (lower urinary tract infection)  Depression  Breast cancer  HLD (hyperlipidemia)  Gallbladder stone without cholecystitis or obstruction  Cholecystitis  2019 novel coronavirus disease (COVID-19)  1/7/2021  Recurrent breast cancer  opposite side, left-3/2020  History of chemotherapy  and radiation-1994  S/P chemotherapy, time since less than 4 weeks  Cholelithiasis  H/O lumpectomy  left-11/1994  History of hip replacement  left-1994  S/P Mohs surgery for basal cell carcinoma  right side of nose-2020          MEDICATIONS  (STANDING):    MEDICATIONS  (PRN):      FAMILY HISTORY:  FH: breast cancer  mother    FH: lung cancer (Mother)        SOCIAL HISTORY:    [ x] Non-smoker  [ ] Smoker  [ ] Alcohol    Allergies    penicillin (Hives)    Intolerances    	    REVIEW OF SYSTEMS:  CONSTITUTIONAL: No fever, weight loss, or fatigue  EYES: No eye pain, visual disturbances, or discharge  ENT:  No difficulty hearing, tinnitus, vertigo; No sinus or throat pain  NECK: No pain or stiffness  RESPIRATORY: No cough, wheezing, chills or hemoptysis; + Shortness of Breath  CARDIOVASCULAR: No chest pain, palpitations, passing out, dizziness, or leg swelling  GASTROINTESTINAL: No abdominal or epigastric pain. No nausea, vomiting, or hematemesis; No diarrhea or constipation. No melena or hematochezia.  GENITOURINARY: No dysuria, frequency, hematuria, or incontinence  NEUROLOGICAL: No headaches, memory loss, loss of strength, numbness, or tremors  SKIN: No itching, burning, rashes, or lesions   LYMPH Nodes: No enlarged glands  ENDOCRINE: No heat or cold intolerance; No hair loss  MUSCULOSKELETAL: No joint pain or swelling; No muscle, back, or extremity pain  PSYCHIATRIC: No depression, anxiety, mood swings, or difficulty sleeping  HEME/LYMPH: No easy bruising, or bleeding gums  ALLERGY AND IMMUNOLOGIC: No hives or eczema	  x  [ ] All others negative	  [ ] Unable to obtain    PHYSICAL EXAM:  T(C): 36.7 (10-15-23 @ 04:41), Max: 37.1 (10-14-23 @ 20:03)  HR: 104 (10-15-23 @ 04:41) (102 - 121)  BP: 93/60 (10-15-23 @ 04:41) (75/44 - 131/77)  RR: 18 (10-15-23 @ 04:41) (18 - 23)  SpO2: 94% (10-15-23 @ 04:41) (76% - 96%)  Wt(kg): --  I&O's Summary      Appearance: Normal	  HEENT:   Normal oral mucosa, PERRL, EOMI	  Lymphatic: No lymphadenopathy  Cardiovascular: Normal S1 S2, No JVD, + murmurs, + edema  Respiratory: decrease bs  Gastrointestinal:  Soft, Non-tender, + BS	  Skin: No rashes, No ecchymoses, No cyanosis	  Neurologic: Non-focal  Extremities: Normal range of motion, No clubbing, cyanosis + edema  Vascular: Peripheral pulses palpable 2+ bilaterally    TELEMETRY: 	    ECG:  	  RADIOLOGY:  OTHER: 	  	  LABS:	 	    CARDIAC MARKERS:                              11.6   17.44 )-----------( 188      ( 14 Oct 2023 20:16 )             36.5     10-15    137  |  97  |  18  ----------------------------<  154<H>  3.7   |  27  |  1.12    Ca    8.4      15 Oct 2023 02:24  Phos  3.2     10-15  Mg     2.0     10-15    TPro  6.6  /  Alb  4.1  /  TBili  0.5  /  DBili  x   /  AST  45<H>  /  ALT  65<H>  /  AlkPhos  114  10-14    proBNP:   Lipid Profile:   HgA1c:   TSH:       PREVIOUS DIAGNOSTIC TESTING:      < from: Xray Chest 1 View- PORTABLE-Urgent (Xray Chest 1 View- PORTABLE-Urgent .) (10.14.23 @ 21:47) >  Reticular opacities in the periphery of the left midlung. Please refer to   same day dedicated CT chest examination for relevant findings.    < from: 12 Lead ECG (08.28.23 @ 02:09) >  Diagnosis Line NORMAL SINUS RHYTHM  NONSPECIFIC ST ABNORMALITY  ABNORMAL ECG  WHEN COMPARED WITH ECG OF 8.23.23   NO SIGNIFICANT CHANGE WAS FOUND    < from: CT Angio Chest PE Protocol w/ IV Cont (10.14.23 @ 21:08) >  FINDINGS:  PULMONARY ANGIOGRAM: No evidence of pulmonary embolism.  LUNGS AND AIRWAYS: Patent central airways.  Centrilobular and paraseptal   emphysema. No parenchymal consolidation. Minimal peripheral reticular   markings which are nonspecific but could be related to edema or an early   fibrotic process.  PLEURA: No pleural effusion.  MEDIASTINUM AND EMMA: No lymphadenopathy. Mildly patulous esophagus with   small hiatal hernia.  VESSELS: Normal sized thoracic aorta and main pulmonary artery.  HEART: Heart size is normal. No pericardial effusion.  CHEST WALL AND LOWER NECK: Within normal limits.  VISUALIZED UPPER ABDOMEN: Cholecystectomy  BONES: Degenerative changes. Marked height loss of T7 vertebral body.   This has progressed since 8/24/2023.    IMPRESSION:  No evidence of pulmonary embolism.  Mild emphysema.               (15 Oct 2023 06:59)    Date of service: 10-15-23 @ 08:31    REVIEW OF SYSTEMS:  CONSTITUTIONAL: No fever,  no  weight loss  ENT:  No  tinnitus,   no   vertigo  NECK: No pain or stiffness  RESPIRATORY: No cough, wheezing, chills or hemoptysis;    No Shortness of Breath  CARDIOVASCULAR: No chest pain, palpitations, dizziness  GASTROINTESTINAL: No abdominal or epigastric pain. No nausea, vomiting, or hematemesis; No diarrhea  No melena or hematochezia.  GENITOURINARY: No dysuria, frequency, hematuria, or incontinence  NEUROLOGICAL: No headaches  SKIN: No itching,  no   rash  LYMPH Nodes: No enlarged glands  ENDOCRINE: No heat or cold intolerance  MUSCULOSKELETAL: No joint pain or swelling  PSYCHIATRIC: No depression, anxiety  HEME/LYMPH: No easy bruising, or bleeding gums  ALLERGY AND IMMUNOLOGIC: No hives or eczema	    Allergies    penicillin (Hives)    Intolerances        CAPILLARY BLOOD GLUCOSE        I&O's Summary    14 Oct 2023 07:01  -  15 Oct 2023 07:00  --------------------------------------------------------  IN: 0 mL / OUT: 1500 mL / NET: -1500 mL        Vital Signs Last 24 Hrs  T(C): 36.7 (15 Oct 2023 04:41), Max: 37.1 (14 Oct 2023 20:03)  T(F): 98.1 (15 Oct 2023 04:41), Max: 98.8 (14 Oct 2023 20:03)  HR: 102 (15 Oct 2023 08:19) (102 - 121)  BP: 101/65 (15 Oct 2023 08:19) (75/44 - 131/77)  BP(mean): 68 (15 Oct 2023 03:54) (53 - 89)  RR: 18 (15 Oct 2023 04:41) (18 - 23)  SpO2: 94% (15 Oct 2023 04:41) (76% - 96%)    Parameters below as of 15 Oct 2023 04:41  Patient On (Oxygen Delivery Method): nasal cannula  O2 Flow (L/min): 5      PHYSICAL EXAM:  Appearance: Normal	  HEENT:   Normal oral mucosa, PERRL, EOMI	  Lymphatic: No lymphadenopathy  Cardiovascular: Normal S1 S2, No JVD  Respiratory: Lungs clear to auscultation	  Psychiatry:    Mood & affect appropriate  Gastrointestinal:  Soft, Non-tender, + BS	  Skin: No rash, No ecchymoses	  Extremities: Normal range of motion  Vascular: Peripheral pulses palpable bilaterally    MEDICATIONS  (STANDING):  albuterol/ipratropium for Nebulization 3 milliLiter(s) Nebulizer every 6 hours  buPROPion XL (24-Hour) . 150 milliGRAM(s) Oral daily  cefTRIAXone   IVPB 1000 milliGRAM(s) IV Intermittent every 24 hours  escitalopram 10 milliGRAM(s) Oral daily  heparin   Injectable 5000 Unit(s) SubCutaneous every 12 hours  methylPREDNISolone sodium succinate Injectable 20 milliGRAM(s) IV Push three times a day  mirtazapine 7.5 milliGRAM(s) Oral at bedtime  pantoprazole    Tablet 40 milliGRAM(s) Oral before breakfast  simvastatin 20 milliGRAM(s) Oral at bedtime  tamsulosin 0.4 milliGRAM(s) Oral at bedtime    MEDICATIONS  (PRN):  ALPRAZolam 0.25 milliGRAM(s) Oral two times a day PRN anxiety    LABS:                        11.6   17.44 )-----------( 188      ( 14 Oct 2023 20:16 )             36.5     10-15    137  |  97  |  18  ----------------------------<  154<H>  3.7   |  27  |  1.12    Ca    8.4      15 Oct 2023 02:24  Phos  3.2     10-15  Mg     2.0     10-15    TPro  6.6  /  Alb  4.1  /  TBili  0.5  /  DBili  x   /  AST  45<H>  /  ALT  65<H>  /  AlkPhos  114  10-14          Urinalysis Basic - ( 15 Oct 2023 02:24 )    Color: x / Appearance: x / SG: x / pH: x  Gluc: 154 mg/dL / Ketone: x  / Bili: x / Urobili: x   Blood: x / Protein: x / Nitrite: x   Leuk Esterase: x / RBC: x / WBC x   Sq Epi: x / Non Sq Epi: x / Bacteria: x          10-14 @ 20:08  4.8  29              Consultant(s) Notes Reviewed:      Care Discussed with Consultants/Other Providers:  Plan:

## 2023-10-16 LAB
ALBUMIN SERPL ELPH-MCNC: 3.2 G/DL — LOW (ref 3.3–5)
ALP SERPL-CCNC: 94 U/L — SIGNIFICANT CHANGE UP (ref 40–120)
ALT FLD-CCNC: 45 U/L — SIGNIFICANT CHANGE UP (ref 10–45)
ANION GAP SERPL CALC-SCNC: 13 MMOL/L — SIGNIFICANT CHANGE UP (ref 5–17)
AST SERPL-CCNC: 23 U/L — SIGNIFICANT CHANGE UP (ref 10–40)
BILIRUB SERPL-MCNC: 0.2 MG/DL — SIGNIFICANT CHANGE UP (ref 0.2–1.2)
BUN SERPL-MCNC: 19 MG/DL — SIGNIFICANT CHANGE UP (ref 7–23)
CALCIUM SERPL-MCNC: 8.4 MG/DL — SIGNIFICANT CHANGE UP (ref 8.4–10.5)
CEA SERPL-MCNC: 4.4 NG/ML — HIGH (ref 0–3.8)
CHLORIDE SERPL-SCNC: 97 MMOL/L — SIGNIFICANT CHANGE UP (ref 96–108)
CO2 SERPL-SCNC: 26 MMOL/L — SIGNIFICANT CHANGE UP (ref 22–31)
CREAT SERPL-MCNC: 0.97 MG/DL — SIGNIFICANT CHANGE UP (ref 0.5–1.3)
EGFR: 56 ML/MIN/1.73M2 — LOW
FERRITIN SERPL-MCNC: 635 NG/ML — HIGH (ref 13–330)
FOLATE SERPL-MCNC: 13.5 NG/ML — SIGNIFICANT CHANGE UP
GLUCOSE SERPL-MCNC: 252 MG/DL — HIGH (ref 70–99)
HAPTOGLOB SERPL-MCNC: 193 MG/DL — SIGNIFICANT CHANGE UP (ref 34–200)
HCT VFR BLD CALC: 30.8 % — LOW (ref 34.5–45)
HGB BLD-MCNC: 9.9 G/DL — LOW (ref 11.5–15.5)
IRON SATN MFR SERPL: 16 % — SIGNIFICANT CHANGE UP (ref 14–50)
IRON SATN MFR SERPL: 39 UG/DL — SIGNIFICANT CHANGE UP (ref 30–160)
LDH SERPL L TO P-CCNC: 467 U/L — HIGH (ref 50–242)
MCHC RBC-ENTMCNC: 30.5 PG — SIGNIFICANT CHANGE UP (ref 27–34)
MCHC RBC-ENTMCNC: 32.1 GM/DL — SIGNIFICANT CHANGE UP (ref 32–36)
MCV RBC AUTO: 94.8 FL — SIGNIFICANT CHANGE UP (ref 80–100)
NRBC # BLD: 0 /100 WBCS — SIGNIFICANT CHANGE UP (ref 0–0)
PLATELET # BLD AUTO: 134 K/UL — LOW (ref 150–400)
POTASSIUM SERPL-MCNC: 3.9 MMOL/L — SIGNIFICANT CHANGE UP (ref 3.5–5.3)
POTASSIUM SERPL-SCNC: 3.9 MMOL/L — SIGNIFICANT CHANGE UP (ref 3.5–5.3)
PROT SERPL-MCNC: 5.7 G/DL — LOW (ref 6–8.3)
RBC # BLD: 3.25 M/UL — LOW (ref 3.8–5.2)
RBC # BLD: 3.25 M/UL — LOW (ref 3.8–5.2)
RBC # FLD: 16 % — HIGH (ref 10.3–14.5)
RETICS #: 68.9 K/UL — SIGNIFICANT CHANGE UP (ref 25–125)
RETICS/RBC NFR: 2.1 % — SIGNIFICANT CHANGE UP (ref 0.5–2.5)
SODIUM SERPL-SCNC: 136 MMOL/L — SIGNIFICANT CHANGE UP (ref 135–145)
TIBC SERPL-MCNC: 251 UG/DL — SIGNIFICANT CHANGE UP (ref 220–430)
UIBC SERPL-MCNC: 212 UG/DL — SIGNIFICANT CHANGE UP (ref 110–370)
VIT B12 SERPL-MCNC: 609 PG/ML — SIGNIFICANT CHANGE UP (ref 232–1245)
WBC # BLD: 10.1 K/UL — SIGNIFICANT CHANGE UP (ref 3.8–10.5)
WBC # FLD AUTO: 10.1 K/UL — SIGNIFICANT CHANGE UP (ref 3.8–10.5)

## 2023-10-16 PROCEDURE — 99223 1ST HOSP IP/OBS HIGH 75: CPT | Mod: GC

## 2023-10-16 PROCEDURE — 72170 X-RAY EXAM OF PELVIS: CPT | Mod: 26,XU

## 2023-10-16 PROCEDURE — 73502 X-RAY EXAM HIP UNI 2-3 VIEWS: CPT | Mod: 26,LT

## 2023-10-16 PROCEDURE — 72131 CT LUMBAR SPINE W/O DYE: CPT | Mod: 26

## 2023-10-16 PROCEDURE — 72125 CT NECK SPINE W/O DYE: CPT | Mod: 26

## 2023-10-16 PROCEDURE — 72128 CT CHEST SPINE W/O DYE: CPT | Mod: 26

## 2023-10-16 RX ORDER — FUROSEMIDE 40 MG
40 TABLET ORAL DAILY
Refills: 0 | Status: DISCONTINUED | OUTPATIENT
Start: 2023-10-16 | End: 2023-10-20

## 2023-10-16 RX ORDER — EXEMESTANE 25 MG/1
25 TABLET, SUGAR COATED ORAL DAILY
Refills: 0 | Status: DISCONTINUED | OUTPATIENT
Start: 2023-10-16 | End: 2023-10-21

## 2023-10-16 RX ORDER — CHLORHEXIDINE GLUCONATE 213 G/1000ML
1 SOLUTION TOPICAL
Refills: 0 | Status: DISCONTINUED | OUTPATIENT
Start: 2023-10-16 | End: 2023-10-21

## 2023-10-16 RX ADMIN — TRAMADOL HYDROCHLORIDE 50 MILLIGRAM(S): 50 TABLET ORAL at 12:11

## 2023-10-16 RX ADMIN — LIDOCAINE 1 PATCH: 4 CREAM TOPICAL at 12:11

## 2023-10-16 RX ADMIN — Medication 20 MILLIGRAM(S): at 05:16

## 2023-10-16 RX ADMIN — Medication 20 MILLIGRAM(S): at 14:16

## 2023-10-16 RX ADMIN — LIDOCAINE 1 PATCH: 4 CREAM TOPICAL at 19:49

## 2023-10-16 RX ADMIN — TAMSULOSIN HYDROCHLORIDE 0.4 MILLIGRAM(S): 0.4 CAPSULE ORAL at 21:36

## 2023-10-16 RX ADMIN — TRAMADOL HYDROCHLORIDE 50 MILLIGRAM(S): 50 TABLET ORAL at 13:10

## 2023-10-16 RX ADMIN — Medication 20 MILLIGRAM(S): at 21:34

## 2023-10-16 RX ADMIN — LIDOCAINE 1 PATCH: 4 CREAM TOPICAL at 07:41

## 2023-10-16 RX ADMIN — MIRTAZAPINE 7.5 MILLIGRAM(S): 45 TABLET, ORALLY DISINTEGRATING ORAL at 21:35

## 2023-10-16 RX ADMIN — Medication 3 MILLILITER(S): at 12:09

## 2023-10-16 RX ADMIN — PANTOPRAZOLE SODIUM 40 MILLIGRAM(S): 20 TABLET, DELAYED RELEASE ORAL at 05:14

## 2023-10-16 RX ADMIN — HEPARIN SODIUM 5000 UNIT(S): 5000 INJECTION INTRAVENOUS; SUBCUTANEOUS at 05:13

## 2023-10-16 RX ADMIN — ESCITALOPRAM OXALATE 10 MILLIGRAM(S): 10 TABLET, FILM COATED ORAL at 12:11

## 2023-10-16 RX ADMIN — Medication 3 MILLILITER(S): at 05:14

## 2023-10-16 RX ADMIN — BUPROPION HYDROCHLORIDE 150 MILLIGRAM(S): 150 TABLET, EXTENDED RELEASE ORAL at 12:06

## 2023-10-16 RX ADMIN — SIMVASTATIN 20 MILLIGRAM(S): 20 TABLET, FILM COATED ORAL at 21:36

## 2023-10-16 RX ADMIN — Medication 3 MILLILITER(S): at 17:43

## 2023-10-16 RX ADMIN — HEPARIN SODIUM 5000 UNIT(S): 5000 INJECTION INTRAVENOUS; SUBCUTANEOUS at 17:42

## 2023-10-16 RX ADMIN — LIDOCAINE 1 PATCH: 4 CREAM TOPICAL at 12:12

## 2023-10-16 RX ADMIN — CEFTRIAXONE 100 MILLIGRAM(S): 500 INJECTION, POWDER, FOR SOLUTION INTRAMUSCULAR; INTRAVENOUS at 21:34

## 2023-10-16 RX ADMIN — Medication 20 MILLIGRAM(S): at 05:14

## 2023-10-16 NOTE — CONSULT NOTE ADULT - TIME BILLING
chart and data review, clinical assessment, and coordination of care. This excludes any time spent on separate procedures or teaching.

## 2023-10-16 NOTE — CONSULT NOTE ADULT - ATTENDING COMMENTS
Attending Attestation:    Patient seen and examined with resident/fellow.  Agree with above except as noted.     88 yo female with h/o breast cancer s/p Ibrance treatment and subsequent pneumonitis now admitted from home after fall. No syncope.  Pt has been on steroids  for pneumonitis. Pt was in rehab until about 10 days ago. Only wore 02 for the first few days at rehab. Pt notice at home becoming more SOB on any type of exertion. She also has been c/o recent leg swelling L> R. Ultrasound of legs no DVT. CTA chest no  PE. Marked improvement of pneumonitis from CT chest of August. Minimal peripheral reticulations.  TTE with PA systolic pressures estimated ~60. Increase from 40   2 months ago.        A/P  88 yo female with improving Ibrance induced pneumonitis with acute hypoxemic respiratory failure due to r heart failure.  1. R heart failure etiology ?? prolonged hypoxemia. Pt says she has not been using 02 for a few weeks. Pt is improving with Lasix. continue Lasix.     Monitor leg swelling and serum bicarbonate.  2. Pneumonitis: decrease steroids to  Solumedrol 20 mg BID. Will likely taper to 30 mg in next 24-48 hrs.  3. Continue antibiotics for 3 days. Low suspicion for infection. If continues to improve with diuresis, stop all antibiotics.  4. CT chest with minimal reticulations either represents the last of the pneumonitis or perhaps mild pulmonary edema.

## 2023-10-16 NOTE — PROGRESS NOTE ADULT - SUBJECTIVE AND OBJECTIVE BOX
Chief Complaint:  FU breast    History of Present Illness:  with pain L hip/back, no f/c, no cp, no uri sx, breathing stable on NC; no cough, no n/v/abd pain, last BM at home; no melena/brbpr; son at bedside      MEDICATIONS  (STANDING):  albuterol/ipratropium for Nebulization 3 milliLiter(s) Nebulizer every 6 hours  buPROPion XL (24-Hour) . 150 milliGRAM(s) Oral daily  cefTRIAXone   IVPB 1000 milliGRAM(s) IV Intermittent every 24 hours  chlorhexidine 2% Cloths 1 Application(s) Topical <User Schedule>  escitalopram 10 milliGRAM(s) Oral daily  furosemide    Tablet 40 milliGRAM(s) Oral daily  heparin   Injectable 5000 Unit(s) SubCutaneous every 12 hours  influenza  Vaccine (HIGH DOSE) 0.7 milliLiter(s) IntraMuscular once  lidocaine   4% Patch 1 Patch Transdermal daily  methylPREDNISolone sodium succinate Injectable 20 milliGRAM(s) IV Push three times a day  mirtazapine 7.5 milliGRAM(s) Oral at bedtime  pantoprazole    Tablet 40 milliGRAM(s) Oral before breakfast  simvastatin 20 milliGRAM(s) Oral at bedtime  tamsulosin 0.4 milliGRAM(s) Oral at bedtime    MEDICATIONS  (PRN):  ALPRAZolam 0.25 milliGRAM(s) Oral two times a day PRN anxiety  traMADol 25 milliGRAM(s) Oral two times a day PRN Moderate Pain (4 - 6)  traMADol 50 milliGRAM(s) Oral daily PRN Severe Pain (7 - 10)      Allergies    penicillin (Hives)    Intolerances        Vital Signs Last 24 Hrs  T(C): 36.4 (16 Oct 2023 10:51), Max: 36.7 (16 Oct 2023 05:09)  T(F): 97.5 (16 Oct 2023 10:51), Max: 98.1 (16 Oct 2023 05:09)  HR: 90 (16 Oct 2023 10:51) (90 - 104)  BP: 106/64 (16 Oct 2023 10:51) (104/66 - 128/73)  BP(mean): --  RR: 18 (16 Oct 2023 10:51) (16 - 18)  SpO2: 93% (16 Oct 2023 10:51) (93% - 94%)    Parameters below as of 16 Oct 2023 10:51  Patient On (Oxygen Delivery Method): nasal cannula  O2 Flow (L/min): 3      PHYSICAL EXAM  General: adult in NAD  HEENT: clear oropharynx, anicteric sclera, pink conjunctiva  Neck: supple  CV: normal S1/S2  Lungs: clear to auscultation, no wheezes, no rales  Abdomen: soft non-tender non-distended, obese, positive bowel sounds  Ext: no calf tenderness, mild pedal edema  Skin: no rashes and no petechiae  Lymph Nodes: No LAD in neck  Neuro: alert and oriented X 3, no focal deficits    LABS:                          9.9    10.10 )-----------( 134      ( 16 Oct 2023 06:26 )             30.8         Mean Cell Volume : 94.8 fl  Mean Cell Hemoglobin : 30.5 pg  Mean Cell Hemoglobin Concentration : 32.1 gm/dL  Auto Neutrophil # : x  Auto Lymphocyte # : x  Auto Monocyte # : x  Auto Eosinophil # : x  Auto Basophil # : x  Auto Neutrophil % : x  Auto Lymphocyte % : x  Auto Monocyte % : x  Auto Eosinophil % : x  Auto Basophil % : x      Serial CBC's  10-16 @ 06:26  Hct-30.8 / Hgb-9.9 / Plat-134 / RBC-3.25 / WBC-10.10  Serial CBC's  10-15 @ 11:03  Hct-33.9 / Hgb-10.4 / Plat-157 / RBC-3.46 / WBC-14.22  Serial CBC's  10-15 @ 08:38  Hct-32.9 / Hgb-10.1 / Plat-144 / RBC-3.41 / WBC-10.12  Serial CBC's  10-14 @ 20:16  Hct-36.5 / Hgb-11.6 / Plat-188 / RBC-3.84 / WBC-17.44      10-16    136  |  97  |  19  ----------------------------<  252<H>  3.9   |  26  |  0.97    Ca    8.4      16 Oct 2023 06:52  Phos  4.0     10-15  Mg     2.1     10-15    TPro  5.7<L>  /  Alb  3.2<L>  /  TBili  0.2  /  DBili  x   /  AST  23  /  ALT  45  /  AlkPhos  94  10-16          Iron - Total Binding Capacity.: 251 ug/dL (10-16 @ 06:52)  Ferritin: 635 ng/mL (10-16 @ 06:52)  Vitamin B12, Serum: 609 pg/mL (10-16 @ 06:52)  Folate, Serum: 13.5 ng/mL (10-16 @ 06:52)  Reticulocyte Percent: 2.1 % (10-16 @ 06:26)          10-16 @ 06:52    ldh1 --  haptoglobin 193  CHULA--  uric acid--      Radiology:  < from: CT Abdomen and Pelvis w/ IV Cont (10.15.23 @ 19:29) >  IMPRESSION:  No acute intra-abdominal finding.    Status post cholecystectomy. No biliary dilatation.    No evidence for bowel obstruction. Moderate fecal retention within the   colon.    Particulate material present within nondistended loops of small bowel in   the  right mid to lower abdomen, also noted on previous exam. In the   absence of bowel obstruction, this finding is nonspecific and may be seen   in the setting of bowel stasis. Correlate clinically.    Additional findings, as above.      < from: CT Lumbar Spine No Cont (10.16.23 @ 15:56) >  CT cervical No vertebral fracture is recognized. Moderate to severe   degenerative disc disease and spondylosis at C3-4 through C7-T1 with loss   of disc height and associated degenerative endplate changes. There is   narrowing of the RIGHT C2-3, BILATERAL C3-4, C4-5, C5-6 and C6-7 neural   foramina due to uncovertebral spurring and facet osteophytic hypertrophy.   Straightening on a degenerative basis.    CT thoracic/lumbar spine:  Chronic compression fracture of T7 with loss   of 80% of vertebral body height minimal bony retropulsion. Mild chronic   compression deformity of T6 with loss of 40% of vertebral body height. No   acute vertebral fracture is seen.   Focal kyphosis of thoracic spine with   apex at T7. Grade 1 anterior spondylolisthesis at L3-4 on a degenerative   basis.  Moderate to severe degenerative disc disease and spondylosis. Small   posterior osteophytic ridge/disc complexes at C5-6, T6-7, T7-8, and T9-10   abuts the ventral cord. Disc bulges noted at T11-T12 through L4-5 abuts   the ventral thecal sac and narrow the BILATERAL neural foramina. Mild   central stenosis at L1-2 and L2-3 and moderate central stenosis at L3-4   and L4-5 on a degenerative basis due to disc bulge, facet osteophytic   hypertrophy and redundancy of the ligamentum flavum. Broad-based LEFT   paracentral disc herniation at L5-S1 narrows the LEFT neural foramen Tiny   central disc herniation at L5-S1 with superior extrusion abuts the   ventral thecal sac.               Chief Complaint:  FU breast    History of Present Illness:  with pain L hip/back, no f/c, no cp, no uri sx, breathing stable on NC; no cough, no n/v/abd pain, last BM at home; no melena/brbpr; son at bedside      MEDICATIONS  (STANDING):  albuterol/ipratropium for Nebulization 3 milliLiter(s) Nebulizer every 6 hours  buPROPion XL (24-Hour) . 150 milliGRAM(s) Oral daily  cefTRIAXone   IVPB 1000 milliGRAM(s) IV Intermittent every 24 hours  chlorhexidine 2% Cloths 1 Application(s) Topical <User Schedule>  escitalopram 10 milliGRAM(s) Oral daily  furosemide    Tablet 40 milliGRAM(s) Oral daily  heparin   Injectable 5000 Unit(s) SubCutaneous every 12 hours  influenza  Vaccine (HIGH DOSE) 0.7 milliLiter(s) IntraMuscular once  lidocaine   4% Patch 1 Patch Transdermal daily  methylPREDNISolone sodium succinate Injectable 20 milliGRAM(s) IV Push three times a day  mirtazapine 7.5 milliGRAM(s) Oral at bedtime  pantoprazole    Tablet 40 milliGRAM(s) Oral before breakfast  simvastatin 20 milliGRAM(s) Oral at bedtime  tamsulosin 0.4 milliGRAM(s) Oral at bedtime    MEDICATIONS  (PRN):  ALPRAZolam 0.25 milliGRAM(s) Oral two times a day PRN anxiety  traMADol 25 milliGRAM(s) Oral two times a day PRN Moderate Pain (4 - 6)  traMADol 50 milliGRAM(s) Oral daily PRN Severe Pain (7 - 10)      Allergies    penicillin (Hives)    Intolerances        Vital Signs Last 24 Hrs  T(C): 36.4 (16 Oct 2023 10:51), Max: 36.7 (16 Oct 2023 05:09)  T(F): 97.5 (16 Oct 2023 10:51), Max: 98.1 (16 Oct 2023 05:09)  HR: 90 (16 Oct 2023 10:51) (90 - 104)  BP: 106/64 (16 Oct 2023 10:51) (104/66 - 128/73)  BP(mean): --  RR: 18 (16 Oct 2023 10:51) (16 - 18)  SpO2: 93% (16 Oct 2023 10:51) (93% - 94%)    Parameters below as of 16 Oct 2023 10:51  Patient On (Oxygen Delivery Method): nasal cannula  O2 Flow (L/min): 3      PHYSICAL EXAM  General: adult in NAD  HEENT: clear oropharynx, anicteric sclera, pink conjunctiva  Neck: supple  CV: normal S1/S2  Lungs: clear to auscultation, no wheezes, no rales  Abdomen: soft non-tender non-distended, obese, positive bowel sounds  Ext: no calf tenderness, mild pedal edema  Skin: no rashes and no petechiae  Lymph Nodes: No LAD in neck  Neuro: alert and oriented X 3, no focal deficits    LABS:                          9.9    10.10 )-----------( 134      ( 16 Oct 2023 06:26 )             30.8         Mean Cell Volume : 94.8 fl  Mean Cell Hemoglobin : 30.5 pg  Mean Cell Hemoglobin Concentration : 32.1 gm/dL  Auto Neutrophil # : x  Auto Lymphocyte # : x  Auto Monocyte # : x  Auto Eosinophil # : x  Auto Basophil # : x  Auto Neutrophil % : x  Auto Lymphocyte % : x  Auto Monocyte % : x  Auto Eosinophil % : x  Auto Basophil % : x      Serial CBC's  10-16 @ 06:26  Hct-30.8 / Hgb-9.9 / Plat-134 / RBC-3.25 / WBC-10.10  Serial CBC's  10-15 @ 11:03  Hct-33.9 / Hgb-10.4 / Plat-157 / RBC-3.46 / WBC-14.22  Serial CBC's  10-15 @ 08:38  Hct-32.9 / Hgb-10.1 / Plat-144 / RBC-3.41 / WBC-10.12  Serial CBC's  10-14 @ 20:16  Hct-36.5 / Hgb-11.6 / Plat-188 / RBC-3.84 / WBC-17.44      10-16    136  |  97  |  19  ----------------------------<  252<H>  3.9   |  26  |  0.97    Ca    8.4      16 Oct 2023 06:52  Phos  4.0     10-15  Mg     2.1     10-15    TPro  5.7<L>  /  Alb  3.2<L>  /  TBili  0.2  /  DBili  x   /  AST  23  /  ALT  45  /  AlkPhos  94  10-16          Iron - Total Binding Capacity.: 251 ug/dL (10-16 @ 06:52)  Ferritin: 635 ng/mL (10-16 @ 06:52)  Vitamin B12, Serum: 609 pg/mL (10-16 @ 06:52)  Folate, Serum: 13.5 ng/mL (10-16 @ 06:52)  Reticulocyte Percent: 2.1 % (10-16 @ 06:26)          10-16 @ 06:52    ldh1 --  haptoglobin 193  CHULA--  uric acid--      Radiology:    < from: CT Angio Chest PE Protocol w/ IV Cont (10.14.23 @ 21:08) >  IMPRESSION:  No evidence of pulmonary embolism.  Mild emphysema.    Marked height loss of T7 vertebral body. This has progressed since   8/24/2023. Correlate clinically for pain at this site.      < end of copied text >      < from: CT Abdomen and Pelvis w/ IV Cont (10.15.23 @ 19:29) >  IMPRESSION:  No acute intra-abdominal finding.    Status post cholecystectomy. No biliary dilatation.    No evidence for bowel obstruction. Moderate fecal retention within the   colon.    Particulate material present within nondistended loops of small bowel in   the  right mid to lower abdomen, also noted on previous exam. In the   absence of bowel obstruction, this finding is nonspecific and may be seen   in the setting of bowel stasis. Correlate clinically.    Additional findings, as above.      < from: CT Lumbar Spine No Cont (10.16.23 @ 15:56) >  CT cervical No vertebral fracture is recognized. Moderate to severe   degenerative disc disease and spondylosis at C3-4 through C7-T1 with loss   of disc height and associated degenerative endplate changes. There is   narrowing of the RIGHT C2-3, BILATERAL C3-4, C4-5, C5-6 and C6-7 neural   foramina due to uncovertebral spurring and facet osteophytic hypertrophy.   Straightening on a degenerative basis.    CT thoracic/lumbar spine:  Chronic compression fracture of T7 with loss   of 80% of vertebral body height minimal bony retropulsion. Mild chronic   compression deformity of T6 with loss of 40% of vertebral body height. No   acute vertebral fracture is seen.   Focal kyphosis of thoracic spine with   apex at T7. Grade 1 anterior spondylolisthesis at L3-4 on a degenerative   basis.  Moderate to severe degenerative disc disease and spondylosis. Small   posterior osteophytic ridge/disc complexes at C5-6, T6-7, T7-8, and T9-10   abuts the ventral cord. Disc bulges noted at T11-T12 through L4-5 abuts   the ventral thecal sac and narrow the BILATERAL neural foramina. Mild   central stenosis at L1-2 and L2-3 and moderate central stenosis at L3-4   and L4-5 on a degenerative basis due to disc bulge, facet osteophytic   hypertrophy and redundancy of the ligamentum flavum. Broad-based LEFT   paracentral disc herniation at L5-S1 narrows the LEFT neural foramen Tiny   central disc herniation at L5-S1 with superior extrusion abuts the   ventral thecal sac.

## 2023-10-16 NOTE — PROGRESS NOTE ADULT - SUBJECTIVE AND OBJECTIVE BOX
Date of Service: 10-16-23 @ 09:58           CARDIOLOGY     PROGRESS  NOTE   ________________________________________________    CHIEF COMPLAINT:Patient is a 89y old  Female who presents with a chief complaint of SOB (16 Oct 2023 07:50)  doing better  	  REVIEW OF SYSTEMS:  CONSTITUTIONAL: No fever, weight loss, or fatigue  EYES: No eye pain, visual disturbances, or discharge  ENT:  No difficulty hearing, tinnitus, vertigo; No sinus or throat pain  NECK: No pain or stiffness  RESPIRATORY: No cough, wheezing, chills or hemoptysis; decrease  Shortness of Breath  CARDIOVASCULAR: No chest pain, palpitations, passing out, dizziness, or leg swelling  GASTROINTESTINAL: No abdominal or epigastric pain. No nausea, vomiting, or hematemesis; No diarrhea or constipation. No melena or hematochezia.  GENITOURINARY: No dysuria, frequency, hematuria, or incontinence  NEUROLOGICAL: No headaches, memory loss, loss of strength, numbness, or tremors  SKIN: No itching, burning, rashes, or lesions   LYMPH Nodes: No enlarged glands  ENDOCRINE: No heat or cold intolerance; No hair loss  MUSCULOSKELETAL: No joint pain or swelling; No muscle, back, or extremity pain  PSYCHIATRIC: No depression, anxiety, mood swings, or difficulty sleeping  HEME/LYMPH: No easy bruising, or bleeding gums  ALLERGY AND IMMUNOLOGIC: No hives or eczema	    [ x] All others negative	  [ ] Unable to obtain    PHYSICAL EXAM:  T(C): 36.7 (10-16-23 @ 05:09), Max: 37.3 (10-15-23 @ 11:08)  HR: 93 (10-16-23 @ 05:09) (93 - 104)  BP: 128/73 (10-16-23 @ 05:09) (95/65 - 128/73)  RR: 18 (10-16-23 @ 05:09) (16 - 20)  SpO2: 93% (10-16-23 @ 05:09) (93% - 95%)  Wt(kg): --  I&O's Summary    15 Oct 2023 07:01  -  16 Oct 2023 07:00  --------------------------------------------------------  IN: 0 mL / OUT: 1450 mL / NET: -1450 mL    16 Oct 2023 07:01  -  16 Oct 2023 09:58  --------------------------------------------------------  IN: 200 mL / OUT: 0 mL / NET: 200 mL        Appearance: Normal	  HEENT:   Normal oral mucosa, PERRL, EOMI	  Lymphatic: No lymphadenopathy  Cardiovascular: Normal S1 S2, No JVD, + murmurs, No edema  Respiratory: Lungs clear to auscultation	  Psychiatry: A & O x 3, Mood & affect appropriate  Gastrointestinal:  Soft, Non-tender, + BS	  Skin: No rashes, No ecchymoses, No cyanosis	  Neurologic: Non-focal  Extremities: Normal range of motion, No clubbing, cyanosis or edema, + pain  Vascular: Peripheral pulses palpable 2+ bilaterally    MEDICATIONS  (STANDING):  albuterol/ipratropium for Nebulization 3 milliLiter(s) Nebulizer every 6 hours  buPROPion XL (24-Hour) . 150 milliGRAM(s) Oral daily  cefTRIAXone   IVPB 1000 milliGRAM(s) IV Intermittent every 24 hours  escitalopram 10 milliGRAM(s) Oral daily  furosemide   Injectable 20 milliGRAM(s) IV Push daily  heparin   Injectable 5000 Unit(s) SubCutaneous every 12 hours  influenza  Vaccine (HIGH DOSE) 0.7 milliLiter(s) IntraMuscular once  lidocaine   4% Patch 1 Patch Transdermal daily  methylPREDNISolone sodium succinate Injectable 20 milliGRAM(s) IV Push three times a day  mirtazapine 7.5 milliGRAM(s) Oral at bedtime  pantoprazole    Tablet 40 milliGRAM(s) Oral before breakfast  simvastatin 20 milliGRAM(s) Oral at bedtime  tamsulosin 0.4 milliGRAM(s) Oral at bedtime      TELEMETRY: 	    ECG:  	  RADIOLOGY:  OTHER: 	  	  LABS:	 	    CARDIAC MARKERS:                                9.9    10.10 )-----------( 134      ( 16 Oct 2023 06:26 )             30.8     10-16    136  |  97  |  19  ----------------------------<  252<H>  3.9   |  26  |  0.97    Ca    8.4      16 Oct 2023 06:52  Phos  4.0     10-15  Mg     2.1     10-15    TPro  5.7<L>  /  Alb  3.2<L>  /  TBili  0.2  /  DBili  x   /  AST  23  /  ALT  45  /  AlkPhos  94  10-16    proBNP:   Lipid Profile:   HgA1c:   TSH:     < from: TTE W or WO Ultrasound Enhancing Agent (10.15.23 @ 13:37) >   1. Left ventricular systolic function is hyperdynamic with an ejection fraction of 75 % by Ross's method of disks.   2. Mildly enlarged right ventricular cavity size, wall thickness, and systolic function.   3. Compared to the transthoracic echocardiogram performed on 8/24/2023 Estimated PASP and TR has increased.  4. Estimated pulmonary artery systolic pressure is 61 mmHg, consistent with severe pulmonary hypertension.   5. Hyperdynamic left ventricular systolic function with intra-cavitary gradient of 37 mmHg.   6. Technically difficult image quality.   7. The inferior vena cava is normal in size measuring 1.70 cm in diameter, (normal <2.1cm) with abnormal inspiratory collapse (abnormal <50%) consistent with mildly elevated right atrial pressure (~8, range 5-10mmHg).   8. There is normal LV mass and concentric remodeling.    < from: CT Angio Chest PE Protocol w/ IV Cont (10.14.23 @ 21:08) >  No evidence of pulmonary embolism.  Mild emphysema.    Marked height loss of T7 vertebral body. This has progressed since         Assessment and plan  ---------------------------  89-year-old female recently discharged from rehab facility chief complaint status post fall in the setting of shortness of breath acute increasing over the past day.  Patient denies any current chest pain.  Patient otherwise otherwise history of breast cancer hyperlipidemia  sob ?sec to ?pneumoniae, copd exacerbation  ?chf , most probably copd/emphysema  hfpef acute on chronic  abx  steroid  continue all meds for anxiety  tsh  dvt prophylaxis  TTE noted, severe pulmonary htn, continue steroid /inhalers/ CTA neg for pe  change lasix to 40 mg daily

## 2023-10-16 NOTE — PHYSICAL THERAPY INITIAL EVALUATION ADULT - ACTIVE RANGE OF MOTION EXAMINATION, REHAB EVAL
b/l hip flexion grossly 0-90 degrees, b/l knee flexion/extension WFL, ankle DF/PF WFL/bilateral upper extremity Active ROM was WFL (within functional limits)

## 2023-10-16 NOTE — CONSULT NOTE ADULT - SUBJECTIVE AND OBJECTIVE BOX
PULMONARY SERVICE INITIAL CONSULT NOTE    HPI:  Date of Service, 10-15-23 @ 07:00  CHIEF COMPLAINT:Patient is a 89y old  Female who presents with a chief complaint of     HPI:  89-year-old female recently discharged from rehab facility chief complaint status post fall in the setting of shortness of breath acute increasing over the past day.  Patient denies any current chest pain.  Patient otherwise otherwise history of breast cancer hyperlipidemia.    PAST MEDICAL & SURGICAL HISTORY:  Anxiety  Hyperlipidemia  UTI (lower urinary tract infection)  Depression  Breast cancer  HLD (hyperlipidemia)  Gallbladder stone without cholecystitis or obstruction  Cholecystitis  2019 novel coronavirus disease (COVID-19)  1/7/2021  Recurrent breast cancer  opposite side, left-3/2020  History of chemotherapy  and radiation-1994  S/P chemotherapy, time since less than 4 weeks  Cholelithiasis  H/O lumpectomy  left-11/1994  History of hip replacement  left-1994  S/P Mohs surgery for basal cell carcinoma  right side of nose-2020          MEDICATIONS  (STANDING):    MEDICATIONS  (PRN):      FAMILY HISTORY:  FH: breast cancer  mother    FH: lung cancer (Mother)        SOCIAL HISTORY:    [ x] Non-smoker  [ ] Smoker  [ ] Alcohol    Allergies    penicillin (Hives)    Intolerances    	    REVIEW OF SYSTEMS:  CONSTITUTIONAL: No fever, weight loss, or fatigue  EYES: No eye pain, visual disturbances, or discharge  ENT:  No difficulty hearing, tinnitus, vertigo; No sinus or throat pain  NECK: No pain or stiffness  RESPIRATORY: No cough, wheezing, chills or hemoptysis; + Shortness of Breath  CARDIOVASCULAR: No chest pain, palpitations, passing out, dizziness, or leg swelling  GASTROINTESTINAL: No abdominal or epigastric pain. No nausea, vomiting, or hematemesis; No diarrhea or constipation. No melena or hematochezia.  GENITOURINARY: No dysuria, frequency, hematuria, or incontinence  NEUROLOGICAL: No headaches, memory loss, loss of strength, numbness, or tremors  SKIN: No itching, burning, rashes, or lesions   LYMPH Nodes: No enlarged glands  ENDOCRINE: No heat or cold intolerance; No hair loss  MUSCULOSKELETAL: No joint pain or swelling; No muscle, back, or extremity pain  PSYCHIATRIC: No depression, anxiety, mood swings, or difficulty sleeping  HEME/LYMPH: No easy bruising, or bleeding gums  ALLERGY AND IMMUNOLOGIC: No hives or eczema	  x  [ ] All others negative	  [ ] Unable to obtain    PHYSICAL EXAM:  T(C): 36.7 (10-15-23 @ 04:41), Max: 37.1 (10-14-23 @ 20:03)  HR: 104 (10-15-23 @ 04:41) (102 - 121)  BP: 93/60 (10-15-23 @ 04:41) (75/44 - 131/77)  RR: 18 (10-15-23 @ 04:41) (18 - 23)  SpO2: 94% (10-15-23 @ 04:41) (76% - 96%)  Wt(kg): --  I&O's Summary      Appearance: Normal	  HEENT:   Normal oral mucosa, PERRL, EOMI	  Lymphatic: No lymphadenopathy  Cardiovascular: Normal S1 S2, No JVD, + murmurs, + edema  Respiratory: decrease bs  Gastrointestinal:  Soft, Non-tender, + BS	  Skin: No rashes, No ecchymoses, No cyanosis	  Neurologic: Non-focal  Extremities: Normal range of motion, No clubbing, cyanosis + edema  Vascular: Peripheral pulses palpable 2+ bilaterally    TELEMETRY: 	    ECG:  	  RADIOLOGY:  OTHER: 	  	  LABS:	 	    CARDIAC MARKERS:                              11.6   17.44 )-----------( 188      ( 14 Oct 2023 20:16 )             36.5     10-15    137  |  97  |  18  ----------------------------<  154<H>  3.7   |  27  |  1.12    Ca    8.4      15 Oct 2023 02:24  Phos  3.2     10-15  Mg     2.0     10-15    TPro  6.6  /  Alb  4.1  /  TBili  0.5  /  DBili  x   /  AST  45<H>  /  ALT  65<H>  /  AlkPhos  114  10-14    proBNP:   Lipid Profile:   HgA1c:   TSH:       PREVIOUS DIAGNOSTIC TESTING:      < from: Xray Chest 1 View- PORTABLE-Urgent (Xray Chest 1 View- PORTABLE-Urgent .) (10.14.23 @ 21:47) >  Reticular opacities in the periphery of the left midlung. Please refer to   same day dedicated CT chest examination for relevant findings.    < from: 12 Lead ECG (08.28.23 @ 02:09) >  Diagnosis Line NORMAL SINUS RHYTHM  NONSPECIFIC ST ABNORMALITY  ABNORMAL ECG  WHEN COMPARED WITH ECG OF 8.23.23   NO SIGNIFICANT CHANGE WAS FOUND    < from: CT Angio Chest PE Protocol w/ IV Cont (10.14.23 @ 21:08) >  FINDINGS:  PULMONARY ANGIOGRAM: No evidence of pulmonary embolism.  LUNGS AND AIRWAYS: Patent central airways.  Centrilobular and paraseptal   emphysema. No parenchymal consolidation. Minimal peripheral reticular   markings which are nonspecific but could be related to edema or an early   fibrotic process.  PLEURA: No pleural effusion.  MEDIASTINUM AND EMMA: No lymphadenopathy. Mildly patulous esophagus with   small hiatal hernia.  VESSELS: Normal sized thoracic aorta and main pulmonary artery.  HEART: Heart size is normal. No pericardial effusion.  CHEST WALL AND LOWER NECK: Within normal limits.  VISUALIZED UPPER ABDOMEN: Cholecystectomy  BONES: Degenerative changes. Marked height loss of T7 vertebral body.   This has progressed since 8/24/2023.    IMPRESSION:  No evidence of pulmonary embolism.  Mild emphysema.               (15 Oct 2023 06:59)      Patient seen and examined at bedside.     REVIEW OF SYSTEMS:  All additional ROS negative.    PAST MEDICAL & SURGICAL HISTORY:  Anxiety      Hyperlipidemia      UTI (lower urinary tract infection)      Depression      Breast cancer      HLD (hyperlipidemia)      Gallbladder stone without cholecystitis or obstruction      Cholecystitis      2019 novel coronavirus disease (COVID-19)  1/7/2021      Recurrent breast cancer  opposite side, left-3/2020      History of chemotherapy  and radiation-1994      S/P chemotherapy, time since less than 4 weeks      Cholelithiasis      H/O lumpectomy  left-11/1994      History of hip replacement  left-1994      S/P Mohs surgery for basal cell carcinoma  right side of nose-2020          FAMILY HISTORY:  FH: breast cancer  mother    FH: lung cancer (Mother)        SOCIAL HISTORY:  Smoking Status: [ ] Current, [ ] Former, [ ] Never  Pack Years:    MEDICATIONS:  Pulmonary:  albuterol/ipratropium for Nebulization 3 milliLiter(s) Nebulizer every 6 hours    Antimicrobials:  cefTRIAXone   IVPB 1000 milliGRAM(s) IV Intermittent every 24 hours    Anticoagulants:  heparin   Injectable 5000 Unit(s) SubCutaneous every 12 hours    Onc:    GI/:  pantoprazole    Tablet 40 milliGRAM(s) Oral before breakfast  tamsulosin 0.4 milliGRAM(s) Oral at bedtime    Endocrine:  methylPREDNISolone sodium succinate Injectable 20 milliGRAM(s) IV Push three times a day  simvastatin 20 milliGRAM(s) Oral at bedtime    Cardiac:  furosemide   Injectable 20 milliGRAM(s) IV Push daily    Other Medications:  ALPRAZolam 0.25 milliGRAM(s) Oral two times a day PRN  buPROPion XL (24-Hour) . 150 milliGRAM(s) Oral daily  escitalopram 10 milliGRAM(s) Oral daily  influenza  Vaccine (HIGH DOSE) 0.7 milliLiter(s) IntraMuscular once  lidocaine   4% Patch 1 Patch Transdermal daily  mirtazapine 7.5 milliGRAM(s) Oral at bedtime  traMADol 25 milliGRAM(s) Oral two times a day PRN  traMADol 50 milliGRAM(s) Oral daily PRN      Allergies    penicillin (Hives)    Intolerances        PHYSICAL EXAM  Vital Signs Last 24 Hrs  T(C): 36.7 (16 Oct 2023 05:09), Max: 37.3 (15 Oct 2023 11:08)  T(F): 98.1 (16 Oct 2023 05:09), Max: 99.2 (15 Oct 2023 11:08)  HR: 93 (16 Oct 2023 05:09) (93 - 104)  BP: 128/73 (16 Oct 2023 05:09) (95/65 - 128/73)  BP(mean): --  RR: 18 (16 Oct 2023 05:09) (16 - 20)  SpO2: 93% (16 Oct 2023 05:09) (93% - 95%)    Parameters below as of 16 Oct 2023 05:09  Patient On (Oxygen Delivery Method): nasal cannula  O2 Flow (L/min): 5      10-15 @ 07:01  -  10-16 @ 07:00  --------------------------------------------------------  IN: 0 mL / OUT: 1450 mL / NET: -1450 mL          CONSTITUTIONAL: No acute distress.   HEENT:  Conjunctiva clear B/L.  Moist oral mucosa.   Cardiovascular: RRR with no murmurs. No JVD noted. No lower extremity edema B/L. Extremities are warm and well perfused.    Respiratory: Lungs CTAB. No wrr. No accessory muscle use.   Gastrointestinal:  Soft, nontender. Non-distended. Non-rigid.    Neurologic:  Alert and awake. Moving all extremities. Following commands.    Skin:  No gross rashes notes.      LABS:      CBC Full  -  ( 16 Oct 2023 06:26 )  WBC Count : 10.10 K/uL  RBC Count : 3.25 M/uL  Hemoglobin : 9.9 g/dL  Hematocrit : 30.8 %  Platelet Count - Automated : 134 K/uL  Mean Cell Volume : 94.8 fl  Mean Cell Hemoglobin : 30.5 pg  Mean Cell Hemoglobin Concentration : 32.1 gm/dL  Auto Neutrophil # : x  Auto Lymphocyte # : x  Auto Monocyte # : x  Auto Eosinophil # : x  Auto Basophil # : x  Auto Neutrophil % : x  Auto Lymphocyte % : x  Auto Monocyte % : x  Auto Eosinophil % : x  Auto Basophil % : x    10-16    136  |  97  |  19  ----------------------------<  252<H>  3.9   |  26  |  0.97    Ca    8.4      16 Oct 2023 06:52  Phos  4.0     10-15  Mg     2.1     10-15    TPro  5.7<L>  /  Alb  3.2<L>  /  TBili  0.2  /  DBili  x   /  AST  23  /  ALT  45  /  AlkPhos  94  10-16          Urinalysis Basic - ( 16 Oct 2023 06:52 )    Color: x / Appearance: x / SG: x / pH: x  Gluc: 252 mg/dL / Ketone: x  / Bili: x / Urobili: x   Blood: x / Protein: x / Nitrite: x   Leuk Esterase: x / RBC: x / WBC x   Sq Epi: x / Non Sq Epi: x / Bacteria: x                RADIOLOGY & ADDITIONAL STUDIES: PULMONARY SERVICE INITIAL CONSULT NOTE    HPI:  89 yo F w/ PMH of breast cancer (who was on Ibrance) with recent admission for hypoxemia (8/2023) related to 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 Patient ultimately discharged to rehab and ultimately home. Patient with a mechanical fall with prompted admission to the hospital and later found to have hypoxemia again. CT imaging with mild emphysema and faint peripheral reticulations. However imaging markedly improved from prior CT imaging (8/2023) which demonstrated diffuse ground glass. Reports dyspnea with exertion and new lower extremity edema. No fever or chills.          REVIEW OF SYSTEMS:  All additional ROS negative.    PAST MEDICAL & SURGICAL HISTORY:  Anxiety      Hyperlipidemia      UTI (lower urinary tract infection)      Depression      Breast cancer      HLD (hyperlipidemia)      Gallbladder stone without cholecystitis or obstruction      Cholecystitis      2019 novel coronavirus disease (COVID-19)  1/7/2021      Recurrent breast cancer  opposite side, left-3/2020      History of chemotherapy  and radiation-1994      S/P chemotherapy, time since less than 4 weeks      Cholelithiasis      H/O lumpectomy  left-11/1994      History of hip replacement  left-1994      S/P Mohs surgery for basal cell carcinoma  right side of nose-2020          FAMILY HISTORY:  FH: breast cancer  mother    FH: lung cancer (Mother)        SOCIAL HISTORY:  Smoking Status: [ ] Current, [ ] Former, [ ] Never  Pack Years:    MEDICATIONS:  Pulmonary:  albuterol/ipratropium for Nebulization 3 milliLiter(s) Nebulizer every 6 hours    Antimicrobials:  cefTRIAXone   IVPB 1000 milliGRAM(s) IV Intermittent every 24 hours    Anticoagulants:  heparin   Injectable 5000 Unit(s) SubCutaneous every 12 hours    Onc:    GI/:  pantoprazole    Tablet 40 milliGRAM(s) Oral before breakfast  tamsulosin 0.4 milliGRAM(s) Oral at bedtime    Endocrine:  methylPREDNISolone sodium succinate Injectable 20 milliGRAM(s) IV Push three times a day  simvastatin 20 milliGRAM(s) Oral at bedtime    Cardiac:  furosemide   Injectable 20 milliGRAM(s) IV Push daily    Other Medications:  ALPRAZolam 0.25 milliGRAM(s) Oral two times a day PRN  buPROPion XL (24-Hour) . 150 milliGRAM(s) Oral daily  escitalopram 10 milliGRAM(s) Oral daily  influenza  Vaccine (HIGH DOSE) 0.7 milliLiter(s) IntraMuscular once  lidocaine   4% Patch 1 Patch Transdermal daily  mirtazapine 7.5 milliGRAM(s) Oral at bedtime  traMADol 25 milliGRAM(s) Oral two times a day PRN  traMADol 50 milliGRAM(s) Oral daily PRN      Allergies    penicillin (Hives)    Intolerances        PHYSICAL EXAM  Vital Signs Last 24 Hrs  T(C): 36.7 (16 Oct 2023 05:09), Max: 37.3 (15 Oct 2023 11:08)  T(F): 98.1 (16 Oct 2023 05:09), Max: 99.2 (15 Oct 2023 11:08)  HR: 93 (16 Oct 2023 05:09) (93 - 104)  BP: 128/73 (16 Oct 2023 05:09) (95/65 - 128/73)  BP(mean): --  RR: 18 (16 Oct 2023 05:09) (16 - 20)  SpO2: 93% (16 Oct 2023 05:09) (93% - 95%)    Parameters below as of 16 Oct 2023 05:09  Patient On (Oxygen Delivery Method): nasal cannula  O2 Flow (L/min): 5      10-15 @ 07:01  -  10-16 @ 07:00  --------------------------------------------------------  IN: 0 mL / OUT: 1450 mL / NET: -1450 mL          CONSTITUTIONAL: No acute distress.   HEENT:  Conjunctiva clear B/L.  Moist oral mucosa.   Cardiovascular: RRR with no murmurs. No JVD noted. No hepatojugular reflux. Trace lower extremity edema B/L. Extremities are warm and well perfused.    Respiratory: Lungs CTAB. No wrr. No accessory muscle use.   Gastrointestinal:  Soft, nontender. Non-distended. Non-rigid.    Neurologic:  Alert and awake. Moving all extremities. Following commands.    Skin:  No gross rashes notes.      LABS:      CBC Full  -  ( 16 Oct 2023 06:26 )  WBC Count : 10.10 K/uL  RBC Count : 3.25 M/uL  Hemoglobin : 9.9 g/dL  Hematocrit : 30.8 %  Platelet Count - Automated : 134 K/uL  Mean Cell Volume : 94.8 fl  Mean Cell Hemoglobin : 30.5 pg  Mean Cell Hemoglobin Concentration : 32.1 gm/dL  Auto Neutrophil # : x  Auto Lymphocyte # : x  Auto Monocyte # : x  Auto Eosinophil # : x  Auto Basophil # : x  Auto Neutrophil % : x  Auto Lymphocyte % : x  Auto Monocyte % : x  Auto Eosinophil % : x  Auto Basophil % : x    10-16    136  |  97  |  19  ----------------------------<  252<H>  3.9   |  26  |  0.97    Ca    8.4      16 Oct 2023 06:52  Phos  4.0     10-15  Mg     2.1     10-15    TPro  5.7<L>  /  Alb  3.2<L>  /  TBili  0.2  /  DBili  x   /  AST  23  /  ALT  45  /  AlkPhos  94  10-16          Urinalysis Basic - ( 16 Oct 2023 06:52 )    Color: x / Appearance: x / SG: x / pH: x  Gluc: 252 mg/dL / Ketone: x  / Bili: x / Urobili: x   Blood: x / Protein: x / Nitrite: x   Leuk Esterase: x / RBC: x / WBC x   Sq Epi: x / Non Sq Epi: x / Bacteria: x                RADIOLOGY & ADDITIONAL STUDIES:

## 2023-10-16 NOTE — PHYSICAL THERAPY INITIAL EVALUATION ADULT - ADDITIONAL COMMENTS
Pt lives alone in an apartment with no steps to enter. Prior to this hospital admission, pt was ambulating with a rolling walker (recently d/c'd from [facility]), prior to GEMA/first fall, pt was independent without AD. Pt owns rolling walker, raised toilet seat, grab bars in bathroom.

## 2023-10-16 NOTE — PHYSICAL THERAPY INITIAL EVALUATION ADULT - PERTINENT HX OF CURRENT PROBLEM, REHAB EVAL
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 Patient ultimately discharged to rehab and ultimately home. Patient with a mechanical fall with prompted admission to the hospital and later found to have hypoxemia again. CT imaging with mild emphysema and faint peripheral reticulations. However imaging markedly improved from prior CT imaging (8/2023) which demonstrated diffuse ground glass. Reports dyspnea with exertion and new lower extremity edema. No fever or chills.     CXR: Reticular opacities in the periphery of the left midlung. Please refer to   same day dedicated CT chest examination for relevant findings. CT Head: No acute intracranial hemorrhage, brain edema, or mass effect. CT Angio Chest PE: No evidence of pulmonary embolism. Mild emphysema. Marked height loss of T7 vertebral body. This has progressed since 8/24/2023. Correlate clinically for pain at this site. 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 Patient ultimately discharged to rehab and ultimately home. Patient with a mechanical fall with prompted admission to the hospital and later found to have hypoxemia again. CT imaging with mild emphysema and faint peripheral reticulations. However imaging markedly improved from prior CT imaging (8/2023) which demonstrated diffuse ground glass. Reports dyspnea with exertion and new lower extremity edema. No fever or chills.     CXR: Reticular opacities in the periphery of the L midlung. CT Head: No acute intracranial hemorrhage, brain edema, or mass effect. CT Angio Chest: No evidence of pulmonary embolism. Mild emphysema. Marked height loss of T7 vertebral body. This has progressed since 8/24/2023. Pt pending CT C/T/L spine and xray pelvis/hip. 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 Patient ultimately discharged to rehab and ultimately home. Patient with a mechanical fall with prompted admission to the hospital and later found to have hypoxemia again. CT imaging with mild emphysema and faint peripheral reticulations. However imaging markedly improved from prior CT imaging (8/2023) which demonstrated diffuse ground glass. Reports dyspnea with exertion and new lower extremity edema. No fever or chills.     CXR: Reticular opacities in the periphery of the L midlung. CT Head: No acute intracranial hemorrhage, brain edema, or mass effect. CT Angio Chest: No evidence of pulmonary embolism. Mild emphysema. Marked height loss of T7 vertebral body. This has progressed since 8/24/2023. CT C/T/L Spine: (-) for acute fx; chronic compression fx of T7; degenerative changes (see results for full report). Xray Pelvis/Hip: Bones are diffusely demineralized. Left hip hemiarthroplasty appears in near-anatomic alignment. No acute displaced fracture within the pelvis or left hip. Mild right hip arthrosis.

## 2023-10-16 NOTE — PHYSICAL THERAPY INITIAL EVALUATION ADULT - SIT-TO-STAND BALANCE
[facility] Transplant Team                                                      Critical / Counseling Time Provided: 30 minutes                                                                                                                                                        Chief Complaint: admitted for stem cell transplant    S: Patient seen and examined with HPC Transplant Team:       Denies mouth / tongue / throat pain, dyspnea, cough, nausea, vomiting, diarrhea, abdominal pain     Vital Signs Last 24 Hrs  T(C): 36.2 (2019 05:43), Max: 37.2 (2019 18:09)  T(F): 97.1 (2019 05:43), Max: 99 (2019 18:09)  HR: 80 (2019 05:43) (75 - 91)  BP: 138/91 (2019 05:43) (94/60 - 138/91)  BP(mean): --  RR: 18 (:43) (18 - 18)  SpO2: 99% (:43) (98% - 100%)    Admit weight:   Daily weight:    I&O's Summary    2019 07:01  -  2019 07:00  --------------------------------------------------------  IN: 3987.2 mL / OUT: 2200 mL / NET: 1787.2 mL    PE:   Oropharynx:   Oral Mucositis:                                                        Grade:   CVS:   Lungs:   Abdomen:  Extremities:   Gastric Mucositis:                                                  Grade:   Intestinal Mucositis:                                              Grade:   Skin:   TLC:   Neuro:   Pain:     Labs:                         12.3   5.7   )-----------( 74       ( 2019 07:02 )             36.3     02-08    140  |  104  |  11  ----------------------------<  92  3.5   |  25  |  0.85    Ca    9.3      2019 06:58  Phos  4.3     02-08  Mg     1.9     -08    TPro  6.1  /  Alb  3.8  /  TBili  0.2  /  DBili  <0.1  /  AST  28  /  ALT  45  /  AlkPhos  251<H>  0208    PT/INR - ( 2019 17:26 )   PT: 10.3 sec;   INR: 0.91 ratio      PTT - ( 2019 17:26 )  PTT:34.8 sec  Urinalysis Basic - ( 2019 19:08 )    Color: Yellow / Appearance: Slightly Turbid / S.016 / pH: x  Gluc: x / Ketone: Negative  / Bili: Negative / Urobili: Negative mg/dL   Blood: x / Protein: Negative mg/dL / Nitrite: Positive   Leuk Esterase: Small / RBC: 2 /HPF / WBC 17 /HPF   Sq Epi: x / Non Sq Epi: 10 /HPF / Bacteria: TNTC    Cultures:   no recent    Radiology:   IR Procedure (19 @ 16:32) >  Impression: Successful placement of a right internal jugular vein   triple-lumen catheter.    Meds:   Antimicrobials:   clotrimazole Lozenge 1 Lozenge Oral five times a day  fluconAZOLE   Tablet 400 milliGRAM(s) Oral daily  trimethoprim  160 mG/sulfamethoxazole 800 mG 1 Tablet(s) Oral every 12 hours    Heme / Onc:   cyclophosphamide IVPB (eMAR) 990 milliGRAM(s) IV Intermittent every 24 hours  fludarabine IVPB (eMAR) 51 milliGRAM(s) IV Intermittent every 24 hours  heparin  Infusion 464 Unit(s)/Hr IV Continuous <Continuous>  mesna IVPB (eMAR) 1337 milliGRAM(s) IV Intermittent every 24 hours    GI:  docusate sodium 100 milliGRAM(s) Oral three times a day PRN  pantoprazole    Tablet 40 milliGRAM(s) Oral before breakfast  senna 1 Tablet(s) Oral at bedtime PRN  sodium bicarbonate Mouth Rinse 10 milliLiter(s) Swish and Spit five times a day  ursodiol Capsule 300 milliGRAM(s) Oral two times a day with meals    Cardiovascular:   furosemide   Injectable 40 milliGRAM(s) IV Push every 6 hours  losartan 100 milliGRAM(s) Oral daily    Other medications:   acetaminophen   Tablet .. 650 milliGRAM(s) Oral every 6 hours  aprepitant 80 milliGRAM(s) Oral every 24 hours  Biotene Dry Mouth Oral Rinse 5 milliLiter(s) Swish and Spit five times a day  folic acid 1 milliGRAM(s) Oral daily  multivitamin 1 Tablet(s) Oral daily  ondansetron Injectable 8 milliGRAM(s) IV Push every 8 hours  sodium chloride 0.45%. 1000 milliLiter(s) IV Continuous <Continuous>  sodium chloride 0.9%. 1000 milliLiter(s) IV Continuous <Continuous>    PRN:   acetaminophen   Tablet .. 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 Headley).  Neurosurgery consult on , no need for Keppra because has been seizure-free.  Neurosurgery recommending to obtain baseline CT Head non-con (pending).    1. Infectious Disease:   clotrimazole Lozenge 1 Lozenge Oral five times a day  fluconAZOLE   Tablet 400 milliGRAM(s) Oral daily  trimethoprim  160 mG/sulfamethoxazole 800 mG 1 Tablet(s) Oral every 12 hours    2. VOD Prophylaxis: Actigall, Glutamine, Heparin (dosed at 100 units / kg / day)     3. GI Prophylaxis:  Protonix    4. Mouthcare - NS / NaHCO3 rinses, Mycelex, Caphosol; Skin care     5. GVHD prophylaxis: MMF and Tacro to start on Day 10     6. Transfuse & replete electrolytes prn     7. IV hydration, daily weights, strict I&O, prn diuresis   Lasix 40 mg IVP BID today    8. PO intake as tolerated, nutrition follow up as needed, MVI, folic acid     9. Antiemetics, anti-diarrhea medications:   aprepitant 80 milliGRAM(s) Oral every 24 hours  ondansetron Injectable 8 milliGRAM(s) IV Push every 8 hours  metoclopramide Injectable 10 milliGRAM(s) IV Push every 6 hours PRN    10. OOB as tolerated, physical therapy consult if needed     11. Monitor coags / fibrinogen 2x week, vitamin K as needed     12. Monitor closely for clinical changes, monitor for fevers     13. Emotional support provided, plan of care discussed and questions addressed     14. Patient education done regarding chemotherapy prep, plan of care, restrictions and discharge planning     15. Continue regular social work input     I have written the above note for Dr. Rene who performed service with me in the room.   Antonia Camarillo, ANP-BC ([phone])    I have seen and examined patient with NP, I agree with above note as scribed. [facility] Transplant Team                                                      Critical / Counseling Time Provided: 30 minutes                                                                                                                                                        Chief Complaint: admitted for stem cell transplant    S: Patient seen and examined with [facility] Transplant Team:       Denies mouth / tongue / throat pain, dyspnea, cough, nausea, vomiting, diarrhea, abdominal pain     Vital Signs Last 24 Hrs  T(C): 36.2 (2019 05:43), Max: 37.2 (2019 18:09)  T(F): 97.1 (2019 05:43), Max: 99 (2019 18:09)  HR: 80 (2019 05:43) (75 - 91)  BP: 138/91 (2019 05:43) (94/60 - 138/91)  BP(mean): --  RR: 18 (:43) (18 - 18)  SpO2: 99% (2019 05:43) (98% - 100%)    Admit weight: 111.4 kg  Daily weight:     I&O's Summary    2019 07:01  -  2019 07:00  --------------------------------------------------------  IN: 3987.2 mL / OUT: 2200 mL / NET: 1787.2 mL    PE:   Oropharynx:   Oral Mucositis:                                                        Grade:   CVS:   Lungs:   Abdomen:  Extremities:   Gastric Mucositis:                                                  Grade:   Intestinal Mucositis:                                              Grade:   Skin:   TLC:   Neuro:   Pain:     Labs:                         12.3   5.7   )-----------( 74       ( 2019 07:02 )             36.3     02-08    140  |  104  |  11  ----------------------------<  92  3.5   |  25  |  0.85    Ca    9.3      2019 06:58  Phos  4.3     02-08  Mg     1.9     02-08    TPro  6.1  /  Alb  3.8  /  TBili  0.2  /  DBili  <0.1  /  AST  28  /  ALT  45  /  AlkPhos  251<H>  02-08    PT/INR - ( 2019 17:26 )   PT: 10.3 sec;   INR: 0.91 ratio      PTT - ( 2019 17:26 )  PTT:34.8 sec  Urinalysis Basic - ( 2019 19:08 )    Color: Yellow / Appearance: Slightly Turbid / S.016 / pH: x  Gluc: x / Ketone: Negative  / Bili: Negative / Urobili: Negative mg/dL   Blood: x / Protein: Negative mg/dL / Nitrite: Positive   Leuk Esterase: Small / RBC: 2 /HPF / WBC 17 /HPF   Sq Epi: x / Non Sq Epi: 10 /HPF / Bacteria: TNTC    Cultures:   no recent    Radiology:     CT Head No Cont (19 @ 08:53) >  IMPRESSION:    No acute intracranial abnormality is noted. No subdural collections are   visualized. If the patient has new and persistent symptoms, consider   follow-up brain MRI with and without contrast, if there are no MRI or   contrast contraindications.    New moderate mucosal thickening involves the paranasal sinuses. Correlate   for possible sinusitis    Xray Chest 2 Views PA/Lat (19 @ 11:49) >  IMPRESSION:    1.  Clear lungs    IR Procedure (19 @ 16:32) >  Impression: Successful placement of a right internal jugular vein   triple-lumen catheter.    Meds:   Antimicrobials:   clotrimazole Lozenge 1 Lozenge Oral five times a day  fluconAZOLE   Tablet 400 milliGRAM(s) Oral daily  trimethoprim  160 mG/sulfamethoxazole 800 mG 1 Tablet(s) Oral every 12 hours    Heme / Onc:   cyclophosphamide IVPB (eMAR) 990 milliGRAM(s) IV Intermittent every 24 hours  fludarabine IVPB (eMAR) 51 milliGRAM(s) IV Intermittent every 24 hours  heparin  Infusion 464 Unit(s)/Hr IV Continuous <Continuous>  mesna IVPB (eMAR) 1337 milliGRAM(s) IV Intermittent every 24 hours    GI:  docusate sodium 100 milliGRAM(s) Oral three times a day PRN  pantoprazole    Tablet 40 milliGRAM(s) Oral before breakfast  senna 1 Tablet(s) Oral at bedtime PRN  sodium bicarbonate Mouth Rinse 10 milliLiter(s) Swish and Spit five times a day  ursodiol Capsule 300 milliGRAM(s) Oral two times a day with meals    Cardiovascular:   furosemide   Injectable 40 milliGRAM(s) IV Push every 6 hours  losartan 100 milliGRAM(s) Oral daily    Other medications:   acetaminophen   Tablet .. 650 milliGRAM(s) Oral every 6 hours  aprepitant 80 milliGRAM(s) Oral every 24 hours  Biotene Dry Mouth Oral Rinse 5 milliLiter(s) Swish and Spit five times a day  folic acid 1 milliGRAM(s) Oral daily  multivitamin 1 Tablet(s) Oral daily  ondansetron Injectable 8 milliGRAM(s) IV Push every 8 hours  sodium chloride 0.45%. 1000 milliLiter(s) IV Continuous <Continuous>  sodium chloride 0.9%. 1000 milliLiter(s) IV Continuous <Continuous>    PRN:   acetaminophen   Tablet .. 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 Headley).  Neurosurgery consult on , no need for Keppra because has been seizure-free.  Neurosurgery recommending to obtain baseline CT Head non-con, no acute intracranial abnormality, possible sinusitus  Cough x 3 weeks, CXR clear, start Tessalon 100 mg PRN  Chronic back pain- start oxycodone 5 mg IR PRN    1. Infectious Disease:   clotrimazole Lozenge 1 Lozenge Oral five times a day  fluconAZOLE   Tablet 400 milliGRAM(s) Oral daily  trimethoprim  160 mG/sulfamethoxazole 800 mG 1 Tablet(s) Oral every 12 hours    2. VOD Prophylaxis: Actigall, Glutamine, Heparin (dosed at 100 units / kg / day)     3. GI Prophylaxis:  Protonix    4. Mouthcare - NS / NaHCO3 rinses, Mycelex, Caphosol; Skin care     5. GVHD prophylaxis: MMF and Tacro to start on Day 10     6. Transfuse & replete electrolytes prn     7. IV hydration, daily weights, strict I&O, prn diuresis   Lasix 40 mg IVP BID today    8. PO intake as tolerated, nutrition follow up as needed, MVI, folic acid     9. Antiemetics, anti-diarrhea medications:   aprepitant 80 milliGRAM(s) Oral every 24 hours  ondansetron Injectable 8 milliGRAM(s) IV Push every 8 hours  metoclopramide Injectable 10 milliGRAM(s) IV Push every 6 hours PRN    10. OOB as tolerated, physical therapy consult if needed     11. Monitor coags / fibrinogen 2x week, vitamin K as needed     12. Monitor closely for clinical changes, monitor for fevers     13. Emotional support provided, plan of care discussed and questions addressed     14. Patient education done regarding chemotherapy prep, plan of care, restrictions and discharge planning     15. Continue regular social work input     I have written the above note for Dr. Rene who performed service with me in the room.   Antonia Camarillo, ANP-BC ([phone])    I have seen and examined patient with NP, I agree with above note as scribed. [facility] Transplant Team                                                      Critical / Counseling Time Provided: 30 minutes                                                                                                                                                        Chief Complaint: admitted for stem cell transplant    S: Patient seen and examined with [facility]PC Transplant Team:     + trouble sleeping, Ambien helped  + green sputum    Denies mouth / tongue / throat pain, dyspnea, cough, nausea, vomiting, diarrhea, abdominal pain     Vital Signs Last 24 Hrs  T(C): 36.2 (2019 05:43), Max: 37.2 (2019 18:09)  T(F): 97.1 (2019 05:43), Max: 99 (2019 18:09)  HR: 80 (2019 05:43) (75 - 91)  BP: 138/91 (2019 05:43) (94/60 - 138/91)  BP(mean): --  RR: 18 (2019 05:43) (18 - 18)  SpO2: 99% (2019 05:43) (98% - 100%)    Admit weight: 111.4 kg  Daily weight: pending    I&O's Summary    2019 07:01  -  2019 07:00  --------------------------------------------------------  IN: 3987.2 mL / OUT: 2200 mL / NET: 1787.2 mL    PE:   Oropharynx:   Oral Mucositis:                                                        Grade:   CVS:   Lungs:   Abdomen:  Extremities:   Gastric Mucositis:                                                  Grade:   Intestinal Mucositis:                                              Grade:   Skin:   TLC:   Neuro:   Pain:     Labs:                         12.3   5.7   )-----------( 74       ( 2019 07:02 )             36.3     02-08    140  |  104  |  11  ----------------------------<  92  3.5   |  25  |  0.85    Ca    9.3      2019 06:58  Phos  4.3     02-08  Mg     1.9     02-08    TPro  6.1  /  Alb  3.8  /  TBili  0.2  /  DBili  <0.1  /  AST  28  /  ALT  45  /  AlkPhos  251<H>  02-08    PT/INR - ( 2019 17:26 )   PT: 10.3 sec;   INR: 0.91 ratio      PTT - ( 2019 17:26 )  PTT:34.8 sec  Urinalysis Basic - ( 2019 19:08 )    Color: Yellow / Appearance: Slightly Turbid / S.016 / pH: x  Gluc: x / Ketone: Negative  / Bili: Negative / Urobili: Negative mg/dL   Blood: x / Protein: Negative mg/dL / Nitrite: Positive   Leuk Esterase: Small / RBC: 2 /HPF / WBC 17 /HPF   Sq Epi: x / Non Sq Epi: 10 /HPF / Bacteria: TNTC    Cultures:   no recent    Radiology:     CT Head No Cont (19 @ 08:53) >  IMPRESSION:    No acute intracranial abnormality is noted. No subdural collections are   visualized. If the patient has new and persistent symptoms, consider   follow-up brain MRI with and without contrast, if there are no MRI or   contrast contraindications.    New moderate mucosal thickening involves the paranasal sinuses. Correlate   for possible sinusitis    Xray Chest 2 Views PA/Lat (19 @ 11:49) >  IMPRESSION:    1.  Clear lungs    IR Procedure (19 @ 16:32) >  Impression: Successful placement of a right internal jugular vein   triple-lumen catheter.    Meds:   Antimicrobials:   clotrimazole Lozenge 1 Lozenge Oral five times a day  fluconAZOLE   Tablet 400 milliGRAM(s) Oral daily  trimethoprim  160 mG/sulfamethoxazole 800 mG 1 Tablet(s) Oral every 12 hours    Heme / Onc:   cyclophosphamide IVPB (eMAR) 990 milliGRAM(s) IV Intermittent every 24 hours  fludarabine IVPB (eMAR) 51 milliGRAM(s) IV Intermittent every 24 hours  heparin  Infusion 464 Unit(s)/Hr IV Continuous <Continuous>  mesna IVPB (eMAR) 1337 milliGRAM(s) IV Intermittent every 24 hours    GI:  docusate sodium 100 milliGRAM(s) Oral three times a day PRN  pantoprazole    Tablet 40 milliGRAM(s) Oral before breakfast  senna 1 Tablet(s) Oral at bedtime PRN  sodium bicarbonate Mouth Rinse 10 milliLiter(s) Swish and Spit five times a day  ursodiol Capsule 300 milliGRAM(s) Oral two times a day with meals    Cardiovascular:   furosemide   Injectable 40 milliGRAM(s) IV Push every 6 hours  losartan 100 milliGRAM(s) Oral daily    Other medications:   acetaminophen   Tablet .. 650 milliGRAM(s) Oral every 6 hours  aprepitant 80 milliGRAM(s) Oral every 24 hours  Biotene Dry Mouth Oral Rinse 5 milliLiter(s) Swish and Spit five times a day  folic acid 1 milliGRAM(s) Oral daily  multivitamin 1 Tablet(s) Oral daily  ondansetron Injectable 8 milliGRAM(s) IV Push every 8 hours  sodium chloride 0.45%. 1000 milliLiter(s) IV Continuous <Continuous>  sodium chloride 0.9%. 1000 milliLiter(s) IV Continuous <Continuous>    PRN:   acetaminophen   Tablet .. 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 Headley).  Neurosurgery consult on , no need for Keppra because has been seizure-free.  Neurosurgery recommending to obtain baseline CT Head non-con, no acute intracranial abnormality, possible sinusitus  Cough x 3 weeks, CXR clear, start Tessalon 100 mg PRN, f/u RVP, start Cipro 500 mg BID  Chronic back pain- start oxycodone 5 mg IR PRN    1. Infectious Disease:   clotrimazole Lozenge 1 Lozenge Oral five times a day  fluconAZOLE   Tablet 400 milliGRAM(s) Oral daily  trimethoprim  160 mG/sulfamethoxazole 800 mG 1 Tablet(s) Oral every 12 hours    2. VOD Prophylaxis: Actigall, Glutamine, Heparin (dosed at 100 units / kg / day)     3. GI Prophylaxis:  Protonix    4. Mouthcare - NS / NaHCO3 rinses, Mycelex, Caphosol; Skin care     5. GVHD prophylaxis: MMF and Tacro to start on Day 10     6. Transfuse & replete electrolytes prn     7. IV hydration, daily weights, strict I&O, prn diuresis   Lasix 40 mg IVP BID today    8. PO intake as tolerated, nutrition follow up as needed, MVI, folic acid     9. Antiemetics, anti-diarrhea medications:   aprepitant 80 milliGRAM(s) Oral every 24 hours  ondansetron Injectable 8 milliGRAM(s) IV Push every 8 hours  metoclopramide Injectable 10 milliGRAM(s) IV Push every 6 hours PRN    10. OOB as tolerated, physical therapy consult if needed     11. Monitor coags / fibrinogen 2x week, vitamin K as needed     12. Monitor closely for clinical changes, monitor for fevers     13. Emotional support provided, plan of care discussed and questions addressed     14. Patient education done regarding chemotherapy prep, plan of care, restrictions and discharge planning     15. Continue regular social work input     I have written the above note for Dr. Rene who performed service with me in the room.   Antonia Camarillo, ANP-BC ([phone])    I have seen and examined patient with NP, I agree with above note as scribed. [facility] Transplant Team                                                      Critical / Counseling Time Provided: 30 minutes                                                                                                                                                        Chief Complaint: admitted for stem cell transplant    S: Patient seen and examined with HPC Transplant Team:     + trouble sleeping, Ambien helped  + green sputum    Denies mouth / tongue / throat pain, dyspnea, cough, nausea, vomiting, diarrhea, abdominal pain     Vital Signs Last 24 Hrs  T(C): 36.2 (2019 05:43), Max: 37.2 (2019 18:09)  T(F): 97.1 (2019 05:43), Max: 99 (2019 18:09)  HR: 80 (2019 05:43) (75 - 91)  BP: 138/91 (2019 05:43) (94/60 - 138/91)  BP(mean): --  RR: 18 (2019 05:43) (18 - 18)  SpO2: 99% (2019 05:43) (98% - 100%)    Admit weight: 111.4 kg  Daily weight: pending    I&O's Summary    2019 07:01  -  2019 07:00  --------------------------------------------------------  IN: 3987.2 mL / OUT: 2200 mL / NET: 1787.2 mL    PE:   Oropharynx: no erythema or ulcers  Oral Mucositis: n/a                                                       stGstrstastdstest:st st1st CVS: S1S2, RRR  Lungs: clear bilaterally  Abdomen: NT/ND, normoactive BS x 4Q  Extremities: no edema  Gastric Mucositis: n/a                                                 stGstrstastdstest:st st1st Intestinal Mucositis: n/a                                             stGstrstastdstest:st st1st Skin: no rashes  TLC: C/D/I  Neuro: nonfocal  Pain: none    Labs:                         12.3   5.7   )-----------( 74       ( 2019 07:02 )             36.3     02-08    140  |  104  |  11  ----------------------------<  92  3.5   |  25  |  0.85    Ca    9.3      2019 06:58  Phos  4.3     02-08  Mg     1.9     -    TPro  6.1  /  Alb  3.8  /  TBili  0.2  /  DBili  <0.1  /  AST  28  /  ALT  45  /  AlkPhos  251<H>      PT/INR - ( 2019 17:26 )   PT: 10.3 sec;   INR: 0.91 ratio      PTT - ( 2019 17:26 )  PTT:34.8 sec  Urinalysis Basic - ( 2019 19:08 )    Color: Yellow / Appearance: Slightly Turbid / S.016 / pH: x  Gluc: x / Ketone: Negative  / Bili: Negative / Urobili: Negative mg/dL   Blood: x / Protein: Negative mg/dL / Nitrite: Positive   Leuk Esterase: Small / RBC: 2 /HPF / WBC 17 /HPF   Sq Epi: x / Non Sq Epi: 10 /HPF / Bacteria: TNTC    Cultures:   no recent    Radiology:     CT Head No Cont (19 @ 08:53) >  IMPRESSION:    No acute intracranial abnormality is noted. No subdural collections are   visualized. If the patient has new and persistent symptoms, consider   follow-up brain MRI with and without contrast, if there are no MRI or   contrast contraindications.    New moderate mucosal thickening involves the paranasal sinuses. Correlate   for possible sinusitis    Xray Chest 2 Views PA/Lat (19 @ 11:49) >  IMPRESSION:    1.  Clear lungs    IR Procedure (19 @ 16:32) >  Impression: Successful placement of a right internal jugular vein   triple-lumen catheter.    Meds:   Antimicrobials:   clotrimazole Lozenge 1 Lozenge Oral five times a day  fluconAZOLE   Tablet 400 milliGRAM(s) Oral daily  trimethoprim  160 mG/sulfamethoxazole 800 mG 1 Tablet(s) Oral every 12 hours    Heme / Onc:   cyclophosphamide IVPB (eMAR) 990 milliGRAM(s) IV Intermittent every 24 hours  fludarabine IVPB (eMAR) 51 milliGRAM(s) IV Intermittent every 24 hours  heparin  Infusion 464 Unit(s)/Hr IV Continuous <Continuous>  mesna IVPB (eMAR) 1337 milliGRAM(s) IV Intermittent every 24 hours    GI:  docusate sodium 100 milliGRAM(s) Oral three times a day PRN  pantoprazole    Tablet 40 milliGRAM(s) Oral before breakfast  senna 1 Tablet(s) Oral at bedtime PRN  sodium bicarbonate Mouth Rinse 10 milliLiter(s) Swish and Spit five times a day  ursodiol Capsule 300 milliGRAM(s) Oral two times a day with meals    Cardiovascular:   furosemide   Injectable 40 milliGRAM(s) IV Push every 6 hours  losartan 100 milliGRAM(s) Oral daily    Other medications:   acetaminophen   Tablet .. 650 milliGRAM(s) Oral every 6 hours  aprepitant 80 milliGRAM(s) Oral every 24 hours  Biotene Dry Mouth Oral Rinse 5 milliLiter(s) Swish and Spit five times a day  folic acid 1 milliGRAM(s) Oral daily  multivitamin 1 Tablet(s) Oral daily  ondansetron Injectable 8 milliGRAM(s) IV Push every 8 hours  sodium chloride 0.45%. 1000 milliLiter(s) IV Continuous <Continuous>  sodium chloride 0.9%. 1000 milliLiter(s) IV Continuous <Continuous>    PRN:   acetaminophen   Tablet .. 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 Headley).  Neurosurgery consult on , no need for Keppra because has been seizure-free.  Neurosurgery recommending to obtain baseline CT Head non-con, no acute intracranial abnormality, possible sinusitus  Cough x 3 weeks, CXR clear, start Tessalon 100 mg PRN, RVP (-), start Cipro 500 mg BID  Chronic back pain- start oxycodone 5 mg IR PRN    1. Infectious Disease:   clotrimazole Lozenge 1 Lozenge Oral five times a day  fluconAZOLE   Tablet 400 milliGRAM(s) Oral daily  trimethoprim  160 mG/sulfamethoxazole 800 mG 1 Tablet(s) Oral every 12 hours    2. VOD Prophylaxis: Actigall, Glutamine, Heparin (dosed at 100 units / kg / day)     3. GI Prophylaxis:  Protonix    4. Mouthcare - NS / NaHCO3 rinses, Mycelex, Caphosol; Skin care     5. GVHD prophylaxis: MMF and [place]ro to start on Day 10     6. Transfuse & replete electrolytes prn     7. IV hydration, daily weights, strict I&O, prn diuresis   Lasix 40 mg IVP BID today    8. PO intake as tolerated, nutrition follow up as needed, MVI, folic acid     9. Antiemetics, anti-diarrhea medications:   aprepitant 80 milliGRAM(s) Oral every 24 hours  ondansetron Injectable 8 milliGRAM(s) IV Push every 8 hours  metoclopramide Injectable 10 milliGRAM(s) IV Push every 6 hours PRN    10. OOB as tolerated, physical therapy consult if needed     11. Monitor coags / fibrinogen 2x week, vitamin K as needed     12. Monitor closely for clinical changes, monitor for fevers     13. Emotional support provided, plan of care discussed and questions addressed     14. Patient education done regarding chemotherapy prep, plan of care, restrictions and discharge planning     15. Continue regular social work input     I have written the above note for Dr. Rene who performed service with me in the room.   Antonia Camarillo, ANP-BC ([phone])    I have seen and examined patient with NP, I agree with above note as scribed. fair plus

## 2023-10-16 NOTE — PHYSICAL THERAPY INITIAL EVALUATION ADULT - NSPTDISCHREC_GEN_A_CORE
if pt d/c home recommend home PT, 24/7 assist, transport w/c, 3:1 commode as pt confined to single room/floor without access to bathroom/Sub-acute Rehab

## 2023-10-16 NOTE — PHYSICAL THERAPY INITIAL EVALUATION ADULT - GENERAL OBSERVATIONS, REHAB EVAL
Received semi-supine in bed in NAD, VSS, +tele, +3L O2 via NC, +purewick, A&O x3-4, agreeable to PT.

## 2023-10-16 NOTE — PROGRESS NOTE ADULT - ASSESSMENT
89F HLD and breast cancer on prior ibrance, previous admission for pneumonitis now readmitted s/p fall with dyspnea    #Breast cancer  - recurrent; follows with Dr. Batista  - was on ibrance and exemestane since 2021 with stable disease; last PET scan 1/2023  - Ibrance held since prior admission; continue exemestane    [facility]  - [facility] with hypoxia in 8/2023, no evidence of PE, no improvement with diuresis and discharged on steroids for presumed pneumonitis from ibrance  - echo with severe PHTN  - diuresis per cardiology; steroids per pulmonary    #anemia, normocytic- chronic  - iron studies with AOCD  - B12/folate stable  - no hemolysis  - Hg overall stable    #s/p fall, back pain  - CT with DJD, disc herniations, chronic compression fracture 89F HLD and breast cancer on prior ibrance, previous admission for pneumonitis now readmitted s/p fall with dyspnea    #Breast cancer  - recurrent; follows with Dr. Batista  - was on ibrance and exemestane since 2021 with stable disease; last PET scan 1/2023  - Ibrance held since prior admission; continue exemestane (d/w Pharmacy)    #hypoxia, pneumonitis from Ibrance  - admitted with hypoxia in 8/2023, no evidence of PE, no improvement with diuresis and discharged on steroids for presumed pneumonitis from ibrance  - repeat CTA 10/14- no PE, edema vs fibrotic change  - echo with severe PHTN  - diuresis per cardiology; steroids per pulmonary    #anemia, normocytic- chronic  - iron studies with AOCD  - B12/folate stable  - no hemolysis  - Hg overall stable    #s/p fall, back pain  - CT with DJD, disc herniations, chronic compression fracture

## 2023-10-16 NOTE — CONSULT NOTE ADULT - ASSESSMENT
Dr. Shelton Faulkner, DO  Pulmonary and Critical Care Medicine Fellow   Available via Microsoft Teams - **Preferred**  Pulmonary Spectra #[id number] ([facility]) / [id number] ([facility])  Pager:  [phone] 88 yo F w/ PMH of breast cancer (who was on Ibrance) with recent admission for hypoxemia (8/2023) related to 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 Patient ultimately discharged to rehab and ultimately home. Patient with a mechanical fall with prompted admission to the hospital and later found to have hypoxemia again. CT imaging with mild emphysema and faint peripheral reticulations. However imaging markedly improved from prior CT imaging (8/2023) which demonstrated diffuse ground glass. Reports dyspnea with exertion and new lower extremity edema. No fever or chills.     #Hypoxemia  #Mild emphysema  #Peripheral reticulations  #Volume overload  #Pulmonary hypertension -- related to hypoxemia and increased RV afterload.    RECS  -Patient with rapid improvement while on diuretics. Continue and maintain daily net negative status.  -Outpatient, was on prednisone 20 mg po bid with recent conversion to prednisone 20 mg po daily. Now on solumedrol 20 mg po TID. Would change to solumedrol 20 mg po BID.   -Please start Bactrim DS po MWF for PCP prophylaxis.   -On CTX. Will monitor. If no clear infectious process, will discontinue tomorrow.   -Check rest and ambulatory spo2 to determine need for home o2.  -Out of bed to chair.  -Incentive spirometer.    Dr. Shelton Faulkner, DO  Pulmonary and Critical Care Medicine Fellow   Available via Microsoft Teams - **Preferred**  Pulmonary Spectra #[id number] ([facility]) / [id number] ([facility])  Pager:  [phone]

## 2023-10-16 NOTE — PROGRESS NOTE ADULT - SUBJECTIVE AND OBJECTIVE BOX
date of service: 10-16-23 @ 07:39  afberile  REVIEW OF SYSTEMS:  CONSTITUTIONAL: No fever,  no  weight loss  ENT:  No  tinnitus,   no   vertigo  NECK: No pain or stiffness  RESPIRATORY: No cough, wheezing, chills or hemoptysis;    No Shortness of Breath  CARDIOVASCULAR: No chest pain, palpitations, dizziness  GASTROINTESTINAL: No abdominal or epigastric pain. No nausea, vomiting, or hematemesis; No diarrhea  No melena or hematochezia.  GENITOURINARY: No dysuria, frequency, hematuria, or incontinence  NEUROLOGICAL: No headaches  SKIN: No itching,  no   rash  LYMPH Nodes: No enlarged glands  ENDOCRINE: No heat or cold intolerance  MUSCULOSKELETAL: No joint pain or swelling  PSYCHIATRIC: No depression, anxiety  HEME/LYMPH: No easy bruising, or bleeding gums  ALLERGY AND IMMUNOLOGIC: No hives or eczema	    MEDICATIONS  (STANDING):  albuterol/ipratropium for Nebulization 3 milliLiter(s) Nebulizer every 6 hours  buPROPion XL (24-Hour) . 150 milliGRAM(s) Oral daily  cefTRIAXone   IVPB 1000 milliGRAM(s) IV Intermittent every 24 hours  escitalopram 10 milliGRAM(s) Oral daily  furosemide   Injectable 20 milliGRAM(s) IV Push daily  heparin   Injectable 5000 Unit(s) SubCutaneous every 12 hours  influenza  Vaccine (HIGH DOSE) 0.7 milliLiter(s) IntraMuscular once  lidocaine   4% Patch 1 Patch Transdermal daily  methylPREDNISolone sodium succinate Injectable 20 milliGRAM(s) IV Push three times a day  mirtazapine 7.5 milliGRAM(s) Oral at bedtime  pantoprazole    Tablet 40 milliGRAM(s) Oral before breakfast  simvastatin 20 milliGRAM(s) Oral at bedtime  tamsulosin 0.4 milliGRAM(s) Oral at bedtime    MEDICATIONS  (PRN):  ALPRAZolam 0.25 milliGRAM(s) Oral two times a day PRN anxiety  traMADol 25 milliGRAM(s) Oral two times a day PRN Moderate Pain (4 - 6)  traMADol 50 milliGRAM(s) Oral daily PRN Severe Pain (7 - 10)      Vital Signs Last 24 Hrs  T(C): 36.7 (16 Oct 2023 05:09), Max: 37.3 (15 Oct 2023 11:08)  T(F): 98.1 (16 Oct 2023 05:09), Max: 99.2 (15 Oct 2023 11:08)  HR: 93 (16 Oct 2023 05:09) (93 - 104)  BP: 128/73 (16 Oct 2023 05:09) (95/65 - 128/73)  BP(mean): --  RR: 18 (16 Oct 2023 05:09) (16 - 20)  SpO2: 93% (16 Oct 2023 05:09) (93% - 95%)    Parameters below as of 16 Oct 2023 05:09  Patient On (Oxygen Delivery Method): nasal cannula  O2 Flow (L/min): 5    CAPILLARY BLOOD GLUCOSE        I&O's Summary    15 Oct 2023 07:01  -  16 Oct 2023 07:00  --------------------------------------------------------  IN: 0 mL / OUT: 1450 mL / NET: -1450 mL          Appearance: Normal	  HEENT:   Normal oral mucosa, PERRL, EOMI	  Lymphatic: No lymphadenopathy  Cardiovascular: Normal S1 S2, No JVD  Respiratory: Lungs clear to auscultation	  Gastrointestinal:  Soft, Non-tender, + BS	  Skin: No rash, No ecchymoses	  Extremities:     LABS:                        9.9    10.10 )-----------( 134      ( 16 Oct 2023 06:26 )             30.8     10-15    135  |  99  |  19  ----------------------------<  140<H>  4.2   |  22  |  1.15    Ca    8.2<L>      15 Oct 2023 08:38  Phos  4.0     10-15  Mg     2.1     10-15    TPro  6.6  /  Alb  4.1  /  TBili  0.5  /  DBili  x   /  AST  45<H>  /  ALT  65<H>  /  AlkPhos  114  10-14          Urinalysis Basic - ( 15 Oct 2023 08:38 )    Color: x / Appearance: x / SG: x / pH: x  Gluc: 140 mg/dL / Ketone: x  / Bili: x / Urobili: x   Blood: x / Protein: x / Nitrite: x   Leuk Esterase: x / RBC: x / WBC x   Sq Epi: x / Non Sq Epi: x / Bacteria: x          10-14 @ 20:08  4.8  29              Consultant(s) Notes Reviewed:      Care Discussed with Consultants/Other Providers:

## 2023-10-16 NOTE — PROGRESS NOTE ADULT - ASSESSMENT
89-year-old female    h/o  HLD,  Ca  breast,  on  chemo . prior  h/o  hip  surg     admitted  with  worsening  sob.  and  also  had  a  fall .   recently  d/c  from rehab facility /  pt  had a  similar  admission  for  sob, in 8/23      *  sob.  from  ?  chf/  acute  copd  exacerbation/ ?  pneumonitis         afberile,  wbc  17,000  on  arrival,    on  iv  solumedrol,  Proventil. and  rocephin/  iv lasix         CT  chest   angio,  no  pe,  centrilobular  emphysema, reticular  markings, /  chf vs  early  fibrosis.  compression T 7. old          house  pulm eval, pending      h/o  Ca breast. onc  dr garcia / [employer]        was  on Ibrance  and exemestane since 2021 with stable disease; last PET scan 1/2023        and, Ibrance held, as  Ibrance can cause ILD/pneumonitis/  pt  was  supposed  to  f/p  with  pulm dr lisker     Echo   8/23, with normal LV.  pulm  htn mild, was  38  mm  hg  /  no  shunt     urinary   retention   uro  note  seen.  keep  hernandez  for  now/  tov in am       abd  distension,  suspect is baseline. ?  discomfort.  ct a/p.  report  pending      on dvt  ppx /  fall  risk/ PT  eval    Echo  now,  normal  ef.  severe  pHTN .  { this  is   new ,  from   echo  of  8/23 }.  normal  RV  function     pulm/  card  f/p      CHAO< from: TTE W or WO Ultrasound Enhancing Agent (10.15.23 @ 13:37) >  ONCLUSIONS:   1. Left ventricular systolic function is hyperdynamic with an ejection fraction of 75 % by Ross's method of disks.   2. Mildly enlarged right ventricular cavity size, wall thickness, and systolic function.   3. Compared to the transthoracic echocardiogram performed on 8/24/2023 Estimated PASP and TR has increased.  4. Estimated pulmonary artery systolic pressure is 61 mmHg, consistent with severe pulmonary hypertension.   5. Hyperdynamic left ventricular systolic function with intra-cavitary gradient of 37 mmHg.   6. Technically difficult image quality.   7. The inferior vena cava is normal in size measuring 1.70 cm in diameter, (normal <2.1cm) with abnormal inspiratory collapse (abnormal <50%) consistent with mildly elevated right atrial pressure (~8, range 5-10mmHg).   8. There is normal LV mass and concentric remodeling.  _______________________________________________________________________________________:    < end of copied text >     89-year-old female    h/o  HLD,  Ca  breast,  on  chemo . prior  h/o  hip  surg     admitted  with  worsening  sob.  and  also  had  a  fall .   recently  d/c  from rehab facility /  pt  had a  similar  admission  for  sob, in 8/23      *  sob.  from  ?  chf/  acute  copd  exacerbation/ ?  pneumonitis         afberile,  wbc  17,000  on  arrival,    on  iv  solumedrol,  Proventil. and  rocephin/  iv lasix         CT  chest   angio,  no  pe,  centrilobular  emphysema, reticular  markings, /  chf vs  early  fibrosis.  compression T 7. old          house  pulm eval, pending      h/o  Ca breast. onc  dr garcia / [employer]          was  on Ibrance  and exemestane since 2021 with stable disease;  PET scan 1/2023          and, Ibrance held, as  Ibrance can cause ILD/pneumonitis/  pt  was  supposed  to  f/p  with  pulm dr lisker     Echo   8/23, with normal LV.  pulm  htn mild, was  38  mm  hg  /  no  shunt       urinary   retention   uro  note  seen.  keep  hernandez  for  now/  tov in am       abd  distension,  suspect is baseline. ?  discomfort.  ct a/p.  report  pending   Depression/anxiety, on xanax.  Wellbutrin   Remeron        on dvt  ppx /  fall  risk/ PT  eval    Echo  now,  normal  ef.  severe  pHTN ,  { this  is   new ,  from   echo  of  8/23 } normal  RV  function     pulm/  card  f/p      CHAO< from: TTE W or WO Ultrasound Enhancing Agent (10.15.23 @ 13:37) >  ONCLUSIONS:   1. Left ventricular systolic function is hyperdynamic with an ejection fraction of 75 % by Ross's method of disks.   2. Mildly enlarged right ventricular cavity size, wall thickness, and systolic function.   3. Compared to the transthoracic echocardiogram performed on 8/24/2023 Estimated PASP and TR has increased.  4. Estimated pulmonary artery systolic pressure is 61 mmHg, consistent with severe pulmonary hypertension.   5. Hyperdynamic left ventricular systolic function with intra-cavitary gradient of 37 mmHg.   6. Technically difficult image quality.   7. The inferior vena cava is normal in size measuring 1.70 cm in diameter, (normal <2.1cm) with abnormal inspiratory collapse (abnormal <50%) consistent with mildly elevated right atrial pressure (~8, range 5-10mmHg).   8. There is normal LV mass and concentric remodeling.  _______________________________________________________________________________________:    < end of copied text >     89-year-old female    h/o  HLD,  Ca  breast,  on  chemo . prior  h/o  hip  surg     admitted  with  worsening  sob.  and  also  had  a  fall .   recently  d/c  from rehab facility /  pt  had a  similar  admission  for  sob, in 8/23      *  sob.  from  ?  chf/  acute  copd  exacerbation/ ?  pneumonitis         afberile,  wbc  17,000  on  arrival,    on  iv  solumedrol,  Proventil. and  rocephin/  iv lasix         CT  chest   angio,  no  pe,  centrilobular  emphysema, reticular  markings, /  chf vs  early  fibrosis.  compression T 7. old          house  pulm eval, pending      h/o  Ca breast. onc  dr garcia / [employer]          was  on Ibrance  and exemestane since 2021 with stable disease;  PET scan 1/2023          and, Ibrance held, as  Ibrance can cause ILD/pneumonitis/  pt  was  supposed  to  f/p  with  pulm dr lisker     Echo   8/23, with normal LV.  pulm  htn mild, was  38  mm  hg  /  no  shunt       urinary   retention   uro  note  seen.  keep  hernandez  for  now/  tov in am       abd  distension,  suspect is baseline. ?  discomfort.  ct a/p.  report  pending   Depression/anxiety, on xanax.  Wellbutrin   Remeron        on dvt  ppx /  fall  risk/ PT  eval    Echo  now,  normal  ef.  severe  pHTN ,  { this  is   new ,  from   echo  of  8/23 } normal  RV  function     pulm  f/p/  discussed  with  floor  team      CHAO< from: TTE W or WO Ultrasound Enhancing Agent (10.15.23 @ 13:37) >  ONCLUSIONS:   1. Left ventricular systolic function is hyperdynamic with an ejection fraction of 75 % by Ross's method of disks.   2. Mildly enlarged right ventricular cavity size, wall thickness, and systolic function.   3. Compared to the transthoracic echocardiogram performed on 8/24/2023 Estimated PASP and TR has increased.  4. Estimated pulmonary artery systolic pressure is 61 mmHg, consistent with severe pulmonary hypertension.   5. Hyperdynamic left ventricular systolic function with intra-cavitary gradient of 37 mmHg.   6. Technically difficult image quality.   7. The inferior vena cava is normal in size measuring 1.70 cm in diameter, (normal <2.1cm) with abnormal inspiratory collapse (abnormal <50%) consistent with mildly elevated right atrial pressure (~8, range 5-10mmHg).   8. There is normal LV mass and concentric remodeling.  _______________________________________________________________________________________:    < end of copied text >     89-year-old female    h/o  HLD,  Ca  breast,    s/p  lumpectomy,   recurrence , on  chemo . prior  h/o  hip  surg     admitted  with  worsening  sob.  and  also  had  a  fall .   recently  d/c  from rehab facility /  pt  had a  similar  admission  for  sob, in 8/23      *  sob.  from  ?  chf/  acute  copd  exacerbation/ ?  pneumonitis         afberile,  wbc  17,000  on  arrival,    on  iv  solumedrol,  Proventil. and  rocephin/  iv lasix         CT  chest   angio,  no  pe,  centrilobular  emphysema, reticular  markings, /  chf vs  early  fibrosis.  compression T 7. old          house  pulm eval, pending      h/o  Ca breast. onc  dr garcia / [employer]          was  on Ibrance  and exemestane since 2021 with stable disease;  PET scan 1/2023          and, Ibrance held, as  Ibrance can cause ILD/pneumonitis/  pt  was  supposed  to  f/p  with  pulm dr lisker     Echo   8/23, with normal LV.  pulm  htn mild, was  38  mm  hg  /  no  shunt       urinary   retention   uro  note  seen.  keep  hernandez  for  now/  tov in am       abd  distension,  suspect is baseline. ?  discomfort.  ct a/p.  report  pending   Depression/anxiety, on xanax.  Wellbutrin   Remeron        on dvt  ppx /  fall  risk/ PT  eval    Echo  now,  normal  ef.  severe  pHTN ,  { this  is   new ,  from   echo  of  8/23 } normal  RV  function     pulm  f/p/  discussed  with  floor  team      CHAO< from: TTE W or WO Ultrasound Enhancing Agent (10.15.23 @ 13:37) >  ONCLUSIONS:   1. Left ventricular systolic function is hyperdynamic with an ejection fraction of 75 % by Ross's method of disks.   2. Mildly enlarged right ventricular cavity size, wall thickness, and systolic function.   3. Compared to the transthoracic echocardiogram performed on 8/24/2023 Estimated PASP and TR has increased.  4. Estimated pulmonary artery systolic pressure is 61 mmHg, consistent with severe pulmonary hypertension.   5. Hyperdynamic left ventricular systolic function with intra-cavitary gradient of 37 mmHg.   6. Technically difficult image quality.   7. The inferior vena cava is normal in size measuring 1.70 cm in diameter, (normal <2.1cm) with abnormal inspiratory collapse (abnormal <50%) consistent with mildly elevated right atrial pressure (~8, range 5-10mmHg).   8. There is normal LV mass and concentric remodeling.  _______________________________________________________________________________________:    < end of copied text >     89-year-old female    h/o  HLD,  Ca  breast,    s/p  lumpectomy,   recurrence , on  chemo . prior  h/o  hip  surg     admitted  with  worsening  sob.  and  also  had  a  fall .   recently  d/c  from rehab facility /  pt  had a  similar  admission  for  sob, in 8/23      *  sob.  from  ?  chf/  acute  copd  exacerbation/ ?  pneumonitis         afberile,  wbc  17,000  on  arrival,    on  iv  solumedrol,  Proventil. and  rocephin/  iv lasix        CT  chest   angio,  no  pe,  centrilobular  emphysema, reticular  markings, /  chf vs  early  fibrosis.  compression T 7. old        house  pulm eval, pending      h/o  Ca breast. onc  dr garcia / [employer]        was  on Ibrance  and exemestane since 2021 with stable disease;  PET scan 1/2023        and, Ibrance held, as  Ibrance can cause ILD/pneumonitis/  pt  was  supposed  to  f/p  with  pulm dr lisker     Echo   8/23, with normal LV.  pulm  htn mild, was  38  mm  hg  /  no  shunt      urinary   retention   uro  note  seen.  keep  hernandez  for  now/  tov in am       abd  distension,  suspect is baseline. ?  discomfort.  ct a/p.  report  pending   Depression/anxiety, on xanax.  Wellbutrin   Remeron        on dvt  ppx /  fall  risk/ PT  eval    Echo  now,  normal  ef.  severe  pHTN ,  { this  is   new ,  from   echo  of  8/23 } normal  RV  function     pulm  f/p/  discussed  with  floor  team      CHAO< from: TTE W or WO Ultrasound Enhancing Agent (10.15.23 @ 13:37) >  ONCLUSIONS:   1. Left ventricular systolic function is hyperdynamic with an ejection fraction of 75 % by Ross's method of disks.   2. Mildly enlarged right ventricular cavity size, wall thickness, and systolic function.   3. Compared to the transthoracic echocardiogram performed on 8/24/2023 Estimated PASP and TR has increased.  4. Estimated pulmonary artery systolic pressure is 61 mmHg, consistent with severe pulmonary hypertension.   5. Hyperdynamic left ventricular systolic function with intra-cavitary gradient of 37 mmHg.   6. Technically difficult image quality.   7. The inferior vena cava is normal in size measuring 1.70 cm in diameter, (normal <2.1cm) with abnormal inspiratory collapse (abnormal <50%) consistent with mildly elevated right atrial pressure (~8, range 5-10mmHg).   8. There is normal LV mass and concentric remodeling.  _______________________________________________________________________________________:    < end of copied text >

## 2023-10-17 LAB
ANION GAP SERPL CALC-SCNC: 13 MMOL/L — SIGNIFICANT CHANGE UP (ref 5–17)
ANION GAP SERPL CALC-SCNC: 13 MMOL/L — SIGNIFICANT CHANGE UP (ref 5–17)
BUN SERPL-MCNC: 18 MG/DL — SIGNIFICANT CHANGE UP (ref 7–23)
BUN SERPL-MCNC: 18 MG/DL — SIGNIFICANT CHANGE UP (ref 7–23)
CALCIUM SERPL-MCNC: 8.6 MG/DL — SIGNIFICANT CHANGE UP (ref 8.4–10.5)
CALCIUM SERPL-MCNC: 8.6 MG/DL — SIGNIFICANT CHANGE UP (ref 8.4–10.5)
CANCER AG27-29 SERPL-ACNC: 14.6 U/ML — SIGNIFICANT CHANGE UP (ref 0–38.6)
CANCER AG27-29 SERPL-ACNC: 14.6 U/ML — SIGNIFICANT CHANGE UP (ref 0–38.6)
CHLORIDE SERPL-SCNC: 100 MMOL/L — SIGNIFICANT CHANGE UP (ref 96–108)
CHLORIDE SERPL-SCNC: 100 MMOL/L — SIGNIFICANT CHANGE UP (ref 96–108)
CO2 SERPL-SCNC: 25 MMOL/L — SIGNIFICANT CHANGE UP (ref 22–31)
CO2 SERPL-SCNC: 25 MMOL/L — SIGNIFICANT CHANGE UP (ref 22–31)
CREAT SERPL-MCNC: 0.85 MG/DL — SIGNIFICANT CHANGE UP (ref 0.5–1.3)
CREAT SERPL-MCNC: 0.85 MG/DL — SIGNIFICANT CHANGE UP (ref 0.5–1.3)
EGFR: 65 ML/MIN/1.73M2 — SIGNIFICANT CHANGE UP
EGFR: 65 ML/MIN/1.73M2 — SIGNIFICANT CHANGE UP
GLUCOSE SERPL-MCNC: 262 MG/DL — HIGH (ref 70–99)
GLUCOSE SERPL-MCNC: 262 MG/DL — HIGH (ref 70–99)
HCT VFR BLD CALC: 30 % — LOW (ref 34.5–45)
HCT VFR BLD CALC: 30 % — LOW (ref 34.5–45)
HGB BLD-MCNC: 9.5 G/DL — LOW (ref 11.5–15.5)
HGB BLD-MCNC: 9.5 G/DL — LOW (ref 11.5–15.5)
MAGNESIUM SERPL-MCNC: 2.5 MG/DL — SIGNIFICANT CHANGE UP (ref 1.6–2.6)
MAGNESIUM SERPL-MCNC: 2.5 MG/DL — SIGNIFICANT CHANGE UP (ref 1.6–2.6)
MCHC RBC-ENTMCNC: 30.4 PG — SIGNIFICANT CHANGE UP (ref 27–34)
MCHC RBC-ENTMCNC: 30.4 PG — SIGNIFICANT CHANGE UP (ref 27–34)
MCHC RBC-ENTMCNC: 31.7 GM/DL — LOW (ref 32–36)
MCHC RBC-ENTMCNC: 31.7 GM/DL — LOW (ref 32–36)
MCV RBC AUTO: 96.2 FL — SIGNIFICANT CHANGE UP (ref 80–100)
MCV RBC AUTO: 96.2 FL — SIGNIFICANT CHANGE UP (ref 80–100)
MRSA PCR RESULT.: SIGNIFICANT CHANGE UP
MRSA PCR RESULT.: SIGNIFICANT CHANGE UP
NRBC # BLD: 0 /100 WBCS — SIGNIFICANT CHANGE UP (ref 0–0)
NRBC # BLD: 0 /100 WBCS — SIGNIFICANT CHANGE UP (ref 0–0)
PHOSPHATE SERPL-MCNC: 2.8 MG/DL — SIGNIFICANT CHANGE UP (ref 2.5–4.5)
PHOSPHATE SERPL-MCNC: 2.8 MG/DL — SIGNIFICANT CHANGE UP (ref 2.5–4.5)
PLATELET # BLD AUTO: 118 K/UL — LOW (ref 150–400)
PLATELET # BLD AUTO: 118 K/UL — LOW (ref 150–400)
POTASSIUM SERPL-MCNC: 4.2 MMOL/L — SIGNIFICANT CHANGE UP (ref 3.5–5.3)
POTASSIUM SERPL-MCNC: 4.2 MMOL/L — SIGNIFICANT CHANGE UP (ref 3.5–5.3)
POTASSIUM SERPL-SCNC: 4.2 MMOL/L — SIGNIFICANT CHANGE UP (ref 3.5–5.3)
POTASSIUM SERPL-SCNC: 4.2 MMOL/L — SIGNIFICANT CHANGE UP (ref 3.5–5.3)
RBC # BLD: 3.12 M/UL — LOW (ref 3.8–5.2)
RBC # BLD: 3.12 M/UL — LOW (ref 3.8–5.2)
RBC # FLD: 15.9 % — HIGH (ref 10.3–14.5)
RBC # FLD: 15.9 % — HIGH (ref 10.3–14.5)
S AUREUS DNA NOSE QL NAA+PROBE: SIGNIFICANT CHANGE UP
S AUREUS DNA NOSE QL NAA+PROBE: SIGNIFICANT CHANGE UP
SODIUM SERPL-SCNC: 138 MMOL/L — SIGNIFICANT CHANGE UP (ref 135–145)
SODIUM SERPL-SCNC: 138 MMOL/L — SIGNIFICANT CHANGE UP (ref 135–145)
WBC # BLD: 10.43 K/UL — SIGNIFICANT CHANGE UP (ref 3.8–10.5)
WBC # BLD: 10.43 K/UL — SIGNIFICANT CHANGE UP (ref 3.8–10.5)
WBC # FLD AUTO: 10.43 K/UL — SIGNIFICANT CHANGE UP (ref 3.8–10.5)
WBC # FLD AUTO: 10.43 K/UL — SIGNIFICANT CHANGE UP (ref 3.8–10.5)

## 2023-10-17 PROCEDURE — 99233 SBSQ HOSP IP/OBS HIGH 50: CPT | Mod: GC

## 2023-10-17 RX ORDER — ALBUTEROL 90 UG/1
2 AEROSOL, METERED ORAL EVERY 8 HOURS
Refills: 0 | Status: DISCONTINUED | OUTPATIENT
Start: 2023-10-17 | End: 2023-10-21

## 2023-10-17 RX ORDER — POLYETHYLENE GLYCOL 3350 17 G/17G
17 POWDER, FOR SOLUTION ORAL DAILY
Refills: 0 | Status: DISCONTINUED | OUTPATIENT
Start: 2023-10-17 | End: 2023-10-21

## 2023-10-17 RX ORDER — SENNA PLUS 8.6 MG/1
2 TABLET ORAL AT BEDTIME
Refills: 0 | Status: DISCONTINUED | OUTPATIENT
Start: 2023-10-17 | End: 2023-10-21

## 2023-10-17 RX ADMIN — ALBUTEROL 2 PUFF(S): 90 AEROSOL, METERED ORAL at 21:08

## 2023-10-17 RX ADMIN — MIRTAZAPINE 7.5 MILLIGRAM(S): 45 TABLET, ORALLY DISINTEGRATING ORAL at 21:07

## 2023-10-17 RX ADMIN — EXEMESTANE 25 MILLIGRAM(S): 25 TABLET, SUGAR COATED ORAL at 21:07

## 2023-10-17 RX ADMIN — Medication 40 MILLIGRAM(S): at 05:32

## 2023-10-17 RX ADMIN — BUPROPION HYDROCHLORIDE 150 MILLIGRAM(S): 150 TABLET, EXTENDED RELEASE ORAL at 11:22

## 2023-10-17 RX ADMIN — TAMSULOSIN HYDROCHLORIDE 0.4 MILLIGRAM(S): 0.4 CAPSULE ORAL at 21:07

## 2023-10-17 RX ADMIN — ESCITALOPRAM OXALATE 10 MILLIGRAM(S): 10 TABLET, FILM COATED ORAL at 11:12

## 2023-10-17 RX ADMIN — ALBUTEROL 2 PUFF(S): 90 AEROSOL, METERED ORAL at 13:38

## 2023-10-17 RX ADMIN — SIMVASTATIN 20 MILLIGRAM(S): 20 TABLET, FILM COATED ORAL at 21:07

## 2023-10-17 RX ADMIN — HEPARIN SODIUM 5000 UNIT(S): 5000 INJECTION INTRAVENOUS; SUBCUTANEOUS at 17:24

## 2023-10-17 RX ADMIN — PANTOPRAZOLE SODIUM 40 MILLIGRAM(S): 20 TABLET, DELAYED RELEASE ORAL at 05:33

## 2023-10-17 RX ADMIN — HEPARIN SODIUM 5000 UNIT(S): 5000 INJECTION INTRAVENOUS; SUBCUTANEOUS at 05:32

## 2023-10-17 RX ADMIN — TRAMADOL HYDROCHLORIDE 25 MILLIGRAM(S): 50 TABLET ORAL at 11:38

## 2023-10-17 RX ADMIN — CHLORHEXIDINE GLUCONATE 1 APPLICATION(S): 213 SOLUTION TOPICAL at 06:49

## 2023-10-17 RX ADMIN — TRAMADOL HYDROCHLORIDE 25 MILLIGRAM(S): 50 TABLET ORAL at 12:38

## 2023-10-17 RX ADMIN — LIDOCAINE 1 PATCH: 4 CREAM TOPICAL at 00:00

## 2023-10-17 RX ADMIN — Medication 3 MILLILITER(S): at 00:15

## 2023-10-17 RX ADMIN — Medication 3 MILLILITER(S): at 05:31

## 2023-10-17 RX ADMIN — POLYETHYLENE GLYCOL 3350 17 GRAM(S): 17 POWDER, FOR SOLUTION ORAL at 11:13

## 2023-10-17 RX ADMIN — SENNA PLUS 2 TABLET(S): 8.6 TABLET ORAL at 21:07

## 2023-10-17 RX ADMIN — Medication 20 MILLIGRAM(S): at 17:23

## 2023-10-17 RX ADMIN — Medication 20 MILLIGRAM(S): at 05:32

## 2023-10-17 NOTE — PROGRESS NOTE ADULT - SUBJECTIVE AND OBJECTIVE BOX
date of service: 10-17-23 @ 07:45  afberile  REVIEW OF SYSTEMS:  CONSTITUTIONAL: No fever,  no  weight loss  ENT:  No  tinnitus,   no   vertigo  NECK: No pain or stiffness  RESPIRATORY: No cough, wheezing, chills or hemoptysis;    No Shortness of Breath  CARDIOVASCULAR: No chest pain, palpitations, dizziness  GASTROINTESTINAL: No abdominal or epigastric pain. No nausea, vomiting, or hematemesis; No diarrhea  No melena or hematochezia.  GENITOURINARY: No dysuria, frequency, hematuria, or incontinence  NEUROLOGICAL: No headaches  SKIN: No itching,  no   rash  LYMPH Nodes: No enlarged glands  ENDOCRINE: No heat or cold intolerance  MUSCULOSKELETAL: No joint pain or swelling  PSYCHIATRIC: No depression, anxiety  HEME/LYMPH: No easy bruising, or bleeding gums  ALLERGY AND IMMUNOLOGIC: No hives or eczema	    MEDICATIONS  (STANDING):  albuterol/ipratropium for Nebulization 3 milliLiter(s) Nebulizer every 6 hours  buPROPion XL (24-Hour) . 150 milliGRAM(s) Oral daily  cefTRIAXone   IVPB 1000 milliGRAM(s) IV Intermittent every 24 hours  chlorhexidine 2% Cloths 1 Application(s) Topical <User Schedule>  escitalopram 10 milliGRAM(s) Oral daily  exemestane 25 milliGRAM(s) Oral daily  furosemide    Tablet 40 milliGRAM(s) Oral daily  heparin   Injectable 5000 Unit(s) SubCutaneous every 12 hours  influenza  Vaccine (HIGH DOSE) 0.7 milliLiter(s) IntraMuscular once  lidocaine   4% Patch 1 Patch Transdermal daily  methylPREDNISolone sodium succinate Injectable 20 milliGRAM(s) IV Push three times a day  mirtazapine 7.5 milliGRAM(s) Oral at bedtime  pantoprazole    Tablet 40 milliGRAM(s) Oral before breakfast  simvastatin 20 milliGRAM(s) Oral at bedtime  tamsulosin 0.4 milliGRAM(s) Oral at bedtime    MEDICATIONS  (PRN):  ALPRAZolam 0.25 milliGRAM(s) Oral two times a day PRN anxiety  traMADol 25 milliGRAM(s) Oral two times a day PRN Moderate Pain (4 - 6)  traMADol 50 milliGRAM(s) Oral daily PRN Severe Pain (7 - 10)      Vital Signs Last 24 Hrs  T(C): 36.7 (17 Oct 2023 04:19), Max: 37.1 (16 Oct 2023 20:08)  T(F): 98 (17 Oct 2023 04:19), Max: 98.7 (16 Oct 2023 20:08)  HR: 83 (17 Oct 2023 04:19) (83 - 95)  BP: 135/75 (17 Oct 2023 04:19) (106/57 - 135/75)  BP(mean): --  RR: 18 (17 Oct 2023 04:19) (18 - 18)  SpO2: 96% (17 Oct 2023 04:19) (93% - 98%)    Parameters below as of 17 Oct 2023 04:19  Patient On (Oxygen Delivery Method): nasal cannula  O2 Flow (L/min): 5    CAPILLARY BLOOD GLUCOSE        I&O's Summary    16 Oct 2023 07:01  -  17 Oct 2023 07:00  --------------------------------------------------------  IN: 400 mL / OUT: 1640 mL / NET: -1240 mL          Appearance: Normal	  HEENT:   Normal oral mucosa, PERRL, EOMI	  Lymphatic: No lymphadenopathy  Cardiovascular: Normal S1 S2, No JVD  Respiratory: Lungs clear to auscultation	  Gastrointestinal:  Soft, Non-tender, + BS	  Skin: No rash, No ecchymoses	  Extremities:     LABS:                        9.9    10.10 )-----------( 134      ( 16 Oct 2023 06:26 )             30.8     10-16    136  |  97  |  19  ----------------------------<  252<H>  3.9   |  26  |  0.97    Ca    8.4      16 Oct 2023 06:52  Phos  4.0     10-15  Mg     2.1     10-15    TPro  5.7<L>  /  Alb  3.2<L>  /  TBili  0.2  /  DBili  x   /  AST  23  /  ALT  45  /  AlkPhos  94  10-16          Urinalysis Basic - ( 16 Oct 2023 06:52 )    Color: x / Appearance: x / SG: x / pH: x  Gluc: 252 mg/dL / Ketone: x  / Bili: x / Urobili: x   Blood: x / Protein: x / Nitrite: x   Leuk Esterase: x / RBC: x / WBC x   Sq Epi: x / Non Sq Epi: x / Bacteria: x                      Consultant(s) Notes Reviewed:      Care Discussed with Consultants/Other Providers:

## 2023-10-17 NOTE — PROGRESS NOTE ADULT - ASSESSMENT
89F HLD and breast cancer on prior ibrance, previous admission for pneumonitis now readmitted s/p fall with dyspnea    For Breast cancer, recurrent; follows with Dr. 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  In present admission   Repeat CTA 10/14- no PE, edema vs fibrotic change, Echo with severe PHTN  Diuresis per cardiology; steroids per pulmonary  Leukopenia and normocytic anemia- chronic, secondary to rx for breast cancer, WBC at baseline  Monitor CBC

## 2023-10-17 NOTE — PROGRESS NOTE ADULT - ASSESSMENT
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 Patient ultimately discharged to rehab and ultimately home. Patient with a mechanical fall with prompted admission to the hospital and later found to have hypoxemia again. CT imaging with mild emphysema and faint peripheral reticulations. However imaging markedly improved from prior CT imaging (8/2023) which demonstrated diffuse ground glass. Reports dyspnea with exertion and new lower extremity edema. No fever or chills.     #Hypoxemia  #Mild emphysema  #Peripheral reticulations  #Volume overload  #Pulmonary hypertension -- related to hypoxemia and increased RV afterload.    RECS  -Patient with rapid improvement while on diuretics. Continue and maintain daily net negative status.  -Outpatient, was on prednisone 20 mg po bid with recent conversion to prednisone 20 mg po daily. Please change to prednisone 30 mg po daily.  -Please start Bactrim DS po MWF for PCP prophylaxis.   -Please discontinue CTX. Got 3 days. No clear infectious process.     -Check rest and ambulatory spo2 to determine need for home o2.  -Out of bed to chair.  -Incentive spirometer.    Dr. Shelton Faulkner, DO  Pulmonary and Critical Care Medicine Fellow   Available via [employer] Teams - **Preferred**  [facility] #[id number] ([facility]) / [id number] ([facility])  Pager:  [phone]

## 2023-10-17 NOTE — PROGRESS NOTE ADULT - SUBJECTIVE AND OBJECTIVE BOX
HPI:  89-year-old female recently discharged from rehab facility admitted 10/15/23 post fall in the setting of shortness of breath acute increasing over one day.  Patient denies any current chest pain.  Patient otherwise otherwise history of breast cancer hyperlipidemia.    PAST MEDICAL & SURGICAL HISTORY:  Anxiety  Hyperlipidemia  UTI (lower urinary tract infection)  Depression  Breast cancer  HLD (hyperlipidemia)  Gallbladder stone without cholecystitis or obstruction  Cholecystitis  2019 novel coronavirus disease (COVID-19)  1/7/2021  Recurrent breast cancer  opposite side, left-3/2020  History of chemotherapy  and radiation-1994  S/P chemotherapy, time since less than 4 weeks  Cholelithiasis  H/O lumpectomy  left-11/1994  History of hip replacement  left-1994  S/P Mohs surgery for basal cell carcinoma  right side of nose-2020              FAMILY HISTORY:  FH: breast cancer  mother    FH: lung cancer (Mother)        SOCIAL HISTORY:    [ x] Non-smoker  [ ] Smoker  [ ] Alcohol    Allergies    penicillin (Hives)    Intolerances            Medications:  albuterol    90 MICROgram(s) HFA Inhaler 2 Puff(s) Inhalation every 8 hours  ALPRAZolam 0.25 milliGRAM(s) Oral two times a day PRN anxiety  bisacodyl 5 milliGRAM(s) Oral every 12 hours PRN Constipation  buPROPion XL (24-Hour) . 150 milliGRAM(s) Oral daily  cefTRIAXone   IVPB 1000 milliGRAM(s) IV Intermittent every 24 hours  chlorhexidine 2% Cloths 1 Application(s) Topical <User Schedule>  escitalopram 10 milliGRAM(s) Oral daily  exemestane 25 milliGRAM(s) Oral daily  furosemide    Tablet 40 milliGRAM(s) Oral daily  heparin   Injectable 5000 Unit(s) SubCutaneous every 12 hours  influenza  Vaccine (HIGH DOSE) 0.7 milliLiter(s) IntraMuscular once  methylPREDNISolone sodium succinate Injectable 20 milliGRAM(s) IV Push two times a day  mirtazapine 7.5 milliGRAM(s) Oral at bedtime  pantoprazole    Tablet 40 milliGRAM(s) Oral before breakfast  polyethylene glycol 3350 17 Gram(s) Oral daily  saline laxative (FLEET) Rectal Enema 1 Enema Rectal once  senna 2 Tablet(s) Oral at bedtime  simvastatin 20 milliGRAM(s) Oral at bedtime  tamsulosin 0.4 milliGRAM(s) Oral at bedtime  traMADol 25 milliGRAM(s) Oral two times a day PRN Moderate Pain (4 - 6)    Labs:  CBC Full  -  ( 17 Oct 2023 07:21 )  WBC Count : 10.43 K/uL  RBC Count : 3.12 M/uL  Hemoglobin : 9.5 g/dL  Hematocrit : 30.0 %  Platelet Count - Automated : 118 K/uL  Mean Cell Volume : 96.2 fl  Mean Cell Hemoglobin : 30.4 pg  Mean Cell Hemoglobin Concentration : 31.7 gm/dL  Auto Neutrophil # : x  Auto Lymphocyte # : x  Auto Monocyte # : x  Auto Eosinophil # : x  Auto Basophil # : x  Auto Neutrophil % : x  Auto Lymphocyte % : x  Auto Monocyte % : x  Auto Eosinophil % : x  Auto Basophil % : x    10-17    138  |  100  |  18  ----------------------------<  262<H>  4.2   |  25  |  0.85    Ca    8.6      17 Oct 2023 07:18  Phos  2.8     10-17  Mg     2.5     10-17    TPro  5.7<L>  /  Alb  3.2<L>  /  TBili  0.2  /  DBili  x   /  AST  23  /  ALT  45  /  AlkPhos  94  10-16      Radiology:             ROS:  Patient comfortable without distress  No SOB or chest pain  No palpitation  No abdominal pain, diarrhaea or constipation  No weakness of extremities  No skin changes or swelling of legs  Rest of the comprehensive ROS was negative  Vital Signs Last 24 Hrs  T(C): 36.7 (17 Oct 2023 11:00), Max: 37.1 (16 Oct 2023 20:08)  T(F): 98.1 (17 Oct 2023 11:00), Max: 98.7 (16 Oct 2023 20:08)  HR: 93 (17 Oct 2023 11:25) (83 - 95)  BP: 114/69 (17 Oct 2023 11:25) (106/57 - 135/75)  BP(mean): --  RR: 18 (17 Oct 2023 11:00) (18 - 18)  SpO2: 94% (17 Oct 2023 11:25) (94% - 98%)    Parameters below as of 17 Oct 2023 11:25  Patient On (Oxygen Delivery Method): nasal cannula  O2 Flow (L/min): 3      Physical exam:  Patient alert and oriented  No distress  CVS: S1, S2   Chest: bilateral breath sound without rales  Abdomen: soft, not tender, no organomegaly or masses  CNS: No focal neuro deficit  Musculoskeletal:  Normal range of motion  Skin: No rash    Assessment and Plan:

## 2023-10-17 NOTE — PROGRESS NOTE ADULT - SUBJECTIVE AND OBJECTIVE BOX
PULMONARY SERVICE FOLLOW UP CONSULT NOTE    SUBJECTIVE:  Denies chest pain, dyspnea, cough, or wheezing.  No acute complaints.     REVIEW OF SYSTEMS:  All additional ROS negative.    MEDICATIONS:  Pulmonary:  albuterol    90 MICROgram(s) HFA Inhaler 2 Puff(s) Inhalation every 8 hours    Antimicrobials:  cefTRIAXone   IVPB 1000 milliGRAM(s) IV Intermittent every 24 hours    Anticoagulants:  heparin   Injectable 5000 Unit(s) SubCutaneous every 12 hours    Onc:  exemestane 25 milliGRAM(s) Oral daily    GI/:  bisacodyl 5 milliGRAM(s) Oral every 12 hours PRN  pantoprazole    Tablet 40 milliGRAM(s) Oral before breakfast  polyethylene glycol 3350 17 Gram(s) Oral daily  saline laxative (FLEET) Rectal Enema 1 Enema Rectal once  senna 2 Tablet(s) Oral at bedtime  tamsulosin 0.4 milliGRAM(s) Oral at bedtime    Endocrine:  methylPREDNISolone sodium succinate Injectable 20 milliGRAM(s) IV Push two times a day  simvastatin 20 milliGRAM(s) Oral at bedtime    Cardiac:  furosemide    Tablet 40 milliGRAM(s) Oral daily    Other Medications:  ALPRAZolam 0.25 milliGRAM(s) Oral two times a day PRN  buPROPion XL (24-Hour) . 150 milliGRAM(s) Oral daily  chlorhexidine 2% Cloths 1 Application(s) Topical <User Schedule>  escitalopram 10 milliGRAM(s) Oral daily  influenza  Vaccine (HIGH DOSE) 0.7 milliLiter(s) IntraMuscular once  mirtazapine 7.5 milliGRAM(s) Oral at bedtime  traMADol 25 milliGRAM(s) Oral two times a day PRN      PHYSICAL EXAM  Vital Signs Last 24 Hrs  T(C): 36.7 (17 Oct 2023 04:19), Max: 37.1 (16 Oct 2023 20:08)  T(F): 98 (17 Oct 2023 04:19), Max: 98.7 (16 Oct 2023 20:08)  HR: 83 (17 Oct 2023 04:19) (83 - 95)  BP: 135/75 (17 Oct 2023 04:19) (106/57 - 135/75)  BP(mean): --  RR: 18 (17 Oct 2023 04:19) (18 - 18)  SpO2: 96% (17 Oct 2023 04:19) (93% - 98%)    Parameters below as of 17 Oct 2023 04:19  Patient On (Oxygen Delivery Method): nasal cannula  O2 Flow (L/min): 5      10-16 @ 07:01  -  10-17 @ 07:00  --------------------------------------------------------  IN: 400 mL / OUT: 1640 mL / NET: -1240 mL    10-17 @ 07:01  -  10-17 @ 08:58  --------------------------------------------------------  IN: 240 mL / OUT: 0 mL / NET: 240 mL            CONSTITUTIONAL: No acute distress.   HEENT:  Conjunctiva clear B/L.  Moist oral mucosa.   Cardiovascular: RRR with no murmurs. No JVD noted. No lower extremity edema B/L. Extremities are warm and well perfused.    Respiratory: Lungs CTAB. No wrr. No accessory muscle use.   Gastrointestinal:  Soft, nontender. Non-distended. Non-rigid.    Neurologic:  Alert and awake. Moving all extremities. Following commands.    Skin:  No gross rashes notes.    LABS:      CBC Full  -  ( 17 Oct 2023 07:21 )  WBC Count : 10.43 K/uL  RBC Count : 3.12 M/uL  Hemoglobin : 9.5 g/dL  Hematocrit : 30.0 %  Platelet Count - Automated : 118 K/uL  Mean Cell Volume : 96.2 fl  Mean Cell Hemoglobin : 30.4 pg  Mean Cell Hemoglobin Concentration : 31.7 gm/dL  Auto Neutrophil # : x  Auto Lymphocyte # : x  Auto Monocyte # : x  Auto Eosinophil # : x  Auto Basophil # : x  Auto Neutrophil % : x  Auto Lymphocyte % : x  Auto Monocyte % : x  Auto Eosinophil % : x  Auto Basophil % : x    10-17    138  |  100  |  18  ----------------------------<  262<H>  4.2   |  25  |  0.85    Ca    8.6      17 Oct 2023 07:18  Phos  2.8     10-17  Mg     2.5     10-17    TPro  5.7<L>  /  Alb  3.2<L>  /  TBili  0.2  /  DBili  x   /  AST  23  /  ALT  45  /  AlkPhos  94  10-16          Urinalysis Basic - ( 17 Oct 2023 07:18 )    Color: x / Appearance: x / SG: x / pH: x  Gluc: 262 mg/dL / Ketone: x  / Bili: x / Urobili: x   Blood: x / Protein: x / Nitrite: x   Leuk Esterase: x / RBC: x / WBC x   Sq Epi: x / Non Sq Epi: x / Bacteria: x                RADIOLOGY & ADDITIONAL STUDIES: PULMONARY SERVICE FOLLOW UP CONSULT NOTE    SUBJECTIVE:  Dyspnea improved. Less LE edema.     REVIEW OF SYSTEMS:  All additional ROS negative.    MEDICATIONS:  Pulmonary:  albuterol    90 MICROgram(s) HFA Inhaler 2 Puff(s) Inhalation every 8 hours    Antimicrobials:  cefTRIAXone   IVPB 1000 milliGRAM(s) IV Intermittent every 24 hours    Anticoagulants:  heparin   Injectable 5000 Unit(s) SubCutaneous every 12 hours    Onc:  exemestane 25 milliGRAM(s) Oral daily    GI/:  bisacodyl 5 milliGRAM(s) Oral every 12 hours PRN  pantoprazole    Tablet 40 milliGRAM(s) Oral before breakfast  polyethylene glycol 3350 17 Gram(s) Oral daily  saline laxative (FLEET) Rectal Enema 1 Enema Rectal once  senna 2 Tablet(s) Oral at bedtime  tamsulosin 0.4 milliGRAM(s) Oral at bedtime    Endocrine:  methylPREDNISolone sodium succinate Injectable 20 milliGRAM(s) IV Push two times a day  simvastatin 20 milliGRAM(s) Oral at bedtime    Cardiac:  furosemide    Tablet 40 milliGRAM(s) Oral daily    Other Medications:  ALPRAZolam 0.25 milliGRAM(s) Oral two times a day PRN  buPROPion XL (24-Hour) . 150 milliGRAM(s) Oral daily  chlorhexidine 2% Cloths 1 Application(s) Topical <User Schedule>  escitalopram 10 milliGRAM(s) Oral daily  influenza  Vaccine (HIGH DOSE) 0.7 milliLiter(s) IntraMuscular once  mirtazapine 7.5 milliGRAM(s) Oral at bedtime  traMADol 25 milliGRAM(s) Oral two times a day PRN      PHYSICAL EXAM  Vital Signs Last 24 Hrs  T(C): 36.7 (17 Oct 2023 04:19), Max: 37.1 (16 Oct 2023 20:08)  T(F): 98 (17 Oct 2023 04:19), Max: 98.7 (16 Oct 2023 20:08)  HR: 83 (17 Oct 2023 04:19) (83 - 95)  BP: 135/75 (17 Oct 2023 04:19) (106/57 - 135/75)  BP(mean): --  RR: 18 (17 Oct 2023 04:19) (18 - 18)  SpO2: 96% (17 Oct 2023 04:19) (93% - 98%)    Parameters below as of 17 Oct 2023 04:19  Patient On (Oxygen Delivery Method): nasal cannula  O2 Flow (L/min): 5      10-16 @ 07:01  -  10-17 @ 07:00  --------------------------------------------------------  IN: 400 mL / OUT: 1640 mL / NET: -1240 mL    10-17 @ 07:01  -  10-17 @ 08:58  --------------------------------------------------------  IN: 240 mL / OUT: 0 mL / NET: 240 mL            CONSTITUTIONAL: No acute distress.   HEENT:  Conjunctiva clear B/L.  Moist oral mucosa.   Cardiovascular: RRR with no murmurs. No JVD noted. Trace lower extremity edema B/L. Extremities are warm and well perfused.    Respiratory: Dec bs. No wrr. No accessory muscle use.   Gastrointestinal:  Soft, nontender. Non-distended. Non-rigid.    Neurologic:  Alert and awake. Moving all extremities. Following commands.    Skin:  No gross rashes notes.    LABS:      CBC Full  -  ( 17 Oct 2023 07:21 )  WBC Count : 10.43 K/uL  RBC Count : 3.12 M/uL  Hemoglobin : 9.5 g/dL  Hematocrit : 30.0 %  Platelet Count - Automated : 118 K/uL  Mean Cell Volume : 96.2 fl  Mean Cell Hemoglobin : 30.4 pg  Mean Cell Hemoglobin Concentration : 31.7 gm/dL  Auto Neutrophil # : x  Auto Lymphocyte # : x  Auto Monocyte # : x  Auto Eosinophil # : x  Auto Basophil # : x  Auto Neutrophil % : x  Auto Lymphocyte % : x  Auto Monocyte % : x  Auto Eosinophil % : x  Auto Basophil % : x    10-17    138  |  100  |  18  ----------------------------<  262<H>  4.2   |  25  |  0.85    Ca    8.6      17 Oct 2023 07:18  Phos  2.8     10-17  Mg     2.5     10-17    TPro  5.7<L>  /  Alb  3.2<L>  /  TBili  0.2  /  DBili  x   /  AST  23  /  ALT  45  /  AlkPhos  94  10-16          Urinalysis Basic - ( 17 Oct 2023 07:18 )    Color: x / Appearance: x / SG: x / pH: x  Gluc: 262 mg/dL / Ketone: x  / Bili: x / Urobili: x   Blood: x / Protein: x / Nitrite: x   Leuk Esterase: x / RBC: x / WBC x   Sq Epi: x / Non Sq Epi: x / Bacteria: x                RADIOLOGY & ADDITIONAL STUDIES:

## 2023-10-17 NOTE — PROGRESS NOTE ADULT - SUBJECTIVE AND OBJECTIVE BOX
Date of Service: 10-17-23 @ 09:19           CARDIOLOGY     PROGRESS  NOTE   ________________________________________________    CHIEF COMPLAINT:Patient is a 89y old  Female who presents with a chief complaint of SOB (17 Oct 2023 08:57)  no complain  	  REVIEW OF SYSTEMS:  CONSTITUTIONAL: No fever, weight loss, or fatigue  EYES: No eye pain, visual disturbances, or discharge  ENT:  No difficulty hearing, tinnitus, vertigo; No sinus or throat pain  NECK: No pain or stiffness  RESPIRATORY: No cough, wheezing, chills or hemoptysis;+Shortness of Breath  CARDIOVASCULAR: No chest pain, palpitations, passing out, dizziness, or leg swelling  GASTROINTESTINAL: No abdominal or epigastric pain. No nausea, vomiting, or hematemesis; No diarrhea or constipation. No melena or hematochezia.  GENITOURINARY: No dysuria, frequency, hematuria, or incontinence  NEUROLOGICAL: No headaches, memory loss, loss of strength, numbness, or tremors  SKIN: No itching, burning, rashes, or lesions   LYMPH Nodes: No enlarged glands  ENDOCRINE: No heat or cold intolerance; No hair loss  MUSCULOSKELETAL: No joint pain or swelling; No muscle, back, or extremity pain  PSYCHIATRIC: No depression, anxiety, mood swings, or difficulty sleeping  HEME/LYMPH: No easy bruising, or bleeding gums  ALLERGY AND IMMUNOLOGIC: No hives or eczema	    [x ] All others negative	  [ ] Unable to obtain    PHYSICAL EXAM:  T(C): 36.7 (10-17-23 @ 04:19), Max: 37.1 (10-16-23 @ 20:08)  HR: 83 (10-17-23 @ 04:19) (83 - 95)  BP: 135/75 (10-17-23 @ 04:19) (106/57 - 135/75)  RR: 18 (10-17-23 @ 04:19) (18 - 18)  SpO2: 96% (10-17-23 @ 04:19) (93% - 98%)  Wt(kg): --  I&O's Summary    16 Oct 2023 07:01  -  17 Oct 2023 07:00  --------------------------------------------------------  IN: 400 mL / OUT: 1640 mL / NET: -1240 mL    17 Oct 2023 07:01  -  17 Oct 2023 09:19  --------------------------------------------------------  IN: 240 mL / OUT: 0 mL / NET: 240 mL        Appearance: Normal	  HEENT:   Normal oral mucosa, PERRL, EOMI	  Lymphatic: No lymphadenopathy  Cardiovascular: Normal S1 S2, No JVD, + murmurs, No edema  Respiratory:rhonchi  Psychiatry: A & O x 3, Mood & affect appropriate  Gastrointestinal:  Soft, Non-tender, + BS	  Skin: No rashes, No ecchymoses, No cyanosis	  Neurologic: Non-focal  Extremities: Normal range of motion, No clubbing, cyanosis or edema  Vascular: Peripheral pulses palpable 2+ bilaterally    MEDICATIONS  (STANDING):  albuterol    90 MICROgram(s) HFA Inhaler 2 Puff(s) Inhalation every 8 hours  buPROPion XL (24-Hour) . 150 milliGRAM(s) Oral daily  cefTRIAXone   IVPB 1000 milliGRAM(s) IV Intermittent every 24 hours  chlorhexidine 2% Cloths 1 Application(s) Topical <User Schedule>  escitalopram 10 milliGRAM(s) Oral daily  exemestane 25 milliGRAM(s) Oral daily  furosemide    Tablet 40 milliGRAM(s) Oral daily  heparin   Injectable 5000 Unit(s) SubCutaneous every 12 hours  influenza  Vaccine (HIGH DOSE) 0.7 milliLiter(s) IntraMuscular once  methylPREDNISolone sodium succinate Injectable 20 milliGRAM(s) IV Push two times a day  mirtazapine 7.5 milliGRAM(s) Oral at bedtime  pantoprazole    Tablet 40 milliGRAM(s) Oral before breakfast  polyethylene glycol 3350 17 Gram(s) Oral daily  saline laxative (FLEET) Rectal Enema 1 Enema Rectal once  senna 2 Tablet(s) Oral at bedtime  simvastatin 20 milliGRAM(s) Oral at bedtime  tamsulosin 0.4 milliGRAM(s) Oral at bedtime      TELEMETRY: 	    ECG:  	  RADIOLOGY:  OTHER: 	  	  LABS:	 	    CARDIAC MARKERS:                            9.5    10.43 )-----------( 118      ( 17 Oct 2023 07:21 )             30.0     10-17    138  |  100  |  18  ----------------------------<  262<H>  4.2   |  25  |  0.85    Ca    8.6      17 Oct 2023 07:18  Phos  2.8     10-17  Mg     2.5     10-17    TPro  5.7<L>  /  Alb  3.2<L>  /  TBili  0.2  /  DBili  x   /  AST  23  /  ALT  45  /  AlkPhos  94  10-16    proBNP:   Lipid Profile:   HgA1c:   TSH:     -Patient with rapid improvement while on diuretics. Continue and maintain daily net negative status.  -Outpatient, was on prednisone 20 mg po bid with recent conversion to prednisone 20 mg po daily. Now on solumedrol 20 mg po TID. Would change to solumedrol 20 mg po BID.   -Please start Bactrim DS po MWF for PCP prophylaxis.   -On CTX. Will monitor. If no clear infectious process, will discontinue tomorrow.   -Check rest and ambulatory spo2 to determine need for home o2.  -Out of bed to chair.  -Incentive spirometer.      Assessment and plan  ---------------------------  89-year-old female recently discharged from rehab facility chief complaint status post fall in the setting of shortness of breath acute increasing over the past day.  Patient denies any current chest pain.  Patient otherwise otherwise history of breast cancer hyperlipidemia  sob ?sec to ?pneumoniae, copd exacerbation  ?chf , most probably copd/emphysema  hfpef acute on chronic  abx  steroid  continue all meds for anxiety  tsh  dvt prophylaxis  TTE noted, severe pulmonary htn, continue steroid /inhalers/ CTA neg for pe  change lasix to 40 mg daily  ?requiring home o2  increase ambulation

## 2023-10-17 NOTE — PROGRESS NOTE ADULT - ASSESSMENT
89-year-old female    h/o  HLD,  Ca  breast, s/p  lumpectomy, recurrence , on  chemo . prior  h/o  hip  surg     admitted  with  worsening  sob.  and  also  had  a  fall .  recently  d/c  from rehab facility /  pt  had a  similar  admission  for  sob, in 8/23      *  sob.  from  ?  chf/  acute  copd  exacerbation/ ?  pneumonitis        afberile,  wbc  17,000  on  arrival,    on  iv  solumedrol,  Proventil. and  rocephin/  iv lasix        CT  chest   angio,  no  pe,  centrilobular  emphysema, reticular  markings, /  chf vs  early  fibrosis.  compression T 7. old     h/o  Ca breast. onc  dr garcia /dr crews        was  on Ibrance  and exemestane since 2021 with stable disease;  PET scan 1/2023        and, Ibrance held, as  Ibrance can cause ILD/pneumonitis/  pt  was  supposed  to  f/p  with  pulm dr lisker     Echo   8/23, with normal LV.  pulm  htn mild, was  38  mm  hg  /  no  shun     ct a/p .  stool. on laxatives    Depression/anxiety, on xanax.  Wellbutrin   Remeron  ,    on dvt  ppx /  fall  risk    Echo  now,  normal  ef.  severe  pHTN ,  { this  is   new ,  from   echo  of  8/23 } normal  RV  function    seen  by  house  pulm /  better  with  lasix. iv steroid/     tov today/ was   seen  by  urology       CHAO< from: TTE W or WO Ultrasound Enhancing Agent (10.15.23 @ 13:37) >  ONCLUSIONS:   1. Left ventricular systolic function is hyperdynamic with an ejection fraction of 75 % by Ross's method of disks.   2. Mildly enlarged right ventricular cavity size, wall thickness, and systolic function.   3. Compared to the transthoracic echocardiogram performed on 8/24/2023 Estimated PASP and TR has increased.  4. Estimated pulmonary artery systolic pressure is 61 mmHg, consistent with severe pulmonary hypertension.   5. Hyperdynamic left ventricular systolic function with intra-cavitary gradient of 37 mmHg.   6. Technically difficult image quality.   7. The inferior vena cava is normal in size measuring 1.70 cm in diameter, (normal <2.1cm) with abnormal inspiratory collapse (abnormal <50%) consistent with mildly elevated right atrial pressure (~8, range 5-10mmHg).   8. There is normal LV mass and concentric remodeling.  _______________________________________________________________________________________:    < end of copied text >     89-year-old female    h/o  HLD,  Ca  breast, s/p  lumpectomy, recurrence , on  chemo . prior  h/o  hip  surg     admitted  with  worsening  sob.  and  also  had  a  fall .  recently  d/c  from rehab facility /  pt  had a  similar  admission  for  sob, in 8/23      *  sob.  from  ?  chf/  acute  copd  exacerbation/ ?  pneumonitis        afberile,  wbc  17,000  on  arrival,    on  iv  solumedrol,  Proventil. and  rocephin/  iv lasix          CT  chest   angio,  no  pe,  centrilobular  emphysema, reticular  markings, /  chf vs  early  fibrosis.  compression T 7. old           CT   spine,  doffuse  deg  dosease,  with  c/c  T7  fx    h/o  Ca breast. onc  dr garcia /dr crews        was  on Ibrance  and exemestane since 2021 with stable disease;  PET scan 1/2023        and, Ibrance held, as  Ibrance can cause ILD/pneumonitis/  pt  was  supposed  to  f/p  with  pulm dr lisker     Echo   8/23, with normal LV.  pulm  htn mild, was  38  mm  hg  /  no  shun     ct a/p .  stool. on laxatives    Depression/anxiety, on xanax.  Wellbutrin   Remeron  ,  on dvt  ppx /  fall  risk    Echo  now,  normal  ef.  severe  pHTN ,  { this  is   new ,  from   echo  of  8/23 } normal  RV  function        seen  by  house  pulm /  better  with  lasix.  iv steroid  lowered  to  bid      uti/  GNR,  on iv Rocephin      tov today/ was   seen  by  urology .  follow   bladder  scan      CHAO< from: TTE W or WO Ultrasound Enhancing Agent (10.15.23 @ 13:37) >  ONCLUSIONS:   1. Left ventricular systolic function is hyperdynamic with an ejection fraction of 75 % by Ross's method of disks.   2. Mildly enlarged right ventricular cavity size, wall thickness, and systolic function.   3. Compared to the transthoracic echocardiogram performed on 8/24/2023 Estimated PASP and TR has increased.  4. Estimated pulmonary artery systolic pressure is 61 mmHg, consistent with severe pulmonary hypertension.   5. Hyperdynamic left ventricular systolic function with intra-cavitary gradient of 37 mmHg.   6. Technically difficult image quality.   7. The inferior vena cava is normal in size measuring 1.70 cm in diameter, (normal <2.1cm) with abnormal inspiratory collapse (abnormal <50%) consistent with mildly elevated right atrial pressure (~8, range 5-10mmHg).   8. There is normal LV mass and concentric remodeling.  _______________________________________________________________________________________:    < end of copied text >

## 2023-10-18 LAB
ANION GAP SERPL CALC-SCNC: 11 MMOL/L — SIGNIFICANT CHANGE UP (ref 5–17)
ANION GAP SERPL CALC-SCNC: 11 MMOL/L — SIGNIFICANT CHANGE UP (ref 5–17)
BUN SERPL-MCNC: 26 MG/DL — HIGH (ref 7–23)
BUN SERPL-MCNC: 26 MG/DL — HIGH (ref 7–23)
CALCIUM SERPL-MCNC: 8.8 MG/DL — SIGNIFICANT CHANGE UP (ref 8.4–10.5)
CALCIUM SERPL-MCNC: 8.8 MG/DL — SIGNIFICANT CHANGE UP (ref 8.4–10.5)
CHLORIDE SERPL-SCNC: 100 MMOL/L — SIGNIFICANT CHANGE UP (ref 96–108)
CHLORIDE SERPL-SCNC: 100 MMOL/L — SIGNIFICANT CHANGE UP (ref 96–108)
CO2 SERPL-SCNC: 27 MMOL/L — SIGNIFICANT CHANGE UP (ref 22–31)
CO2 SERPL-SCNC: 27 MMOL/L — SIGNIFICANT CHANGE UP (ref 22–31)
CREAT SERPL-MCNC: 0.85 MG/DL — SIGNIFICANT CHANGE UP (ref 0.5–1.3)
CREAT SERPL-MCNC: 0.85 MG/DL — SIGNIFICANT CHANGE UP (ref 0.5–1.3)
EGFR: 65 ML/MIN/1.73M2 — SIGNIFICANT CHANGE UP
EGFR: 65 ML/MIN/1.73M2 — SIGNIFICANT CHANGE UP
GLUCOSE SERPL-MCNC: 221 MG/DL — HIGH (ref 70–99)
GLUCOSE SERPL-MCNC: 221 MG/DL — HIGH (ref 70–99)
HCT VFR BLD CALC: 28.7 % — LOW (ref 34.5–45)
HCT VFR BLD CALC: 28.7 % — LOW (ref 34.5–45)
HGB BLD-MCNC: 9.2 G/DL — LOW (ref 11.5–15.5)
HGB BLD-MCNC: 9.2 G/DL — LOW (ref 11.5–15.5)
MCHC RBC-ENTMCNC: 30.7 PG — SIGNIFICANT CHANGE UP (ref 27–34)
MCHC RBC-ENTMCNC: 30.7 PG — SIGNIFICANT CHANGE UP (ref 27–34)
MCHC RBC-ENTMCNC: 32.1 GM/DL — SIGNIFICANT CHANGE UP (ref 32–36)
MCHC RBC-ENTMCNC: 32.1 GM/DL — SIGNIFICANT CHANGE UP (ref 32–36)
MCV RBC AUTO: 95.7 FL — SIGNIFICANT CHANGE UP (ref 80–100)
MCV RBC AUTO: 95.7 FL — SIGNIFICANT CHANGE UP (ref 80–100)
NRBC # BLD: 0 /100 WBCS — SIGNIFICANT CHANGE UP (ref 0–0)
NRBC # BLD: 0 /100 WBCS — SIGNIFICANT CHANGE UP (ref 0–0)
PLATELET # BLD AUTO: 125 K/UL — LOW (ref 150–400)
PLATELET # BLD AUTO: 125 K/UL — LOW (ref 150–400)
POTASSIUM SERPL-MCNC: 4.5 MMOL/L — SIGNIFICANT CHANGE UP (ref 3.5–5.3)
POTASSIUM SERPL-MCNC: 4.5 MMOL/L — SIGNIFICANT CHANGE UP (ref 3.5–5.3)
POTASSIUM SERPL-SCNC: 4.5 MMOL/L — SIGNIFICANT CHANGE UP (ref 3.5–5.3)
POTASSIUM SERPL-SCNC: 4.5 MMOL/L — SIGNIFICANT CHANGE UP (ref 3.5–5.3)
RBC # BLD: 3 M/UL — LOW (ref 3.8–5.2)
RBC # BLD: 3 M/UL — LOW (ref 3.8–5.2)
RBC # FLD: 15.7 % — HIGH (ref 10.3–14.5)
RBC # FLD: 15.7 % — HIGH (ref 10.3–14.5)
SODIUM SERPL-SCNC: 138 MMOL/L — SIGNIFICANT CHANGE UP (ref 135–145)
SODIUM SERPL-SCNC: 138 MMOL/L — SIGNIFICANT CHANGE UP (ref 135–145)
WBC # BLD: 11.44 K/UL — HIGH (ref 3.8–10.5)
WBC # BLD: 11.44 K/UL — HIGH (ref 3.8–10.5)
WBC # FLD AUTO: 11.44 K/UL — HIGH (ref 3.8–10.5)
WBC # FLD AUTO: 11.44 K/UL — HIGH (ref 3.8–10.5)

## 2023-10-18 PROCEDURE — 99233 SBSQ HOSP IP/OBS HIGH 50: CPT | Mod: GC

## 2023-10-18 RX ADMIN — Medication 1 TABLET(S): at 09:53

## 2023-10-18 RX ADMIN — EXEMESTANE 25 MILLIGRAM(S): 25 TABLET, SUGAR COATED ORAL at 21:23

## 2023-10-18 RX ADMIN — POLYETHYLENE GLYCOL 3350 17 GRAM(S): 17 POWDER, FOR SOLUTION ORAL at 11:31

## 2023-10-18 RX ADMIN — Medication 40 MILLIGRAM(S): at 05:51

## 2023-10-18 RX ADMIN — SIMVASTATIN 20 MILLIGRAM(S): 20 TABLET, FILM COATED ORAL at 21:22

## 2023-10-18 RX ADMIN — CHLORHEXIDINE GLUCONATE 1 APPLICATION(S): 213 SOLUTION TOPICAL at 05:51

## 2023-10-18 RX ADMIN — BUPROPION HYDROCHLORIDE 150 MILLIGRAM(S): 150 TABLET, EXTENDED RELEASE ORAL at 11:32

## 2023-10-18 RX ADMIN — ALBUTEROL 2 PUFF(S): 90 AEROSOL, METERED ORAL at 13:15

## 2023-10-18 RX ADMIN — Medication 30 MILLIGRAM(S): at 05:50

## 2023-10-18 RX ADMIN — SENNA PLUS 2 TABLET(S): 8.6 TABLET ORAL at 21:23

## 2023-10-18 RX ADMIN — PANTOPRAZOLE SODIUM 40 MILLIGRAM(S): 20 TABLET, DELAYED RELEASE ORAL at 05:51

## 2023-10-18 RX ADMIN — HEPARIN SODIUM 5000 UNIT(S): 5000 INJECTION INTRAVENOUS; SUBCUTANEOUS at 17:19

## 2023-10-18 RX ADMIN — ALBUTEROL 2 PUFF(S): 90 AEROSOL, METERED ORAL at 05:51

## 2023-10-18 RX ADMIN — HEPARIN SODIUM 5000 UNIT(S): 5000 INJECTION INTRAVENOUS; SUBCUTANEOUS at 05:51

## 2023-10-18 RX ADMIN — TAMSULOSIN HYDROCHLORIDE 0.4 MILLIGRAM(S): 0.4 CAPSULE ORAL at 21:21

## 2023-10-18 RX ADMIN — TRAMADOL HYDROCHLORIDE 25 MILLIGRAM(S): 50 TABLET ORAL at 13:14

## 2023-10-18 RX ADMIN — ESCITALOPRAM OXALATE 10 MILLIGRAM(S): 10 TABLET, FILM COATED ORAL at 11:32

## 2023-10-18 RX ADMIN — TRAMADOL HYDROCHLORIDE 25 MILLIGRAM(S): 50 TABLET ORAL at 14:14

## 2023-10-18 RX ADMIN — ALBUTEROL 2 PUFF(S): 90 AEROSOL, METERED ORAL at 21:21

## 2023-10-18 RX ADMIN — MIRTAZAPINE 7.5 MILLIGRAM(S): 45 TABLET, ORALLY DISINTEGRATING ORAL at 21:22

## 2023-10-18 NOTE — PROGRESS NOTE ADULT - ASSESSMENT
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 Patient ultimately discharged to rehab and ultimately home. Patient with a mechanical fall with prompted admission to the hospital and later found to have hypoxemia again. CT imaging with mild emphysema and faint peripheral reticulations. However imaging markedly improved from prior CT imaging (2023) which demonstrated diffuse ground glass. Reports dyspnea with exertion and new lower extremity edema. No fever or chills.     #Hypoxemia  #Mild emphysema  #Peripheral reticulations  #Volume overload  #Pulmonary hypertension -- related to hypoxemia and increased RV afterload.    RECS  -Patient with rapid improvement while on diuretics. Continue and maintain daily net negative status.  -Outpatient, was on prednisone 20 mg po bid with recent conversion to prednisone 20 mg po daily. CW prednisone 30 mg po daily.  -CW Bactrim DS po MWF for PCP prophylaxis.   -S/p 3 days of CTX. Monitor off further abx.  -Out of bed to chair.  -Incentive spirometer.    Patient should f/u with pulmonary within 1-2 weeks of discharge. Please email [email or web address] and include patient's name,  and MRN and allow 24 hours for scheduling.    [address] [place] [address]  [phone]    Dr. Shelton Faulkner, DO  Pulmonary and Critical Care Medicine Fellow   Available via [employer] Teams - **Preferred**  Pulmonary [facility] #[id number] ([facility]) / [id number] ([facility])  Pager:  [phone]

## 2023-10-18 NOTE — PROGRESS NOTE ADULT - SUBJECTIVE AND OBJECTIVE BOX
PULMONARY SERVICE FOLLOW UP CONSULT NOTE    SUBJECTIVE:  Reports msk related pain. Dyspnea improved. LE edema improved.     REVIEW OF SYSTEMS:  All additional ROS negative.    MEDICATIONS:  Pulmonary:  albuterol    90 MICROgram(s) HFA Inhaler 2 Puff(s) Inhalation every 8 hours    Antimicrobials:  trimethoprim  160 mG/sulfamethoxazole 800 mG 1 Tablet(s) Oral <User Schedule>    Anticoagulants:  heparin   Injectable 5000 Unit(s) SubCutaneous every 12 hours    Onc:  exemestane 25 milliGRAM(s) Oral daily    GI/:  bisacodyl 5 milliGRAM(s) Oral every 12 hours PRN  pantoprazole    Tablet 40 milliGRAM(s) Oral before breakfast  polyethylene glycol 3350 17 Gram(s) Oral daily  senna 2 Tablet(s) Oral at bedtime  tamsulosin 0.4 milliGRAM(s) Oral at bedtime    Endocrine:  predniSONE   Tablet 30 milliGRAM(s) Oral daily  simvastatin 20 milliGRAM(s) Oral at bedtime    Cardiac:  furosemide    Tablet 40 milliGRAM(s) Oral daily    Other Medications:  ALPRAZolam 0.25 milliGRAM(s) Oral two times a day PRN  buPROPion XL (24-Hour) . 150 milliGRAM(s) Oral daily  chlorhexidine 2% Cloths 1 Application(s) Topical <User Schedule>  escitalopram 10 milliGRAM(s) Oral daily  influenza  Vaccine (HIGH DOSE) 0.7 milliLiter(s) IntraMuscular once  mirtazapine 7.5 milliGRAM(s) Oral at bedtime  traMADol 25 milliGRAM(s) Oral two times a day PRN      PHYSICAL EXAM  Vital Signs Last 24 Hrs  T(C): 36.6 (18 Oct 2023 11:09), Max: 36.6 (17 Oct 2023 20:10)  T(F): 97.8 (18 Oct 2023 11:09), Max: 97.9 (17 Oct 2023 20:10)  HR: 88 (18 Oct 2023 11:09) (86 - 106)  BP: 109/67 (18 Oct 2023 11:09) (107/65 - 168/73)  BP(mean): --  RR: 18 (18 Oct 2023 11:09) (18 - 18)  SpO2: 92% (18 Oct 2023 11:09) (85% - 96%)    Parameters below as of 18 Oct 2023 11:09  Patient On (Oxygen Delivery Method): nasal cannula  O2 Flow (L/min): 3      10-17 @ 07:01  -  10-18 @ 07:00  --------------------------------------------------------  IN: 480 mL / OUT: 1400 mL / NET: -920 mL    10-18 @ 07:01  -  10-18 @ 14:13  --------------------------------------------------------  IN: 480 mL / OUT: 1200 mL / NET: -720 mL            CONSTITUTIONAL: No acute distress.   HEENT:  Conjunctiva clear B/L.  Moist oral mucosa.   Cardiovascular: RRR with no murmurs. Trace lower extremity edema B/L. Extremities are warm and well perfused.    Respiratory: Dec bs at the bases. No wrr. No accessory muscle use.   Gastrointestinal:  Soft, nontender. Non-distended. Non-rigid.    Neurologic:  Alert and awake. Moving all extremities. Following commands.    Skin:  No gross rashes notes.    LABS:      CBC Full  -  ( 18 Oct 2023 06:29 )  WBC Count : 11.44 K/uL  RBC Count : 3.00 M/uL  Hemoglobin : 9.2 g/dL  Hematocrit : 28.7 %  Platelet Count - Automated : 125 K/uL  Mean Cell Volume : 95.7 fl  Mean Cell Hemoglobin : 30.7 pg  Mean Cell Hemoglobin Concentration : 32.1 gm/dL  Auto Neutrophil # : x  Auto Lymphocyte # : x  Auto Monocyte # : x  Auto Eosinophil # : x  Auto Basophil # : x  Auto Neutrophil % : x  Auto Lymphocyte % : x  Auto Monocyte % : x  Auto Eosinophil % : x  Auto Basophil % : x    10-18    138  |  100  |  26<H>  ----------------------------<  221<H>  4.5   |  27  |  0.85    Ca    8.8      18 Oct 2023 06:29  Phos  2.8     10-17  Mg     2.5     10-17            Urinalysis Basic - ( 18 Oct 2023 06:29 )    Color: x / Appearance: x / SG: x / pH: x  Gluc: 221 mg/dL / Ketone: x  / Bili: x / Urobili: x   Blood: x / Protein: x / Nitrite: x   Leuk Esterase: x / RBC: x / WBC x   Sq Epi: x / Non Sq Epi: x / Bacteria: x                RADIOLOGY & ADDITIONAL STUDIES:

## 2023-10-18 NOTE — PROGRESS NOTE ADULT - SUBJECTIVE AND OBJECTIVE BOX
date of service: 10-18-23 @ 10:59  afebrile  REVIEW OF SYSTEMS:  CONSTITUTIONAL: No fever,  no  weight loss  ENT:  No  tinnitus,   no   vertigo  NECK: No pain or stiffness  RESPIRATORY: No cough, wheezing, chills or hemoptysis;    No Shortness of Breath  CARDIOVASCULAR: No chest pain, palpitations, dizziness  GASTROINTESTINAL: No abdominal or epigastric pain. No nausea, vomiting, or hematemesis; No diarrhea  No melena or hematochezia.  GENITOURINARY: No dysuria, frequency, hematuria, or incontinence  NEUROLOGICAL: No headaches  SKIN: No itching,  no   rash  LYMPH Nodes: No enlarged glands  ENDOCRINE: No heat or cold intolerance  MUSCULOSKELETAL: No joint pain or swelling  PSYCHIATRIC: No depression, anxiety  HEME/LYMPH: No easy bruising, or bleeding gums  ALLERGY AND IMMUNOLOGIC: No hives or eczema	    MEDICATIONS  (STANDING):  albuterol    90 MICROgram(s) HFA Inhaler 2 Puff(s) Inhalation every 8 hours  buPROPion XL (24-Hour) . 150 milliGRAM(s) Oral daily  chlorhexidine 2% Cloths 1 Application(s) Topical <User Schedule>  escitalopram 10 milliGRAM(s) Oral daily  exemestane 25 milliGRAM(s) Oral daily  furosemide    Tablet 40 milliGRAM(s) Oral daily  heparin   Injectable 5000 Unit(s) SubCutaneous every 12 hours  influenza  Vaccine (HIGH DOSE) 0.7 milliLiter(s) IntraMuscular once  mirtazapine 7.5 milliGRAM(s) Oral at bedtime  pantoprazole    Tablet 40 milliGRAM(s) Oral before breakfast  polyethylene glycol 3350 17 Gram(s) Oral daily  predniSONE   Tablet 30 milliGRAM(s) Oral daily  senna 2 Tablet(s) Oral at bedtime  simvastatin 20 milliGRAM(s) Oral at bedtime  tamsulosin 0.4 milliGRAM(s) Oral at bedtime  trimethoprim  160 mG/sulfamethoxazole 800 mG 1 Tablet(s) Oral <User Schedule>    MEDICATIONS  (PRN):  ALPRAZolam 0.25 milliGRAM(s) Oral two times a day PRN anxiety  bisacodyl 5 milliGRAM(s) Oral every 12 hours PRN Constipation  traMADol 25 milliGRAM(s) Oral two times a day PRN Moderate Pain (4 - 6)      Vital Signs Last 24 Hrs  T(C): 36.3 (18 Oct 2023 04:22), Max: 36.7 (17 Oct 2023 11:00)  T(F): 97.3 (18 Oct 2023 04:22), Max: 98.1 (17 Oct 2023 11:00)  HR: 86 (18 Oct 2023 04:22) (86 - 106)  BP: 168/73 (18 Oct 2023 04:22) (107/65 - 168/73)  BP(mean): --  RR: 18 (18 Oct 2023 07:28) (18 - 18)  SpO2: 85% (18 Oct 2023 07:28) (85% - 96%)    Parameters below as of 18 Oct 2023 07:28  Patient On (Oxygen Delivery Method): room air      CAPILLARY BLOOD GLUCOSE        I&O's Summary    17 Oct 2023 07:01  -  18 Oct 2023 07:00  --------------------------------------------------------  IN: 480 mL / OUT: 1400 mL / NET: -920 mL    18 Oct 2023 07:01  -  18 Oct 2023 10:59  --------------------------------------------------------  IN: 240 mL / OUT: 0 mL / NET: 240 mL          Appearance: Normal	  HEENT:   Normal oral mucosa, PERRL, EOMI	  Lymphatic: No lymphadenopathy  Cardiovascular: Normal S1 S2, No JVD  Respiratory: Lungs clear to auscultation	  Gastrointestinal:  Soft, Non-tender, + BS	  Skin: No rash, No ecchymoses	  Extremities:     LABS:                        9.2    11.44 )-----------( 125      ( 18 Oct 2023 06:29 )             28.7     10-18    138  |  100  |  26<H>  ----------------------------<  221<H>  4.5   |  27  |  0.85    Ca    8.8      18 Oct 2023 06:29  Phos  2.8     10-17  Mg     2.5     10-17            Urinalysis Basic - ( 18 Oct 2023 06:29 )    Color: x / Appearance: x / SG: x / pH: x  Gluc: 221 mg/dL / Ketone: x  / Bili: x / Urobili: x   Blood: x / Protein: x / Nitrite: x   Leuk Esterase: x / RBC: x / WBC x   Sq Epi: x / Non Sq Epi: x / Bacteria: x                      Consultant(s) Notes Reviewed:      Care Discussed with Consultants/Other Providers:

## 2023-10-18 NOTE — PROGRESS NOTE ADULT - ASSESSMENT
89-year-old female    h/o  HLD,  Ca  breast, s/p  lumpectomy, recurrence , on  chemo . prior  h/o  hip  surg     admitted  with  worsening  sob.  and  also  had  a  fall .  recently  d/c  from rehab facility /  pt  had a  similar  admission  for  sob, in 8/23      *  sob.  from  ?  chf/  acute  copd  exacerbation/ ?  pneumonitis        afberile,  wbc  17,000  on  arrival,    on  iv  solumedrol,  Proventil. and  rocephin/  iv lasix          CT  chest   angio,  no  pe,  centrilobular  emphysema, reticular  markings, /  chf vs  early  fibrosis.  compression T 7. old           CT   spine,  doffuse  deg  dosease,  with  c/c  T7  fx    h/o  Ca breast. onc  dr garcia /dr crews        was  on Ibrance  and exemestane since 2021 with stable disease;  PET scan 1/2023        and, Ibrance held, as  Ibrance can cause ILD/pneumonitis/  pt  was  supposed  to  f/p  with  pulm dr lisker     Echo   8/23, with normal LV.  pulm  htn mild, was  38  mm  hg  /  no  shun     ct a/p .  stool. on laxatives    Depression/anxiety, on xanax.  Wellbutrin   Remeron  ,  on dvt  ppx /  fall  risk    Echo  now,  normal  ef.  severe  pHTN ,  { this  is   new ,  from   echo  of  8/23 } normal  RV  function        seen  by  house  pulm /  better  with  lasix.  iv steroid  lowered  to  bid      uti/ Klebsiella  /  awiating sensitivity ,  on iv Rocephin      tov  was   seen  by  urology .  follow   bladder  scan   ha s  c/c  back pain. h/o comp  fx /  wbc from steroid      CHAO< from: TTE W or WO Ultrasound Enhancing Agent (10.15.23 @ 13:37) >  ONCLUSIONS:   1. Left ventricular systolic function is hyperdynamic with an ejection fraction of 75 % by Ross's method of disks.   2. Mildly enlarged right ventricular cavity size, wall thickness, and systolic function.   3. Compared to the transthoracic echocardiogram performed on 8/24/2023 Estimated PASP and TR has increased.  4. Estimated pulmonary artery systolic pressure is 61 mmHg, consistent with severe pulmonary hypertension.   5. Hyperdynamic left ventricular systolic function with intra-cavitary gradient of 37 mmHg.   6. Technically difficult image quality.   7. The inferior vena cava is normal in size measuring 1.70 cm in diameter, (normal <2.1cm) with abnormal inspiratory collapse (abnormal <50%) consistent with mildly elevated right atrial pressure (~8, range 5-10mmHg).   8. There is normal LV mass and concentric remodeling.  _______________________________________________________________________________________:    < end of copied text >

## 2023-10-18 NOTE — PROGRESS NOTE ADULT - SUBJECTIVE AND OBJECTIVE BOX
Date of Service: 10-18-23 @ 08:23           CARDIOLOGY     PROGRESS  NOTE   ________________________________________________    CHIEF COMPLAINT:Patient is a 89y old  Female who presents with a chief complaint of SOB (17 Oct 2023 12:22)  doing better  	  REVIEW OF SYSTEMS:  CONSTITUTIONAL: No fever, weight loss, or fatigue  EYES: No eye pain, visual disturbances, or discharge  ENT:  No difficulty hearing, tinnitus, vertigo; No sinus or throat pain  NECK: No pain or stiffness  RESPIRATORY: No cough, wheezing, chills or hemoptysis; No Shortness of Breath  CARDIOVASCULAR: No chest pain, palpitations, passing out, dizziness, or leg swelling  GASTROINTESTINAL: No abdominal or epigastric pain. No nausea, vomiting, or hematemesis; No diarrhea or constipation. No melena or hematochezia.  GENITOURINARY: No dysuria, frequency, hematuria, or incontinence  NEUROLOGICAL: No headaches, memory loss, loss of strength, numbness, or tremors  SKIN: No itching, burning, rashes, or lesions   LYMPH Nodes: No enlarged glands  ENDOCRINE: No heat or cold intolerance; No hair loss  MUSCULOSKELETAL: No joint pain or swelling; No muscle, back, or extremity pain  PSYCHIATRIC: No depression, anxiety, mood swings, or difficulty sleeping  HEME/LYMPH: No easy bruising, or bleeding gums  ALLERGY AND IMMUNOLOGIC: No hives or eczema	    [ x] All others negative	  [ ] Unable to obtain    PHYSICAL EXAM:  T(C): 36.3 (10-18-23 @ 04:22), Max: 36.7 (10-17-23 @ 11:00)  HR: 86 (10-18-23 @ 04:22) (86 - 106)  BP: 168/73 (10-18-23 @ 04:22) (107/65 - 168/73)  RR: 18 (10-18-23 @ 07:28) (18 - 18)  SpO2: 85% (10-18-23 @ 07:28) (85% - 96%)  Wt(kg): --  I&O's Summary    17 Oct 2023 07:01  -  18 Oct 2023 07:00  --------------------------------------------------------  IN: 480 mL / OUT: 1400 mL / NET: -920 mL        Appearance: Normal	  HEENT:   Normal oral mucosa, PERRL, EOMI	  Lymphatic: No lymphadenopathy  Cardiovascular: Normal S1 S2, No JVD, + murmurs, No edema  Respiratory: rhonchi  Psychiatry: A & O x 3, Mood & affect appropriate  Gastrointestinal:  Soft, Non-tender, + BS	  Skin: No rashes, No ecchymoses, No cyanosis	  Neurologic: Non-focal  Extremities: Normal range of motion, No clubbing, cyanosis or edema  Vascular: Peripheral pulses palpable 2+ bilaterally    MEDICATIONS  (STANDING):  albuterol    90 MICROgram(s) HFA Inhaler 2 Puff(s) Inhalation every 8 hours  buPROPion XL (24-Hour) . 150 milliGRAM(s) Oral daily  chlorhexidine 2% Cloths 1 Application(s) Topical <User Schedule>  escitalopram 10 milliGRAM(s) Oral daily  exemestane 25 milliGRAM(s) Oral daily  furosemide    Tablet 40 milliGRAM(s) Oral daily  heparin   Injectable 5000 Unit(s) SubCutaneous every 12 hours  influenza  Vaccine (HIGH DOSE) 0.7 milliLiter(s) IntraMuscular once  mirtazapine 7.5 milliGRAM(s) Oral at bedtime  pantoprazole    Tablet 40 milliGRAM(s) Oral before breakfast  polyethylene glycol 3350 17 Gram(s) Oral daily  predniSONE   Tablet 30 milliGRAM(s) Oral daily  senna 2 Tablet(s) Oral at bedtime  simvastatin 20 milliGRAM(s) Oral at bedtime  tamsulosin 0.4 milliGRAM(s) Oral at bedtime  trimethoprim  160 mG/sulfamethoxazole 800 mG 1 Tablet(s) Oral <User Schedule>      TELEMETRY: 	    ECG:  	  RADIOLOGY:  OTHER: 	  	  LABS:	 	    CARDIAC MARKERS:                          9.2    11.44 )-----------( 125      ( 18 Oct 2023 06:29 )             28.7     10-18    138  |  100  |  26<H>  ----------------------------<  221<H>  4.5   |  27  |  0.85    Ca    8.8      18 Oct 2023 06:29  Phos  2.8     10-17  Mg     2.5     10-17      proBNP:   Lipid Profile:   HgA1c:   TSH:         Assessment and plan  ---------------------------  89-year-old female recently discharged from rehab facility chief complaint status post fall in the setting of shortness of breath acute increasing over the past day.  Patient denies any current chest pain.  Patient otherwise otherwise history of breast cancer hyperlipidemia  sob ?sec to ?pneumoniae, copd exacerbation  ?chf , most probably copd/emphysema  hfpef acute on chronic  abx  steroid taper  continue all meds for anxiety  tsh  dvt prophylaxis  TTE noted, severe pulmonary htn, continue steroid /inhalers/ CTA neg for pe  change lasix to 40 mg daily  ?requiring home o2  increase ambulation  discussed with pulmonary continue diuresis, will plan for R heart cath only if will  as per pulmonary  increase ambulation as tolerated

## 2023-10-19 LAB
-  AMIKACIN: SIGNIFICANT CHANGE UP
-  AMIKACIN: SIGNIFICANT CHANGE UP
-  AMOXICILLIN/CLAVULANIC ACID: SIGNIFICANT CHANGE UP
-  AMOXICILLIN/CLAVULANIC ACID: SIGNIFICANT CHANGE UP
-  AMPICILLIN/SULBACTAM: SIGNIFICANT CHANGE UP
-  AMPICILLIN/SULBACTAM: SIGNIFICANT CHANGE UP
-  AMPICILLIN: SIGNIFICANT CHANGE UP
-  AMPICILLIN: SIGNIFICANT CHANGE UP
-  AZTREONAM: SIGNIFICANT CHANGE UP
-  AZTREONAM: SIGNIFICANT CHANGE UP
-  CEFAZOLIN: SIGNIFICANT CHANGE UP
-  CEFAZOLIN: SIGNIFICANT CHANGE UP
-  CEFEPIME: SIGNIFICANT CHANGE UP
-  CEFEPIME: SIGNIFICANT CHANGE UP
-  CEFOXITIN: SIGNIFICANT CHANGE UP
-  CEFOXITIN: SIGNIFICANT CHANGE UP
-  CEFTRIAXONE: SIGNIFICANT CHANGE UP
-  CEFTRIAXONE: SIGNIFICANT CHANGE UP
-  CEFUROXIME: SIGNIFICANT CHANGE UP
-  CEFUROXIME: SIGNIFICANT CHANGE UP
-  CIPROFLOXACIN: SIGNIFICANT CHANGE UP
-  CIPROFLOXACIN: SIGNIFICANT CHANGE UP
-  ERTAPENEM: SIGNIFICANT CHANGE UP
-  ERTAPENEM: SIGNIFICANT CHANGE UP
-  GENTAMICIN: SIGNIFICANT CHANGE UP
-  GENTAMICIN: SIGNIFICANT CHANGE UP
-  IMIPENEM: SIGNIFICANT CHANGE UP
-  IMIPENEM: SIGNIFICANT CHANGE UP
-  LEVOFLOXACIN: SIGNIFICANT CHANGE UP
-  LEVOFLOXACIN: SIGNIFICANT CHANGE UP
-  MEROPENEM: SIGNIFICANT CHANGE UP
-  MEROPENEM: SIGNIFICANT CHANGE UP
-  NITROFURANTOIN: SIGNIFICANT CHANGE UP
-  NITROFURANTOIN: SIGNIFICANT CHANGE UP
-  PIPERACILLIN/TAZOBACTAM: SIGNIFICANT CHANGE UP
-  PIPERACILLIN/TAZOBACTAM: SIGNIFICANT CHANGE UP
-  TOBRAMYCIN: SIGNIFICANT CHANGE UP
-  TOBRAMYCIN: SIGNIFICANT CHANGE UP
-  TRIMETHOPRIM/SULFAMETHOXAZOLE: SIGNIFICANT CHANGE UP
-  TRIMETHOPRIM/SULFAMETHOXAZOLE: SIGNIFICANT CHANGE UP
CULTURE RESULTS: SIGNIFICANT CHANGE UP
CULTURE RESULTS: SIGNIFICANT CHANGE UP
METHOD TYPE: SIGNIFICANT CHANGE UP
METHOD TYPE: SIGNIFICANT CHANGE UP
ORGANISM # SPEC MICROSCOPIC CNT: SIGNIFICANT CHANGE UP
SPECIMEN SOURCE: SIGNIFICANT CHANGE UP
SPECIMEN SOURCE: SIGNIFICANT CHANGE UP

## 2023-10-19 PROCEDURE — 99233 SBSQ HOSP IP/OBS HIGH 50: CPT | Mod: GC

## 2023-10-19 RX ADMIN — SENNA PLUS 2 TABLET(S): 8.6 TABLET ORAL at 21:40

## 2023-10-19 RX ADMIN — Medication 30 MILLIGRAM(S): at 05:25

## 2023-10-19 RX ADMIN — HEPARIN SODIUM 5000 UNIT(S): 5000 INJECTION INTRAVENOUS; SUBCUTANEOUS at 18:06

## 2023-10-19 RX ADMIN — PANTOPRAZOLE SODIUM 40 MILLIGRAM(S): 20 TABLET, DELAYED RELEASE ORAL at 05:25

## 2023-10-19 RX ADMIN — HEPARIN SODIUM 5000 UNIT(S): 5000 INJECTION INTRAVENOUS; SUBCUTANEOUS at 05:25

## 2023-10-19 RX ADMIN — CHLORHEXIDINE GLUCONATE 1 APPLICATION(S): 213 SOLUTION TOPICAL at 05:27

## 2023-10-19 RX ADMIN — ALBUTEROL 2 PUFF(S): 90 AEROSOL, METERED ORAL at 05:26

## 2023-10-19 RX ADMIN — ESCITALOPRAM OXALATE 10 MILLIGRAM(S): 10 TABLET, FILM COATED ORAL at 11:25

## 2023-10-19 RX ADMIN — ALBUTEROL 2 PUFF(S): 90 AEROSOL, METERED ORAL at 13:32

## 2023-10-19 RX ADMIN — MIRTAZAPINE 7.5 MILLIGRAM(S): 45 TABLET, ORALLY DISINTEGRATING ORAL at 21:39

## 2023-10-19 RX ADMIN — Medication 40 MILLIGRAM(S): at 05:26

## 2023-10-19 RX ADMIN — BUPROPION HYDROCHLORIDE 150 MILLIGRAM(S): 150 TABLET, EXTENDED RELEASE ORAL at 11:25

## 2023-10-19 RX ADMIN — POLYETHYLENE GLYCOL 3350 17 GRAM(S): 17 POWDER, FOR SOLUTION ORAL at 11:25

## 2023-10-19 RX ADMIN — SIMVASTATIN 20 MILLIGRAM(S): 20 TABLET, FILM COATED ORAL at 21:40

## 2023-10-19 RX ADMIN — ALBUTEROL 2 PUFF(S): 90 AEROSOL, METERED ORAL at 21:39

## 2023-10-19 RX ADMIN — TAMSULOSIN HYDROCHLORIDE 0.4 MILLIGRAM(S): 0.4 CAPSULE ORAL at 21:40

## 2023-10-19 RX ADMIN — EXEMESTANE 25 MILLIGRAM(S): 25 TABLET, SUGAR COATED ORAL at 21:39

## 2023-10-19 NOTE — PROGRESS NOTE ADULT - SUBJECTIVE AND OBJECTIVE BOX
Date of Service: 10-19-23 @ 08:00           CARDIOLOGY     PROGRESS  NOTE   ________________________________________________    CHIEF COMPLAINT:Patient is a 89y old  Female who presents with a chief complaint of SOB (18 Oct 2023 14:12)  doing better  	  REVIEW OF SYSTEMS:  CONSTITUTIONAL: No fever, weight loss, or fatigue  EYES: No eye pain, visual disturbances, or discharge  ENT:  No difficulty hearing, tinnitus, vertigo; No sinus or throat pain  NECK: No pain or stiffness  RESPIRATORY: No cough, wheezing, chills or hemoptysis; + Shortness of Breath  CARDIOVASCULAR: No chest pain, palpitations, passing out, dizziness, or leg swelling  GASTROINTESTINAL: No abdominal or epigastric pain. No nausea, vomiting, or hematemesis; No diarrhea or constipation. No melena or hematochezia.  GENITOURINARY: No dysuria, frequency, hematuria, or incontinence  NEUROLOGICAL: No headaches, memory loss, loss of strength, numbness, or tremors  SKIN: No itching, burning, rashes, or lesions   LYMPH Nodes: No enlarged glands  ENDOCRINE: No heat or cold intolerance; No hair loss  MUSCULOSKELETAL: No joint pain or swelling; No muscle, back, or extremity pain  PSYCHIATRIC: No depression, anxiety, mood swings, or difficulty sleeping  HEME/LYMPH: No easy bruising, or bleeding gums  ALLERGY AND IMMUNOLOGIC: No hives or eczema	    [ x] All others negative	  [ ] Unable to obtain    PHYSICAL EXAM:  T(C): 36.6 (10-19-23 @ 04:18), Max: 36.6 (10-18-23 @ 11:09)  HR: 79 (10-19-23 @ 04:18) (79 - 95)  BP: 106/54 (10-19-23 @ 04:18) (106/54 - 122/69)  RR: 18 (10-19-23 @ 04:18) (18 - 18)  SpO2: 95% (10-19-23 @ 04:18) (92% - 95%)  Wt(kg): --  I&O's Summary    18 Oct 2023 07:01  -  19 Oct 2023 07:00  --------------------------------------------------------  IN: 480 mL / OUT: 2500 mL / NET: -2020 mL        Appearance: Normal	  HEENT:   Normal oral mucosa, PERRL, EOMI	  Lymphatic: No lymphadenopathy  Cardiovascular: Normal S1 S2, No JVD, + murmurs, No edema  Respiratory: rhonchi  Psychiatry: A & O x 3, Mood & affect appropriate  Gastrointestinal:  Soft, Non-tender, + BS	  Skin: No rashes, No ecchymoses, No cyanosis	  Neurologic: Non-focal  Extremities: Normal range of motion, No clubbing, cyanosis or edema  Vascular: Peripheral pulses palpable 2+ bilaterally    MEDICATIONS  (STANDING):  albuterol    90 MICROgram(s) HFA Inhaler 2 Puff(s) Inhalation every 8 hours  buPROPion XL (24-Hour) . 150 milliGRAM(s) Oral daily  chlorhexidine 2% Cloths 1 Application(s) Topical <User Schedule>  escitalopram 10 milliGRAM(s) Oral daily  exemestane 25 milliGRAM(s) Oral daily  furosemide    Tablet 40 milliGRAM(s) Oral daily  heparin   Injectable 5000 Unit(s) SubCutaneous every 12 hours  influenza  Vaccine (HIGH DOSE) 0.7 milliLiter(s) IntraMuscular once  mirtazapine 7.5 milliGRAM(s) Oral at bedtime  pantoprazole    Tablet 40 milliGRAM(s) Oral before breakfast  polyethylene glycol 3350 17 Gram(s) Oral daily  predniSONE   Tablet 30 milliGRAM(s) Oral daily  senna 2 Tablet(s) Oral at bedtime  simvastatin 20 milliGRAM(s) Oral at bedtime  tamsulosin 0.4 milliGRAM(s) Oral at bedtime  trimethoprim  160 mG/sulfamethoxazole 800 mG 1 Tablet(s) Oral <User Schedule>      TELEMETRY: 	    ECG:  	  RADIOLOGY:  OTHER: 	  	  LABS:	 	    CARDIAC MARKERS:                                9.2    11.44 )-----------( 125      ( 18 Oct 2023 06:29 )             28.7     10-18    138  |  100  |  26<H>  ----------------------------<  221<H>  4.5   |  27  |  0.85    Ca    8.8      18 Oct 2023 06:29      proBNP:   Lipid Profile:   HgA1c:   TSH:       -Patient with rapid improvement while on diuretics. Continue and maintain daily net negative status.  -Outpatient, was on prednisone 20 mg po bid with recent conversion to prednisone 20 mg po daily. CW prednisone 30 mg po daily.  -CW Bactrim DS po MWF for PCP prophylaxis.   -S/p 3 days of CTX. Monitor off further abx.  -Out of bed to chair.  -Incentive spirometer.    Patient should f/u with pulmonary within 1-2 weeks of discharge. Please email [email or web address] and include patient's name,  and MRN and allow 24 hours for scheduling.      Assessment and plan  ---------------------------  89-year-old female recently discharged from rehab facility chief complaint status post fall in the setting of shortness of breath acute increasing over the past day.  Patient denies any current chest pain.  Patient otherwise otherwise history of breast cancer hyperlipidemia  sob ?sec to ?pneumoniae, copd exacerbation  ?chf , most probably copd/emphysema  hfpef acute on chronic  abx  steroid taper  continue all meds for anxiety  tsh  dvt prophylaxis  TTE noted, severe pulmonary htn, continue steroid /inhalers/ CTA neg for pe  change lasix to 40 mg daily  ?requiring home o2  increase ambulation  discussed with pulmonary continue diuresis, will plan for R heart cath only if will  as per pulmonary  increase ambulation as tolerated  dc plannig as per pulmonary/ home o2

## 2023-10-19 NOTE — PROGRESS NOTE ADULT - ASSESSMENT
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 Patient ultimately discharged to rehab and ultimately home. Patient with a mechanical fall with prompted admission to the hospital and later found to have hypoxemia again. CT imaging with mild emphysema and faint peripheral reticulations. However imaging markedly improved from prior CT imaging (8/2023) which demonstrated diffuse ground glass. Reports dyspnea with exertion and new lower extremity edema. No fever or chills.     #Hypoxemia  #Mild emphysema  #Peripheral reticulations  #Volume overload  #Pulmonary hypertension -- related to hypoxemia and increased RV afterload.    RECS  -Patient with rapid improvement while on diuretics. Monitor uptrending BUN.   -Change Prednisone to 20 mg po daily.   -CW Bactrim DS po MWF for PCP prophylaxis outpatient. Will need to remain on bactrim till prednisone can safely be lowered.   -S/p 3 days of CTX. Monitor off further abx.  -Out of bed to chair.  -Incentive spirometer.    Patient has a virtual appointment on 10/23 @ 3:30 pm w/ Dr. Martinez.     [address] [place] [address]  [phone]    Dr. Shelton Faulkner, DO  Pulmonary and Critical Care Medicine Fellow   Available via [employer] Teams - **Preferred**  Pulmonary [facility] #[id number] (NS) / [id number] ([place])  Pager:  [phone]

## 2023-10-19 NOTE — PROGRESS NOTE ADULT - SUBJECTIVE AND OBJECTIVE BOX
PULMONARY SERVICE FOLLOW UP CONSULT NOTE    SUBJECTIVE:  Dyspnea improving.      REVIEW OF SYSTEMS:  All additional ROS negative.    MEDICATIONS:  Pulmonary:  albuterol    90 MICROgram(s) HFA Inhaler 2 Puff(s) Inhalation every 8 hours    Antimicrobials:  trimethoprim  160 mG/sulfamethoxazole 800 mG 1 Tablet(s) Oral <User Schedule>    Anticoagulants:  heparin   Injectable 5000 Unit(s) SubCutaneous every 12 hours    Onc:  exemestane 25 milliGRAM(s) Oral daily    GI/:  bisacodyl 5 milliGRAM(s) Oral every 12 hours PRN  pantoprazole    Tablet 40 milliGRAM(s) Oral before breakfast  polyethylene glycol 3350 17 Gram(s) Oral daily  senna 2 Tablet(s) Oral at bedtime  tamsulosin 0.4 milliGRAM(s) Oral at bedtime    Endocrine:  predniSONE   Tablet 30 milliGRAM(s) Oral daily  simvastatin 20 milliGRAM(s) Oral at bedtime    Cardiac:  furosemide    Tablet 40 milliGRAM(s) Oral daily    Other Medications:  ALPRAZolam 0.25 milliGRAM(s) Oral two times a day PRN  buPROPion XL (24-Hour) . 150 milliGRAM(s) Oral daily  chlorhexidine 2% Cloths 1 Application(s) Topical <User Schedule>  escitalopram 10 milliGRAM(s) Oral daily  influenza  Vaccine (HIGH DOSE) 0.7 milliLiter(s) IntraMuscular once  mirtazapine 7.5 milliGRAM(s) Oral at bedtime  traMADol 25 milliGRAM(s) Oral two times a day PRN      PHYSICAL EXAM  Vital Signs Last 24 Hrs  T(C): 36.7 (19 Oct 2023 11:12), Max: 36.7 (19 Oct 2023 11:12)  T(F): 98.1 (19 Oct 2023 11:12), Max: 98.1 (19 Oct 2023 11:12)  HR: 97 (19 Oct 2023 11:12) (79 - 97)  BP: 122/74 (19 Oct 2023 11:12) (106/54 - 122/74)  BP(mean): --  RR: 18 (19 Oct 2023 11:12) (18 - 18)  SpO2: 93% (19 Oct 2023 11:12) (93% - 95%)    Parameters below as of 19 Oct 2023 11:12  Patient On (Oxygen Delivery Method): room air        10-18 @ 07:01  -  10-19 @ 07:00  --------------------------------------------------------  IN: 480 mL / OUT: 2500 mL / NET: -2020 mL    10-19 @ 07:01  -  10-19 @ 12:43  --------------------------------------------------------  IN: 520 mL / OUT: 1200 mL / NET: -680 mL            CONSTITUTIONAL: No acute distress.   HEENT:  Conjunctiva clear B/L.  Moist oral mucosa.   Cardiovascular: RRR with no murmurs. Trace lower extremity edema B/L. Extremities are warm and well perfused.    Respiratory: Lungs CTAB. No wrr. No accessory muscle use.   Gastrointestinal:  Soft, nontender. Non-distended. Non-rigid.    Neurologic:  Alert and awake. Moving all extremities. Following commands.    Skin:  No gross rashes notes.    LABS:      CBC Full  -  ( 18 Oct 2023 06:29 )  WBC Count : 11.44 K/uL  RBC Count : 3.00 M/uL  Hemoglobin : 9.2 g/dL  Hematocrit : 28.7 %  Platelet Count - Automated : 125 K/uL  Mean Cell Volume : 95.7 fl  Mean Cell Hemoglobin : 30.7 pg  Mean Cell Hemoglobin Concentration : 32.1 gm/dL  Auto Neutrophil # : x  Auto Lymphocyte # : x  Auto Monocyte # : x  Auto Eosinophil # : x  Auto Basophil # : x  Auto Neutrophil % : x  Auto Lymphocyte % : x  Auto Monocyte % : x  Auto Eosinophil % : x  Auto Basophil % : x    10-18    138  |  100  |  26<H>  ----------------------------<  221<H>  4.5   |  27  |  0.85    Ca    8.8      18 Oct 2023 06:29            Urinalysis Basic - ( 18 Oct 2023 06:29 )    Color: x / Appearance: x / SG: x / pH: x  Gluc: 221 mg/dL / Ketone: x  / Bili: x / Urobili: x   Blood: x / Protein: x / Nitrite: x   Leuk Esterase: x / RBC: x / WBC x   Sq Epi: x / Non Sq Epi: x / Bacteria: x                RADIOLOGY & ADDITIONAL STUDIES:

## 2023-10-19 NOTE — PROGRESS NOTE ADULT - SUBJECTIVE AND OBJECTIVE BOX
date of service: 10-19-23 @ 09:14  afebrile  REVIEW OF SYSTEMS:  CONSTITUTIONAL: No fever,  no  weight loss  ENT:  No  tinnitus,   no   vertigo  NECK: No pain or stiffness  RESPIRATORY: No cough, wheezing, chills or hemoptysis;    No Shortness of Breath  CARDIOVASCULAR: No chest pain, palpitations, dizziness  GASTROINTESTINAL: No abdominal or epigastric pain. No nausea, vomiting, or hematemesis; No diarrhea  No melena or hematochezia.  GENITOURINARY: No dysuria, frequency, hematuria, or incontinence  NEUROLOGICAL: No headaches  SKIN: No itching,  no   rash  LYMPH Nodes: No enlarged glands  ENDOCRINE: No heat or cold intolerance  MUSCULOSKELETAL: No joint pain or swelling  PSYCHIATRIC: No depression, anxiety  HEME/LYMPH: No easy bruising, or bleeding gums  ALLERGY AND IMMUNOLOGIC: No hives or eczema	    MEDICATIONS  (STANDING):  albuterol    90 MICROgram(s) HFA Inhaler 2 Puff(s) Inhalation every 8 hours  buPROPion XL (24-Hour) . 150 milliGRAM(s) Oral daily  chlorhexidine 2% Cloths 1 Application(s) Topical <User Schedule>  escitalopram 10 milliGRAM(s) Oral daily  exemestane 25 milliGRAM(s) Oral daily  furosemide    Tablet 40 milliGRAM(s) Oral daily  heparin   Injectable 5000 Unit(s) SubCutaneous every 12 hours  influenza  Vaccine (HIGH DOSE) 0.7 milliLiter(s) IntraMuscular once  mirtazapine 7.5 milliGRAM(s) Oral at bedtime  pantoprazole    Tablet 40 milliGRAM(s) Oral before breakfast  polyethylene glycol 3350 17 Gram(s) Oral daily  predniSONE   Tablet 30 milliGRAM(s) Oral daily  senna 2 Tablet(s) Oral at bedtime  simvastatin 20 milliGRAM(s) Oral at bedtime  tamsulosin 0.4 milliGRAM(s) Oral at bedtime  trimethoprim  160 mG/sulfamethoxazole 800 mG 1 Tablet(s) Oral <User Schedule>    MEDICATIONS  (PRN):  ALPRAZolam 0.25 milliGRAM(s) Oral two times a day PRN anxiety  bisacodyl 5 milliGRAM(s) Oral every 12 hours PRN Constipation  traMADol 25 milliGRAM(s) Oral two times a day PRN Moderate Pain (4 - 6)      Vital Signs Last 24 Hrs  T(C): 36.6 (19 Oct 2023 04:18), Max: 36.6 (18 Oct 2023 11:09)  T(F): 97.9 (19 Oct 2023 04:18), Max: 97.9 (19 Oct 2023 04:18)  HR: 79 (19 Oct 2023 04:18) (79 - 95)  BP: 106/54 (19 Oct 2023 04:18) (106/54 - 122/69)  BP(mean): --  RR: 18 (19 Oct 2023 04:18) (18 - 18)  SpO2: 95% (19 Oct 2023 04:18) (92% - 95%)    Parameters below as of 19 Oct 2023 04:18  Patient On (Oxygen Delivery Method): nasal cannula  O2 Flow (L/min): 3    CAPILLARY BLOOD GLUCOSE        I&O's Summary    18 Oct 2023 07:01  -  19 Oct 2023 07:00  --------------------------------------------------------  IN: 480 mL / OUT: 2500 mL / NET: -2020 mL    19 Oct 2023 07:01  -  19 Oct 2023 09:14  --------------------------------------------------------  IN: 240 mL / OUT: 0 mL / NET: 240 mL          Appearance: Normal	  HEENT:   Normal oral mucosa, PERRL, EOMI	  Lymphatic: No lymphadenopathy  Cardiovascular: Normal S1 S2, No JVD  Respiratory: Lungs clear to auscultation	  Gastrointestinal:  Soft, Non-tender, + BS	  Skin: No rash, No ecchymoses	  Extremities:     LABS:                        9.2    11.44 )-----------( 125      ( 18 Oct 2023 06:29 )             28.7     10-18    138  |  100  |  26<H>  ----------------------------<  221<H>  4.5   |  27  |  0.85    Ca    8.8      18 Oct 2023 06:29            Urinalysis Basic - ( 18 Oct 2023 06:29 )    Color: x / Appearance: x / SG: x / pH: x  Gluc: 221 mg/dL / Ketone: x  / Bili: x / Urobili: x   Blood: x / Protein: x / Nitrite: x   Leuk Esterase: x / RBC: x / WBC x   Sq Epi: x / Non Sq Epi: x / Bacteria: x                      Consultant(s) Notes Reviewed:      Care Discussed with Consultants/Other Providers:

## 2023-10-19 NOTE — CONSULT NOTE ADULT - SUBJECTIVE AND OBJECTIVE BOX
HPI  89yFemale c/o thoracolumbar back pain s/p mechanical fall x2 (1 month ago and 5d ago). Denies focal weakness, numbness/tingling, or radicular sxs. Denies fevers/chills, acute changes in bowel/bladder function, or saddle anesthesia. Denies any other ortho injuries at this time. Community ambulator w/o assistive devices at baseline. Has been able to ambulate to the chair during this admission.    ROS  Negative unless otherwise specified in HPI.    PAST MEDICAL & SURGICAL Hx  PAST MEDICAL & SURGICAL HISTORY:  Anxiety  Hyperlipidemia  UTI (lower urinary tract infection)  Depression  Breast cancer  HLD (hyperlipidemia)  Gallbladder stone without cholecystitis or obstruction  Cholecystitis  2019 novel coronavirus disease (COVID-19)  1/7/2021  Recurrent breast cancer  opposite side, left-3/2020  History of chemotherapy  and radiation-1994  S/P chemotherapy, time since less than 4 weeks  Cholelithiasis  H/O lumpectomy  left-11/1994  History of hip replacement  left-1994  S/P Mohs surgery for basal cell carcinoma  right side of nose-2020    MEDICATIONS  Home Medications:  ALPRAZolam 0.25 mg oral tablet: 1 tab(s) orally 2 times a day as needed (24 Aug 2023 10:49)  biotin: 1 cap(s) orally once a day (24 Aug 2023 10:22)  buPROPion 300 mg/24 hours (XL) oral tablet, extended release: 1 tab(s) orally once a day (01 Sep 2023 12:41)  Cranberry tablet: 1 tablet orally once a day (24 Aug 2023 10:22)  escitalopram 10 mg oral tablet: 1 tab(s) orally once a day (01 Sep 2023 12:41)  escitalopram 5 mg oral tablet: 1 tab(s) orally once a day (01 Sep 2023 12:41)  exemestane 25 mg oral tablet: 1 tab(s) orally once a day (01 Sep 2023 12:41)  furosemide 20 mg oral tablet: 1 tab(s) orally once a day (01 Sep 2023 12:41)  ipratropium 21 mcg/inh (0.03%) nasal spray: 2 spray(s) intranasally once a day (24 Aug 2023 12:11)  mirtazapine 7.5 mg oral tablet: 1 tab(s) orally once a day (at bedtime) (01 Sep 2023 12:41)  pantoprazole 40 mg oral delayed release tablet: 1 tab(s) orally once a day (before a meal) (01 Sep 2023 12:41)  predniSONE 10 mg oral tablet: 3 tab(s) orally 2 times a day (01 Sep 2023 13:37)  simvastatin 20 mg oral tablet: 1 tab(s) orally once a day (at bedtime) (01 Sep 2023 12:41)  sulfamethoxazole-trimethoprim 800 mg-160 mg oral tablet: 1 tab(s) orally once a day (01 Sep 2023 12:41)  tamsulosin 0.4 mg oral capsule: 1 cap(s) orally once a day (at bedtime) (01 Sep 2023 12:41)  vitamin D3: 1 tab(s) orally once a day (24 Aug 2023 10:22)    ALLERGIES  penicillin (Hives)    FAMILY Hx  FAMILY HISTORY:  FH: breast cancer  mother  FH: lung cancer (Mother)    VITALS  Vital Signs Last 24 Hrs  T(C): 36.7 (19 Oct 2023 11:12), Max: 36.7 (19 Oct 2023 11:12)  T(F): 98.1 (19 Oct 2023 11:12), Max: 98.1 (19 Oct 2023 11:12)  HR: 97 (19 Oct 2023 11:12) (79 - 97)  BP: 122/74 (19 Oct 2023 11:12) (106/54 - 122/74)  RR: 18 (19 Oct 2023 11:12) (18 - 18)  SpO2: 93% (19 Oct 2023 11:12) (93% - 95%)  Parameters below as of 19 Oct 2023 11:12  Patient On (Oxygen Delivery Method): room air    PHYSICAL EXAM  Gen: Lying in bed, NAD  Resp: No increased WOB  Spine:  Skin intact  No bony TTP or step-offs along c-, t-, l-spine, or sacrum    Motor:                   C5                C6              C7               C8           T1   R            5/5                5/5            5/5             5/5          5/5  L             5/5               5/5             5/5             5/5          5/5                L2             L3             L4               L5            S1  R         4*/5           5/5          5/5             5/5           5/5       *pain-limited  L          5/5          5/5           5/5             5/5           5/5    Sensory:            C5         C6         C7      C8       T1        (0=absent, 1=impaired, 2=normal, NT=not testable)  R         2            2           2        2         2  L          2            2           2        2         2               L2          L3         L4      L5       S1         (0=absent, 1=impaired, 2=normal, NT=not testable)  R         2            2            2        2        2  L          2            2           2        2         2    (-) Hawkins test b/l  (-) Babinski sign b/l  (-) Ankle clonus b/l    LABS                        9.2    11.44 )-----------( 125      ( 18 Oct 2023 06:29 )             28.7     10-18    138  |  100  |  26<H>  ----------------------------<  221<H>  4.5   |  27  |  0.85    Ca    8.8      18 Oct 2023 06:29      IMAGING  < from: CT Lumbar Spine No Cont (10.16.23 @ 15:56) >  PROCEDURE DATE:  10/16/2023      INTERPRETATION:  CT cervical spine without IV contrast    CLINICAL INFORMATION: Neck pain, spinal stenosis, spondylosis. back pain      fracture seen on outpt MRI    TECHNIQUE:  Contiguous axial 2.0 mm sections were obtained through the   cervical spine using a single helical acquisition.  Additional 2 mm   sagittal and coronal reformatted reconstructions of the spine were   obtained.  These additional reformatted images were used to evaluate the   spine for alignment, vertebral fractures and the integrity of the the   posterior elements.   This scan was performed using automatic exposure   control (radiation dose reduction software) to obtain a diagnostic image   quality scan with patient dose as low as reasonably achievable.    FINDINGS:   No prior similar studies are available for review    Cervical vertebral body heights are maintained. No vertebral fracture is   seen. No destructive bone lesion is found.  Alignment is significant for   straightening on a degenerative basis.  Facet joints appear intact and   aligned.    Cervical intervertebral disc spaces show moderate to severe degenerative   disc disease and spondylosis at C3-4 through C7-T1 with loss of disc   height and associated degenerative endplate changes. There is narrowing   of the RIGHT C2-3, BILATERAL C3-4, C4-5, C5-6 and C6-7 neural foramina   due to uncovertebral spurring and facet osteophytic hypertrophy.    The skull base appears intact.  No neck mass is recognized.  Paraspinal   soft tissues appear intact. Visualized lymph nodes appear to be within   physiologic size limits. Degenerative changes at the temporomandibular   joints . Mild emphysema is noted in the lung apices.        CT thoracic and lumbar spine without IV contrast    CLINICAL INFORMATION:  Fracture,  Back pain, spinal stenosis, spondylosis.    TECHNIQUE:  Contiguous axial 2 mm sections were obtained through the   thoracic and lumbar spine using a single helical acquisition.     Additional 2 mm sagittal and coronal reconstructions of the spine were   obtained. These additional reformatted images were used to evaluate the   spine for alignment, vertebral fractures and the integrity of the the   posterior elements.   This scan was performed using automatic exposure   control (radiation dose reduction software) to obtain a diagnostic image   quality scan with patient dose as low as reasonably achievable.    FINDINGS:   No prior similar studies are available for review    Thoracic and lumbar vertebral body heights are maintained with the   exception of a chronic compression fracture of T7 with loss of 80% of   vertebral body height minimal bony retropulsion. Mild chronic compression   deformity of T6 with loss of 40% of vertebral body height. No acute   vertebral fracture is seen.No destructive bone lesion is found.    Alignment is significant for focal kyphosis of thoracic spine with apex   at T7. Grade 1 anterior spondylolisthesis at L3-4 on a degenerative basis.    Thoracic and lumbar intervertebral disc spaces show moderate to severe   degenerative disc disease and spondylosis. Small posterior osteophytic   ridge/disc complexes at C5-6, T6-7, T7-8, and T9-10 abuts the ventral   cord. Disc bulges noted at T11-T12 through L4-5 abuts the ventral thecal   sac and narrow the BILATERAL neural foramina. Mild central stenosis at   L1-2 and L2-3 and moderate central stenosis at L3-4 and L4-5 on a   degenerative basis due to disc bulge, facet osteophytic hypertrophy and   redundancy of the ligamentum flavum. Broad-based LEFT paracentral disc   herniation at L5-S1 narrows the LEFT neural foramen Tiny central disc   herniation at L5-S1 with superior extrusion abuts the ventral thecal sac.    No paraspinal mass is recognized.  Paraspinal soft tissues appear intact.      IMPRESSION:    CT cervical No vertebral fracture is recognized. Moderate to severe   degenerative disc disease and spondylosis at C3-4 through C7-T1 with loss   of disc height and associated degenerative endplate changes. There is   narrowing of the RIGHT C2-3, BILATERAL C3-4, C4-5, C5-6 and C6-7 neural   foramina due to uncovertebral spurring and facet osteophytic hypertrophy.   Straightening on a degenerative basis.    CT thoracic/lumbar spine:  Chronic compression fracture of T7 with loss   of 80% of vertebral body height minimal bony retropulsion. Mild chronic   compression deformity of T6 with loss of 40% of vertebral body height. No   acute vertebral fracture is seen.   Focal kyphosis of thoracic spine with   apex at T7. Grade 1 anterior spondylolisthesis at L3-4 on a degenerative   basis.  Moderate to severe degenerative disc disease and spondylosis. Small   posterior osteophytic ridge/disc complexes at C5-6, T6-7, T7-8, and T9-10   abuts the ventral cord. Disc bulges noted at T11-T12 through L4-5 abuts   the ventral thecal sac and narrow the BILATERAL neural foramina. Mild   central stenosis at L1-2 and L2-3 and moderate central stenosis at L3-4   and L4-5 on a degenerative basis due to disc bulge, facet osteophytic   hypertrophy and redundancy of the ligamentum flavum. Broad-based LEFT   paracentral disc herniation at L5-S1 narrows the LEFT neural foramen Tiny   central disc herniation at L5-S1 with superior extrusion abuts the   ventral thecal sac.    --- End ofReport ---      ASSESSMENT & PLAN  89yFemale w/ T6-7 VCFs w/o significant bony retropulsion.  -WBAT  -if pt desires, can order a TLSO for comfort from orthotist Sanju Dick/[facility] ([phone])  -pain control  -incentive spirometry  -PT/OT  -no acute ortho spine surgery at this time  -f/u outpt with Dr. Lucio in 1 week following discharge, please call office for appt

## 2023-10-19 NOTE — PROGRESS NOTE ADULT - ASSESSMENT
89-year-old female    h/o  HLD,  Ca  breast, s/p  lumpectomy, recurrence , on  chemo . prior  h/o  hip  surg     admitted  with  worsening  sob.  and  also  had  a  fall .  recently  d/c  from rehab facility /  pt  had a  similar  admission  for  sob, in 8/23      *  sob.  from  ?  chf/  acute  copd  exacerbation/ ?  pneumonitis        afberile,  wbc  17,000  on  arrival,    on  iv  solumedrol,  Proventil. and  rocephin/  iv lasix          CT  chest   angio,  no  pe,  centrilobular  emphysema, reticular  markings, /  chf vs  early  fibrosis.  compression T 7. old           CT   spine,  doffuse  deg  dosease,  with  c/c  T7  fx    h/o  Ca breast. onc  dr garcia /dr crews        was  on Ibrance  and exemestane since 2021 with stable disease;  PET scan 1/2023        and, Ibrance held, as  Ibrance can cause ILD/pneumonitis/  pt  was  supposed  to  f/p  with  pulm dr lisker     Echo   8/23, with normal LV.  pulm  htn mild, was  38  mm  hg  /  no  shun     ct a/p .  stool. on laxatives    Depression/anxiety, on xanax.  Wellbutrin   Remeron  ,  on dvt  ppx /  fall  risk    Echo  now,  normal  ef.  severe  pHTN ,  { this  is   new ,  from   echo  of  8/23 } normal  RV  function        seen  by  house  pulm /  better  with  lasix.  iv steroid  lowered  to  bid      uti/ Klebsiella  /  awiating sensitivity ,  on iv Rocephin      tov  was   seen  by  urology    has   c/c  back pain. h/o comp  fx ,  daughter   requesting spine  eval/  discussed  with  team      wbc from steroid      CHAO< from: TTE W or WO Ultrasound Enhancing Agent (10.15.23 @ 13:37) >  ONCLUSIONS:   1. Left ventricular systolic function is hyperdynamic with an ejection fraction of 75 % by Ross's method of disks.   2. Mildly enlarged right ventricular cavity size, wall thickness, and systolic function.   3. Compared to the transthoracic echocardiogram performed on 8/24/2023 Estimated PASP and TR has increased.  4. Estimated pulmonary artery systolic pressure is 61 mmHg, consistent with severe pulmonary hypertension.   5. Hyperdynamic left ventricular systolic function with intra-cavitary gradient of 37 mmHg.   6. Technically difficult image quality.   7. The inferior vena cava is normal in size measuring 1.70 cm in diameter, (normal <2.1cm) with abnormal inspiratory collapse (abnormal <50%) consistent with mildly elevated right atrial pressure (~8, range 5-10mmHg).   8. There is normal LV mass and concentric remodeling.  _______________________________________________________________________________________:    < end of copied text >

## 2023-10-19 NOTE — PROGRESS NOTE ADULT - ASSESSMENT
89F HLD and breast cancer on prior Ibrance, previous admission for pneumonitis now readmitted s/p fall with dyspnea    #Breast cancer  - recurrent; follows with Dr. Btaista  - was on ibrance and exemestane since 2021 with stable disease; last PET scan 1/2023  - Ibrance held since prior admission; continue exemestane (d/w Pharmacy)    #hypoxia, pneumonitis from Ibrance  - admitted with hypoxia in 8/2023, no evidence of PE, no improvement with diuresis and discharged on steroids for presumed pneumonitis from ibrance  - repeat CTA 10/14- no PE, edema vs fibrotic change  - echo with severe PHTN  - diuresis per cardiology; steroids per pulmonary    #anemia, normocytic- chronic  - iron studies with AOCD  - B12/folate stable  - no hemolysis  - Hg overall stable    #s/p fall, back pain  - CT with DJD, disc herniations, chronic compression fracture    will likely need Rehab

## 2023-10-19 NOTE — PROGRESS NOTE ADULT - SUBJECTIVE AND OBJECTIVE BOX
Chief Complaint:   FU  breast cancer    History of Present Illness:   breathing in general improved from prior; on NC currently; no f/c, no cough, no cp, no n/v/abd pain, tolerating diet, + pain left hip, only getting to chair; no bleeding      MEDICATIONS  (STANDING):  albuterol    90 MICROgram(s) HFA Inhaler 2 Puff(s) Inhalation every 8 hours  buPROPion XL (24-Hour) . 150 milliGRAM(s) Oral daily  chlorhexidine 2% Cloths 1 Application(s) Topical <User Schedule>  escitalopram 10 milliGRAM(s) Oral daily  exemestane 25 milliGRAM(s) Oral daily  furosemide    Tablet 40 milliGRAM(s) Oral daily  heparin   Injectable 5000 Unit(s) SubCutaneous every 12 hours  influenza  Vaccine (HIGH DOSE) 0.7 milliLiter(s) IntraMuscular once  mirtazapine 7.5 milliGRAM(s) Oral at bedtime  pantoprazole    Tablet 40 milliGRAM(s) Oral before breakfast  polyethylene glycol 3350 17 Gram(s) Oral daily  predniSONE   Tablet 30 milliGRAM(s) Oral daily  senna 2 Tablet(s) Oral at bedtime  simvastatin 20 milliGRAM(s) Oral at bedtime  tamsulosin 0.4 milliGRAM(s) Oral at bedtime  trimethoprim  160 mG/sulfamethoxazole 800 mG 1 Tablet(s) Oral <User Schedule>    MEDICATIONS  (PRN):  ALPRAZolam 0.25 milliGRAM(s) Oral two times a day PRN anxiety  bisacodyl 5 milliGRAM(s) Oral every 12 hours PRN Constipation  traMADol 25 milliGRAM(s) Oral two times a day PRN Moderate Pain (4 - 6)      Allergies    penicillin (Hives)    Intolerances        Vital Signs Last 24 Hrs  T(C): 36.7 (19 Oct 2023 11:12), Max: 36.7 (19 Oct 2023 11:12)  T(F): 98.1 (19 Oct 2023 11:12), Max: 98.1 (19 Oct 2023 11:12)  HR: 97 (19 Oct 2023 11:12) (79 - 97)  BP: 122/74 (19 Oct 2023 11:12) (106/54 - 122/74)  BP(mean): --  RR: 18 (19 Oct 2023 11:12) (18 - 18)  SpO2: 93% (19 Oct 2023 11:12) (93% - 95%)    Parameters below as of 19 Oct 2023 11:12  Patient On (Oxygen Delivery Method): room air        PHYSICAL EXAM  General: adult in NAD  HEENT: clear oropharynx, anicteric sclera, pink conjunctiva  Neck: supple  CV: normal S1/S2 with no murmur rubs or gallops  Lungs: clear to auscultation, no wheezes, no rales  Abdomen: soft non-tender non-distended, no hepatosplenomegaly, positive bowel sounds  Ext: no clubbing cyanosis or edema  Skin: no rashes and no petechiae  Lymph Nodes: No LAD in axillae, groin, neck  Neuro: alert and oriented X 3, no focal deficits    LABS:                          9.2    11.44 )-----------( 125      ( 18 Oct 2023 06:29 )             28.7         Mean Cell Volume : 95.7 fl  Mean Cell Hemoglobin : 30.7 pg  Mean Cell Hemoglobin Concentration : 32.1 gm/dL  Auto Neutrophil # : x  Auto Lymphocyte # : x  Auto Monocyte # : x  Auto Eosinophil # : x  Auto Basophil # : x  Auto Neutrophil % : x  Auto Lymphocyte % : x  Auto Monocyte % : x  Auto Eosinophil % : x  Auto Basophil % : x      Serial CBC's  10-18 @ 06:29  Hct-28.7 / Hgb-9.2 / Plat-125 / RBC-3.00 / WBC-11.44  Serial CBC's  10-17 @ 07:21  Hct-30.0 / Hgb-9.5 / Plat-118 / RBC-3.12 / WBC-10.43  Serial CBC's  10-16 @ 06:26  Hct-30.8 / Hgb-9.9 / Plat-134 / RBC-3.25 / WBC-10.10      10-18    138  |  100  |  26<H>  ----------------------------<  221<H>  4.5   |  27  |  0.85    Ca    8.8      18 Oct 2023 06:29            Iron - Total Binding Capacity.: 251 ug/dL (10-16 @ 06:52)  Ferritin: 635 ng/mL (10-16 @ 06:52)  Vitamin B12, Serum: 609 pg/mL (10-16 @ 06:52)  Folate, Serum: 13.5 ng/mL (10-16 @ 06:52)  Reticulocyte Percent: 2.1 % (10-16 @ 06:26)          10-16 @ 06:52    ldh1 --  haptoglobin 193  CHULA--  uric acid--      Radiology:

## 2023-10-20 LAB
CULTURE RESULTS: SIGNIFICANT CHANGE UP
SPECIMEN SOURCE: SIGNIFICANT CHANGE UP

## 2023-10-20 PROCEDURE — 99233 SBSQ HOSP IP/OBS HIGH 50: CPT | Mod: GC

## 2023-10-20 RX ORDER — SODIUM CHLORIDE 9 MG/ML
400 INJECTION INTRAMUSCULAR; INTRAVENOUS; SUBCUTANEOUS
Refills: 0 | Status: DISCONTINUED | OUTPATIENT
Start: 2023-10-20 | End: 2023-10-21

## 2023-10-20 RX ORDER — FUROSEMIDE 40 MG
20 TABLET ORAL DAILY
Refills: 0 | Status: DISCONTINUED | OUTPATIENT
Start: 2023-10-21 | End: 2023-10-21

## 2023-10-20 RX ADMIN — SODIUM CHLORIDE 50 MILLILITER(S): 9 INJECTION INTRAMUSCULAR; INTRAVENOUS; SUBCUTANEOUS at 12:10

## 2023-10-20 RX ADMIN — SIMVASTATIN 20 MILLIGRAM(S): 20 TABLET, FILM COATED ORAL at 21:58

## 2023-10-20 RX ADMIN — TRAMADOL HYDROCHLORIDE 25 MILLIGRAM(S): 50 TABLET ORAL at 08:13

## 2023-10-20 RX ADMIN — ESCITALOPRAM OXALATE 10 MILLIGRAM(S): 10 TABLET, FILM COATED ORAL at 11:57

## 2023-10-20 RX ADMIN — CHLORHEXIDINE GLUCONATE 1 APPLICATION(S): 213 SOLUTION TOPICAL at 05:39

## 2023-10-20 RX ADMIN — ALBUTEROL 2 PUFF(S): 90 AEROSOL, METERED ORAL at 14:11

## 2023-10-20 RX ADMIN — ALBUTEROL 2 PUFF(S): 90 AEROSOL, METERED ORAL at 05:39

## 2023-10-20 RX ADMIN — TRAMADOL HYDROCHLORIDE 25 MILLIGRAM(S): 50 TABLET ORAL at 09:00

## 2023-10-20 RX ADMIN — HEPARIN SODIUM 5000 UNIT(S): 5000 INJECTION INTRAVENOUS; SUBCUTANEOUS at 05:39

## 2023-10-20 RX ADMIN — BUPROPION HYDROCHLORIDE 150 MILLIGRAM(S): 150 TABLET, EXTENDED RELEASE ORAL at 11:57

## 2023-10-20 RX ADMIN — Medication 30 MILLIGRAM(S): at 05:38

## 2023-10-20 RX ADMIN — Medication 1 TABLET(S): at 11:57

## 2023-10-20 RX ADMIN — MIRTAZAPINE 7.5 MILLIGRAM(S): 45 TABLET, ORALLY DISINTEGRATING ORAL at 21:58

## 2023-10-20 RX ADMIN — Medication 40 MILLIGRAM(S): at 05:37

## 2023-10-20 RX ADMIN — EXEMESTANE 25 MILLIGRAM(S): 25 TABLET, SUGAR COATED ORAL at 11:57

## 2023-10-20 RX ADMIN — HEPARIN SODIUM 5000 UNIT(S): 5000 INJECTION INTRAVENOUS; SUBCUTANEOUS at 17:31

## 2023-10-20 RX ADMIN — TAMSULOSIN HYDROCHLORIDE 0.4 MILLIGRAM(S): 0.4 CAPSULE ORAL at 21:58

## 2023-10-20 RX ADMIN — ALBUTEROL 2 PUFF(S): 90 AEROSOL, METERED ORAL at 21:58

## 2023-10-20 RX ADMIN — POLYETHYLENE GLYCOL 3350 17 GRAM(S): 17 POWDER, FOR SOLUTION ORAL at 11:57

## 2023-10-20 RX ADMIN — PANTOPRAZOLE SODIUM 40 MILLIGRAM(S): 20 TABLET, DELAYED RELEASE ORAL at 05:37

## 2023-10-20 NOTE — PROGRESS NOTE ADULT - ASSESSMENT
89F HLD and breast cancer on prior Ibrance, previous admission for pneumonitis now readmitted s/p fall with dyspnea    #Breast cancer  - recurrent; follows with Dr. Batista  - was on ibrance and exemestane since 2021 with stable disease; last PET scan 1/2023  - Ibrance held since prior admission; continue exemestane (d/w Pharmacy)    #hypoxia, pneumonitis from Ibrance  - admitted with hypoxia in 8/2023, no evidence of PE, no improvement with diuresis and discharged on steroids for presumed pneumonitis from ibrance  - repeat CTA 10/14- no PE, edema vs fibrotic change  - echo with severe PHTN  - diuresis per cardiology; steroids per pulmonary    #anemia, normocytic- chronic  - iron studies with AOCD  - B12/folate stable  - no hemolysis  - Hg overall stable    #s/p fall, back pain  - CT with DJD, disc herniations, chronic compression fracture    DC planning to Rehab. no plan for chemo while at rehab. will FU with Dr. Batista post rehab

## 2023-10-20 NOTE — PROGRESS NOTE ADULT - ATTENDING COMMENTS
Attending Attestation:    Patient seen and examined with resident/fellow.  Agree with above except as noted.     88 yo female with h/o breast cancer s/p Ibrance treatment and subsequent pneumonitis now admitted from home after fall. No syncope.  Pt has been on steroids  for pneumonitis. Pt was in rehab until about 10 days ago. Only wore 02 for the first few days at rehab. Pt notice at home becoming more SOB on any type of exertion. She also has been c/o recent leg swelling L> R. Ultrasound of legs no DVT. CTA chest no  PE. Marked improvement of pneumonitis from CT chest of August. Minimal peripheral reticulations.  TTE with PA systolic pressures estimated ~60. Increase from 40   2 months ago.        A/P  88 yo female with improving Ibrance induced pneumonitis with acute hypoxemic respiratory failure due to r heart failure.  1. R heart failure etiology ?? prolonged hypoxemia. Pt says she has not been using 02 for a few weeks. Pt is improving with Lasix. continue Lasix.     Legs with minimal edema. Consider decreasing Lasix today.  2. Pneumonitis: decrease steroids to  Solumedrol 30 mg daily. Decrease to 20mg tomorrow.   3. .Off antibiotics..  4. CT chest with minimal reticulations either represents the last of the pneumonitis or perhaps mild pulmonary edema.  5. Pt's biggest complaint right now still  is back pain hip pain when she sits in a chair.
Attending Attestation:    Patient seen and examined with resident/fellow.  Agree with above except as noted.     88 yo female with h/o breast cancer s/p Ibrance treatment and subsequent pneumonitis now admitted from home after fall. No syncope.  Pt has been on steroids  for pneumonitis. Pt was in rehab until about 10 days ago. Only wore 02 for the first few days at rehab. Pt notice at home becoming more SOB on any type of exertion. She also has been c/o recent leg swelling L> R. Ultrasound of legs no DVT. CTA chest no  PE. Marked improvement of pneumonitis from CT chest of August. Minimal peripheral reticulations.  TTE with PA systolic pressures estimated ~60. Increase from 40   2 months ago.        A/P  88 yo female with improving Ibrance induced pneumonitis with acute hypoxemic respiratory failure due to r heart failure.  1. R heart failure etiology ?? prolonged hypoxemia. Pt says she has not been using 02 for a few weeks. Pt is improving with Lasix. continue Lasix.     Legs with minimal edema. Consider decreasing Lasix to 20 mg daily tomorrow.  2. Pneumonitis: decrease steroids to  Solumedrol 30 mg daily. Decrease to 20mg tomorrow. Pt now on room air.   3. .Off antibiotics..  4. CT chest with minimal reticulations either represents the last of the pneumonitis or perhaps mild pulmonary edema.  5. Pt's biggest complaint right now still  is back pain hip pain when she sits in a chair.
Attending Attestation:    Patient seen and examined with resident/fellow.  Agree with above except as noted.     88 yo female with h/o breast cancer s/p Ibrance treatment and subsequent pneumonitis now admitted from home after fall. No syncope.  Pt has been on steroids  for pneumonitis. Pt was in rehab until about 10 days ago. Only wore 02 for the first few days at rehab. Pt notice at home becoming more SOB on any type of exertion. She also has been c/o recent leg swelling L> R. Ultrasound of legs no DVT. CTA chest no  PE. Marked improvement of pneumonitis from CT chest of August. Minimal peripheral reticulations.  TTE with PA systolic pressures estimated ~60. Increase from 40   2 months ago.        A/P  88 yo female with improving Ibrance induced pneumonitis with acute hypoxemic respiratory failure due to r heart failure.  1. R heart failure etiology ?? prolonged hypoxemia. Pt says she has not been using 02 for a few weeks. Pt is improving with Lasix. continue Lasix.     Monitor leg swelling  decreasing. Serum bicarbonate stable.  2. Pneumonitis: decrease steroids to  Solumedrol 20 mg BID. Will likely taper to 30 mg in next 24-48 hrs. Pt says breathing is better.  3. Continue antibiotics for 3 days. Low suspicion for infection. If continues to improve with diuresis, stop all antibiotics.  4. CT chest with minimal reticulations either represents the last of the pneumonitis or perhaps mild pulmonary edema.  5. Pt's biggest complaint right now is back pain hip pain when she sits in a chair.
Attending Attestation:    Patient seen and examined with resident/fellow.  Agree with above except as noted.     88 yo female with h/o breast cancer s/p Ibrance treatment and subsequent pneumonitis now admitted from home after fall. No syncope.  Pt has been on steroids  for pneumonitis. Pt was in rehab until about 10 days ago. Only wore 02 for the first few days at rehab. Pt notice at home becoming more SOB on any type of exertion. She also has been c/o recent leg swelling L> R. Ultrasound of legs no DVT. CTA chest no  PE. Marked improvement of pneumonitis from CT chest of August. Minimal peripheral reticulations.  TTE with PA systolic pressures estimated ~60. Increase from 40   2 months ago.        A/P  88 yo female with improving Ibrance induced pneumonitis with acute hypoxemic respiratory failure due to r heart failure.  1. R heart failure etiology ?? prolonged hypoxemia. Pt says she has not been using 02 for a few weeks. Pt is improving with Lasix. continue Lasix.     Monitor leg swelling  decreasing. Serum bicarbonate stable.  2. Pneumonitis: decrease steroids to  Solumedrol 30 mg daily.  3. Continue antibiotics for 3 days. Low suspicion for infection. If continues to improve with diuresis, stop all antibiotics.  4. CT chest with minimal reticulations either represents the last of the pneumonitis or perhaps mild pulmonary edema.  5. Pt's biggest complaint right now still  is back pain hip pain when she sits in a chair.

## 2023-10-20 NOTE — PROGRESS NOTE ADULT - SUBJECTIVE AND OBJECTIVE BOX
PULMONARY SERVICE FOLLOW UP CONSULT NOTE    SUBJECTIVE:  No acute complaints.     REVIEW OF SYSTEMS:  All additional ROS negative.    MEDICATIONS:  Pulmonary:  albuterol    90 MICROgram(s) HFA Inhaler 2 Puff(s) Inhalation every 8 hours    Antimicrobials:  trimethoprim  160 mG/sulfamethoxazole 800 mG 1 Tablet(s) Oral <User Schedule>    Anticoagulants:  heparin   Injectable 5000 Unit(s) SubCutaneous every 12 hours    Onc:  exemestane 25 milliGRAM(s) Oral daily    GI/:  bisacodyl 5 milliGRAM(s) Oral every 12 hours PRN  pantoprazole    Tablet 40 milliGRAM(s) Oral before breakfast  polyethylene glycol 3350 17 Gram(s) Oral daily  senna 2 Tablet(s) Oral at bedtime  tamsulosin 0.4 milliGRAM(s) Oral at bedtime    Endocrine:  simvastatin 20 milliGRAM(s) Oral at bedtime    Cardiac:    Other Medications:  ALPRAZolam 0.25 milliGRAM(s) Oral two times a day PRN  buPROPion XL (24-Hour) . 150 milliGRAM(s) Oral daily  chlorhexidine 2% Cloths 1 Application(s) Topical <User Schedule>  escitalopram 10 milliGRAM(s) Oral daily  influenza  Vaccine (HIGH DOSE) 0.7 milliLiter(s) IntraMuscular once  mirtazapine 7.5 milliGRAM(s) Oral at bedtime  sodium chloride 0.9%. 400 milliLiter(s) IV Continuous <Continuous>  traMADol 25 milliGRAM(s) Oral two times a day PRN      PHYSICAL EXAM  Vital Signs Last 24 Hrs  T(C): 36.5 (20 Oct 2023 11:45), Max: 36.8 (19 Oct 2023 20:10)  T(F): 97.7 (20 Oct 2023 11:45), Max: 98.2 (19 Oct 2023 20:10)  HR: 98 (20 Oct 2023 11:45) (79 - 101)  BP: 133/76 (20 Oct 2023 11:45) (112/67 - 133/76)  BP(mean): --  RR: 18 (20 Oct 2023 04:19) (18 - 18)  SpO2: 99% (20 Oct 2023 11:45) (95% - 99%)    Parameters below as of 20 Oct 2023 11:45  Patient On (Oxygen Delivery Method): nasal cannula  O2 Flow (L/min): 2.5      10-19 @ 07:01  -  10-20 @ 07:00  --------------------------------------------------------  IN: 520 mL / OUT: 1800 mL / NET: -1280 mL    10-20 @ 07:01  -  10-20 @ 12:16  --------------------------------------------------------  IN: 520 mL / OUT: 0 mL / NET: 520 mL            CONSTITUTIONAL: No acute distress.   HEENT:  Conjunctiva clear B/L.  Moist oral mucosa.   Cardiovascular: RRR with no murmurs. Trace lower extremity edema B/L. Extremities are warm and well perfused.    Respiratory: Lungs CTAB. No wrr. No accessory muscle use.   Gastrointestinal:  Soft, nontender. Non-distended. Non-rigid.    Neurologic:  Alert and awake. Moving all extremities. Following commands.    Skin:  No gross rashes notes.    LABS:                                RADIOLOGY & ADDITIONAL STUDIES:

## 2023-10-20 NOTE — PROGRESS NOTE ADULT - ASSESSMENT
89-year-old female    h/o  HLD,  Ca  breast, s/p  lumpectomy, recurrence , on  chemo . prior  h/o  hip  surg     admitted  with  worsening  sob.  and  also  had  a  fall .  recently  d/c  from rehab facility /  pt  had a  similar  admission  for  sob, in 8/23      *  sob.  from  ?  chf/  acute  copd  exacerbation/ ?  pneumonitis        afberile,  wbc  17,000  on  arrival,    on  iv  solumedrol,  Proventil. and  rocephin/  iv lasix          CT  chest   angio,  no  pe,  centrilobular  emphysema, reticular  markings, /  chf vs  early  fibrosis.  compression T 7. old           CT   spine,  doffuse  deg  dosease,  with  c/c  T7  fx    h/o  Ca breast. onc  dr garcia /dr crews        was  on Ibrance  and exemestane since 2021 with stable disease;  PET scan 1/2023        and, Ibrance held, as  Ibrance can cause ILD/pneumonitis/  pt  was  supposed  to  f/p  with  pulm dr lisker     Echo   8/23, with normal LV.  pulm  htn mild, was  38  mm  hg  /  no  shun     ct a/p .  stool. on laxatives    Depression/anxiety, on xanax.  Wellbutrin   Remeron  ,  on dvt  ppx /  fall  risk    Echo  now,  normal  ef.  severe  pHTN ,  { this  is   new ,  from   echo  of  8/23 } normal  RV  function        seen  by  house  pulm /  better  with  lasix.  iv steroid  lowered  to  bid      uti/ Klebsiella  /  awiating sensitivity ,  on iv Rocephin      tov  was   seen  by  urology    has   c/c  back pain. h/o comp  fx ,  daughter   requesting spine  eval/  discussed  with  team      hyperglycemia  caryn can/   d/c  plans,  after  seen  by caryn GUIDRY< from: TTE W or WO Ultrasound Enhancing Agent (10.15.23 @ 13:37) >  ONCLUSIONS:   1. Left ventricular systolic function is hyperdynamic with an ejection fraction of 75 % by Ross's method of disks.   2. Mildly enlarged right ventricular cavity size, wall thickness, and systolic function.   3. Compared to the transthoracic echocardiogram performed on 8/24/2023 Estimated PASP and TR has increased.  4. Estimated pulmonary artery systolic pressure is 61 mmHg, consistent with severe pulmonary hypertension.   5. Hyperdynamic left ventricular systolic function with intra-cavitary gradient of 37 mmHg.   6. Technically difficult image quality.   7. The inferior vena cava is normal in size measuring 1.70 cm in diameter, (normal <2.1cm) with abnormal inspiratory collapse (abnormal <50%) consistent with mildly elevated right atrial pressure (~8, range 5-10mmHg).   8. There is normal LV mass and concentric remodeling.  _______________________________________________________________________________________:    < end of copied text >     89-year-old female    h/o  HLD,  Ca  breast, s/p  lumpectomy, recurrence , on  chemo . prior  h/o  hip  surg     admitted  with  worsening  sob.  and  also  had  a  fall .  recently  d/c  from rehab facility /  pt  had a  similar  admission  for  sob, in 8/23      *  sob.  from  ?  chf/  acute  copd  exacerbation/ ?  pneumonitis        afberile,  wbc  17,000  on  arrival,    on  iv  solumedrol,  Proventil. and  rocephin/  iv lasix          CT  chest   angio,  no  pe,  centrilobular  emphysema, reticular  markings, /  chf vs  early  fibrosis.  compression T 7. old           CT   spine,  doffuse  deg  dosease,  with  c/c  T7  fx    h/o  Ca breast. onc  dr garcia /dr crews        was  on Ibrance  and exemestane since 2021 with stable disease;  PET scan 1/2023        and, Ibrance held, as  Ibrance can cause ILD/pneumonitis/  pt  was  supposed  to  f/p  with  pulm dr lisker     Echo   8/23, with normal LV.  pulm  htn mild, was  38  mm  hg  /  no  shun     ct a/p .  stool. on laxatives    Depression/anxiety, on xanax.  Wellbutrin   Remeron  ,  on dvt  ppx /  fall  risk    Echo  now,  normal  ef.  severe  pHTN ,  { this  is   new ,  from   echo  of  8/23 } normal  RV  function        seen  by  house  pulm /  better  with  lasix.  iv steroid  lowered  to  bid      uti/ Klebsiella  /  awiating sensitivity ,  on iv Rocephin      tov  was   seen  by  urology    has   c/c  back pain. h/o comp  fx ,  daughter   requesting spine  eval/  discussed  with  team      hyperglycemia  caryn can/   d/c  plans,  after  seen  by endo    is orthostatics  now. lasix  lowered      CHAO< from: TTE W or WO Ultrasound Enhancing Agent (10.15.23 @ 13:37) >  ONCLUSIONS:   1. Left ventricular systolic function is hyperdynamic with an ejection fraction of 75 % by Ross's method of disks.   2. Mildly enlarged right ventricular cavity size, wall thickness, and systolic function.   3. Compared to the transthoracic echocardiogram performed on 8/24/2023 Estimated PASP and TR has increased.  4. Estimated pulmonary artery systolic pressure is 61 mmHg, consistent with severe pulmonary hypertension.   5. Hyperdynamic left ventricular systolic function with intra-cavitary gradient of 37 mmHg.   6. Technically difficult image quality.   7. The inferior vena cava is normal in size measuring 1.70 cm in diameter, (normal <2.1cm) with abnormal inspiratory collapse (abnormal <50%) consistent with mildly elevated right atrial pressure (~8, range 5-10mmHg).   8. There is normal LV mass and concentric remodeling.  _______________________________________________________________________________________:    < end of copied text >

## 2023-10-20 NOTE — PROGRESS NOTE ADULT - SUBJECTIVE AND OBJECTIVE BOX
Date of Service: 10-20-23 @ 09:35           CARDIOLOGY     PROGRESS  NOTE   ________________________________________________    CHIEF COMPLAINT:Patient is a 89y old  Female who presents with a chief complaint of SOB (19 Oct 2023 16:44)  no complain  	  REVIEW OF SYSTEMS:  CONSTITUTIONAL: No fever, weight loss, or fatigue  EYES: No eye pain, visual disturbances, or discharge  ENT:  No difficulty hearing, tinnitus, vertigo; No sinus or throat pain  NECK: No pain or stiffness  RESPIRATORY: No cough, wheezing, chills or hemoptysis; No Shortness of Breath  CARDIOVASCULAR: No chest pain, palpitations, passing out, dizziness, or leg swelling  GASTROINTESTINAL: No abdominal or epigastric pain. No nausea, vomiting, or hematemesis; No diarrhea or constipation. No melena or hematochezia.  GENITOURINARY: No dysuria, frequency, hematuria, or incontinence  NEUROLOGICAL: No headaches, memory loss, loss of strength, numbness, or tremors  SKIN: No itching, burning, rashes, or lesions   LYMPH Nodes: No enlarged glands  ENDOCRINE: No heat or cold intolerance; No hair loss  MUSCULOSKELETAL: No joint pain or swelling; No muscle, back, or extremity pain, + back pain  PSYCHIATRIC: No depression, anxiety, mood swings, or difficulty sleeping  HEME/LYMPH: No easy bruising, or bleeding gums  ALLERGY AND IMMUNOLOGIC: No hives or eczema	    x[ ] All others negative	  [ ] Unable to obtain    PHYSICAL EXAM:  T(C): 36.8 (10-20-23 @ 04:19), Max: 36.8 (10-19-23 @ 20:10)  HR: 79 (10-20-23 @ 04:19) (79 - 101)  BP: 113/72 (10-20-23 @ 04:19) (112/67 - 128/76)  RR: 18 (10-20-23 @ 04:19) (18 - 18)  SpO2: 96% (10-20-23 @ 04:19) (93% - 96%)  Wt(kg): --  I&O's Summary    19 Oct 2023 07:01  -  20 Oct 2023 07:00  --------------------------------------------------------  IN: 520 mL / OUT: 1800 mL / NET: -1280 mL    20 Oct 2023 07:01  -  20 Oct 2023 09:35  --------------------------------------------------------  IN: 240 mL / OUT: 0 mL / NET: 240 mL        Appearance: Normal	  HEENT:   Normal oral mucosa, PERRL, EOMI	  Lymphatic: No lymphadenopathy  Cardiovascular: Normal S1 S2, No JVD, + murmurs, No edema  Respiratory: Lungs clear to auscultation	  Psychiatry: A & O x 3, Mood & affect appropriate  Gastrointestinal:  Soft, Non-tender, + BS	  Skin: No rashes, No ecchymoses, No cyanosis	  Neurologic: Non-focal  Extremities: Normal range of motion, No clubbing, cyanosis or edema  Vascular: Peripheral pulses palpable 2+ bilaterally    MEDICATIONS  (STANDING):  albuterol    90 MICROgram(s) HFA Inhaler 2 Puff(s) Inhalation every 8 hours  buPROPion XL (24-Hour) . 150 milliGRAM(s) Oral daily  chlorhexidine 2% Cloths 1 Application(s) Topical <User Schedule>  escitalopram 10 milliGRAM(s) Oral daily  exemestane 25 milliGRAM(s) Oral daily  furosemide    Tablet 40 milliGRAM(s) Oral daily  heparin   Injectable 5000 Unit(s) SubCutaneous every 12 hours  influenza  Vaccine (HIGH DOSE) 0.7 milliLiter(s) IntraMuscular once  mirtazapine 7.5 milliGRAM(s) Oral at bedtime  pantoprazole    Tablet 40 milliGRAM(s) Oral before breakfast  polyethylene glycol 3350 17 Gram(s) Oral daily  senna 2 Tablet(s) Oral at bedtime  simvastatin 20 milliGRAM(s) Oral at bedtime  tamsulosin 0.4 milliGRAM(s) Oral at bedtime  trimethoprim  160 mG/sulfamethoxazole 800 mG 1 Tablet(s) Oral <User Schedule>      TELEMETRY: 	    ECG:  	  RADIOLOGY:  OTHER: 	  	  LABS:	 	    CARDIAC MARKERS:        proBNP:   Lipid Profile:   HgA1c:   TSH:     -WBAT  -if pt desires, can order a TLSO for comfort from orthotist Sanju Dick/[facility] ([phone])  -pain control  -incentive spirometry  -PT/OT  -no acute ortho spine surgery at this time  -f/u outpt with Dr. Lucio in 1 week following discharge, please call office for appt      -Patient with rapid improvement while on diuretics. Monitor uptrending BUN.   -Change Prednisone to 20 mg po daily.   -CW Bactrim DS po MWF for PCP prophylaxis outpatient. Will need to remain on bactrim till prednisone can safely be lowered.   -S/p 3 days of CTX. Monitor off further abx.  -Out of bed to chair.  -Incentive spirometer.    Assessment and plan  ---------------------------  89-year-old female recently discharged from rehab facility chief complaint status post fall in the setting of shortness of breath acute increasing over the past day.  Patient denies any current chest pain.  Patient otherwise otherwise history of breast cancer hyperlipidemia  sob ?sec to ?pneumoniae, copd exacerbation  ?chf , most probably copd/emphysema  hfpef acute on chronic  abx  steroid taper  continue all meds for anxiety  tsh  dvt prophylaxis  TTE noted, severe pulmonary htn, continue steroid /inhalers/ CTA neg for pe  change lasix to 40 mg daily  ?requiring home o2  increase ambulation  discussed with pulmonary continue diuresis, will plan for R heart cath only if will  as per pulmonary  increase ambulation as tolerated  dc planing as per pulmonary/ home o2  will decrease prednisone to 20 mg daily

## 2023-10-20 NOTE — PROGRESS NOTE ADULT - SUBJECTIVE AND OBJECTIVE BOX
Chief Complaint:  FU    History of Present Illness: feels breathing stable/improved, no f/c, no cp, no n/v/abd pain, persistent pain on L; tolerating diet, no bleeding      MEDICATIONS  (STANDING):  albuterol    90 MICROgram(s) HFA Inhaler 2 Puff(s) Inhalation every 8 hours  buPROPion XL (24-Hour) . 150 milliGRAM(s) Oral daily  chlorhexidine 2% Cloths 1 Application(s) Topical <User Schedule>  escitalopram 10 milliGRAM(s) Oral daily  exemestane 25 milliGRAM(s) Oral daily  heparin   Injectable 5000 Unit(s) SubCutaneous every 12 hours  influenza  Vaccine (HIGH DOSE) 0.7 milliLiter(s) IntraMuscular once  mirtazapine 7.5 milliGRAM(s) Oral at bedtime  pantoprazole    Tablet 40 milliGRAM(s) Oral before breakfast  polyethylene glycol 3350 17 Gram(s) Oral daily  senna 2 Tablet(s) Oral at bedtime  simvastatin 20 milliGRAM(s) Oral at bedtime  sodium chloride 0.9%. 400 milliLiter(s) (50 mL/Hr) IV Continuous <Continuous>  tamsulosin 0.4 milliGRAM(s) Oral at bedtime  trimethoprim  160 mG/sulfamethoxazole 800 mG 1 Tablet(s) Oral <User Schedule>    MEDICATIONS  (PRN):  ALPRAZolam 0.25 milliGRAM(s) Oral two times a day PRN anxiety  bisacodyl 5 milliGRAM(s) Oral every 12 hours PRN Constipation  traMADol 25 milliGRAM(s) Oral two times a day PRN Moderate Pain (4 - 6)      Allergies    penicillin (Hives)    Intolerances        Vital Signs Last 24 Hrs  T(C): 36.5 (20 Oct 2023 11:45), Max: 36.8 (19 Oct 2023 20:10)  T(F): 97.7 (20 Oct 2023 11:45), Max: 98.2 (19 Oct 2023 20:10)  HR: 98 (20 Oct 2023 11:45) (79 - 101)  BP: 133/76 (20 Oct 2023 11:45) (112/67 - 133/76)  BP(mean): --  RR: 18 (20 Oct 2023 04:19) (18 - 18)  SpO2: 99% (20 Oct 2023 11:45) (95% - 99%)    Parameters below as of 20 Oct 2023 11:45  Patient On (Oxygen Delivery Method): nasal cannula  O2 Flow (L/min): 2.5      PHYSICAL EXAM  General: adult in NAD  HEENT: clear oropharynx, anicteric sclera, pink conjunctiva  Neck: supple  CV: normal S1/S2  Lungs: clear to auscultation, no wheezes, no rales  Abdomen: soft non-tender non-distended positive bowel sounds  Ext: no clubbing cyanosis or edema  Skin: no rashes and no petechiae  Lymph Nodes: No LAD neck  Neuro: alert and oriented X 3, no focal deficits    LABS:            Serial CBC's  10-18 @ 06:29  Hct-28.7 / Hgb-9.2 / Plat-125 / RBC-3.00 / WBC-11.44  Serial CBC's  10-17 @ 07:21  Hct-30.0 / Hgb-9.5 / Plat-118 / RBC-3.12 / WBC-10.43                  Iron - Total Binding Capacity.: 251 ug/dL (10-16 @ 06:52)  Ferritin: 635 ng/mL (10-16 @ 06:52)  Vitamin B12, Serum: 609 pg/mL (10-16 @ 06:52)  Folate, Serum: 13.5 ng/mL (10-16 @ 06:52)  Reticulocyte Percent: 2.1 % (10-16 @ 06:26)              Radiology:

## 2023-10-20 NOTE — PROGRESS NOTE ADULT - ASSESSMENT
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 Patient ultimately discharged to rehab and ultimately home. Patient with a mechanical fall with prompted admission to the hospital and later found to have hypoxemia again. CT imaging with mild emphysema and faint peripheral reticulations. However imaging markedly improved from prior CT imaging (8/2023) which demonstrated diffuse ground glass. Reports dyspnea with exertion and new lower extremity edema. No fever or chills.     #Hypoxemia  #Mild emphysema  #Peripheral reticulations  #Volume overload  #Pulmonary hypertension -- related to hypoxemia and increased RV afterload.    RECS  -Would reduce lasix to 20 mg po daily.  -CW Prednisone to 20 mg po daily.   -CW Bactrim DS po MWF for PCP prophylaxis outpatient. Will need to remain on bactrim till prednisone can safely be lowered.   -S/p 3 days of CTX. Monitor off further abx.  -Out of bed to chair.  -Incentive spirometer.    Patient has a virtual appointment on 10/23 @ 3:30 pm w/ Dr. Martinez.     [address] [place] [address]  [phone]    Dr. Shelton Faulkner, DO  Pulmonary and Critical Care Medicine Fellow   Available via Microsoft Teams - **Preferred**  [facility] #[id number] (NS) / [phone] ([facility])  Pager:  [phone]

## 2023-10-20 NOTE — PROGRESS NOTE ADULT - SUBJECTIVE AND OBJECTIVE BOX
date of service: 10-20-23 @ 10:16  afebrile  REVIEW OF SYSTEMS:  CONSTITUTIONAL: No fever,  no  weight loss  ENT:  No  tinnitus,   no   vertigo  NECK: No pain or stiffness  RESPIRATORY: No cough, wheezing, chills or hemoptysis;    No Shortness of Breath  CARDIOVASCULAR: No chest pain, palpitations, dizziness  GASTROINTESTINAL: No abdominal or epigastric pain. No nausea, vomiting, or hematemesis; No diarrhea  No melena or hematochezia.  GENITOURINARY: No dysuria, frequency, hematuria, or incontinence  NEUROLOGICAL: No headaches  SKIN: No itching,  no   rash  LYMPH Nodes: No enlarged glands  ENDOCRINE: No heat or cold intolerance  MUSCULOSKELETAL: No joint pain or swelling  PSYCHIATRIC: No depression, anxiety  HEME/LYMPH: No easy bruising, or bleeding gums  ALLERGY AND IMMUNOLOGIC: No hives or eczema	    MEDICATIONS  (STANDING):  albuterol    90 MICROgram(s) HFA Inhaler 2 Puff(s) Inhalation every 8 hours  buPROPion XL (24-Hour) . 150 milliGRAM(s) Oral daily  chlorhexidine 2% Cloths 1 Application(s) Topical <User Schedule>  escitalopram 10 milliGRAM(s) Oral daily  exemestane 25 milliGRAM(s) Oral daily  furosemide    Tablet 40 milliGRAM(s) Oral daily  heparin   Injectable 5000 Unit(s) SubCutaneous every 12 hours  influenza  Vaccine (HIGH DOSE) 0.7 milliLiter(s) IntraMuscular once  mirtazapine 7.5 milliGRAM(s) Oral at bedtime  pantoprazole    Tablet 40 milliGRAM(s) Oral before breakfast  polyethylene glycol 3350 17 Gram(s) Oral daily  senna 2 Tablet(s) Oral at bedtime  simvastatin 20 milliGRAM(s) Oral at bedtime  tamsulosin 0.4 milliGRAM(s) Oral at bedtime  trimethoprim  160 mG/sulfamethoxazole 800 mG 1 Tablet(s) Oral <User Schedule>    MEDICATIONS  (PRN):  ALPRAZolam 0.25 milliGRAM(s) Oral two times a day PRN anxiety  bisacodyl 5 milliGRAM(s) Oral every 12 hours PRN Constipation  traMADol 25 milliGRAM(s) Oral two times a day PRN Moderate Pain (4 - 6)      Vital Signs Last 24 Hrs  T(C): 36.8 (20 Oct 2023 04:19), Max: 36.8 (19 Oct 2023 20:10)  T(F): 98.2 (20 Oct 2023 04:19), Max: 98.2 (19 Oct 2023 20:10)  HR: 79 (20 Oct 2023 04:19) (79 - 101)  BP: 113/72 (20 Oct 2023 04:19) (112/67 - 128/76)  BP(mean): --  RR: 18 (20 Oct 2023 04:19) (18 - 18)  SpO2: 96% (20 Oct 2023 04:19) (93% - 96%)    Parameters below as of 20 Oct 2023 04:19  Patient On (Oxygen Delivery Method): room air  O2 Flow (L/min): 3    CAPILLARY BLOOD GLUCOSE        I&O's Summary    19 Oct 2023 07:01  -  20 Oct 2023 07:00  --------------------------------------------------------  IN: 520 mL / OUT: 1800 mL / NET: -1280 mL    20 Oct 2023 07:01  -  20 Oct 2023 10:16  --------------------------------------------------------  IN: 240 mL / OUT: 0 mL / NET: 240 mL          Appearance: Normal	  HEENT:   Normal oral mucosa, PERRL, EOMI	  Lymphatic: No lymphadenopathy  Cardiovascular: Normal S1 S2, No JVD  Respiratory: Lungs clear to auscultation	  Gastrointestinal:  Soft, Non-tender, + BS	  Skin: No rash, No ecchymoses	  Extremities:     LABS:                                  Consultant(s) Notes Reviewed:      Care Discussed with Consultants/Other Providers:

## 2023-10-21 ENCOUNTER — TRANSCRIPTION ENCOUNTER (OUTPATIENT)
Age: 88
End: 2023-10-21

## 2023-10-21 VITALS
OXYGEN SATURATION: 95 % | TEMPERATURE: 98 F | SYSTOLIC BLOOD PRESSURE: 125 MMHG | HEART RATE: 92 BPM | RESPIRATION RATE: 18 BRPM | DIASTOLIC BLOOD PRESSURE: 72 MMHG

## 2023-10-21 LAB
ANION GAP SERPL CALC-SCNC: 13 MMOL/L — SIGNIFICANT CHANGE UP (ref 5–17)
ANION GAP SERPL CALC-SCNC: 13 MMOL/L — SIGNIFICANT CHANGE UP (ref 5–17)
BUN SERPL-MCNC: 30 MG/DL — HIGH (ref 7–23)
BUN SERPL-MCNC: 30 MG/DL — HIGH (ref 7–23)
CALCIUM SERPL-MCNC: 8.5 MG/DL — SIGNIFICANT CHANGE UP (ref 8.4–10.5)
CALCIUM SERPL-MCNC: 8.5 MG/DL — SIGNIFICANT CHANGE UP (ref 8.4–10.5)
CHLORIDE SERPL-SCNC: 99 MMOL/L — SIGNIFICANT CHANGE UP (ref 96–108)
CHLORIDE SERPL-SCNC: 99 MMOL/L — SIGNIFICANT CHANGE UP (ref 96–108)
CO2 SERPL-SCNC: 27 MMOL/L — SIGNIFICANT CHANGE UP (ref 22–31)
CO2 SERPL-SCNC: 27 MMOL/L — SIGNIFICANT CHANGE UP (ref 22–31)
CREAT SERPL-MCNC: 0.98 MG/DL — SIGNIFICANT CHANGE UP (ref 0.5–1.3)
CREAT SERPL-MCNC: 0.98 MG/DL — SIGNIFICANT CHANGE UP (ref 0.5–1.3)
EGFR: 55 ML/MIN/1.73M2 — LOW
EGFR: 55 ML/MIN/1.73M2 — LOW
GLUCOSE SERPL-MCNC: 119 MG/DL — HIGH (ref 70–99)
GLUCOSE SERPL-MCNC: 119 MG/DL — HIGH (ref 70–99)
HCT VFR BLD CALC: 34.5 % — SIGNIFICANT CHANGE UP (ref 34.5–45)
HCT VFR BLD CALC: 34.5 % — SIGNIFICANT CHANGE UP (ref 34.5–45)
HGB BLD-MCNC: 10.9 G/DL — LOW (ref 11.5–15.5)
HGB BLD-MCNC: 10.9 G/DL — LOW (ref 11.5–15.5)
MCHC RBC-ENTMCNC: 29.9 PG — SIGNIFICANT CHANGE UP (ref 27–34)
MCHC RBC-ENTMCNC: 29.9 PG — SIGNIFICANT CHANGE UP (ref 27–34)
MCHC RBC-ENTMCNC: 31.6 GM/DL — LOW (ref 32–36)
MCHC RBC-ENTMCNC: 31.6 GM/DL — LOW (ref 32–36)
MCV RBC AUTO: 94.8 FL — SIGNIFICANT CHANGE UP (ref 80–100)
MCV RBC AUTO: 94.8 FL — SIGNIFICANT CHANGE UP (ref 80–100)
NRBC # BLD: 0 /100 WBCS — SIGNIFICANT CHANGE UP (ref 0–0)
NRBC # BLD: 0 /100 WBCS — SIGNIFICANT CHANGE UP (ref 0–0)
PLATELET # BLD AUTO: 167 K/UL — SIGNIFICANT CHANGE UP (ref 150–400)
PLATELET # BLD AUTO: 167 K/UL — SIGNIFICANT CHANGE UP (ref 150–400)
POTASSIUM SERPL-MCNC: 4.2 MMOL/L — SIGNIFICANT CHANGE UP (ref 3.5–5.3)
POTASSIUM SERPL-MCNC: 4.2 MMOL/L — SIGNIFICANT CHANGE UP (ref 3.5–5.3)
POTASSIUM SERPL-SCNC: 4.2 MMOL/L — SIGNIFICANT CHANGE UP (ref 3.5–5.3)
POTASSIUM SERPL-SCNC: 4.2 MMOL/L — SIGNIFICANT CHANGE UP (ref 3.5–5.3)
RBC # BLD: 3.64 M/UL — LOW (ref 3.8–5.2)
RBC # BLD: 3.64 M/UL — LOW (ref 3.8–5.2)
RBC # FLD: 15.4 % — HIGH (ref 10.3–14.5)
RBC # FLD: 15.4 % — HIGH (ref 10.3–14.5)
SODIUM SERPL-SCNC: 139 MMOL/L — SIGNIFICANT CHANGE UP (ref 135–145)
SODIUM SERPL-SCNC: 139 MMOL/L — SIGNIFICANT CHANGE UP (ref 135–145)
WBC # BLD: 10.09 K/UL — SIGNIFICANT CHANGE UP (ref 3.8–10.5)
WBC # BLD: 10.09 K/UL — SIGNIFICANT CHANGE UP (ref 3.8–10.5)
WBC # FLD AUTO: 10.09 K/UL — SIGNIFICANT CHANGE UP (ref 3.8–10.5)
WBC # FLD AUTO: 10.09 K/UL — SIGNIFICANT CHANGE UP (ref 3.8–10.5)

## 2023-10-21 RX ORDER — SENNA PLUS 8.6 MG/1
2 TABLET ORAL
Qty: 0 | Refills: 0 | DISCHARGE
Start: 2023-10-21

## 2023-10-21 RX ORDER — TRAMADOL HYDROCHLORIDE 50 MG/1
0.5 TABLET ORAL
Qty: 0 | Refills: 0 | DISCHARGE
Start: 2023-10-21

## 2023-10-21 RX ADMIN — TRAMADOL HYDROCHLORIDE 25 MILLIGRAM(S): 50 TABLET ORAL at 08:29

## 2023-10-21 RX ADMIN — TRAMADOL HYDROCHLORIDE 25 MILLIGRAM(S): 50 TABLET ORAL at 09:30

## 2023-10-21 RX ADMIN — ESCITALOPRAM OXALATE 10 MILLIGRAM(S): 10 TABLET, FILM COATED ORAL at 11:40

## 2023-10-21 RX ADMIN — HEPARIN SODIUM 5000 UNIT(S): 5000 INJECTION INTRAVENOUS; SUBCUTANEOUS at 05:18

## 2023-10-21 RX ADMIN — CHLORHEXIDINE GLUCONATE 1 APPLICATION(S): 213 SOLUTION TOPICAL at 05:16

## 2023-10-21 RX ADMIN — Medication 20 MILLIGRAM(S): at 05:18

## 2023-10-21 RX ADMIN — PANTOPRAZOLE SODIUM 40 MILLIGRAM(S): 20 TABLET, DELAYED RELEASE ORAL at 05:18

## 2023-10-21 RX ADMIN — BUPROPION HYDROCHLORIDE 150 MILLIGRAM(S): 150 TABLET, EXTENDED RELEASE ORAL at 11:40

## 2023-10-21 RX ADMIN — ALBUTEROL 2 PUFF(S): 90 AEROSOL, METERED ORAL at 05:18

## 2023-10-21 RX ADMIN — ALBUTEROL 2 PUFF(S): 90 AEROSOL, METERED ORAL at 13:14

## 2023-10-21 NOTE — DISCHARGE NOTE PROVIDER - NSDCFUADDAPPT_GEN_ALL_CORE_FT
APPTS ARE READY TO BE MADE: [ x] YES    Best Family or Patient Contact (if needed):    Additional Information about above appointments (if needed):    1:   2:   3:     Other comments or requests:    APPTS ARE READY TO BE MADE: [ x] YES    Best Family or Patient Contact (if needed):    Additional Information about above appointments (if needed):    1:   2:   3:     Other comments or requests:     Patient is being discharged to rehab. Caregiver will arrange follow up.

## 2023-10-21 NOTE — PROGRESS NOTE ADULT - SUBJECTIVE AND OBJECTIVE BOX
Date of Service: 10-21-23 @ 09:48           CARDIOLOGY     PROGRESS  NOTE   ________________________________________________    CHIEF COMPLAINT:Patient is a 89y old  Female who presents with a chief complaint of SOB (21 Oct 2023 07:07)  + orthosttaic  	  REVIEW OF SYSTEMS:  CONSTITUTIONAL: No fever, weight loss, or fatigue  EYES: No eye pain, visual disturbances, or discharge  ENT:  No difficulty hearing, tinnitus, vertigo; No sinus or throat pain  NECK: No pain or stiffness  RESPIRATORY: No cough, wheezing, chills or hemoptysis; No Shortness of Breath  CARDIOVASCULAR: No chest pain, palpitations, passing out, dizziness, or leg swelling  GASTROINTESTINAL: No abdominal or epigastric pain. No nausea, vomiting, or hematemesis; No diarrhea or constipation. No melena or hematochezia.  GENITOURINARY: No dysuria, frequency, hematuria, or incontinence  NEUROLOGICAL: No headaches, memory loss, loss of strength, numbness, or tremors  SKIN: No itching, burning, rashes, or lesions   LYMPH Nodes: No enlarged glands  ENDOCRINE: No heat or cold intolerance; No hair loss  MUSCULOSKELETAL: No joint pain or swelling; No muscle, back, or extremity pain  PSYCHIATRIC: No depression, anxiety, mood swings, or difficulty sleeping  HEME/LYMPH: No easy bruising, or bleeding gums  ALLERGY AND IMMUNOLOGIC: No hives or eczema	    [x ] All others negative	  [ ] Unable to obtain    PHYSICAL EXAM:  T(C): 36.6 (10-21-23 @ 04:27), Max: 36.6 (10-20-23 @ 20:35)  HR: 82 (10-21-23 @ 08:28) (74 - 98)  BP: 122/64 (10-21-23 @ 04:27) (122/64 - 152/73)  RR: 18 (10-21-23 @ 08:28) (18 - 18)  SpO2: 94% (10-21-23 @ 08:28) (94% - 99%)  Wt(kg): --  I&O's Summary    20 Oct 2023 07:01  -  21 Oct 2023 07:00  --------------------------------------------------------  IN: 880 mL / OUT: 600 mL / NET: 280 mL        Appearance: Normal	  HEENT:   Normal oral mucosa, PERRL, EOMI	  Lymphatic: No lymphadenopathy  Cardiovascular: Normal S1 S2, No JVD, + murmurs, No edema  Respiratory:rhonchi  Psychiatry: A & O x 3, Mood & affect appropriate  Gastrointestinal:  Soft, Non-tender, + BS	  Skin: No rashes, No ecchymoses, No cyanosis	  Neurologic: Non-focal  Extremities: Normal range of motion, No clubbing, cyanosis or edema  Vascular: Peripheral pulses palpable 2+ bilaterally    MEDICATIONS  (STANDING):  albuterol    90 MICROgram(s) HFA Inhaler 2 Puff(s) Inhalation every 8 hours  buPROPion XL (24-Hour) . 150 milliGRAM(s) Oral daily  chlorhexidine 2% Cloths 1 Application(s) Topical <User Schedule>  escitalopram 10 milliGRAM(s) Oral daily  exemestane 25 milliGRAM(s) Oral daily  furosemide    Tablet 20 milliGRAM(s) Oral daily  heparin   Injectable 5000 Unit(s) SubCutaneous every 12 hours  influenza  Vaccine (HIGH DOSE) 0.7 milliLiter(s) IntraMuscular once  mirtazapine 7.5 milliGRAM(s) Oral at bedtime  pantoprazole    Tablet 40 milliGRAM(s) Oral before breakfast  polyethylene glycol 3350 17 Gram(s) Oral daily  predniSONE   Tablet 20 milliGRAM(s) Oral daily  senna 2 Tablet(s) Oral at bedtime  simvastatin 20 milliGRAM(s) Oral at bedtime  sodium chloride 0.9%. 400 milliLiter(s) (50 mL/Hr) IV Continuous <Continuous>  tamsulosin 0.4 milliGRAM(s) Oral at bedtime  trimethoprim  160 mG/sulfamethoxazole 800 mG 1 Tablet(s) Oral <User Schedule>      TELEMETRY: 	    ECG:  	  RADIOLOGY:  OTHER: 	  	  LABS:	 	    CARDIAC MARKERS:                     10.9   10.09 )-----------( 167      ( 21 Oct 2023 06:39 )             34.5     10-21    139  |  99  |  30<H>  ----------------------------<  119<H>  4.2   |  27  |  0.98    Ca    8.5      21 Oct 2023 06:39      proBNP:   Lipid Profile:   HgA1c:   TSH:         Assessment and plan  ---------------------------  89-year-old female recently discharged from rehab facility chief complaint status post fall in the setting of shortness of breath acute increasing over the past day.  Patient denies any current chest pain.  Patient otherwise otherwise history of breast cancer hyperlipidemia  sob ?sec to ?pneumoniae, copd exacerbation  ?chf , most probably copd/emphysema  hfpef acute on chronic  abx  steroid taper  continue all meds for anxiety  tsh  dvt prophylaxis  TTE noted, severe pulmonary htn, continue steroid /inhalers/ CTA neg for pe  change lasix to 40 mg daily  ?requiring home o2  increase ambulation  discussed with pulmonary continue diuresis, will plan for R heart cath only if will  as per pulmonary  increase ambulation as tolerated  dc planing as per pulmonary/ home o2  will decrease prednisone to 20 mg daily  orthostatic hypotension sec to diuresis, much better now, decrease Lasix to 20 mg daily  danisha stocking

## 2023-10-21 NOTE — DISCHARGE NOTE PROVIDER - HOSPITAL COURSE
Patient is a 89-year-old female recently discharged from rehab facility chief complaint status post fall in the setting of shortness of breath acute increasing over the past day.  Patient denies any current chest pain.  Patient otherwise history of breast cancer hyperlipidemia.    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 Patient ultimately discharged to rehab and ultimately home. Patient with a mechanical fall with prompted admission to the hospital and later found to have hypoxemia again.    Hospital course: Patient presented to ED status post fall with c/o increased shortness of breath found to be hypoxic.  CT imaging with mild emphysema and faint peripheral reticulations. However imaging markedly improved from prior CT imaging (8/2023)which demonstrated diffuse ground glass. ->Change Prednisone to 20 mg po daily.   -CW Bactrim DS po M,W,F for PCP prophylaxis outpatient. Will need to remain on bactrim till prednisone can safely be lowered. S/p 3 days of CTX. Currently Monitor off further abx. Patient noted to have VCFs w/o significant bony retropulsion. Orthopedics evaluated patient, no surgical intervention, TLSO brace and Physical therapy. TTE noted, showing  severe pulmonary htn, continue steroid /inhalers/ CTA neg for pe.     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 Patient ultimately discharged to rehab and ultimately home. Patient with a mechanical fall with prompted admission to the hospital and later found to have hypoxemia again.    Hospital course: Patient presented to ED status post fall with c/o increased shortness of breath found to be hypoxic.  CT imaging with mild emphysema and faint peripheral reticulations. However imaging markedly improved from prior CT imaging (8/2023)which demonstrated diffuse ground glass. ->Change Prednisone to 20 mg po daily.   -CW Bactrim DS po M,W,F for PCP prophylaxis outpatient. Will need to remain on bactrim till prednisone can safely be lowered. S/p 3 days of CTX. Currently Monitor off further abx. Patient noted to have VCFs w/o significant bony retropulsion. Orthopedics evaluated patient, no surgical intervention, TLSO brace and Physical therapy. TTE noted, showing  severe pulmonary htn, continue steroid /inhalers/ CTA neg for pe.     Important Medication Changes:  Prednisone 20 mg daily to be tapered by outpatient provider  Active or Pending issues Requiring Follow-up:    Advanced Directives    [ x] Full code  [ ] DNR  [ ]  Hospice     Discharge Diagnosis:  Dyspnea on exertion   Hypertension   HLD  Breast Cancer  Back Pain

## 2023-10-21 NOTE — DISCHARGE NOTE PROVIDER - CARE PROVIDER_API CALL
Ray Dallas J  Internal Medicine  [address] [place] [address]  Phone: [phone]  Fax: [phone]  Follow Up Time: 1 week    Blue Lucio  Orthopaedic Surgery  [address], [address]  [place] [address]  Phone: [phone]  Fax: [phone]  Follow Up Time: 1 week    Juvencio Batista  Medical Oncology  [address] [place] [address], [address]  [place] [address]  Phone: [phone]  Fax: [phone]  Follow Up Time: 1 week

## 2023-10-21 NOTE — DISCHARGE NOTE PROVIDER - NSDCCPCAREPLAN_GEN_ALL_CORE_FT
PRINCIPAL DISCHARGE DIAGNOSIS  Diagnosis: Acute respiratory failure with hypoxia  Assessment and Plan of Treatment: You presented to the ED with complaints of increased shortness of breath. Please continue all medicaitons as prescribed. Please follow-up with Pulmonary in 1 week of discharge, as well as cardiology.      SECONDARY DISCHARGE DIAGNOSES  Diagnosis: History of breast cancer  Assessment and Plan of Treatment: You have a history of breast cancer. Please follow-up with your oncologist within 1-2 weeks of discharge    Diagnosis: Back pain  Assessment and Plan of Treatment: You had complaints of back pain while in the hospital. You were evaluated by Orthopedics, no surgery at this time. Please continue to use TSLO brace as directed. Follow-up with Orthopedics 1-2 weeks after discharge    Diagnosis: Hypertension  Assessment and Plan of Treatment: Continue with your blood pressure medications; eat a heart healthy diet with low salt diet; exercise regularly (consult with your physician or cardiologist first); maintain a heart healthy weight; if you smoke - quit (A resource to help you stop smoking is the [facility] [employer] Control – phone number [phone].); include healthy ways to manage stress. Continue to follow with your primary care physician or cardiologist.    Diagnosis: Hyperlipidemia  Assessment and Plan of Treatment: Continue with your cholesterol medications. Eat a heart healthy diet that is low in saturated fats and salt, and includes whole grains, fruits, vegetables and lean protein; exercise regularly (consult with your physician or cardiologist first); maintain a heart healthy weight; if you smoke - quit (A resource to help you stop smoking is the [facility] [employer] Control – phone number [phone].). Continue to follow with your primary physician or cardiologist.     PRINCIPAL DISCHARGE DIAGNOSIS  Diagnosis: Acute respiratory failure with hypoxia  Assessment and Plan of Treatment: You presented to the ED with complaints of increased shortness of breath. Please continue all medicaitons as prescribed. Please follow-up with Pulmonary in 1 week of discharge, as well as cardiology.  PREDNISONE TO BE TAPERED BY OUTPATIENT PROVIDER      SECONDARY DISCHARGE DIAGNOSES  Diagnosis: History of breast cancer  Assessment and Plan of Treatment: You have a history of breast cancer. Please follow-up with your oncologist within 1-2 weeks of discharge    Diagnosis: Back pain  Assessment and Plan of Treatment: You had complaints of back pain while in the hospital. You were evaluated by Orthopedics, no surgery at this time. Please continue to use TSLO brace as directed. Follow-up with Orthopedics 1-2 weeks after discharge    Diagnosis: Hypertension  Assessment and Plan of Treatment: Continue with your blood pressure medications; eat a heart healthy diet with low salt diet; exercise regularly (consult with your physician or cardiologist first); maintain a heart healthy weight; if you smoke - quit (A resource to help you stop smoking is the [facility] [employer] – phone number [phone].); include healthy ways to manage stress. Continue to follow with your primary care physician or cardiologist.    Diagnosis: Hyperlipidemia  Assessment and Plan of Treatment: Continue with your cholesterol medications. Eat a heart healthy diet that is low in saturated fats and salt, and includes whole grains, fruits, vegetables and lean protein; exercise regularly (consult with your physician or cardiologist first); maintain a heart healthy weight; if you smoke - quit (A resource to help you stop smoking is the [facility] [employer] Control – phone number [phone].). Continue to follow with your primary physician or cardiologist.

## 2023-10-21 NOTE — DISCHARGE NOTE PROVIDER - NSDCMRMEDTOKEN_GEN_ALL_CORE_FT
ALPRAZolam 0.25 mg oral tablet: 1 tab(s) orally 2 times a day as needed  biotin: 1 cap(s) orally once a day  bisacodyl 5 mg oral delayed release tablet: 1 tab(s) orally every 12 hours As needed Constipation  buPROPion 300 mg/24 hours (XL) oral tablet, extended release: 1 tab(s) orally once a day  Cranberry tablet: 1 tablet orally once a day  escitalopram 10 mg oral tablet: 1 tab(s) orally once a day  exemestane 25 mg oral tablet: 1 tab(s) orally once a day  furosemide 20 mg oral tablet: 1 tab(s) orally once a day  ipratropium 21 mcg/inh (0.03%) nasal spray: 2 spray(s) intranasally once a day  mirtazapine 7.5 mg oral tablet: 1 tab(s) orally once a day (at bedtime)  pantoprazole 40 mg oral delayed release tablet: 1 tab(s) orally once a day (before a meal)  predniSONE 20 mg oral tablet: 1 tab(s) orally once a day  senna leaf extract oral tablet: 2 tab(s) orally once a day (at bedtime)  simvastatin 20 mg oral tablet: 1 tab(s) orally once a day (at bedtime)  sulfamethoxazole-trimethoprim 800 mg-160 mg oral tablet: 1 tab(s) orally 3 times a week Take Mon, Wed, Fri  tamsulosin 0.4 mg oral capsule: 1 cap(s) orally once a day (at bedtime)  traMADol 50 mg oral tablet: 0.5 tab(s) orally 2 times a day As needed Moderate Pain (4 - 6)  vitamin D3: 1 tab(s) orally once a day

## 2023-10-21 NOTE — PROGRESS NOTE ADULT - SUBJECTIVE AND OBJECTIVE BOX
date of service: 10-21-23 @ 07:07  afberile  REVIEW OF SYSTEMS:  CONSTITUTIONAL: No fever,  no  weight loss  ENT:  No  tinnitus,   no   vertigo  NECK: No pain or stiffness  RESPIRATORY: No cough, wheezing, chills or hemoptysis;    No Shortness of Breath  CARDIOVASCULAR: No chest pain, palpitations, dizziness  GASTROINTESTINAL: No abdominal or epigastric pain. No nausea, vomiting, or hematemesis; No diarrhea  No melena or hematochezia.  GENITOURINARY: No dysuria, frequency, hematuria, or incontinence  NEUROLOGICAL: No headaches  SKIN: No itching,  no   rash  LYMPH Nodes: No enlarged glands  ENDOCRINE: No heat or cold intolerance  MUSCULOSKELETAL: No joint pain or swelling  PSYCHIATRIC: No depression, anxiety  HEME/LYMPH: No easy bruising, or bleeding gums  ALLERGY AND IMMUNOLOGIC: No hives or eczema	    MEDICATIONS  (STANDING):  albuterol    90 MICROgram(s) HFA Inhaler 2 Puff(s) Inhalation every 8 hours  buPROPion XL (24-Hour) . 150 milliGRAM(s) Oral daily  chlorhexidine 2% Cloths 1 Application(s) Topical <User Schedule>  escitalopram 10 milliGRAM(s) Oral daily  exemestane 25 milliGRAM(s) Oral daily  furosemide    Tablet 20 milliGRAM(s) Oral daily  heparin   Injectable 5000 Unit(s) SubCutaneous every 12 hours  influenza  Vaccine (HIGH DOSE) 0.7 milliLiter(s) IntraMuscular once  mirtazapine 7.5 milliGRAM(s) Oral at bedtime  pantoprazole    Tablet 40 milliGRAM(s) Oral before breakfast  polyethylene glycol 3350 17 Gram(s) Oral daily  predniSONE   Tablet 20 milliGRAM(s) Oral daily  senna 2 Tablet(s) Oral at bedtime  simvastatin 20 milliGRAM(s) Oral at bedtime  sodium chloride 0.9%. 400 milliLiter(s) (50 mL/Hr) IV Continuous <Continuous>  tamsulosin 0.4 milliGRAM(s) Oral at bedtime  trimethoprim  160 mG/sulfamethoxazole 800 mG 1 Tablet(s) Oral <User Schedule>    MEDICATIONS  (PRN):  ALPRAZolam 0.25 milliGRAM(s) Oral two times a day PRN anxiety  bisacodyl 5 milliGRAM(s) Oral every 12 hours PRN Constipation  traMADol 25 milliGRAM(s) Oral two times a day PRN Moderate Pain (4 - 6)      Vital Signs Last 24 Hrs  T(C): 36.6 (21 Oct 2023 04:27), Max: 36.6 (20 Oct 2023 20:35)  T(F): 97.9 (21 Oct 2023 04:27), Max: 97.9 (21 Oct 2023 04:27)  HR: 74 (21 Oct 2023 04:27) (74 - 98)  BP: 122/64 (21 Oct 2023 04:27) (122/64 - 152/73)  BP(mean): --  RR: 18 (21 Oct 2023 04:27) (18 - 18)  SpO2: 95% (21 Oct 2023 04:27) (95% - 99%)    Parameters below as of 21 Oct 2023 04:27  Patient On (Oxygen Delivery Method): nasal cannula  O2 Flow (L/min): 3    CAPILLARY BLOOD GLUCOSE        I&O's Summary    20 Oct 2023 07:01  -  21 Oct 2023 07:00  --------------------------------------------------------  IN: 880 mL / OUT: 600 mL / NET: 280 mL          Appearance: Normal	  HEENT:   Normal oral mucosa, PERRL, EOMI	  Lymphatic: No lymphadenopathy  Cardiovascular: Normal S1 S2, No JVD  Respiratory: Lungs clear to auscultation	  Gastrointestinal:  Soft, Non-tender, + BS	  Skin: No rash, No ecchymoses	  Extremities:     LABS:                        10.9   10.09 )-----------( 167      ( 21 Oct 2023 06:39 )             34.5                                   Consultant(s) Notes Reviewed:      Care Discussed with Consultants/Other Providers:

## 2023-10-21 NOTE — DISCHARGE NOTE PROVIDER - PROVIDER TOKENS
PROVIDER:[TOKEN:[[id number]],FOLLOWUP:[1 week]],PROVIDER:[TOKEN:[[id number]],FOLLOWUP:[1 week]],PROVIDER:[TOKEN:[[id number]],FOLLOWUP:[1 week]]

## 2023-10-21 NOTE — DISCHARGE NOTE NURSING/CASE MANAGEMENT/SOCIAL WORK - PATIENT PORTAL LINK FT
You can access the FollowMyHealth Patient Portal offered by [facility] by registering at the following website: [email or web address]. By joining [facility]’s FollowMyHealth portal, you will also be able to view your health information using other applications (apps) compatible with our system.

## 2023-10-21 NOTE — PROGRESS NOTE ADULT - ASSESSMENT
89-year-old female    h/o  HLD,  Ca  breast, s/p  lumpectomy, recurrence , on  chemo . prior  h/o  hip  surg     admitted  with  worsening  sob.  and  also  had  a  fall .  recently  d/c  from rehab facility /  pt  had a  similar  admission  for  sob, in 8/23      *  sob.  from  ?  chf/  acute  copd  exacerbation/ ?  pneumonitis        afberile,  wbc  17,000  on  arrival,    on  iv  solumedrol,  Proventil. and  rocephin/  iv lasix          CT  chest   angio,  no  pe,  centrilobular  emphysema, reticular  markings, /  chf vs  early  fibrosis.  compression T 7. old           CT   spine,  doffuse  deg  dosease,  with  c/c  T7  fx    h/o  Ca breast. onc  dr garcia /dr crews        was  on Ibrance  and exemestane since 2021 with stable disease;  PET scan 1/2023        and, Ibrance held, as  Ibrance can cause ILD/pneumonitis/  pt  was  supposed  to  f/p  with  pulm dr lisker     Echo   8/23, with normal LV.  pulm  htn mild, was  38  mm  hg  /  no  shun     ct a/p .  stool. on laxatives    Depression/anxiety, on xanax.  Wellbutrin   Remeron  ,  on dvt  ppx /  fall  risk    Echo  now,  normal  ef.  severe  pHTN ,  { this  is   new ,  from   echo  of  8/23 } normal  RV  function        seen  by  house  pulm /  better  with  lasix.  iv steroid  lowered  to  bid      uti/ Klebsiella  /  awiating sensitivity ,  on iv Rocephin      tov  was   seen  by  urology    has   c/c  back pain. h/o comp  fx ,  daughter   requesting spine  eval/  discussed  with  team    seen by  endo      was  orthostatic, >  40  points,  lasix  lowered/ pt  was  dizzy    follow   orthostatics  today      CHAO< from: TTE W or WO Ultrasound Enhancing Agent (10.15.23 @ 13:37) >  ONCLUSIONS:   1. Left ventricular systolic function is hyperdynamic with an ejection fraction of 75 % by Ross's method of disks.   2. Mildly enlarged right ventricular cavity size, wall thickness, and systolic function.   3. Compared to the transthoracic echocardiogram performed on 8/24/2023 Estimated PASP and TR has increased.  4. Estimated pulmonary artery systolic pressure is 61 mmHg, consistent with severe pulmonary hypertension.   5. Hyperdynamic left ventricular systolic function with intra-cavitary gradient of 37 mmHg.   6. Technically difficult image quality.   7. The inferior vena cava is normal in size measuring 1.70 cm in diameter, (normal <2.1cm) with abnormal inspiratory collapse (abnormal <50%) consistent with mildly elevated right atrial pressure (~8, range 5-10mmHg).   8. There is normal LV mass and concentric remodeling.  _______________________________________________________________________________________:    < end of copied text >     89-year-old female    h/o  HLD,  Ca  breast, s/p  lumpectomy, recurrence , on  chemo . prior  h/o  hip  surg     admitted  with  worsening  sob.  and  also  had  a  fall .  recently  d/c  from rehab facility /  pt  had a  similar  admission  for  sob, in 8/23      *  sob.  from  ?  chf/  acute  copd  exacerbation/ ?  pneumonitis        afberile,  wbc  17,000  on  arrival,    on  iv  solumedrol,  Proventil. and  rocephin/  iv lasix          CT  chest   angio,  no  pe,  centrilobular  emphysema, reticular  markings, /  chf vs  early  fibrosis.  compression T 7. old           CT   spine,  doffuse  deg  dosease,  with  c/c  T7  fx    h/o  Ca breast. onc  dr garcia /dr crews        was  on Ibrance  and exemestane since 2021 with stable disease;  PET scan 1/2023        and, Ibrance held, as  Ibrance can cause ILD/pneumonitis/  pt  was  supposed  to  f/p  with  pulm dr lisker     Echo   8/23, with normal LV.  pulm  htn mild, was  38  mm  hg  /  no  shun     ct a/p .  stool. on laxatives    Depression/anxiety, on xanax.  Wellbutrin   Remeron  ,  on dvt  ppx /  fall  risk    Echo  now,  normal  ef.  severe  pHTN ,  { this  is   new ,  from   echo  of  8/23 } normal  RV  function        seen  by  house  pulm /  better  with  lasix.  iv steroid  lowered  to  bid      uti/ Klebsiella  /  awiating sensitivity ,  on iv Rocephin      tov  was   seen  by  urology    has   c/c  back pain. h/o comp  fx ,  daughter   requesting spine  eval/  discussed  with  team    seen by  endo      was  orthostatic, >  40  points,  lasix  lowered/ pt  was  dizzy      and   orthostatics  today/ is +/  but much less      CHAO< from: TTE W or WO Ultrasound Enhancing Agent (10.15.23 @ 13:37) >  ONCLUSIONS:   1. Left ventricular systolic function is hyperdynamic with an ejection fraction of 75 % by Ross's method of disks.   2. Mildly enlarged right ventricular cavity size, wall thickness, and systolic function.   3. Compared to the transthoracic echocardiogram performed on 8/24/2023 Estimated PASP and TR has increased.  4. Estimated pulmonary artery systolic pressure is 61 mmHg, consistent with severe pulmonary hypertension.   5. Hyperdynamic left ventricular systolic function with intra-cavitary gradient of 37 mmHg.   6. Technically difficult image quality.   7. The inferior vena cava is normal in size measuring 1.70 cm in diameter, (normal <2.1cm) with abnormal inspiratory collapse (abnormal <50%) consistent with mildly elevated right atrial pressure (~8, range 5-10mmHg).   8. There is normal LV mass and concentric remodeling.  _______________________________________________________________________________________:    < end of copied text >

## 2023-10-21 NOTE — CHART NOTE - NSCHARTNOTEFT_GEN_A_CORE
CC: Notified by RN pt with SBP 82 @ 1:20 am asymptomatic, on repeat BP lowest systolic 75 , on 6L in setting of LE edema, hypervolemia. Pt prior had c/o inability to urinate and Perez was placed in ED. RN informs she noted Perez w/ 1,500 cc.    cc ordered over 1 hour  for likely post obstructive diuresis.    HPI: 89 y.o. F w/ PMHx of  breast cancer, HTN, HLD recent dc from rehab presenting with dyspnea, malaise, fatigue.  Noted pulse ox 60%s on RA.  Not on oxygen at home.  Denies any chest pain, fever, vomiting. Admitted with acute respiratory failure 2/2 CHF exac -Lasix 40 mg IVP x 1 given in ED. CTA of chest w/ no PE, mild emphysema on 6L NC 93%. Pt now  hypotensive likely in setting of post obstructive diuresis with noted >1500 cc output.    Vital Signs Last 24 Hrs  T(C): 36.5 (15 Oct 2023 00:20), Max: 37.1 (14 Oct 2023 20:03)  T(F): 97.7 (15 Oct 2023 00:20), Max: 98.8 (14 Oct 2023 20:03)  HR: 109 (15 Oct 2023 02:05) (102 - 121)  BP: 97/46 (15 Oct 2023 02:05) (75/44 - 131/77)  BP(mean): 60 (15 Oct 2023 02:05) (53 - 89)  RR: 21 (15 Oct 2023 02:05) (19 - 23)  SpO2: 93% (15 Oct 2023 02:05) (76% - 96%)    Parameters below as of 15 Oct 2023 02:05  Patient On (Oxygen Delivery Method): nasal cannula  O2 Flow (L/min): 5    A/P:  89 y.o. F w/ PMHx of  breast cancer, HTN, HLD recent dc from rehab presenting with dyspnea, malaise, fatigue.  Noted pulse ox 60%s on RA. Pt now  hypotensive likely in setting of post obstructive diuresis with noted >1500 cc output.    - Perez clamped for now  - 250 cc ordered over 1 hour with SBP 94  - stat lytes ordered and repeat in AM   - continue to monitor frequent BP       Phosphorus (10.15.23 @ 02:24)   Phosphorus: 3.2 mg/dLMagnesium (10.15.23 @ 02:24)   Magnesium: 2.0: For pregnant women undergoing magnesium therapy (MgSO4), the therapeutic   range is 5.0-9.0 mg/dL. mg/dLBasic Metabolic Panel - STAT (10.15.23 @ 02:24)   Sodium: 137 mmol/L  Potassium: 3.7 mmol/L  Chloride: 97 mmol/L  Carbon Dioxide: 27 mmol/L  Anion Gap: 13 mmol/L  Blood Urea Nitrogen: 18 mg/dL  Creatinine: 1.12 mg/dL  Glucose: 154 mg/dL  Calcium: 8.4 mg/dL  eGFR: 47:     Roland Robertson NP  [id number]  Roland Robertson NP  [id number]
MEDICINE PA NOTE    Per heme onc, patient to be discharged on oral exemestane and Ok to follow up with her oncologist Dr Batista until after d/c from rehab.
Medicine NP      Pt medically cleared for discharge today by Dr. Tse . All medications reviewed with  Dr. Tse. Patient discharged to rehab with appropriate follow-up.     Josie Cardozo [facility]   [id number]
Urology was contacted for concern of post-obstructive diuresis. Primary team states that patient has been getting diuresed for CHF and had an episode of retention for which a hernandez catheter was placed. When the catheter was placed ~1L had come out and since that time she has had outputs of 200-300cc per hour for the 2-3 hours after catheter placement. Given concern of POD catheter was clamped and since that time patient has had BMP which shows the electrolytes are wnl. Given that patient was likely not in retention for a signficant portion of time which is further evidenced by stable Cr there is a high likelihood of spontaneous resolution of POD.    Recommendations  - Unclamp catheter, studies have shown that there is no benefit in clamping the catheter  -Monitor patient for post-obstructive diuresis.  -Maintain strict intake and output  -POD = >200cc of urine/hr for 3 or more consecutive hours  -Allow patient to drink to thirst.  Keep two pitchers of water at the bedside at all times.  -Check q6 BMPs to monitor electrolytes this AM then can go to regular schedule  -Continue Hernandez catheter
Patient was fit and delivered a TLSO rigid posterior panel extending from sacrococcygeal junction to the scapular spine with a soft apron front. The TLSO was adjusted to fit the patient's individual anatomy. The orthosis will stabilize and control the spine reduce pain and ROM, allow for safer ambulation and assist in the healing process. Care use and function were explained to the patient. Contact info was given to the patient. All went without incident.   [facility]  [phone]

## 2023-10-21 NOTE — PROGRESS NOTE ADULT - PROVIDER SPECIALTY LIST ADULT
Cardiology
Internal Medicine
Pulmonology
Cardiology
Cardiology
Heme/Onc
Internal Medicine
Pulmonology
Cardiology
Heme/Onc
Pulmonology
Heme/Onc
Heme/Onc
Internal Medicine
Pulmonology

## 2023-10-23 ENCOUNTER — APPOINTMENT (OUTPATIENT)
Dept: PULMONOLOGY | Facility: CLINIC | Age: 88
End: 2023-10-23

## 2023-10-24 NOTE — PRE-ANESTHESIA EVALUATION ADULT - BSA (M2)
Duplicate, signed yesterday
Patient sent a My chart message     I have new RX coverage which requires me to fill through mail order. I didn’t know this until it was denied through [employer]. To follow my new plan, I’m transferred the RX to [facility], after [facility] said they would process 10 days for me. BUT they ([facility]) can’t now, since the RX has been transferred. Long story short - it will take at least 10 days to get my mail order, but I only have one pill left. Can you call in a 10 day supply to [employer] please so I don’t miss a day? I will pay cash for the pills. I would need this to happen tomorrow since I will take the last pill tonight. Please let me know. Thank you.  Betsy Roberts [phone]
1.66

## 2023-11-01 NOTE — ED ADULT TRIAGE NOTE - INTERNATIONAL TRAVEL
Diagnoses and all orders for this visit:  Type 2 diabetes mellitus without complication, without long-term current use of insulin (CMS/[facility])  -     POCT glycosylated hemoglobin (Hb A1C) manually resulted  Type 2 diabetes mellitus with hyperglycemia, with long-term current use of insulin (CMS/[facility])  -     empagliflozin-metformin (Synjardy) 12.5-1,000 mg; Take 1 tablet by mouth 2 times a day.  -     dulaglutide (Trulicity) 3 mg/0.5 mL pen injector; Inject 3 mg under the skin 1 (one) time per week.    Type 2 diabetes mellitus, is not at goal.    RX changes: restart Trulicity 3mg once a week, switch invokamet to synjardy 12.5-1000mg twice a day with food,    Education:  interpretation of lab results, blood sugar goals, and nutrition  Follow up: I recommend diabetes care be 3 months.  
No

## 2023-11-13 PROCEDURE — 97162 PT EVAL MOD COMPLEX 30 MIN: CPT

## 2023-11-13 PROCEDURE — 99291 CRITICAL CARE FIRST HOUR: CPT

## 2023-11-13 PROCEDURE — 36415 COLL VENOUS BLD VENIPUNCTURE: CPT

## 2023-11-13 PROCEDURE — C8929: CPT

## 2023-11-13 PROCEDURE — 80048 BASIC METABOLIC PNL TOTAL CA: CPT

## 2023-11-13 PROCEDURE — 97530 THERAPEUTIC ACTIVITIES: CPT

## 2023-11-13 PROCEDURE — 80053 COMPREHEN METABOLIC PANEL: CPT

## 2023-11-13 PROCEDURE — 83615 LACTATE (LD) (LDH) ENZYME: CPT

## 2023-11-13 PROCEDURE — 72125 CT NECK SPINE W/O DYE: CPT

## 2023-11-13 PROCEDURE — 83880 ASSAY OF NATRIURETIC PEPTIDE: CPT

## 2023-11-13 PROCEDURE — 85045 AUTOMATED RETICULOCYTE COUNT: CPT

## 2023-11-13 PROCEDURE — 87640 STAPH A DNA AMP PROBE: CPT

## 2023-11-13 PROCEDURE — 82803 BLOOD GASES ANY COMBINATION: CPT

## 2023-11-13 PROCEDURE — 86300 IMMUNOASSAY TUMOR CA 15-3: CPT

## 2023-11-13 PROCEDURE — 97110 THERAPEUTIC EXERCISES: CPT

## 2023-11-13 PROCEDURE — 84484 ASSAY OF TROPONIN QUANT: CPT

## 2023-11-13 PROCEDURE — 96375 TX/PRO/DX INJ NEW DRUG ADDON: CPT

## 2023-11-13 PROCEDURE — 85014 HEMATOCRIT: CPT

## 2023-11-13 PROCEDURE — 84100 ASSAY OF PHOSPHORUS: CPT

## 2023-11-13 PROCEDURE — 85027 COMPLETE CBC AUTOMATED: CPT

## 2023-11-13 PROCEDURE — 82947 ASSAY GLUCOSE BLOOD QUANT: CPT

## 2023-11-13 PROCEDURE — 83540 ASSAY OF IRON: CPT

## 2023-11-13 PROCEDURE — 87186 SC STD MICRODIL/AGAR DIL: CPT

## 2023-11-13 PROCEDURE — 82330 ASSAY OF CALCIUM: CPT

## 2023-11-13 PROCEDURE — 82746 ASSAY OF FOLIC ACID SERUM: CPT

## 2023-11-13 PROCEDURE — 87641 MR-STAPH DNA AMP PROBE: CPT

## 2023-11-13 PROCEDURE — 74177 CT ABD & PELVIS W/CONTRAST: CPT

## 2023-11-13 PROCEDURE — 71275 CT ANGIOGRAPHY CHEST: CPT | Mod: MA

## 2023-11-13 PROCEDURE — 87077 CULTURE AEROBIC IDENTIFY: CPT

## 2023-11-13 PROCEDURE — 83010 ASSAY OF HAPTOGLOBIN QUANT: CPT

## 2023-11-13 PROCEDURE — 94640 AIRWAY INHALATION TREATMENT: CPT

## 2023-11-13 PROCEDURE — 71045 X-RAY EXAM CHEST 1 VIEW: CPT

## 2023-11-13 PROCEDURE — 82607 VITAMIN B-12: CPT

## 2023-11-13 PROCEDURE — 82378 CARCINOEMBRYONIC ANTIGEN: CPT

## 2023-11-13 PROCEDURE — 72131 CT LUMBAR SPINE W/O DYE: CPT

## 2023-11-13 PROCEDURE — 82728 ASSAY OF FERRITIN: CPT

## 2023-11-13 PROCEDURE — 84295 ASSAY OF SERUM SODIUM: CPT

## 2023-11-13 PROCEDURE — 87040 BLOOD CULTURE FOR BACTERIA: CPT

## 2023-11-13 PROCEDURE — 0225U NFCT DS DNA&RNA 21 SARSCOV2: CPT

## 2023-11-13 PROCEDURE — 82435 ASSAY OF BLOOD CHLORIDE: CPT

## 2023-11-13 PROCEDURE — 96374 THER/PROPH/DIAG INJ IV PUSH: CPT

## 2023-11-13 PROCEDURE — 85025 COMPLETE CBC W/AUTO DIFF WBC: CPT

## 2023-11-13 PROCEDURE — 87086 URINE CULTURE/COLONY COUNT: CPT

## 2023-11-13 PROCEDURE — 83605 ASSAY OF LACTIC ACID: CPT

## 2023-11-13 PROCEDURE — 72170 X-RAY EXAM OF PELVIS: CPT

## 2023-11-13 PROCEDURE — 83550 IRON BINDING TEST: CPT

## 2023-11-13 PROCEDURE — 70450 CT HEAD/BRAIN W/O DYE: CPT | Mod: MA

## 2023-11-13 PROCEDURE — 83735 ASSAY OF MAGNESIUM: CPT

## 2023-11-13 PROCEDURE — 81001 URINALYSIS AUTO W/SCOPE: CPT

## 2023-11-13 PROCEDURE — 85018 HEMOGLOBIN: CPT

## 2023-11-13 PROCEDURE — 84132 ASSAY OF SERUM POTASSIUM: CPT

## 2023-11-13 PROCEDURE — 73502 X-RAY EXAM HIP UNI 2-3 VIEWS: CPT

## 2023-11-13 PROCEDURE — 72128 CT CHEST SPINE W/O DYE: CPT

## 2023-11-15 ENCOUNTER — APPOINTMENT (OUTPATIENT)
Dept: PULMONOLOGY | Facility: CLINIC | Age: 88
End: 2023-11-15
Payer: MEDICARE

## 2023-11-15 PROCEDURE — 99214 OFFICE O/P EST MOD 30 MIN: CPT | Mod: 95

## 2023-11-20 ENCOUNTER — APPOINTMENT (OUTPATIENT)
Dept: PULMONOLOGY | Facility: CLINIC | Age: 88
End: 2023-11-20
Payer: MEDICARE

## 2023-11-20 PROCEDURE — 99212 OFFICE O/P EST SF 10 MIN: CPT | Mod: 95

## 2023-12-04 ENCOUNTER — NON-APPOINTMENT (OUTPATIENT)
Age: 88
End: 2023-12-04

## 2023-12-04 ENCOUNTER — APPOINTMENT (OUTPATIENT)
Dept: PULMONOLOGY | Facility: CLINIC | Age: 88
End: 2023-12-04
Payer: MEDICARE

## 2023-12-04 ENCOUNTER — RX RENEWAL (OUTPATIENT)
Age: 88
End: 2023-12-04

## 2023-12-04 VITALS
OXYGEN SATURATION: 90 % | RESPIRATION RATE: 15 BRPM | SYSTOLIC BLOOD PRESSURE: 127 MMHG | DIASTOLIC BLOOD PRESSURE: 73 MMHG | HEART RATE: 77 BPM | HEIGHT: 60 IN | TEMPERATURE: 97.7 F

## 2023-12-04 PROCEDURE — 99214 OFFICE O/P EST MOD 30 MIN: CPT

## 2023-12-04 RX ORDER — PREDNISONE 5 MG/1
5 TABLET ORAL
Qty: 1 | Refills: 2 | Status: ACTIVE | COMMUNITY
Start: 2023-12-04 | End: 1900-01-01

## 2023-12-04 RX ORDER — BUDESONIDE AND FORMOTEROL FUMARATE DIHYDRATE 160; 4.5 UG/1; UG/1
160-4.5 AEROSOL RESPIRATORY (INHALATION)
Qty: 1 | Refills: 11 | Status: DISCONTINUED | COMMUNITY
Start: 2023-12-04 | End: 2023-12-04

## 2023-12-06 LAB
ENA SCL70 IGG SER IA-ACNC: <0.2 AL
ENA SS-A AB SER IA-ACNC: <0.2 AL
ENA SS-B AB SER IA-ACNC: <0.2 AL

## 2023-12-08 ENCOUNTER — APPOINTMENT (OUTPATIENT)
Dept: PULMONOLOGY | Facility: CLINIC | Age: 88
End: 2023-12-08

## 2024-01-05 ENCOUNTER — APPOINTMENT (OUTPATIENT)
Dept: PULMONOLOGY | Facility: CLINIC | Age: 89
End: 2024-01-05
Payer: MEDICARE

## 2024-01-05 VITALS
RESPIRATION RATE: 15 BRPM | HEART RATE: 83 BPM | TEMPERATURE: 97.7 F | OXYGEN SATURATION: 89 % | BODY MASS INDEX: 31.65 KG/M2 | HEIGHT: 59 IN | SYSTOLIC BLOOD PRESSURE: 123 MMHG | WEIGHT: 157 LBS | DIASTOLIC BLOOD PRESSURE: 65 MMHG

## 2024-01-05 PROCEDURE — 99214 OFFICE O/P EST MOD 30 MIN: CPT

## 2024-01-05 NOTE — HISTORY OF PRESENT ILLNESS
[TextBox_4] :   89-year-old female recently discharged from rehabilitation. 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 She was treated with high-dose prednisone with gradual clearing of her pulmonary infiltrates but leaving a residual reticular pattern suggesting early pulmonary fibrosis. Her CAT scan also demonstrated mild emphysema. Currently she is home and with minimal ambulation. She is utilizing supplemental oxygen at night. During the day when ambulatory her oxygen saturations can fall to 89%. She denies any coughing, chest discomfort or peripheral edema.  Last visit her prednisone was tapered ot 5mg/day and added symbicort bid with aerochamber.  She feels no difference in her breathing. Although reports wheezing has reduced. Remains short of breath with minimal exertion. Ambulates with walker.  Has a POC for prn use and using O2 at night.  Plans to attend her grandson's wedding in April in [place].    former smoker for about 10 yrs

## 2024-01-05 NOTE — ASSESSMENT
[FreeTextEntry1] :   89-year-old female recently discharged from rehabilitation. 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 She was treated with high-dose prednisone with gradual clearing of her pulmonary infiltrates but leaving a residual reticular pattern suggesting early pulmonary fibrosis. Her CAT scan also demonstrated mild emphysema. She is a former smoker.  1 month ago we tapered her prednisone to 5mg/daily and added symbicort bid. She reports less wheezing but dyspnea is overall unchanged. Requires O2 at night and on exertion.  Her saturation at rest is 97%, dropped to 85% while getting dressed. Bounced back to 95% standing and dropped to 90% after ambulating about 100ft with walker.  Lungs are clear. No LE edema.  Plan: Continue Symbicort bid Add Spiriva Continue supplemented O2 at night and use POC on exertion Will maintain her on prednisone 5mg daily.  She is scheduled for a PET scan this week at [facility] and will bring us the CD/report.  PFTs and follow up first week of March. Should be ok to travel, as long as she takes her POC and continues regimen.

## 2024-01-05 NOTE — END OF VISIT
[FreeTextEntry3] : I reviewed and agree with the information elicited by the advanced care practitioner and I personally elicited a history and examined the patient and developed a plan of care I spent a total of 30 minutes in this patient contact including face-to-face time, review of her prior CT imaging and documentation [Time Spent: ___ minutes] : I have spent [unfilled] minutes of time on the encounter.

## 2024-01-08 ENCOUNTER — NON-APPOINTMENT (OUTPATIENT)
Age: 89
End: 2024-01-08

## 2024-01-23 ENCOUNTER — APPOINTMENT (OUTPATIENT)
Dept: CT IMAGING | Facility: CLINIC | Age: 89
End: 2024-01-23
Payer: MEDICARE

## 2024-01-23 ENCOUNTER — APPOINTMENT (OUTPATIENT)
Dept: PULMONOLOGY | Facility: CLINIC | Age: 89
End: 2024-01-23
Payer: MEDICARE

## 2024-01-23 ENCOUNTER — APPOINTMENT (OUTPATIENT)
Dept: ULTRASOUND IMAGING | Facility: CLINIC | Age: 89
End: 2024-01-23
Payer: MEDICARE

## 2024-01-23 VITALS
DIASTOLIC BLOOD PRESSURE: 86 MMHG | SYSTOLIC BLOOD PRESSURE: 149 MMHG | HEIGHT: 59 IN | BODY MASS INDEX: 31.9 KG/M2 | RESPIRATION RATE: 16 BRPM | WEIGHT: 158.25 LBS | TEMPERATURE: 98.1 F | HEART RATE: 86 BPM | OXYGEN SATURATION: 96 %

## 2024-01-23 PROCEDURE — 93970 EXTREMITY STUDY: CPT

## 2024-01-23 PROCEDURE — 71275 CT ANGIOGRAPHY CHEST: CPT

## 2024-01-23 PROCEDURE — 99215 OFFICE O/P EST HI 40 MIN: CPT

## 2024-01-23 NOTE — REVIEW OF SYSTEMS
[Fatigue] : fatigue [Cough] : cough [Dyspnea] : dyspnea [Edema] : edema [Negative] : Endocrine [TextBox_69] : Unusual bowel movements

## 2024-01-23 NOTE — HISTORY OF PRESENT ILLNESS
[TextBox_4] : 89-year-old female recently discharged from rehabilitation. 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 She was treated with high-dose prednisone with gradual clearing of her pulmonary infiltrates but leaving a residual reticular pattern suggesting early pulmonary fibrosis. Her CAT scan also demonstrated mild emphysema. Currently she is home and with minimal ambulation. She is utilizing supplemental oxygen at night. During the day when ambulatory her oxygen saturations can fall to 89%. She denies any coughing, chest discomfort or peripheral edema.  about 2 months ago, prednisone was tapered ot 5mg/day and added symbicort bid with aerochamber. She feels no difference in her breathing. Although reports wheezing has reduced. Remains short of breath with minimal exertion. Ambulates with walker. Has a POC for prn use and using O2 at night. Plans to attend her grandson's wedding in April in [place]. Since I last saw her she underwent PET scanning which demonstrated increased activity in her right breast, sacrum and pelvis and new pleural effusions.  She is also experiencing increased dyspnea and progressive hypoxemia.  She denies any chest pain but does have mild peripheral edema left greater than right.  I reviewed her last echo and her last EKG.  It demonstrated a change in her EKG with a possible anterior infarction and her echo shows more pulmonary hypertension likely related to her noncardiac pulmonary edema. former smoker for about 10 yrs.

## 2024-01-23 NOTE — PHYSICAL EXAM
[Normal Appearance] : normal appearance [No Neck Mass] : no neck mass [Normal Rate/Rhythm] : normal rate/rhythm [Normal S1, S2] : normal s1, s2 [Normal Gait] : normal gait [1+ Pitting] : 1+ pitting [Oriented x3] : oriented x3 [TextBox_2] : Perceptible dyspnea [TextBox_68] : Crackles left anterior chest and left lower lobe; tachypnea

## 2024-01-23 NOTE — ASSESSMENT
[FreeTextEntry1] : The patient's dyspnea is clearly worse.  She is more hypoxic.  There appears to be some cardiac issue because of a change in her electrocardiogram and her echo.  I asked her to see a cardiologist and I gave her a phone number and name, Jay Lisker.  It is quite possible that the pleural effusions are related to heart failure.  I have asked her to increase her Lasix to 40 mg/day.  I also ordered a CTPA and Dopplers of the lower extremities.  Blood work was drawn.  She will be following up with her oncologist. Dr. Batista  It is quite possible that the fall that she had contributed to the PET scan findings in her pelvis and sacrum but that does not account for the findings in her right breast.

## 2024-01-24 LAB
ALBUMIN SERPL ELPH-MCNC: 4.5 G/DL
ALP BLD-CCNC: 109 U/L
ALT SERPL-CCNC: 25 U/L
ANION GAP SERPL CALC-SCNC: 13 MMOL/L
AST SERPL-CCNC: 23 U/L
BILIRUB SERPL-MCNC: 0.3 MG/DL
BUN SERPL-MCNC: 18 MG/DL
CALCIUM SERPL-MCNC: 8.7 MG/DL
CHLORIDE SERPL-SCNC: 101 MMOL/L
CO2 SERPL-SCNC: 29 MMOL/L
CREAT SERPL-MCNC: 1.14 MG/DL
DEPRECATED D DIMER PPP IA-ACNC: 764 NG/ML DDU
EGFR: 46 ML/MIN/1.73M2
NT-PROBNP SERPL-MCNC: 262 PG/ML
POTASSIUM SERPL-SCNC: 4.3 MMOL/L
PROT SERPL-MCNC: 6.8 G/DL
SODIUM SERPL-SCNC: 143 MMOL/L

## 2024-01-24 RX ORDER — FUROSEMIDE 40 MG/1
40 TABLET ORAL
Qty: 30 | Refills: 1 | Status: ACTIVE | COMMUNITY
Start: 2024-01-24 | End: 1900-01-01

## 2024-01-26 ENCOUNTER — NON-APPOINTMENT (OUTPATIENT)
Age: 89
End: 2024-01-26

## 2024-01-31 ENCOUNTER — APPOINTMENT (OUTPATIENT)
Dept: CARDIOLOGY | Facility: CLINIC | Age: 89
End: 2024-01-31
Payer: MEDICARE

## 2024-01-31 PROCEDURE — 93306 TTE W/DOPPLER COMPLETE: CPT

## 2024-02-02 ENCOUNTER — NON-APPOINTMENT (OUTPATIENT)
Age: 89
End: 2024-02-02

## 2024-02-08 ENCOUNTER — NON-APPOINTMENT (OUTPATIENT)
Age: 89
End: 2024-02-08

## 2024-02-08 ENCOUNTER — APPOINTMENT (OUTPATIENT)
Dept: CARDIOLOGY | Facility: CLINIC | Age: 89
End: 2024-02-08
Payer: MEDICARE

## 2024-02-08 VITALS
WEIGHT: 158.25 LBS | OXYGEN SATURATION: 93 % | SYSTOLIC BLOOD PRESSURE: 148 MMHG | DIASTOLIC BLOOD PRESSURE: 78 MMHG | HEART RATE: 89 BPM | BODY MASS INDEX: 31.96 KG/M2

## 2024-02-08 VITALS — SYSTOLIC BLOOD PRESSURE: 146 MMHG | HEART RATE: 89 BPM | DIASTOLIC BLOOD PRESSURE: 78 MMHG | OXYGEN SATURATION: 93 %

## 2024-02-08 DIAGNOSIS — R06.09 OTHER FORMS OF DYSPNEA: ICD-10-CM

## 2024-02-08 DIAGNOSIS — R06.02 SHORTNESS OF BREATH: ICD-10-CM

## 2024-02-08 PROCEDURE — 99205 OFFICE O/P NEW HI 60 MIN: CPT

## 2024-02-08 PROCEDURE — 93000 ELECTROCARDIOGRAM COMPLETE: CPT

## 2024-02-08 NOTE — PHYSICAL EXAM
[Well Developed] : well developed [Well Nourished] : well nourished [Normal Venous Pressure] : normal venous pressure [Normal S1, S2] : normal S1, S2 [Soft] : abdomen soft [Normal Gait] : normal gait [No Rash] : no rash [Moves all extremities] : moves all extremities [Alert and Oriented] : alert and oriented [Normal memory] : normal memory [de-identified] : She appears well comfortable at rest. Wearing oxygen. [de-identified] : Decreased breath sounds at the bases bilaterally with rare rhonchi.  Some dry crackles are heard. [de-identified] : trace ankle edema.

## 2024-02-08 NOTE — HISTORY OF PRESENT ILLNESS
[FreeTextEntry1] : Dear Odell, Thank you for referring her for evaluation of her dyspnea. Is an 89-year-old who has been having progressive shortness of breath and increasing oxygen requirements. Her downward spiral started with a COVID infection in early 2020, at which time she had chronic mild COPD that may or may not have been diagnosed.  She had been maintained on Ibrance for her metastatic breast cancer, and had been doing well, but was hospitalized in December for severe shortness of breath and had acute pulmonary edema on CT scan. She was referred for cardiac evaluation since she was started on Lasix to attempt to improve her dyspnea. I reviewed her CT scans and recent blood work. Echocardiography done in our office late last week showed normal left ventricular systolic function with only mild diastolic dysfunction and no significant valvular abnormalities.  There is no significant pericardial effusion seen. Her proBNP level was less than 300 mg/dL. Juanito reports that her symptoms did not improve much with this, however her ankle swelling did improve.  She reports shortness of breath with minimal exertion and significant increase in her oxygen requirement when just walking around the room.  She denies any orthopnea.  Her leg edema has been better since being on a lower dose of prednisone.  She has no known history of myocardial infarction, congestive heart failure, diabetes mellitus, smoking since the 70s or Renal insufficiency.

## 2024-02-08 NOTE — REVIEW OF SYSTEMS
[SOB] : shortness of breath [Dyspnea on exertion] : dyspnea during exertion [Lower Ext Edema] : lower extremity edema [Cough] : cough [Negative] : Psychiatric

## 2024-02-08 NOTE — DISCUSSION/SUMMARY
[FreeTextEntry1] : She is an 89-year-old with a history of smoking, COPD, COVID infection and recent idiosyncratic reaction to [employer] that is likely the source of her hypoxia and pulmonary edema. ECG shows normal sinus rhythm with no acute ST segment abnormalities and a PVC Her echocardiogram and review of her CT scan shows no sign of cardiac source for heart failure. I do not believe that she suffers from the heart failure syndrome and suggested that she discontinue the Lasix.  Her ankle swelling was probably related to high-dose steroids and will improve as her steroid requirement is reduced. I encouraged her to keep her self on oxygen as often as possible and reviewed these findings with Dr. Fraser and Dr. Batista. Her daughter was present on the telephone for our entire conversation, As was her sister. [EKG obtained to assist in diagnosis and management of assessed problem(s)] : EKG obtained to assist in diagnosis and management of assessed problem(s)

## 2024-02-14 NOTE — DISCHARGE NOTE NURSING/CASE MANAGEMENT/SOCIAL WORK - NSDCVIVACCINE_GEN_ALL_CORE_FT
normocephalic, atraumatic, Face within normal limits,  , External ears within normal limits, no drainage bilaterally,  External nose  normal appearance,
No Vaccines Administered.

## 2024-03-06 ENCOUNTER — APPOINTMENT (OUTPATIENT)
Dept: PULMONOLOGY | Facility: CLINIC | Age: 89
End: 2024-03-06
Payer: MEDICARE

## 2024-03-06 VITALS
BODY MASS INDEX: 33.37 KG/M2 | WEIGHT: 159 LBS | OXYGEN SATURATION: 92 % | HEART RATE: 67 BPM | SYSTOLIC BLOOD PRESSURE: 138 MMHG | DIASTOLIC BLOOD PRESSURE: 77 MMHG | HEIGHT: 58 IN

## 2024-03-06 DIAGNOSIS — C50.911 MALIGNANT NEOPLASM OF UNSPECIFIED SITE OF RIGHT FEMALE BREAST: ICD-10-CM

## 2024-03-06 PROCEDURE — 94729 DIFFUSING CAPACITY: CPT

## 2024-03-06 PROCEDURE — 94726 PLETHYSMOGRAPHY LUNG VOLUMES: CPT

## 2024-03-06 PROCEDURE — 99214 OFFICE O/P EST MOD 30 MIN: CPT | Mod: 25

## 2024-03-06 PROCEDURE — 94060 EVALUATION OF WHEEZING: CPT

## 2024-03-06 PROCEDURE — ZZZZZ: CPT

## 2024-03-06 NOTE — HISTORY OF PRESENT ILLNESS
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  She was treated with high-dose prednisone with gradual clearing of her infiltrates but leaving a residual reticular pattern suggestive of fibrosis.  Her CAT scan also demonstrated emphysema.  Because of her history of breast cancer, she had a recent PET scan and abnormalities in her pelvis may be related to her recent fall. She reports that her dyspnea is somewhat improved.  She is on supplemental oxygen and she is using a walker.  She denies any significant coughing or chest pain.  She is currently on Symbicort and prednisone 5 mg/day.

## 2024-03-06 NOTE — PHYSICAL EXAM
[No Acute Distress] : no acute distress [Normal Appearance] : normal appearance [Normal Rate/Rhythm] : normal rate/rhythm [Normal S1, S2] : normal s1, s2 [No Resp Distress] : no resp distress [No Focal Deficits] : no focal deficits [TextBox_68] : Crackles both bases

## 2024-03-06 NOTE — REVIEW OF SYSTEMS
[Fatigue] : fatigue [SOB on Exertion] : sob on exertion [Arthralgias] : arthralgias [Anemia] : anemia [Negative] : Gastrointestinal

## 2024-03-06 NOTE — DISCUSSION/SUMMARY
[FreeTextEntry1] : After walking 160 feet her oxygen saturations fell from 98% to 84%.  Her lung functions showed normal mechanics and normal lung volumes but her diffusing capacity is severely impaired.  This may have resulted from the pneumonitis, from her emphysema and from a prior serious COVID infection For now she will reduce her prednisone to 5 mg on alternate days and she will continue to use supplemental oxygen 24/7.  Lengthy discussion with her and her daughter about a trip to [place] for a wedding

## 2024-03-24 ENCOUNTER — RX RENEWAL (OUTPATIENT)
Age: 89
End: 2024-03-24

## 2024-03-27 NOTE — PRE-OP CHECKLIST - DNR CLARIFICATION FORM COMPLETED
Price (Do Not Change): 0.00 Instructions: This plan will send the code FBSE to the PM system.  DO NOT or CHANGE the price. Detail Level: Generalized done

## 2024-04-05 ENCOUNTER — APPOINTMENT (OUTPATIENT)
Dept: DERMATOLOGY | Facility: CLINIC | Age: 89
End: 2024-04-05

## 2024-04-17 ENCOUNTER — APPOINTMENT (OUTPATIENT)
Dept: PULMONOLOGY | Facility: CLINIC | Age: 89
End: 2024-04-17
Payer: MEDICARE

## 2024-04-17 VITALS
RESPIRATION RATE: 15 BRPM | TEMPERATURE: 98 F | HEIGHT: 58 IN | BODY MASS INDEX: 32.95 KG/M2 | DIASTOLIC BLOOD PRESSURE: 73 MMHG | WEIGHT: 157 LBS | SYSTOLIC BLOOD PRESSURE: 155 MMHG | OXYGEN SATURATION: 86 % | HEART RATE: 84 BPM

## 2024-04-17 DIAGNOSIS — J98.4 OTHER DISORDERS OF LUNG: ICD-10-CM

## 2024-04-17 DIAGNOSIS — R09.02 HYPOXEMIA: ICD-10-CM

## 2024-04-17 PROCEDURE — 99214 OFFICE O/P EST MOD 30 MIN: CPT

## 2024-04-17 NOTE — HISTORY OF PRESENT ILLNESS
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  She had been treated with high-dose prednisone and has gradually cleared out the pulmonary infiltrates.  She also has emphysema secondary to cigarette smoking and it and she had a serious COVID infection last year.  There seems to be residual reticular infiltrate in both lungs.  A PET scan has shown some abnormalities of her pelvis which we thought was secondary to a fall.  Currently she reports that she feels improved.  She recently went to a wedding in [place] which clearly buoyed her spirits.

## 2024-04-17 NOTE — PHYSICAL EXAM
[No Acute Distress] : no acute distress [Normal Appearance] : normal appearance [Normal Rate/Rhythm] : normal rate/rhythm [No Resp Distress] : no resp distress [Clear to Auscultation Bilaterally] : clear to auscultation bilaterally [No Focal Deficits] : no focal deficits [TextBox_2] : Pale [TextBox_54] : 1/6 systolic ejection murmur aortic area [TextBox_99] : Uses walker

## 2024-04-17 NOTE — REVIEW OF SYSTEMS
[Fatigue] : fatigue [SOB on Exertion] : sob on exertion [Immunocompromised] : immunocompromised [Negative] : Gastrointestinal

## 2024-05-07 RX ORDER — PREDNISONE 10 MG/1
10 TABLET ORAL
Qty: 40 | Refills: 0 | Status: ACTIVE | COMMUNITY
Start: 2024-05-07 | End: 1900-01-01

## 2024-05-08 ENCOUNTER — APPOINTMENT (OUTPATIENT)
Dept: RADIOLOGY | Facility: IMAGING CENTER | Age: 89
End: 2024-05-08
Payer: MEDICARE

## 2024-05-08 ENCOUNTER — OUTPATIENT (OUTPATIENT)
Dept: OUTPATIENT SERVICES | Facility: HOSPITAL | Age: 89
LOS: 1 days | End: 2024-05-08
Payer: MEDICARE

## 2024-05-08 DIAGNOSIS — Z98.89 OTHER SPECIFIED POSTPROCEDURAL STATES: Chronic | ICD-10-CM

## 2024-05-08 DIAGNOSIS — Z98.890 OTHER SPECIFIED POSTPROCEDURAL STATES: Chronic | ICD-10-CM

## 2024-05-08 DIAGNOSIS — Z96.649 PRESENCE OF UNSPECIFIED ARTIFICIAL HIP JOINT: Chronic | ICD-10-CM

## 2024-05-08 DIAGNOSIS — J43.9 EMPHYSEMA, UNSPECIFIED: ICD-10-CM

## 2024-05-08 PROCEDURE — 71046 X-RAY EXAM CHEST 2 VIEWS: CPT | Mod: 26

## 2024-05-08 PROCEDURE — 71046 X-RAY EXAM CHEST 2 VIEWS: CPT

## 2024-05-09 DIAGNOSIS — U07.1 COVID-19: ICD-10-CM

## 2024-05-09 RX ORDER — NIRMATRELVIR AND RITONAVIR 150-100 MG
10 X 150 MG & KIT ORAL
Qty: 1 | Refills: 0 | Status: ACTIVE | COMMUNITY
Start: 2024-05-09 | End: 1900-01-01

## 2024-05-09 RX ORDER — ALBUTEROL SULFATE 90 UG/1
108 (90 BASE) INHALANT RESPIRATORY (INHALATION)
Qty: 1 | Refills: 1 | Status: ACTIVE | COMMUNITY
Start: 2024-05-07 | End: 1900-01-01

## 2024-05-09 RX ORDER — TIOTROPIUM BROMIDE INHALATION SPRAY 1.56 UG/1
1.25 SPRAY, METERED RESPIRATORY (INHALATION) DAILY
Qty: 1 | Refills: 5 | Status: ACTIVE | COMMUNITY
Start: 2024-01-05 | End: 1900-01-01

## 2024-05-09 RX ORDER — BUDESONIDE AND FORMOTEROL FUMARATE DIHYDRATE 160; 4.5 UG/1; UG/1
160-4.5 AEROSOL RESPIRATORY (INHALATION)
Qty: 30.6 | Refills: 3 | Status: ACTIVE | COMMUNITY
Start: 2023-12-04 | End: 1900-01-01

## 2024-05-28 ENCOUNTER — RX RENEWAL (OUTPATIENT)
Age: 89
End: 2024-05-28

## 2024-06-21 ENCOUNTER — APPOINTMENT (OUTPATIENT)
Dept: PULMONOLOGY | Facility: CLINIC | Age: 89
End: 2024-06-21
Payer: MEDICARE

## 2024-06-21 VITALS
HEIGHT: 58 IN | SYSTOLIC BLOOD PRESSURE: 139 MMHG | DIASTOLIC BLOOD PRESSURE: 78 MMHG | WEIGHT: 155 LBS | TEMPERATURE: 97.7 F | BODY MASS INDEX: 32.54 KG/M2 | RESPIRATION RATE: 15 BRPM | HEART RATE: 82 BPM | OXYGEN SATURATION: 91 %

## 2024-06-21 DIAGNOSIS — J43.9 EMPHYSEMA, UNSPECIFIED: ICD-10-CM

## 2024-06-21 PROCEDURE — 99213 OFFICE O/P EST LOW 20 MIN: CPT

## 2024-06-24 NOTE — ASSESSMENT
[FreeTextEntry1] : 89F with COPD, hx of non cardiac pulmonary edema, and a serious COVID infection last year. She had another covid infection last month which was luckily mild, treated with paxlovid, prednisone, and inhalers. She feels very well today and denies sx. In fact, stopped using all her pulmonary meds shortly after covid resolved. Continues supplemented O2 on exertion as neeed.  Lungs are clear on exam. She walked about 160ft and sats went from 96% to 89%, which is improved compared to prior visits. She looks well.  Continue using walker for safety and supplemented O2 on ambulation.  Follow up in 4-6 months or sooner if needed

## 2024-06-24 NOTE — PHYSICAL EXAM
[No Acute Distress] : no acute distress [Well Nourished] : well nourished [Well Developed] : well developed [Normal Rate/Rhythm] : normal rate/rhythm [Normal S1, S2] : normal s1, s2 [No Resp Distress] : no resp distress [Clear to Auscultation Bilaterally] : clear to auscultation bilaterally [No Edema] : no edema [Oriented x3] : oriented x3 [Normal Affect] : normal affect [TextBox_2] : ambulates with walker

## 2024-06-24 NOTE — HISTORY OF PRESENT ILLNESS
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 She had been treated with high-dose prednisone and has gradually cleared out the pulmonary infiltrates. She also has emphysema secondary to cigarette smoking and it and she had a serious COVID infection last year. There seems to be residual reticular infiltrate in both lungs. A PET scan has shown some abnormalities of her pelvis which we thought was secondary to a fall. Currently she reports that she feels improved. She recently went to a wedding in [place] which clearly buoyed her spirits.  She had covid in early May, treated with Paxlovid and Prednisone. Spiriva and Symbicort. CXR was negative.  She continues to use supplemented O2 at 2LPM on exertion.  She recovered from covid and feels very well. Denies any breathing issues, cough or sputum.

## 2024-07-22 ENCOUNTER — APPOINTMENT (OUTPATIENT)
Dept: UROGYNECOLOGY | Facility: CLINIC | Age: 89
End: 2024-07-22

## 2024-07-22 DIAGNOSIS — N32.81 OVERACTIVE BLADDER: ICD-10-CM

## 2024-07-22 DIAGNOSIS — Z85.3 PERSONAL HISTORY OF MALIGNANT NEOPLASM OF BREAST: ICD-10-CM

## 2024-07-22 DIAGNOSIS — R35.1 NOCTURIA: ICD-10-CM

## 2024-07-22 DIAGNOSIS — Z86.59 PERSONAL HISTORY OF OTHER MENTAL AND BEHAVIORAL DISORDERS: ICD-10-CM

## 2024-07-22 DIAGNOSIS — J98.4 OTHER DISORDERS OF LUNG: ICD-10-CM

## 2024-07-22 DIAGNOSIS — N39.41 URGE INCONTINENCE: ICD-10-CM

## 2024-07-22 DIAGNOSIS — Z83.511 FAMILY HISTORY OF GLAUCOMA: ICD-10-CM

## 2024-07-22 DIAGNOSIS — Z63.4 DISAPPEARANCE AND DEATH OF FAMILY MEMBER: ICD-10-CM

## 2024-07-22 LAB
BILIRUB UR QL STRIP: NORMAL
CLARITY UR: CLEAR
COLLECTION METHOD: NORMAL
GLUCOSE UR-MCNC: NORMAL
HCG UR QL: 0.2 EU/DL
HGB UR QL STRIP.AUTO: ABNORMAL
KETONES UR-MCNC: NORMAL
LEUKOCYTE ESTERASE UR QL STRIP: ABNORMAL
NITRITE UR QL STRIP: NORMAL
PH UR STRIP: 5.5
PROT UR STRIP-MCNC: NORMAL
SP GR UR STRIP: 1.01

## 2024-07-22 PROCEDURE — 81003 URINALYSIS AUTO W/O SCOPE: CPT | Mod: QW

## 2024-07-22 PROCEDURE — 51701 INSERT BLADDER CATHETER: CPT

## 2024-07-22 PROCEDURE — 99214 OFFICE O/P EST MOD 30 MIN: CPT | Mod: 25

## 2024-07-22 PROCEDURE — 99459 PELVIC EXAMINATION: CPT

## 2024-07-22 RX ORDER — VIBEGRON 75 MG/1
75 TABLET, FILM COATED ORAL
Qty: 30 | Refills: 1 | Status: ACTIVE | COMMUNITY
Start: 2024-07-22 | End: 1900-01-01

## 2024-07-22 SDOH — SOCIAL STABILITY - SOCIAL INSECURITY: DISSAPEARANCE AND DEATH OF FAMILY MEMBER: Z63.4

## 2024-07-23 PROBLEM — Z85.3 HISTORY OF MALIGNANT NEOPLASM OF BREAST: Status: RESOLVED | Noted: 2024-07-23 | Resolved: 2024-07-23

## 2024-07-23 PROBLEM — J98.4 LUNG DISEASE: Status: ACTIVE | Noted: 2024-07-23

## 2024-07-23 PROBLEM — Z83.511 FAMILY HISTORY OF GLAUCOMA: Status: ACTIVE | Noted: 2024-07-23

## 2024-07-23 PROBLEM — Z63.4 WIDOWED: Status: ACTIVE | Noted: 2024-07-23

## 2024-07-23 PROBLEM — Z86.59 HISTORY OF DEPRESSION: Status: RESOLVED | Noted: 2024-07-23 | Resolved: 2024-07-23

## 2024-07-23 RX ORDER — CRANBERRY FRUIT EXTRACT 200 MG
CAPSULE ORAL
Refills: 0 | Status: ACTIVE | COMMUNITY

## 2024-07-24 NOTE — PHYSICAL EXAM
[Chaperone Present] : A chaperone was present in the examining room during all aspects of the physical examination [14964] : A chaperone was present during the pelvic exam. [Well developed] : well developed [Well Nourished] : ~L well nourished [Warm and Dry] : was warm and dry to touch [Vulvar Atrophy] : vulvar atrophy [Normal Appearance] : general appearance was normal [Atrophy] : atrophy [Normal] : normal [Uterine Adnexae] : were not tender and not enlarged [Post Void Residual ____ml] : post void residual was [unfilled] ml [FreeTextEntry2] : Luis Armando [Tenderness] : ~T no ~M abdominal tenderness observed [Distended] : not distended

## 2024-07-24 NOTE — HISTORY OF PRESENT ILLNESS
[Vaginal Wall Prolapse] : no [Rectal Prolapse] : no [Urinary Frequency] : no [Urinary Tract Infection] : no [] : yes [Uses ___ pads per day] : uses [unfilled] pad(s) per day [Unable To Restrain Bowel Movement] : no [de-identified] : occassioanlyy [de-identified] : daily, [de-identified] : 3-4

## 2024-07-24 NOTE — HISTORY OF PRESENT ILLNESS
[Vaginal Wall Prolapse] : no [Rectal Prolapse] : no [Urinary Frequency] : no [Urinary Tract Infection] : no [] : yes [Uses ___ pads per day] : uses [unfilled] pad(s) per day [Unable To Restrain Bowel Movement] : no [de-identified] : occassioanlyy [de-identified] : daily, [de-identified] : 3-4

## 2024-07-24 NOTE — PHYSICAL EXAM
[Chaperone Present] : A chaperone was present in the examining room during all aspects of the physical examination [41307] : A chaperone was present during the pelvic exam. [Well developed] : well developed [Well Nourished] : ~L well nourished [Warm and Dry] : was warm and dry to touch [Vulvar Atrophy] : vulvar atrophy [Normal Appearance] : general appearance was normal [Atrophy] : atrophy [Normal] : normal [Uterine Adnexae] : were not tender and not enlarged [Post Void Residual ____ml] : post void residual was [unfilled] ml [FreeTextEntry2] : Luis Armando [Tenderness] : ~T no ~M abdominal tenderness observed [Distended] : not distended

## 2024-07-24 NOTE — DISCUSSION/SUMMARY
[FreeTextEntry1] : I reviewed the above findings with the patient.  With regards to her urinary symptoms we discussed likely overactive bladder.  We discussed first line therapies, including Kegel's exercises and behavioral modification. We discussed medications. We also discussed SNM, Botox, and PTNS.  She would like to start medication.  We discussed starting Gemtesa.  Side effects were discussed.  We discussed that urine dipstick was positive for trace blood and leukocytes and we will send off for urinalysis with microscopy and culture.  I have asked her to follow-up in the office in approximately 1 month to evaluate her response.  I UGA patient information on overactive bladder and drug education was given to her.

## 2024-07-27 LAB
APPEARANCE: ABNORMAL
BACTERIA: NEGATIVE /HPF
BILIRUBIN URINE: NEGATIVE
BLOOD URINE: ABNORMAL
CAST: 0 /LPF
COLOR: YELLOW
EPITHELIAL CELLS: 1 /HPF
GLUCOSE QUALITATIVE U: NEGATIVE MG/DL
KETONES URINE: NEGATIVE MG/DL
LEUKOCYTE ESTERASE URINE: ABNORMAL
MICROSCOPIC-UA: NORMAL
NITRITE URINE: NEGATIVE
PH URINE: 6
PROTEIN URINE: NEGATIVE MG/DL
RED BLOOD CELLS URINE: 0 /HPF
SPECIFIC GRAVITY URINE: 1.01
UROBILINOGEN URINE: 0.2 MG/DL
WHITE BLOOD CELLS URINE: 114 /HPF

## 2024-08-16 ENCOUNTER — APPOINTMENT (OUTPATIENT)
Dept: PULMONOLOGY | Facility: CLINIC | Age: 89
End: 2024-08-16
Payer: MEDICARE

## 2024-08-16 VITALS
RESPIRATION RATE: 15 BRPM | TEMPERATURE: 97 F | HEIGHT: 58 IN | OXYGEN SATURATION: 87 % | DIASTOLIC BLOOD PRESSURE: 72 MMHG | HEART RATE: 78 BPM | WEIGHT: 155 LBS | BODY MASS INDEX: 32.54 KG/M2 | SYSTOLIC BLOOD PRESSURE: 152 MMHG

## 2024-08-16 DIAGNOSIS — J43.9 EMPHYSEMA, UNSPECIFIED: ICD-10-CM

## 2024-08-16 DIAGNOSIS — J98.4 OTHER DISORDERS OF LUNG: ICD-10-CM

## 2024-08-16 DIAGNOSIS — R06.02 SHORTNESS OF BREATH: ICD-10-CM

## 2024-08-16 DIAGNOSIS — C50.912 MALIGNANT NEOPLASM OF UNSPECIFIED SITE OF LEFT FEMALE BREAST: ICD-10-CM

## 2024-08-16 PROCEDURE — 99214 OFFICE O/P EST MOD 30 MIN: CPT

## 2024-08-16 NOTE — DISCUSSION/SUMMARY
Health Maintenance Due   Topic Date Due   • Hepatitis B Vaccine (1 of 3 - 3-dose series) Never done       Patient is due for topics as listed above but is not proceeding with Immunization(s) Hep B at this time. Education provided for Immunization(s) Hep B.     [FreeTextEntry1] : After walking 160 feet today, her oxygen saturations fell from 93% to 86% which is slightly worse than previously observed.  Her physical exam is about the same.  Her last chest x-ray showed no evidence of parenchymal infiltrates and her last CAT scan did show small pleural effusions and residual interstitial disease related to her radiation but no evidence of an active pneumonitis.  I have not changed any specific therapy.  But I will follow her closely and see her in 3 months.  We may need to repeat the CT scan I advised her to use her oxygen if she is walking more than 150 feet.

## 2024-08-16 NOTE — HISTORY OF PRESENT ILLNESS
[TextBox_4] : 89-year-old female previously hospitalized for noncardiac pulmonary edema thought to be secondary to Ibrance.  We had treated her with high-dose prednisone and she has gradually improved as have the pulmonary infiltrates.  She also has emphysema secondary to cigarette smoking and she had a serious COVID infection at least once and a mild infection once.  Her only current complaint beside dyspnea on exertion is a nonproductive cough.  She denies any recent fevers and she is using supplemental oxygen to ambulate which she carries on her walker.  Recently took Bactrim for UTI

## 2024-08-16 NOTE — PHYSICAL EXAM
[Normal Oropharynx] : normal oropharynx [III] : Mallampati Class: III [Normal Appearance] : normal appearance [Normal Rate/Rhythm] : normal rate/rhythm [No Resp Distress] : no resp distress [No Edema] : no edema [Oriented x3] : oriented x3 [TextBox_2] : Chronically ill [TextBox_54] : 2/6 systolic ejection murmur aortic area [TextBox_68] : Crackles both lung bases left greater than right [TextBox_99] : Uses walker

## 2024-08-21 ENCOUNTER — APPOINTMENT (OUTPATIENT)
Dept: UROGYNECOLOGY | Facility: CLINIC | Age: 89
End: 2024-08-21

## 2024-09-28 NOTE — H&P ADULT - REASON FOR ADMISSION
Hospitalist Progress Note  9/28/2024 2:43 PM  Subjective:   Admit Date: 9/27/2024  PCP: Ron Padgett MD     Full Code      C/c:No chief complaint on file.        Interval History: pt here for hip replacement, pt had it yesterday, doing well, pt will be d/c today    Diet: ADULT DIET; Regular                                ip days:0  Medications:   Scheduled Meds:   hydroCHLOROthiazide  25 mg Oral Daily    losartan  100 mg Oral Daily    sodium chloride flush  5-40 mL IntraVENous 2 times per day    acetaminophen  650 mg Oral Q6H    aspirin  81 mg Oral BID     Continuous Infusions:   sodium chloride      sodium chloride Stopped (09/28/24 1323)     PRN Meds:.albuterol sulfate HFA, sodium chloride flush, sodium chloride, HYDROcodone 5 mg - acetaminophen **OR** HYDROcodone 5 mg - acetaminophen, magnesium hydroxide, promethazine **OR** ondansetron, HYDROmorphone     CBC:   Recent Labs     09/26/24  0857 09/27/24  1549 09/28/24  0605   WBC 8.2 18.7* 14.0*   HGB 13.0 12.4 11.0*    321 310     BMP:    Recent Labs     09/27/24  1549 09/28/24  0605    138   K 3.9 3.8    104   CO2 27 26   BUN 19 18   CREATININE 1.2* 1.1*   GLUCOSE 169* 121*     Hepatic: No results for input(s): \"AST\", \"ALT\", \"BILITOT\", \"ALKPHOS\" in the last 72 hours.    Invalid input(s): \"ALB\"  Troponin: No results for input(s): \"TROPONINI\" in the last 72 hours.  BNP: No results for input(s): \"BNP\" in the last 72 hours.  Lipids: No results for input(s): \"CHOL\", \"HDL\" in the last 72 hours.    Invalid input(s): \"LDLCALCU\"  INR: No results for input(s): \"INR\" in the last 72 hours.    Objective:   Vitals: /60   Pulse 75   Temp 98.2 °F (36.8 °C) (Temporal)   Resp 16   Ht 1.727 m (5' 8\")   Wt 108.8 kg (239 lb 14.4 oz)   LMP  (LMP Unknown)   SpO2 97%   BMI 36.48 kg/m²   General appearance: alert, appears stated age and cooperative  Skin: Skin color, texture, turgor normal. No rashes or lesions  Lungs: clear to auscultation  bilaterally  Heart: regular rate and rhythm, S1, S2 normal, no murmur, click, rub or gallop  Abdomen: soft, non-tender; bowel sounds normal; no masses,  no organomegaly  Extremities: extremities normal, atraumatic, no cyanosis or edema  Neurologic: Mental status: Alert, oriented, thought content appropriate    Prophylaxis:   DVT with  [] lovenox        [] heparin        [] Scd        [x] asa    Radiology:  XR CHEST PORTABLE    Result Date: 9/27/2024  EXAMINATION: ONE XRAY VIEW OF THE CHEST 9/27/2024 4:17 pm COMPARISON: 02/27/2019. HISTORY: ORDERING SYSTEM PROVIDED HISTORY: post operative TECHNOLOGIST PROVIDED HISTORY: post operative FINDINGS: The exam is rotated.  The cardiac silhouette is unremarkable.  Aortic vascular calcification with tortuosity, likely accentuated by rotation. Linear opacification at the right base, likely atelectasis.  The lungs otherwise are clear.  No pleural fluid or evidence of overt failure.     Linear right basilar atelectasis.  Otherwise unremarkable chest.     XR HIP LEFT (1 VIEW)    Result Date: 9/27/2024  EXAMINATION: ONE XRAY VIEW OF THE LEFT HIP 9/27/2024 1:25 pm COMPARISON: 08/23/2024 HISTORY: ORDERING SYSTEM PROVIDED HISTORY: Left total hip arthroplasty TECHNOLOGIST PROVIDED HISTORY: Left total hip arthroplasty Reason for Exam: Left total hip arthroplasty FINDINGS: There has been interval placement of components of left total hip arthroplasty situated in near anatomic alignment without specific imaging features of immediate postop complication seen.  There are foci of soft tissue gas/defect of the lateral soft tissues hip, reflecting recent surgery. Components of right total hip arthroplasty are partially seen.     Interval placement of components of left total hip arthroplasty situated in near anatomic alignment.       Assessment :   Total hip replacement/stable  Asthma/stable      Plan:   1.  Ok to d/c        Patient Active Problem List:     Postmenopausal HRT (hormone  Shortness of breath

## 2024-09-30 ENCOUNTER — APPOINTMENT (OUTPATIENT)
Dept: UROGYNECOLOGY | Facility: CLINIC | Age: 89
End: 2024-09-30
Payer: MEDICARE

## 2024-09-30 VITALS — SYSTOLIC BLOOD PRESSURE: 162 MMHG | DIASTOLIC BLOOD PRESSURE: 77 MMHG | HEART RATE: 74 BPM

## 2024-09-30 DIAGNOSIS — N39.41 URGE INCONTINENCE: ICD-10-CM

## 2024-09-30 DIAGNOSIS — N32.81 OVERACTIVE BLADDER: ICD-10-CM

## 2024-09-30 PROCEDURE — G2211 COMPLEX E/M VISIT ADD ON: CPT

## 2024-09-30 PROCEDURE — 99213 OFFICE O/P EST LOW 20 MIN: CPT

## 2024-09-30 RX ORDER — VIBEGRON 75 MG/1
75 TABLET, FILM COATED ORAL DAILY
Qty: 90 | Refills: 2 | Status: ACTIVE | COMMUNITY
Start: 2024-09-30 | End: 1900-01-01

## 2024-09-30 NOTE — HISTORY OF PRESENT ILLNESS
[FreeTextEntry1] : Quinton is a 89yo with hx of OAB, UUI and nocturia symptoms who started Gemtesa 75mg daily at her last visit (07/22/24).  She presents today for follow-up.  She reports over 90% improvement in her urinary symptoms and remains very pleased with the improvement.  She is voiding and moving bowels without issues.  She feels that she is emptying her bladder completely.  She does not notice any bothersome side effects from the medication and would like to continue.  However, she reports that the medication is expensive and wonders if we can try to send for generic version of Gemtesa.  Discussed with pt that Gemtesa's generic (Vibegron) is not available and we will send for 90-day supply, which is usually more affordable.

## 2024-09-30 NOTE — PHYSICAL EXAM
[No Acute Distress] : in no acute distress [Well developed] : well developed [Well Nourished] : ~L well nourished [Good Hygeine] : demonstrates good hygeine [Oriented x3] : oriented to person, place, and time [Normal Memory] : ~T memory was ~L unimpaired [Normal Mood/Affect] : mood and affect are normal [Warm and Dry] : was warm and dry to touch [Normal Gait] : gait was normal [Post Void Residual ____ml] : post void residual was [unfilled] ml [Anxiety] : patient is not anxious [de-identified] : via bladder scan

## 2024-09-30 NOTE — DISCUSSION/SUMMARY
[FreeTextEntry1] : #OAB / UUI / nocturia symptoms  - Continue with Gemtesa 75mg.  If it continues to be very expensive despite 90 days supply and pt would like to try another medication, she will call us and let us know.   - BP noted to be elevated today; recommended pt to follow up with PCP.  PVR was 0ml via bladder scan.  - Continue with diet and behavioral modifications   RTO in 12 months or sooner as needed. All questions answered to pt satisfaction.  Pt can call the office with any additional questions or concerns; pt verbalized understanding.

## 2024-10-04 RX ORDER — TROSPIUM CHLORIDE 60 MG/1
60 CAPSULE, EXTENDED RELEASE ORAL DAILY
Qty: 90 | Refills: 0 | Status: COMPLETED | COMMUNITY
Start: 2024-10-01 | End: 2024-10-04

## 2024-10-07 ENCOUNTER — RX RENEWAL (OUTPATIENT)
Age: 89
End: 2024-10-07

## 2024-10-09 RX ORDER — TROSPIUM CHLORIDE 20 MG/1
20 TABLET, FILM COATED ORAL
Qty: 180 | Refills: 0 | Status: ACTIVE | COMMUNITY
Start: 2024-10-04 | End: 1900-01-01

## 2024-10-17 NOTE — ASSESSMENT
[FreeTextEntry1] : 1.  Heartburn, eructation, difficulty swallowing with solids and liquids.  May be due to GERD with esophagitis, motility disorder or structural lesion (hiatus hernia, stricture, external compression).\par 2.  Metastatic breast cancer maintained on Ibrance.\par 3.  History of cholecystitis (2021).\par \par Recs:\par - Prior records, recent labs reviewed.\par - Patient was counseled on GERD lifestyle modifications including head of bed elevation at night, avoiding lying down 2-3 hours after eating, weight loss, avoiding tight-fitting clothing, eating small, frequent meals, and minimizing potential food triggers (tomatoes/sauce, caffeine, alcohol, spicy foods, citrus foods, red meat, chocolate, mint, carbonated beverages).\par - Start PPI daily in AM.  (potential interaction with Ibrance capsules but patient is taking tablets).\par - Use Pepcid for breakthrough symptoms.\par - Check barium esophagram.\par - Further recommendations pending above. Male

## 2024-11-04 NOTE — CONSULT NOTE ADULT - SUBJECTIVE AND OBJECTIVE BOX
Per Francia Tyson PA-C:    Please let patient know that Dr. Woodruff approved a steroid injection 6 weeks post shoulder surgery. We can schedule R IA hip injection at that time or schedule FU. Whichever patient prefers.     Thanks,     PAWAN RANKIN     Called patient, advised as above. Patient states she'd like to just schedule for 6 weeks after shoulder surgery. Advised patient I'd place an order and ask Gracie to call her to schedule; advised she might not hear from her right away as I'm not sure how far out Dr. Johns is scheduling at this time.     (Shoulder surgery is 11/20 so we would be looking at after 1/1/25)     Patient may be on ASA after shoulder surgery; reached out to Mayda to make sure OK to continue    Per Francia Tyson PA-C:  Yes. Ok to continue ASA!      HPI   Patient has complex recent medical history now co several year history of ? urinary retention and recurrent uit's  She now presents to ns er with chills.  She has a pmh 88 y/o F pmh  hx bilat breast Ca on Exemestane, s/p covid w/o sequelae, fully vaccinated, hyperlipidemia, depression/ anxiety, who presents with chills, x5 days and profuse sweating. Pt first noted chills, "shaking out of control" intermittently throughout the day since Friday, and sweating like "water pouring out of my body" most notably on the day prior to admission. She denies any productive cough, sore throat, fatigue, weakness, documented fevers, headaches, myalgias, neck rigidity. Pt visited Urgent care on  and tested negative for COVID PCR, Influenza. Her Sweating episode yesterday prompted her to call PCP who recommended ED visit. Pt also reports to history of ? urinary retention without prior hernandez now with urinary frequency due to feeling unable to empty bladder. She has a history of recurrent UTI's and notes being acutely symptomatic 3x in the past year. Pt described that she must apply abdominal pressure or strain to completely urinate. Pt also  suprapubic pressure but no hematuria, dysuria, since her last acute UTI episode a few weeks ago. Pt was on Levofloxacin, last taken Mon and Tues for prophylaxis. She presently denies pyuria, hematuria. Pt denies any recent travel or sick contacts.     ED course:   99.7 110/62 92bpm 16/min at 99% on RA      PAST MEDICAL & SURGICAL HISTORY:  Anxiety    Hyperlipidemia    UTI (lower urinary tract infection)    Depression    Breast cancer    HLD (hyperlipidemia)    Gallbladder stone without cholecystitis or obstruction    Cholecystitis    2019 novel coronavirus disease (COVID-19)  2021    Recurrent breast cancer  opposite side, left-3/2020    History of chemotherapy  and radiation-    S/P chemotherapy, time since less than 4 weeks    Cholelithiasis    H/O lumpectomy  left-1994    History of hip replacement  left-    S/P Mohs surgery for basal cell carcinoma  right side of nose-        MEDICATIONS  (STANDING):  buPROPion XL (24-Hour) . 300 milliGRAM(s) Oral at bedtime  cefTRIAXone   IVPB 1000 milliGRAM(s) IV Intermittent every 24 hours  escitalopram 5 milliGRAM(s) Oral daily  exemestane 25 milliGRAM(s) Oral daily  heparin   Injectable 5000 Unit(s) SubCutaneous every 12 hours  mirtazapine 7.5 milliGRAM(s) Oral at bedtime  multivitamin 1 Tablet(s) Oral daily  simvastatin 20 milliGRAM(s) Oral at bedtime    MEDICATIONS  (PRN):    FAMILY HISTORY:  FH: breast cancer  mother    FH: lung cancer (Mother)    Allergies  penicillin (Hives)    SOCIAL HISTORY:  no tobacco etoh    REVIEW OF SYSTEMS:     CONSTITUTIONAL: chills   EYES/ENT: No visual changes;  Neg   NECK: Neg  RESPIRATORY: Ng  CARDIOVASCULAR: Neg  GASTROINTESTINAL: Neg  GENITOURINARY: as per HPI   NEUROLOGICAL: Neg  SKIN: Neg  BAck: ng   MSK: neg  psyche: neg    Physical Exam  Vital signs  T(C): 36.7 (21 @ 07:52), Max: 37.3 (12-15-21 @ 21:02)  HR: 87 (21 @ 07:52)  BP: 102/66 (21 @ 07:52)  SpO2: 97% (21 @ 07:52)    Gen:  wdwn woman in nad  heent ncat neck symm   cor reg chest clear  abd soft ndnt no cvat no brae guarding    LABS:     @ 05:03    WBC 2.60  / Hct 27.8  / SCr 0.95     12-15 @ 16:23    WBC --    / Hct --    / SCr 0.99         138  |  106  |  13  ----------------------------<  133<H>  3.7   |  21<L>  |  0.95    Ca    8.4      16 Dec 2021 05:03  Phos  2.5       Mg     2.3         TPro  6.7  /  Alb  3.2<L>  /  TBili  0.1<L>  /  DBili  x   /  AST  64<H>  /  ALT  79<H>  /  AlkPhos  66      PT/INR - ( 15 Dec 2021 14:39 )   PT: 13.5 sec;   INR: 1.13 ratio      PTT - ( 15 Dec 2021 14:39 )  PTT:29.1 sec  Urinalysis Basic - ( 15 Dec 2021 16:25 )    Color: Yellow / Appearance: Clear / S.016 / pH: x  Gluc: x / Ketone: Negative  / Bili: Negative / Urobili: Negative   Blood: x / Protein: 30 mg/dL / Nitrite: Negative   Leuk Esterase: Moderate / RBC: 9 /hpf / WBC 10 /HPF   Sq Epi: x / Non Sq Epi: 1 /hpf / Bacteria: Negative    Urine Cx: ng    RADIOLOGY  na:

## 2024-11-25 ENCOUNTER — NON-APPOINTMENT (OUTPATIENT)
Age: 89
End: 2024-11-25

## 2024-11-26 ENCOUNTER — APPOINTMENT (OUTPATIENT)
Dept: PULMONOLOGY | Facility: CLINIC | Age: 89
End: 2024-11-26
Payer: MEDICARE

## 2024-11-26 VITALS
TEMPERATURE: 98.2 F | DIASTOLIC BLOOD PRESSURE: 62 MMHG | HEART RATE: 79 BPM | HEIGHT: 58 IN | OXYGEN SATURATION: 90 % | BODY MASS INDEX: 31.12 KG/M2 | SYSTOLIC BLOOD PRESSURE: 131 MMHG | WEIGHT: 148.25 LBS | RESPIRATION RATE: 16 BRPM

## 2024-11-26 DIAGNOSIS — J43.9 EMPHYSEMA, UNSPECIFIED: ICD-10-CM

## 2024-11-26 DIAGNOSIS — C50.911 MALIGNANT NEOPLASM OF UNSPECIFIED SITE OF RIGHT FEMALE BREAST: ICD-10-CM

## 2024-11-26 DIAGNOSIS — C50.912 MALIGNANT NEOPLASM OF UNSPECIFIED SITE OF LEFT FEMALE BREAST: ICD-10-CM

## 2024-11-26 PROCEDURE — 99214 OFFICE O/P EST MOD 30 MIN: CPT

## 2024-12-04 ENCOUNTER — OUTPATIENT (OUTPATIENT)
Dept: OUTPATIENT SERVICES | Facility: HOSPITAL | Age: 88
LOS: 1 days | End: 2024-12-04
Payer: MEDICARE

## 2024-12-04 ENCOUNTER — APPOINTMENT (OUTPATIENT)
Dept: RADIOLOGY | Facility: CLINIC | Age: 88
End: 2024-12-04
Payer: MEDICARE

## 2024-12-04 DIAGNOSIS — Z96.649 PRESENCE OF UNSPECIFIED ARTIFICIAL HIP JOINT: Chronic | ICD-10-CM

## 2024-12-04 DIAGNOSIS — J98.4 OTHER DISORDERS OF LUNG: ICD-10-CM

## 2024-12-04 DIAGNOSIS — J43.9 EMPHYSEMA, UNSPECIFIED: ICD-10-CM

## 2024-12-04 DIAGNOSIS — Z98.890 OTHER SPECIFIED POSTPROCEDURAL STATES: Chronic | ICD-10-CM

## 2024-12-04 DIAGNOSIS — Z98.89 OTHER SPECIFIED POSTPROCEDURAL STATES: Chronic | ICD-10-CM

## 2024-12-04 PROCEDURE — 71046 X-RAY EXAM CHEST 2 VIEWS: CPT | Mod: 26

## 2024-12-04 PROCEDURE — 71046 X-RAY EXAM CHEST 2 VIEWS: CPT

## 2024-12-19 ENCOUNTER — APPOINTMENT (OUTPATIENT)
Dept: ORTHOPEDIC SURGERY | Facility: CLINIC | Age: 88
End: 2024-12-19
Payer: MEDICARE

## 2024-12-19 VITALS — HEIGHT: 59 IN | WEIGHT: 145 LBS | BODY MASS INDEX: 29.23 KG/M2

## 2024-12-19 DIAGNOSIS — M65.311 TRIGGER THUMB, RIGHT THUMB: ICD-10-CM

## 2024-12-19 PROCEDURE — 20550 NJX 1 TENDON SHEATH/LIGAMENT: CPT | Mod: RT

## 2024-12-19 PROCEDURE — 99213 OFFICE O/P EST LOW 20 MIN: CPT | Mod: 25

## 2025-01-15 ENCOUNTER — APPOINTMENT (OUTPATIENT)
Dept: UROLOGY | Facility: CLINIC | Age: 89
End: 2025-01-15
Payer: MEDICARE

## 2025-01-15 ENCOUNTER — NON-APPOINTMENT (OUTPATIENT)
Age: 89
End: 2025-01-15

## 2025-01-15 VITALS
SYSTOLIC BLOOD PRESSURE: 146 MMHG | DIASTOLIC BLOOD PRESSURE: 74 MMHG | HEIGHT: 59 IN | OXYGEN SATURATION: 96 % | HEART RATE: 81 BPM | WEIGHT: 145 LBS | BODY MASS INDEX: 29.23 KG/M2

## 2025-01-15 DIAGNOSIS — N32.81 OVERACTIVE BLADDER: ICD-10-CM

## 2025-01-15 DIAGNOSIS — R82.71 BACTERIURIA: ICD-10-CM

## 2025-01-15 DIAGNOSIS — N39.0 URINARY TRACT INFECTION, SITE NOT SPECIFIED: ICD-10-CM

## 2025-01-15 PROCEDURE — 99204 OFFICE O/P NEW MOD 45 MIN: CPT

## 2025-01-16 PROBLEM — R82.71 BACTERIURIA, ASYMPTOMATIC: Status: ACTIVE | Noted: 2025-01-16 | Resolved: 2025-02-15

## 2025-01-16 LAB
APPEARANCE: CLEAR
BILIRUBIN URINE: NEGATIVE
BLOOD URINE: NEGATIVE
COLOR: YELLOW
GLUCOSE QUALITATIVE U: NEGATIVE MG/DL
KETONES URINE: NEGATIVE MG/DL
LEUKOCYTE ESTERASE URINE: NEGATIVE
NITRITE URINE: NEGATIVE
PH URINE: 6.5
PROTEIN URINE: NEGATIVE MG/DL
SPECIFIC GRAVITY URINE: 1.01
UROBILINOGEN URINE: 0.2 MG/DL

## 2025-02-01 DIAGNOSIS — N95.2 POSTMENOPAUSAL ATROPHIC VAGINITIS: ICD-10-CM

## 2025-02-01 RX ORDER — ESTRADIOL 0.1 MG/G
0.1 CREAM VAGINAL
Qty: 1 | Refills: 3 | Status: ACTIVE | COMMUNITY
Start: 2025-02-01 | End: 1900-01-01

## 2025-03-06 ENCOUNTER — RX RENEWAL (OUTPATIENT)
Age: 89
End: 2025-03-06

## 2025-04-04 ENCOUNTER — APPOINTMENT (OUTPATIENT)
Dept: PULMONOLOGY | Facility: CLINIC | Age: 89
End: 2025-04-04
Payer: MEDICARE

## 2025-04-04 VITALS
HEART RATE: 78 BPM | BODY MASS INDEX: 29.03 KG/M2 | HEIGHT: 59 IN | WEIGHT: 144 LBS | TEMPERATURE: 97.4 F | DIASTOLIC BLOOD PRESSURE: 91 MMHG | RESPIRATION RATE: 15 BRPM | SYSTOLIC BLOOD PRESSURE: 154 MMHG | OXYGEN SATURATION: 98 %

## 2025-04-04 DIAGNOSIS — R06.09 OTHER FORMS OF DYSPNEA: ICD-10-CM

## 2025-04-04 DIAGNOSIS — C50.911 MALIGNANT NEOPLASM OF UNSPECIFIED SITE OF RIGHT FEMALE BREAST: ICD-10-CM

## 2025-04-04 DIAGNOSIS — J43.9 EMPHYSEMA, UNSPECIFIED: ICD-10-CM

## 2025-04-04 PROCEDURE — 99214 OFFICE O/P EST MOD 30 MIN: CPT

## 2025-04-05 NOTE — ED ADULT NURSE NOTE - OBJECTIVE STATEMENT
No care due was identified.  [facility] Embedded Care Due Messages. Reference number: [id number].   4/04/2025 7:31:45 PM CDT   89F bibems from [facility] rehab s/p slipped and fell in the bathroom yesterday and sustained a deformity in the rt 4th finger, [facility] and showed dislocated rt 4th finger, noted some mild hematoma in the proximal area of the rt 4th finger. T650mg of tylenol this morning. Reports 1 on pain scale, denies hitting head, no LOC, not on blood thinners. On exam, obvious deformity noted in the rt 4th finger. Denies any numbness or tingling. VS WDL. 2019 novel coronavirus disease (COVID-19) 1/7/2021 Anxiety Breast cancer Cholecystitis Cholelithiasis Depression Gallbladder stone without cholecystitis or obstruction History of chemotherapy and radiation-1994 HLD (hyperlipidemia) Hyperlipidemia Recurrent breast cancer opposite side, left-3/2020 S/P chemotherapy, time since less than 4 weeks for rt breast cancer UTI (lower urinary tract infection) Lumpectomy in the left breast.

## 2025-05-21 ENCOUNTER — OUTPATIENT (OUTPATIENT)
Dept: OUTPATIENT SERVICES | Facility: HOSPITAL | Age: 89
LOS: 1 days | End: 2025-05-21
Payer: MEDICARE

## 2025-05-21 ENCOUNTER — APPOINTMENT (OUTPATIENT)
Dept: RADIOLOGY | Facility: IMAGING CENTER | Age: 89
End: 2025-05-21
Payer: MEDICARE

## 2025-05-21 DIAGNOSIS — Z96.649 PRESENCE OF UNSPECIFIED ARTIFICIAL HIP JOINT: Chronic | ICD-10-CM

## 2025-05-21 DIAGNOSIS — Z98.890 OTHER SPECIFIED POSTPROCEDURAL STATES: Chronic | ICD-10-CM

## 2025-05-21 DIAGNOSIS — J43.9 EMPHYSEMA, UNSPECIFIED: ICD-10-CM

## 2025-05-21 DIAGNOSIS — Z98.89 OTHER SPECIFIED POSTPROCEDURAL STATES: Chronic | ICD-10-CM

## 2025-05-21 PROCEDURE — 71046 X-RAY EXAM CHEST 2 VIEWS: CPT

## 2025-05-21 PROCEDURE — 71046 X-RAY EXAM CHEST 2 VIEWS: CPT | Mod: 26

## 2025-09-05 ENCOUNTER — APPOINTMENT (OUTPATIENT)
Dept: PULMONOLOGY | Facility: CLINIC | Age: 89
End: 2025-09-05
Payer: MEDICARE

## 2025-09-05 DIAGNOSIS — C50.911 MALIGNANT NEOPLASM OF UNSPECIFIED SITE OF RIGHT FEMALE BREAST: ICD-10-CM

## 2025-09-05 DIAGNOSIS — J43.9 EMPHYSEMA, UNSPECIFIED: ICD-10-CM

## 2025-09-05 DIAGNOSIS — J98.4 OTHER DISORDERS OF LUNG: ICD-10-CM

## 2025-09-05 DIAGNOSIS — C50.912 MALIGNANT NEOPLASM OF UNSPECIFIED SITE OF LEFT FEMALE BREAST: ICD-10-CM

## 2025-09-05 PROCEDURE — 99214 OFFICE O/P EST MOD 30 MIN: CPT

## 2025-09-05 PROCEDURE — G2211 COMPLEX E/M VISIT ADD ON: CPT
